# Patient Record
Sex: FEMALE | Race: OTHER | HISPANIC OR LATINO | Employment: UNEMPLOYED | URBAN - METROPOLITAN AREA
[De-identification: names, ages, dates, MRNs, and addresses within clinical notes are randomized per-mention and may not be internally consistent; named-entity substitution may affect disease eponyms.]

---

## 2017-01-15 ENCOUNTER — HOSPITAL ENCOUNTER (EMERGENCY)
Facility: HOSPITAL | Age: 37
Discharge: HOME | End: 2017-01-15
Attending: EMERGENCY MEDICINE | Admitting: EMERGENCY MEDICINE
Payer: COMMERCIAL

## 2017-01-15 VITALS
WEIGHT: 177 LBS | TEMPERATURE: 97.8 F | RESPIRATION RATE: 14 BRPM | OXYGEN SATURATION: 99 % | HEART RATE: 76 BPM | DIASTOLIC BLOOD PRESSURE: 61 MMHG | SYSTOLIC BLOOD PRESSURE: 108 MMHG

## 2017-01-15 DIAGNOSIS — G43.909 MIGRAINE HEADACHE: Primary | ICD-10-CM

## 2017-01-15 PROCEDURE — 96361 HYDRATE IV INFUSION ADD-ON: CPT

## 2017-01-15 PROCEDURE — 96375 TX/PRO/DX INJ NEW DRUG ADDON: CPT

## 2017-01-15 PROCEDURE — 99283 EMERGENCY DEPT VISIT LOW MDM: CPT

## 2017-01-15 PROCEDURE — 96374 THER/PROPH/DIAG INJ IV PUSH: CPT

## 2017-01-15 RX ORDER — KETOROLAC TROMETHAMINE 30 MG/ML
15 INJECTION, SOLUTION INTRAMUSCULAR; INTRAVENOUS ONCE
Status: COMPLETED | OUTPATIENT
Start: 2017-01-15 | End: 2017-01-15

## 2017-01-15 RX ORDER — METOCLOPRAMIDE HYDROCHLORIDE 5 MG/ML
10 INJECTION INTRAMUSCULAR; INTRAVENOUS ONCE
Status: COMPLETED | OUTPATIENT
Start: 2017-01-15 | End: 2017-01-15

## 2017-01-15 RX ADMIN — METOCLOPRAMIDE 10 MG: 5 INJECTION, SOLUTION INTRAMUSCULAR; INTRAVENOUS at 08:45

## 2017-01-15 RX ADMIN — SODIUM CHLORIDE 1000 ML: 0.9 INJECTION, SOLUTION INTRAVENOUS at 08:44

## 2017-01-15 RX ADMIN — KETOROLAC TROMETHAMINE 15 MG: 30 INJECTION, SOLUTION INTRAMUSCULAR at 08:44

## 2017-01-17 ENCOUNTER — ALLSCRIPTS OFFICE VISIT (OUTPATIENT)
Dept: OTHER | Facility: OTHER | Age: 37
End: 2017-01-17

## 2017-01-27 ENCOUNTER — ALLSCRIPTS OFFICE VISIT (OUTPATIENT)
Dept: OTHER | Facility: OTHER | Age: 37
End: 2017-01-27

## 2017-01-27 DIAGNOSIS — R26.89 OTHER ABNORMALITIES OF GAIT AND MOBILITY: ICD-10-CM

## 2017-01-27 DIAGNOSIS — D64.9 ANEMIA: ICD-10-CM

## 2017-01-27 DIAGNOSIS — R26.9 ABNORMALITY OF GAIT AND MOBILITY: ICD-10-CM

## 2017-01-27 DIAGNOSIS — G43.709 CHRONIC MIGRAINE WITHOUT AURA WITHOUT STATUS MIGRAINOSUS, NOT INTRACTABLE: ICD-10-CM

## 2017-01-27 DIAGNOSIS — G31.9 DEGENERATIVE DISEASE OF NERVOUS SYSTEM (HCC): ICD-10-CM

## 2017-01-27 DIAGNOSIS — A69.22 OTHER NEUROLOGIC DISORDERS IN LYME DISEASE: ICD-10-CM

## 2017-01-30 ENCOUNTER — TRANSCRIBE ORDERS (OUTPATIENT)
Dept: ADMINISTRATIVE | Facility: HOSPITAL | Age: 37
End: 2017-01-30

## 2017-01-30 DIAGNOSIS — R26.9 ABNORMALITY OF GAIT: ICD-10-CM

## 2017-01-30 DIAGNOSIS — G31.9: Primary | ICD-10-CM

## 2017-01-30 DIAGNOSIS — G43.719 INTRACTABLE CHRONIC MIGRAINE WITHOUT AURA AND WITHOUT STATUS MIGRAINOSUS: ICD-10-CM

## 2017-01-30 DIAGNOSIS — R26.89 SCISSOR GAIT: ICD-10-CM

## 2017-02-16 ENCOUNTER — HOSPITAL ENCOUNTER (OUTPATIENT)
Dept: RADIOLOGY | Facility: HOSPITAL | Age: 37
Discharge: HOME | End: 2017-02-16
Payer: COMMERCIAL

## 2017-02-16 DIAGNOSIS — G43.719 INTRACTABLE CHRONIC MIGRAINE WITHOUT AURA AND WITHOUT STATUS MIGRAINOSUS: ICD-10-CM

## 2017-02-16 DIAGNOSIS — G31.9: ICD-10-CM

## 2017-02-16 DIAGNOSIS — R26.9 ABNORMALITY OF GAIT: ICD-10-CM

## 2017-02-16 DIAGNOSIS — R26.89 SCISSOR GAIT: ICD-10-CM

## 2017-02-16 PROCEDURE — A9585 GADOBUTROL INJECTION: HCPCS | Performed by: RADIOLOGY

## 2017-02-16 PROCEDURE — 70553 MRI BRAIN STEM W/O & W/DYE: CPT

## 2017-02-16 RX ADMIN — GADOBUTROL 7 ML: 604.72 INJECTION INTRAVENOUS at 15:22

## 2017-02-20 ENCOUNTER — TRANSCRIBE ORDERS (OUTPATIENT)
Dept: ADMINISTRATIVE | Facility: HOSPITAL | Age: 37
End: 2017-02-20

## 2017-02-20 ENCOUNTER — ALLSCRIPTS OFFICE VISIT (OUTPATIENT)
Dept: OTHER | Facility: OTHER | Age: 37
End: 2017-02-20

## 2017-02-20 DIAGNOSIS — R26.9 ABNORMALITY OF GAIT: ICD-10-CM

## 2017-02-20 DIAGNOSIS — G43.701 CHRONIC MIGRAINE WITHOUT AURA WITH STATUS MIGRAINOSUS, NOT INTRACTABLE: ICD-10-CM

## 2017-02-20 DIAGNOSIS — R26.89 SCISSOR GAIT: Primary | ICD-10-CM

## 2017-03-07 ENCOUNTER — GENERIC CONVERSION - ENCOUNTER (OUTPATIENT)
Dept: OTHER | Facility: OTHER | Age: 37
End: 2017-03-07

## 2017-03-07 ENCOUNTER — APPOINTMENT (OUTPATIENT)
Dept: PHYSICAL THERAPY | Facility: REHABILITATION | Age: 37
End: 2017-03-07
Payer: COMMERCIAL

## 2017-03-07 PROCEDURE — 97163 PT EVAL HIGH COMPLEX 45 MIN: CPT

## 2017-03-07 PROCEDURE — 97110 THERAPEUTIC EXERCISES: CPT

## 2017-03-09 ENCOUNTER — APPOINTMENT (OUTPATIENT)
Dept: PHYSICAL THERAPY | Facility: REHABILITATION | Age: 37
End: 2017-03-09
Payer: COMMERCIAL

## 2017-03-09 PROCEDURE — 97110 THERAPEUTIC EXERCISES: CPT

## 2017-03-09 PROCEDURE — 97112 NEUROMUSCULAR REEDUCATION: CPT

## 2017-03-14 ENCOUNTER — APPOINTMENT (OUTPATIENT)
Dept: PHYSICAL THERAPY | Facility: REHABILITATION | Age: 37
End: 2017-03-14
Payer: COMMERCIAL

## 2017-03-16 ENCOUNTER — APPOINTMENT (OUTPATIENT)
Dept: PHYSICAL THERAPY | Facility: REHABILITATION | Age: 37
End: 2017-03-16
Payer: COMMERCIAL

## 2017-03-20 ENCOUNTER — ALLSCRIPTS OFFICE VISIT (OUTPATIENT)
Dept: OTHER | Facility: OTHER | Age: 37
End: 2017-03-20

## 2017-03-20 DIAGNOSIS — N92.0 EXCESSIVE AND FREQUENT MENSTRUATION WITH REGULAR CYCLE: ICD-10-CM

## 2017-03-20 LAB — HCG, QUALITATIVE (HISTORICAL): NEGATIVE

## 2017-03-21 ENCOUNTER — HOSPITAL ENCOUNTER (OUTPATIENT)
Dept: RADIOLOGY | Facility: HOSPITAL | Age: 37
Discharge: HOME | End: 2017-03-21
Attending: PSYCHIATRY & NEUROLOGY
Payer: COMMERCIAL

## 2017-03-21 ENCOUNTER — APPOINTMENT (OUTPATIENT)
Dept: PHYSICAL THERAPY | Facility: REHABILITATION | Age: 37
End: 2017-03-21
Payer: COMMERCIAL

## 2017-03-21 DIAGNOSIS — G43.709 CHRONIC MIGRAINE WITHOUT AURA WITHOUT STATUS MIGRAINOSUS, NOT INTRACTABLE: ICD-10-CM

## 2017-03-21 PROCEDURE — 97112 NEUROMUSCULAR REEDUCATION: CPT

## 2017-03-21 PROCEDURE — 72141 MRI NECK SPINE W/O DYE: CPT

## 2017-03-23 ENCOUNTER — APPOINTMENT (OUTPATIENT)
Dept: PHYSICAL THERAPY | Facility: REHABILITATION | Age: 37
End: 2017-03-23
Payer: COMMERCIAL

## 2017-03-23 PROCEDURE — 97112 NEUROMUSCULAR REEDUCATION: CPT

## 2017-03-28 ENCOUNTER — APPOINTMENT (OUTPATIENT)
Dept: PHYSICAL THERAPY | Facility: REHABILITATION | Age: 37
End: 2017-03-28
Payer: COMMERCIAL

## 2017-03-28 PROCEDURE — 97112 NEUROMUSCULAR REEDUCATION: CPT

## 2017-03-28 PROCEDURE — 97110 THERAPEUTIC EXERCISES: CPT

## 2017-03-29 ENCOUNTER — HOSPITAL ENCOUNTER (OUTPATIENT)
Dept: NEUROLOGY | Facility: AMBULATORY SURGERY CENTER | Age: 37
Discharge: HOME | End: 2017-03-29
Payer: COMMERCIAL

## 2017-03-29 DIAGNOSIS — R26.9 ABNORMALITY OF GAIT: ICD-10-CM

## 2017-03-29 DIAGNOSIS — R26.89 SCISSOR GAIT: ICD-10-CM

## 2017-03-29 PROCEDURE — 95886 MUSC TEST DONE W/N TEST COMP: CPT

## 2017-03-29 PROCEDURE — 95910 NRV CNDJ TEST 7-8 STUDIES: CPT

## 2017-04-18 ENCOUNTER — ALLSCRIPTS OFFICE VISIT (OUTPATIENT)
Dept: OTHER | Facility: OTHER | Age: 37
End: 2017-04-18

## 2017-04-27 ENCOUNTER — ALLSCRIPTS OFFICE VISIT (OUTPATIENT)
Dept: OTHER | Facility: OTHER | Age: 37
End: 2017-04-27

## 2017-05-08 ENCOUNTER — ALLSCRIPTS OFFICE VISIT (OUTPATIENT)
Dept: OTHER | Facility: OTHER | Age: 37
End: 2017-05-08

## 2017-05-16 ENCOUNTER — ALLSCRIPTS OFFICE VISIT (OUTPATIENT)
Dept: OTHER | Facility: OTHER | Age: 37
End: 2017-05-16

## 2017-06-20 ENCOUNTER — ALLSCRIPTS OFFICE VISIT (OUTPATIENT)
Dept: OTHER | Facility: OTHER | Age: 37
End: 2017-06-20

## 2017-07-11 ENCOUNTER — ALLSCRIPTS OFFICE VISIT (OUTPATIENT)
Dept: OTHER | Facility: OTHER | Age: 37
End: 2017-07-11

## 2017-07-31 ENCOUNTER — GENERIC CONVERSION - ENCOUNTER (OUTPATIENT)
Dept: OTHER | Facility: OTHER | Age: 37
End: 2017-07-31

## 2017-08-14 ENCOUNTER — ALLSCRIPTS OFFICE VISIT (OUTPATIENT)
Dept: OTHER | Facility: OTHER | Age: 37
End: 2017-08-14

## 2017-08-14 DIAGNOSIS — E55.9 VITAMIN D DEFICIENCY: ICD-10-CM

## 2017-08-21 ENCOUNTER — GENERIC CONVERSION - ENCOUNTER (OUTPATIENT)
Dept: OTHER | Facility: OTHER | Age: 37
End: 2017-08-21

## 2017-10-02 ENCOUNTER — GENERIC CONVERSION - ENCOUNTER (OUTPATIENT)
Dept: OTHER | Facility: OTHER | Age: 37
End: 2017-10-02

## 2017-11-08 ENCOUNTER — HOSPITAL ENCOUNTER (EMERGENCY)
Facility: HOSPITAL | Age: 37
Discharge: HOME | End: 2017-11-08
Payer: COMMERCIAL

## 2017-11-08 ENCOUNTER — APPOINTMENT (EMERGENCY)
Dept: CT IMAGING | Facility: HOSPITAL | Age: 37
End: 2017-11-08
Payer: COMMERCIAL

## 2017-11-08 VITALS
BODY MASS INDEX: 26.73 KG/M2 | HEART RATE: 75 BPM | OXYGEN SATURATION: 97 % | DIASTOLIC BLOOD PRESSURE: 62 MMHG | TEMPERATURE: 99 F | RESPIRATION RATE: 17 BRPM | SYSTOLIC BLOOD PRESSURE: 116 MMHG | WEIGHT: 181 LBS

## 2017-11-08 DIAGNOSIS — K29.70 VIRAL GASTRITIS: ICD-10-CM

## 2017-11-08 DIAGNOSIS — R10.30 LOWER ABDOMINAL PAIN: Primary | ICD-10-CM

## 2017-11-08 LAB
ALBUMIN SERPL BCP-MCNC: 3.4 G/DL (ref 3.5–5)
ALP SERPL-CCNC: 72 U/L (ref 46–116)
ALT SERPL W P-5'-P-CCNC: 15 U/L (ref 12–78)
ANION GAP SERPL CALCULATED.3IONS-SCNC: 7 MMOL/L (ref 4–13)
AST SERPL W P-5'-P-CCNC: 14 U/L (ref 5–45)
BACTERIA UR QL AUTO: ABNORMAL /HPF
BASOPHILS # BLD AUTO: 0.01 THOUSANDS/ΜL (ref 0–0.1)
BASOPHILS NFR BLD AUTO: 0 % (ref 0–1)
BILIRUB SERPL-MCNC: 0.25 MG/DL (ref 0.2–1)
BILIRUB UR QL STRIP: NEGATIVE
BUN SERPL-MCNC: 10 MG/DL (ref 5–25)
CALCIUM SERPL-MCNC: 8.7 MG/DL (ref 8.3–10.1)
CHLORIDE SERPL-SCNC: 106 MMOL/L (ref 100–108)
CLARITY UR: CLEAR
CO2 SERPL-SCNC: 27 MMOL/L (ref 21–32)
COLOR UR: YELLOW
COLOR, POC: YELLOW
CREAT SERPL-MCNC: 0.72 MG/DL (ref 0.6–1.3)
EOSINOPHIL # BLD AUTO: 0.07 THOUSAND/ΜL (ref 0–0.61)
EOSINOPHIL NFR BLD AUTO: 2 % (ref 0–6)
ERYTHROCYTE [DISTWIDTH] IN BLOOD BY AUTOMATED COUNT: 14.2 % (ref 11.6–15.1)
EXT PREG TEST URINE: NEGATIVE
GFR SERPL CREATININE-BSD FRML MDRD: 109 ML/MIN/1.73SQ M
GLUCOSE SERPL-MCNC: 103 MG/DL (ref 65–140)
GLUCOSE UR STRIP-MCNC: NEGATIVE MG/DL
HCT VFR BLD AUTO: 34.2 % (ref 34.8–46.1)
HGB BLD-MCNC: 11 G/DL (ref 11.5–15.4)
HGB UR QL STRIP.AUTO: ABNORMAL
KETONES UR STRIP-MCNC: NEGATIVE MG/DL
LEUKOCYTE ESTERASE UR QL STRIP: NEGATIVE
LIPASE SERPL-CCNC: 149 U/L (ref 73–393)
LYMPHOCYTES # BLD AUTO: 1.1 THOUSANDS/ΜL (ref 0.6–4.47)
LYMPHOCYTES NFR BLD AUTO: 25 % (ref 14–44)
MCH RBC QN AUTO: 26.3 PG (ref 26.8–34.3)
MCHC RBC AUTO-ENTMCNC: 32.2 G/DL (ref 31.4–37.4)
MCV RBC AUTO: 82 FL (ref 82–98)
MONOCYTES # BLD AUTO: 0.37 THOUSAND/ΜL (ref 0.17–1.22)
MONOCYTES NFR BLD AUTO: 8 % (ref 4–12)
MUCOUS THREADS UR QL AUTO: ABNORMAL
NEUTROPHILS # BLD AUTO: 2.91 THOUSANDS/ΜL (ref 1.85–7.62)
NEUTS SEG NFR BLD AUTO: 65 % (ref 43–75)
NITRITE UR QL STRIP: NEGATIVE
NON-SQ EPI CELLS URNS QL MICRO: ABNORMAL /HPF
NRBC BLD AUTO-RTO: 0 /100 WBCS
PH UR STRIP.AUTO: 6 [PH] (ref 4.5–8)
PLATELET # BLD AUTO: 179 THOUSANDS/UL (ref 149–390)
PMV BLD AUTO: 12 FL (ref 8.9–12.7)
POTASSIUM SERPL-SCNC: 3.9 MMOL/L (ref 3.5–5.3)
PROT SERPL-MCNC: 7.1 G/DL (ref 6.4–8.2)
PROT UR STRIP-MCNC: ABNORMAL MG/DL
RBC # BLD AUTO: 4.19 MILLION/UL (ref 3.81–5.12)
RBC #/AREA URNS AUTO: ABNORMAL /HPF
SODIUM SERPL-SCNC: 140 MMOL/L (ref 136–145)
SP GR UR STRIP.AUTO: 1.02 (ref 1–1.03)
UROBILINOGEN UR QL STRIP.AUTO: 0.2 E.U./DL
WBC # BLD AUTO: 4.46 THOUSAND/UL (ref 4.31–10.16)
WBC #/AREA URNS AUTO: ABNORMAL /HPF

## 2017-11-08 PROCEDURE — 99284 EMERGENCY DEPT VISIT MOD MDM: CPT

## 2017-11-08 PROCEDURE — 80053 COMPREHEN METABOLIC PANEL: CPT | Performed by: PHYSICIAN ASSISTANT

## 2017-11-08 PROCEDURE — 36415 COLL VENOUS BLD VENIPUNCTURE: CPT | Performed by: PHYSICIAN ASSISTANT

## 2017-11-08 PROCEDURE — 74177 CT ABD & PELVIS W/CONTRAST: CPT

## 2017-11-08 PROCEDURE — 96375 TX/PRO/DX INJ NEW DRUG ADDON: CPT

## 2017-11-08 PROCEDURE — 81002 URINALYSIS NONAUTO W/O SCOPE: CPT | Performed by: PHYSICIAN ASSISTANT

## 2017-11-08 PROCEDURE — 81025 URINE PREGNANCY TEST: CPT | Performed by: PHYSICIAN ASSISTANT

## 2017-11-08 PROCEDURE — 96374 THER/PROPH/DIAG INJ IV PUSH: CPT

## 2017-11-08 PROCEDURE — 81001 URINALYSIS AUTO W/SCOPE: CPT

## 2017-11-08 PROCEDURE — 96361 HYDRATE IV INFUSION ADD-ON: CPT

## 2017-11-08 PROCEDURE — 83690 ASSAY OF LIPASE: CPT | Performed by: PHYSICIAN ASSISTANT

## 2017-11-08 PROCEDURE — 85025 COMPLETE CBC W/AUTO DIFF WBC: CPT | Performed by: PHYSICIAN ASSISTANT

## 2017-11-08 RX ORDER — ONDANSETRON 2 MG/ML
4 INJECTION INTRAMUSCULAR; INTRAVENOUS ONCE
Status: COMPLETED | OUTPATIENT
Start: 2017-11-08 | End: 2017-11-08

## 2017-11-08 RX ORDER — KETOROLAC TROMETHAMINE 30 MG/ML
15 INJECTION, SOLUTION INTRAMUSCULAR; INTRAVENOUS ONCE
Status: COMPLETED | OUTPATIENT
Start: 2017-11-08 | End: 2017-11-08

## 2017-11-08 RX ORDER — FAMOTIDINE 10 MG
10 TABLET ORAL DAILY
COMMUNITY
End: 2018-10-29 | Stop reason: SDUPTHER

## 2017-11-08 RX ORDER — DICYCLOMINE HCL 20 MG
20 TABLET ORAL ONCE
Status: COMPLETED | OUTPATIENT
Start: 2017-11-08 | End: 2017-11-08

## 2017-11-08 RX ADMIN — SODIUM CHLORIDE 1000 ML: 0.9 INJECTION, SOLUTION INTRAVENOUS at 07:21

## 2017-11-08 RX ADMIN — ONDANSETRON 4 MG: 2 INJECTION INTRAMUSCULAR; INTRAVENOUS at 07:27

## 2017-11-08 RX ADMIN — KETOROLAC TROMETHAMINE 15 MG: 30 INJECTION, SOLUTION INTRAMUSCULAR at 07:26

## 2017-11-08 RX ADMIN — DICYCLOMINE HYDROCHLORIDE 20 MG: 20 TABLET ORAL at 09:13

## 2017-11-08 RX ADMIN — IOHEXOL 100 ML: 350 INJECTION, SOLUTION INTRAVENOUS at 08:15

## 2017-11-08 NOTE — ED NOTES
Patient reports has had right sided abdominal pain for the past 3 days  Describes it as intermittent sharp pain  Occ  radiation to the back  Reports nausea  No vomiting or diarrhea  No urinary symptoms       Maricarmen Ledesma RN  11/08/17 5013

## 2017-11-08 NOTE — DISCHARGE INSTRUCTIONS
Dolor abdominal   LO QUE NECESITA SABER:   El dolor abdominal puede ser sordo, [place], o elijah  Usted puede sentir dolor localizado en berny liz área del abdomen o en todo el abdomen  El dolor puede ser causado por berny afección arnav estreñimiento, sensibilidad o intoxicación alimentaria, infección o berny obstrucción  Asimismo, el dolor abdominal puede deberse a berny hernia, apendicitis o [place]  Las enfermedades del hígado, la vesícula o el riñón también pueden causar dolor abdominal  La causa del dolor abdominal puede ser desconocida  INSTRUCCIONES SOBRE EL ROB HOSPITALARIA:   Regrese a la spenser de emergencias si:   · Usted comienza a sentir un dolor en el pecho o dificultad para respirar que antes no sentía  · Usted siente un dolor con pulsaciones en la parte superior del abdomen o en la parte inferior de la espalda que de repente se vuelve jessenia  · El dolor se localiza en la parte inferior derecha del abdomen y [place] cuando se [place]  · Usted tiene fiebre por encima de los 100 4 °F (38 °C) o escalofríos  · Usted tiene vómitos y no puede retener líquidos ni alimentos en el estómago  · El dolor no mejora o empeora en las próximas 8 a 12 horas  · Usted nota oscar en shepard vómito o heces, o éstas tienen un aspecto negruzco y alquitranado  · Shepard piel o las partes modesto de flora ojos se vuelven amarillentas  · Si usted es berny paulina y presenta abundante sangrado vaginal que no es shepard menstruación  Pregúntele a shepard [place] vitaminas y minerales son adecuados para usted  · Usted siente dolor en la parte inferior de la espalda  · Usted es [place] y tiene dolor en los testículos  · Siente dolor al [place]  · Usted tiene preguntas o inquietudes acerca de shepard condición o cuidado  Acuda en 24 horas a berny raven de seguimiento con shepard médico o arnav se le indique:  Anote flora preguntas para que se acuerde de hacerlas sonja flora visitas     Medicamentos:   · Samella Brochure ser administrados para calmar shepard estómago y prevenir los vómitos o para disminuir el dolor  Pregunte cómo se debe kinza los analgésicos de forma arnold  · [place] flora medicamentos arnav se le haya indicado  Consulte con shepard médico si usted justin que shepard medicamento no le está ayudando o si presenta efectos secundarios  Infórmele si es alérgico a algún medicamento  Mantenga berny lista actualizada de los [place], las vitaminas y los productos herbales que glenna  Incluya los siguientes datos de los medicamentos: cantidad, frecuencia y motivo de administración  Traiga con usted la lista o los envases de la píldoras a flora citas de seguimiento  Lleve la lista de los medicamentos con usted en ashley de berny emergencia  © 2017 [address]  Information is for End User's use only and may not be sold, redistributed or otherwise used for commercial purposes  All illustrations and images included in CareNotes® are the copyrighted property of A D A M , Inc  or Hernan Brown  Esta información es sólo para uso en educación  Shepard intención no es darle un consejo médico sobre enfermedades o tratamientos  Colsulte con shepard Thelma Gan farmacéutico antes de seguir cualquier régimen médico para saber si es seguro y efectivo para usted  Dolor abdominal elijah   LO QUE NECESITA SABER:   No se puede encontrar la causa del dolor abdominal  Si se encuentra berny causa, el tratamiento dependerá de cuál es marce causa  INSTRUCCIONES SOBRE EL ROB HOSPITALARIA:   Regrese a la spenser de emergencias si:   · Usted no puede dejar de vomitar o vomita oscar  · Usted tiene [employer] evacuaciones intestinales o estas tienen un aspecto alquitranado  · Usted tiene sangrado por shepard recto  · El tamaño del abdomen es más hoda de lo normal y se siente gino y [place] doloroso  · Usted tiene dolor abdominal intenso  · Usted paul de tener flatulencias y evacuaciones intestinales       · Usted se siente mareado, débil o tiene sensación de Tay Sachs  Pregúntele a shepard Nadia Paddy vitaminas y minerales son adecuados para usted  · Usted tiene fiebre  · Tiene nuevos signos y síntomas  · Flora síntomas no mejoran con el tratamiento  · Usted tiene preguntas o inquietudes acerca de shepard condición o cuidado  Medicamentos,  estos pueden administrarse para disminuir el dolor, tratar berny infección y [place] flora síntomas  [place] los [place] arnav se le haya indicado  Llame a shepard médico si usted piensa que el medicamento no está ayudando o si tiene efectos secundarios  Infórmele si es alérgico a cualquier medicamento  Mantenga berny lista actualizada de los [place], las vitaminas y los productos herbales que glenna  Incluya los siguientes datos de los medicamentos: cantidad, frecuencia y motivo de administración  Traiga con usted la lista o los envases de la píldoras a flora citas de seguimiento  Lleve la lista de los medicamentos con usted en ashley de berny emergencia  [place] de shepard síntomas:   · La aplicación de calor  sobre el abdomen de 20 a 30 minutos cada 2 horas por los [employer] indiquen  El calor ayuda a disminuir el dolor y los espasmos musculares  · Controle shepard estrés  El estrés puede causar dolor abdominal  Shepard médico puede recomendarle técnicas de relajación y ejercicios de respiración profunda para ayudar a disminuir el estrés  Shepard médico puede recomendarle que hable con alguien sobre shepard estrés o ansiedad, arnav un consejero o un amigo de [place]  Duerma lo suficiente y realice ejercicio regularmente  · Limite o no consuma bebidas alcohólicas  El alcohol puede empeorar el dolor abdominal  Pregunte a shepard médico si usted puede kinza alcohol  Pregunte cuanto es la cantidad arnold para usted kinza  · No fume  La nicotina y otros químicos en los cigarrillos pueden dañarle el esófago y [place]  Pida información a shepard médico si usted actualmente fuma y necesita ayuda para dejar de fumar   Los cigarrillos electrónicos o tabaco sin humo todavía contienen nicotina  Consulte con shepard médico antes de [employer]  Realice cambios en los alimentos que consume según se le indique:  No coma alimentos que causan dolor abdominal u otros síntomas  Ingiera comidas pequeñas, más a menudo  · Coma más alimentos ricos en fibra si tiene estreñimiento  Los alimentos altos en fibra incluyen frutas, verduras, alimentos de grano integral y legumbres  · No coma alimentos que causan gas si tiene distensión  Por ejemplo, brócoli, col y coliflor  No stephanie gaseosas o bebidas carbonadas, ya que también pueden provocarle gases  · No consuma alimentos o bebidas que contienen sorbitol o fructosa si tiene diarrea y distensión  Louise Loose son jugos de frutas, dulces, mermeladas y gomas de mascar sin azúcar  · No coma alimentos altos en grasas, arnav comidas fritas, hamburguesas con queso, salchichas y postres  · Limite o no tome cafeína  La cafeína [employer], arnav la acidez o las náuseas  · Stephanie suficientes líquidos para evitar la deshidratación causada por la diarrea o los vómitos  Pregunte a shepard médico sobre la cantidad de líquido que necesita kinza todos los días y cuáles le recomienda  Acuda a flora consultas de control con shepard médico según le indicaron  Anote flora preguntas para que se acuerde de hacerlas sonja flora visitas  © 2017 [facility] Information is for End User's use only and may not be sold, redistributed or otherwise used for commercial purposes  All illustrations and images included in CareNotes® are the copyrighted property of A D A [employer] Inc  or Hernan Brown  Esta información es sólo para uso en educación  Shepard intención no es darle un consejo médico sobre enfermedades o tratamientos  Colsulte con shepard Shiraz Travis farmacéutico antes de seguir cualquier régimen médico para saber si es seguro y efectivo para usted

## 2017-11-08 NOTE — ED PROVIDER NOTES
History  Chief Complaint   Patient presents with    Abdominal Pain     Pt reports right lower abdominal pain with nausea since monday     31 y/o female presents to the ED with c/o intermittent right abdominal pain since Monday evening  States pain comes and goes, has associated Nausea  Denies Vomiting, diarrhea, fever, constipation  Last BM was yesterday and was normal  Has been able to eat and drink but decreased appetite  Denies radiation of pain,  aggravating or alleviating factors  Has history of , oopherectomy and tubal ligation in the past  Denies tobacco, ETOH or illicit drug use            Prior to Admission Medications   Prescriptions Last Dose Informant Patient Reported? Taking?   famotidine (PEPCID) 10 mg tablet 2017 at Unknown time  Yes Yes   Sig: Take 10 mg by mouth daily      Facility-Administered Medications: None       History reviewed  No pertinent past medical history  History reviewed  No pertinent surgical history  History reviewed  No pertinent family history  I have reviewed and agree with the history as documented  Social History   Substance Use Topics    Smoking status: Never Smoker    Smokeless tobacco: Never Used    Alcohol use No        Review of Systems   Gastrointestinal: Positive for abdominal pain and nausea  Negative for abdominal distention and vomiting  Genitourinary: Negative for dyspareunia, dysuria, enuresis, flank pain, frequency, hematuria, menstrual problem and pelvic pain  All other systems reviewed and are negative        Physical Exam  ED Triage Vitals [17 0652]   Temperature Pulse Respirations Blood Pressure SpO2   98 3 °F (36 8 °C) 92 16 128/76 98 %      Temp Source Heart Rate Source Patient Position - Orthostatic VS BP Location FiO2 (%)   Oral Monitor Sitting Right arm --      Pain Score       9           Orthostatic Vital Signs  Vitals:    17 0652   BP: 128/76   Pulse: 92   Patient Position - Orthostatic VS: Sitting Physical Exam   Constitutional: She is oriented to person, place, and time  She appears well-developed and well-nourished  HENT:   Head: Normocephalic and atraumatic  Eyes: Conjunctivae are normal    Neck: Normal range of motion  Cardiovascular: Normal rate and regular rhythm  Pulmonary/Chest: Effort normal and breath sounds normal    Abdominal: Soft  Bowel sounds are decreased  There is tenderness in the right upper quadrant, right lower quadrant and epigastric area  There is no rigidity, no rebound, no guarding, no CVA tenderness, no tenderness at McBurney's point and negative Bond's sign  Neurological: She is alert and oriented to person, place, and time  Skin: Skin is warm and dry  Capillary refill takes less than 2 seconds  Psychiatric: She has a normal mood and affect  Nursing note and vitals reviewed        ED Medications  Medications   sodium chloride 0 9 % bolus 1,000 mL (1,000 mL Intravenous New Bag 11/8/17 0721)   ketorolac (TORADOL) 30 mg/mL injection 15 mg (15 mg Intravenous Given 11/8/17 0726)   ondansetron (ZOFRAN) injection 4 mg (4 mg Intravenous Given 11/8/17 0727)       Diagnostic Studies  Results Reviewed     Procedure Component Value Units Date/Time    CBC and differential [[id number]]  (Abnormal) Collected:  11/08/17 0715    Lab Status:  Final result Specimen:  Blood from Arm, Right Updated:  11/08/17 0727     WBC 4 46 Thousand/uL      RBC 4 19 Million/uL      Hemoglobin 11 0 (L) g/dL      Hematocrit 34 2 (L) %      MCV 82 fL      MCH 26 3 (L) pg      MCHC 32 2 g/dL      RDW 14 2 %      MPV 12 0 fL      Platelets 189 Thousands/uL      nRBC 0 /100 WBCs      Neutrophils Relative 65 %      Lymphocytes Relative 25 %      Monocytes Relative 8 %      Eosinophils Relative 2 %      Basophils Relative 0 %      Neutrophils Absolute 2 91 Thousands/µL      Lymphocytes Absolute 1 10 Thousands/µL      Monocytes Absolute 0 37 Thousand/µL      Eosinophils Absolute 0 07 Thousand/µL Basophils Absolute 0 01 Thousands/µL     Comprehensive metabolic panel [[id number]] Collected:  11/08/17 0715    Lab Status: In process Specimen:  Blood from Arm, Right Updated:  11/08/17 0724    Lipase [[id number]] Collected:  11/08/17 0715    Lab Status: In process Specimen:  Blood from Arm, Right Updated:  11/08/17 0724    Urine Microscopic [[id number]] Collected:  11/08/17 0833    Lab Status: In process Specimen:  Urine from Urine, Clean Catch Updated:  11/08/17 0722    POCT urinalysis dipstick [[id number]]  (Abnormal) Resulted:  11/08/17 0720    Lab Status:  Final result Specimen:  Urine Updated:  11/08/17 0720     Color, UA yellow    POCT pregnancy, urine [[id number]]  (Normal) Resulted:  11/08/17 0720    Lab Status:  Final result Updated:  11/08/17 0720     EXT PREG TEST UR (Ref: Negative) negative    ED Urine Macroscopic [[id number]]  (Abnormal) Collected:  11/08/17 0833    Lab Status:  Final result Specimen:  Urine Updated:  11/08/17 0719     Color, UA Yellow     Clarity, UA Clear     pH, UA 6 0     Leukocytes, UA Negative     Nitrite, UA Negative     Protein, UA 30 (1+) (A) mg/dl      Glucose, UA Negative mg/dl      Ketones, UA Negative mg/dl      Urobilinogen, UA 0 2 E U /dl      Bilirubin, UA Negative     Blood, UA Moderate (A)     Specific [place], UA 1 025    Narrative:       CLINITEK RESULT                 CT abdomen pelvis with contrast    (Results Pending)              Procedures  Procedures       Phone Contacts  ED Phone Contact    ED Course  ED Course                                MDM  Number of Diagnoses or Management Options  Lower abdominal pain: new and requires workup  Viral gastritis: new and requires workup  Diagnosis management comments: 29 y/o female presents to the ED with c/o intermittent right abdominal pain since Monday evening  States pain comes and goes, has associated Nausea  Denies Vomiting, diarrhea, fever, constipation   Last BM was yesterday and was normal  Has been able to eat and drink but decreased appetite  Denies radiation of pain,  aggravating or alleviating factors  Has history of , oopherectomy and tubal ligation in the past  Denies tobacco, ETOH or illicit drug use  Pt  Is A&0x3 , NAD, appears uncomfortable, LS CTA Bl, RRR, abd soft, slightly decreased Bs, + Tenderness right side and epigastric area  No rebound tenderness, masses identified  Will order CBC, CMP, Lipase, IVF, Toradol, urine, CT abdomen, pelvis with IV contrast,   Labs WNL,   Pt  Reports mild improvement in abdominal discomfort with toradol will order bentyl  CT scan without acute pathology, normal appendix identified  Symptoms most likely viral in nature  Will DC home follow up PC            Amount and/or Complexity of Data Reviewed  Clinical lab tests: ordered and reviewed  Tests in the radiology section of CPT®: reviewed and ordered      CritCare Time    Disposition  Final diagnoses:   None     ED Disposition     None      Follow-up Information    None       Patient's Medications   Discharge Prescriptions    No medications on file     No discharge procedures on file      ED Provider  Electronically Signed by           Soha Murrieta PA-C  [id number]

## 2017-11-10 ENCOUNTER — ALLSCRIPTS OFFICE VISIT (OUTPATIENT)
Dept: OTHER | Facility: OTHER | Age: 37
End: 2017-11-10

## 2017-11-14 NOTE — PROGRESS NOTES
Assessment    1  Chronic migraine without aura (346 70) (G43 709)   2  Balance problems (781 99) (R26 89)   3  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 (331 9) (G31 9)   4  Gait disturbance (781 2) (R26 9)    Plan   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, Gait disturbance    · 3 - [facility] (NEUROSURGERY ) Co-Management  * for NEUROLOGYNOT NEUROSURGERY  Status: Active  Requested for: 38UBD8195   Ordered;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, Gait disturbance; Ordered By: Sravanthi Patten Performed:  Due: 66CDW2538; Last Updated By: Yesi Simon; 11/10/2017 3:46:51 PM  are Referring to a non- Preferred Provider : 2nd/3rd Opinion  Care Summary provided  : Yes  Chronic migraine without aura    · Dexamethasone 2 MG Oral Tablet; TAKE 1 TABLET DAILY in the morning for fivedays   Rx By: Sravanthi Patten; Dispense: 5 Days ; #:5 Tablet; Refill: 0;Chronic migraine without aura; JERALD = N; Verified Transmission to [facility]; Last Updated By: System, SureScripts; 11/10/2017 3:34:29 PM  Gait disturbance    · Follow-up visit in 3 months Evaluation and Treatment  Follow-up with Amos Streeter or Taryn Lao Status: Complete  Done: 19EDH5145   Ordered;Gait disturbance; Ordered By: Sravanthi Patten Performed:  Due: 67HUB6945; Last Updated By: Yesi Simon; 11/10/2017 3:43:34 PM  Topiramate 25 MG Oral Tablet; one at bedtime for five days then two afterthat; Therapy: 51VBN7615 to (Last Rx:10Nov2017)  Requested for: 12JJG6917; Status: ACTIVE Ordered Rx By: Sravanthi Patten; Dispense: 0 Days ; #:60 Tablet; Refill: 3;  For: Chronic migraine without aura; JERALD = N; Verified Transmission to [facility]; Last Updated By: System, SureScripts; 11/13/2017 9:16:23 AM   History of Present Illness  HPI: We had the pleasure of evaluating Lyric Hartley in neurological follow up today  As you know she is a 31year old right handed female  She is originally from [facility] and moved to the [facility] in [address]  She is here today by herself for evaluation of her headaches and balance problems    does not speak [place]  Used translation line today  problems:She has had balance problem since her last child  Her youngest child is now 15years of age  Does not recall having balance problem when she was younger  She has two sister with similar balance problem  Her one sisters symptoms of balance problem started at the age of 32 now age 30 and other sister symptoms started at the age 22 and she is now 37years of age  She is of balance when she gets up from sitting position or when walking  Has problem with writing and at time talking  No problem with swallowing  She states her memory is also poor and so is her vision  She was seen by an ophthalmologist two years ago but we do not have records of this  Her mother passed away at age 46 from HIV and brain tumor  Her mother also had balance problems  She has no history of cancer  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   Migraine headaches:noneadvilTreatments used in the past for headaches: noneHeadache trigger: Fatigue, Stress/Tensionnone  often do the headaches occur â daily for the last one year  time of the day do the headaches start â there all the time and worse in the morninglong do the headaches last - variesare they located - temporalis the intensity of pain â they can get up to 10/10 and has noted that since she was started on iron pills she is doing better  your usual headache - Throbbing, Poundingsymptoms:Decrease of appetite, nauseaPhotophobialight-headed or dizzyprefer to be alone and in a dark room, unable to work  ROS personally    Feb 17, 2017BRAIN PARENCHYMA: Mild diffuse cerebellar cortical atrophy, unchanged  No abnormal white matter signal identified  Diffusion imaging is unremarkable  There is no discrete mass, mass effect or midline shift  Normal brainstem  There is no intracranial hemorrhage  There is no evidence of acute infarction   Postcontrast imaging of the brain demonstrates no abnormal enhancement  VENTRICLES: Normal  SELLA AND PITUITARY GLAND: Normal  ORBITS: Normal  PARANASAL SINUSES: Normal  VASCULATURE: Evaluation of the major intracranial vasculature demonstrates appropriate flow voids  CALVARIUM AND SKULL BASE: Normal  EXTRACRANIAL SOFT TISSUES: Normal  IMPRESSION: Mild chronic cerebellar cortical volume loss No acute intracranial abnormalities No pathologic enhancement Similar to previous MRI study           Review of Systems  Neurological ROS:  Constitutional: no fever, no chills, no recent weight gain, no recent weight loss, no complaints of feeling tired, no changes in appetite  HEENT:  no sinus problems, not feeling congested, no blurred vision, no dryness of the eyes, no eye pain, no hearing loss, no tinnitus, no mouth sores, no sore throat, no hoarseness, no dysphagia, no masses, no bleeding  Cardiovascular:  no chest pain or pressure, no palpitations present, the heart rate was not rapid or irregular, no swelling in the arms or legs, no poor circulation  Respiratory:  no unusual or persistant cough, no shortness of breath with or without exertion  Gastrointestinal:  no nausea, no vomiting, no diarrhea, no abdominal pain, no changes in bowel habits, no melena, no loss of bowel control  Genitourinary:  no incontinence, no feelings of urinary urgency, no increase in frequency, no urinary hesitancy, no dysuria, no hematuria  Musculoskeletal:  no arthralgias, no myalgias, no immobility or loss of function, no head/neck/back pain, no pain while walking  Integumentary  no masses, no rash, no skin lesions, no livedo reticularis  Psychiatric:  no anxiety, no depression, no mood swings, no psychiatric hospitalizations, no sleep problems  Endocrine   no unusual weight loss or gain, no excessive urination, no excessive thirst, no hair loss or gain, no hot or cold intolerance, no menstrual period change or irregularity, no loss of sexual ability or drive, no erection difficulty, no nipple discharge  Hematologic/Lymphatic:  no unusual bleeding, no tendency for easy bruising, no clotting skin or lumps  Neurological General: headache, but-- no nausea or vomiting,-- no lightheadedness,-- no convulsions,-- no syncope-- and-- no trauma  Neurological Mental Status: mood swings  Neurological Cranial Nerves:  no blurry or double vision, no loss of vision, no face drooping, no facial numbness or weakness, no taste or smell loss/changes, no hearing loss or ringing, no vertigo or dizziness, no dysphagia, no slurred speech  Neurological Motor findings include:  no tremor, no twitching, no cramping(pre/post exercise), no atrophy  ROS Reviewed:   ROS reviewed  Active Problems  1  Acid indigestion (536 8) (K30)   2  Allergic reaction, initial encounter (995 3) (T78 40XA)   3  B12 deficiency (266 2) (E53 8)   4  Balance problems (781 99) (R26 89)   5  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 (331 9) (G31 9)   6  Chronic migraine without aura (346 70) (G43 709)   7  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)   8  Diplopia (368 2) (H53 2)   9  Encounter for cervical Pap smear with pelvic exam (V76 2,V72 31) (Z01 419)   10  Excessive vaginal bleeding (626 2) (N92 0)   11  Gait disturbance (781 2) (R26 9)   12  Influenza vaccine needed (V04 81) (Z23)   13  Iron deficiency anemia (280 9) (D50 9)   14  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,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)   15  Nystagmus (379 50) (H55 00)   16  Urticaria, chronic (708 8) (L50 8)   17  Vitamin D deficiency (268 9) (E55 9)    Past Medical History  1  History of nausea (V12 79) (Z87 898)   2  History of visual disturbance (V12 49) (Z86 69)    Surgical History  1  History of  Section   2  History of Oophorectomy - Unilateral (Removal Of One Ovary)   3  History of Tubal Ligation    Family History  Mother    1  Family history of AIDS   2   Family history of Brain tumor    Social History     · Always uses seat belt   · Does not drink alcohol (V49 89) (Z78 9)   · Does not use illicit drugs (O96 26) (Z78 9)   · Housewife or homemaker   · Lives with children   · Never a smoker   · No caffeine use   · Jehovah's witness   · Single   · Three children    Current Meds   1  Cetirizine HCl - 10 MG Oral Tablet; TAKE 1 TABLET DAILY AT NIGHT; Therapy: 53TWC8620 to (Evaluate:95Dtr2442)  Requested for: 20Jun2017; Last Rx:20Jun2017 Ordered   2  HydrOXYzine HCl - 10 MG Oral Tablet; 1-2 tabs at sleep for inc itchiness; Therapy: 65YXX8601 to (Last Natalia Grumbling)  Requested for: 27Apr2017 Ordered   3  RaNITidine HCl - 300 MG Oral Tablet; TAKE 1 TABLET BY MOUTH EVERY DAY AT BEDTIME; Therapy: 21CEA5384 to (Evaluate:18Oct2017)  Requested for: 20Jun2017; Last Rx:20Jun2017 Ordered   4  Vitamin B12 TABS; Therapy: (Recorded:65Xai9756) to Recorded    Allergies    1  Dye FDC Red 2 (Amaranth) POWD   2  Morphine Derivatives    Vitals  Signs   Recorded: 85AKE3340 02:42PM   Heart Rate: 639  Systolic: 597  Diastolic: 76  Weight: 927 lb   BMI Calculated: 29 38  BSA Calculated: 1 92    Physical Exam   Constitutional  General appearance: No acute distress, well appearing and well nourished  Eyes  Ophthalmoscopic examination: Vision is grossly normal  Gross visual field testing by confrontation shows no abnormalities  EOMI in both eyes  Conjunctivae clear  Eyelids normal palpebral fissures equal  Orbits exhibit normal position  No discharge from the eyes  PERRL  Musculoskeletal  Gait and station: Abnormal  -- (wide base and has a steppage gait, off balance when she turned around  difficulty with tandem but was able to manage with some support  )  Muscle strength: Normal strength throughout  Muscle tone: No atrophy, abnormal movements, flaccidity, cogwheeling or spasticity  Neurologic  Orientation to person, place, and time: Normal    Recent and remote memory: Abnormal  -- [place] 16/30 moca in chart  Attention span and concentration: Normal thought process and attention span     Language: Names objects, able to repeat phrases and speaks spontaneously  Fund of knowledge: Normal vocabulary with appropriate knowledge of current events and past history  2nd cranial nerve: Normal    3rd, 4th, and 6th cranial nerves: Normal    5th cranial nerve: Normal    7th cranial nerve: Normal    8th cranial nerve: Normal    9th cranial nerve: Normal    11th cranial nerve: Normal    12th cranial nerve: Normal    Sensation: Normal    Reflexes: Abnormal   Deep tendon reflexes: 3+ left patella2+ right biceps,-- 2+ left biceps,-- 2+ right triceps,-- 2+ left triceps,-- 2+ right brachioradialis,-- 2+ left brachioradialis,-- 2+ right patella,-- 2+ right ankle jerk-- and-- 2+ left ankle jerk  -- up going two on the left  Coordination: Abnormal  -- over all slow but able to do coordination  Mood and affect: Normal        Future Appointments    Date/Time Provider Specialty Site   12/14/2017 10:30 AM SONDRA Mcneill 476   03/02/2018 12:45 PM Concetta Martinez, [facility] Neurology [address]       Signatures   Electronically signed by : Kendal Díaz MD; Nov 13 2017  3:29PM EST                       (Author)

## 2017-12-14 ENCOUNTER — ALLSCRIPTS OFFICE VISIT (OUTPATIENT)
Dept: OTHER | Facility: OTHER | Age: 37
End: 2017-12-14

## 2017-12-14 DIAGNOSIS — E53.8 DEFICIENCY OF OTHER SPECIFIED B GROUP VITAMINS (CODE): ICD-10-CM

## 2017-12-14 DIAGNOSIS — G31.9 DEGENERATIVE DISEASE OF NERVOUS SYSTEM (HCC): ICD-10-CM

## 2017-12-14 DIAGNOSIS — L50.8 OTHER URTICARIA (CODE): ICD-10-CM

## 2017-12-14 DIAGNOSIS — H53.2 DIPLOPIA: ICD-10-CM

## 2017-12-14 DIAGNOSIS — D50.9 IRON DEFICIENCY ANEMIA: ICD-10-CM

## 2017-12-14 DIAGNOSIS — K30 FUNCTIONAL DYSPEPSIA: ICD-10-CM

## 2018-01-12 VITALS
DIASTOLIC BLOOD PRESSURE: 70 MMHG | WEIGHT: 183.13 LBS | BODY MASS INDEX: 29.43 KG/M2 | SYSTOLIC BLOOD PRESSURE: 106 MMHG | HEART RATE: 101 BPM | HEIGHT: 66 IN | OXYGEN SATURATION: 94 % | RESPIRATION RATE: 18 BRPM | TEMPERATURE: 98.6 F

## 2018-01-12 VITALS
HEART RATE: 106 BPM | WEIGHT: 182 LBS | BODY MASS INDEX: 26.88 KG/M2 | SYSTOLIC BLOOD PRESSURE: 137 MMHG | DIASTOLIC BLOOD PRESSURE: 76 MMHG

## 2018-01-13 VITALS
BODY MASS INDEX: 28.48 KG/M2 | SYSTOLIC BLOOD PRESSURE: 108 MMHG | WEIGHT: 181.44 LBS | HEIGHT: 67 IN | DIASTOLIC BLOOD PRESSURE: 62 MMHG

## 2018-01-13 VITALS
DIASTOLIC BLOOD PRESSURE: 70 MMHG | TEMPERATURE: 98.5 F | HEIGHT: 66 IN | BODY MASS INDEX: 28.85 KG/M2 | HEART RATE: 80 BPM | WEIGHT: 179.5 LBS | OXYGEN SATURATION: 98 % | SYSTOLIC BLOOD PRESSURE: 110 MMHG

## 2018-01-13 VITALS
DIASTOLIC BLOOD PRESSURE: 68 MMHG | BODY MASS INDEX: 27.94 KG/M2 | WEIGHT: 178 LBS | HEART RATE: 91 BPM | OXYGEN SATURATION: 99 % | SYSTOLIC BLOOD PRESSURE: 110 MMHG | HEIGHT: 67 IN | TEMPERATURE: 98.7 F

## 2018-01-13 VITALS
HEART RATE: 107 BPM | SYSTOLIC BLOOD PRESSURE: 106 MMHG | TEMPERATURE: 99.3 F | WEIGHT: 178.38 LBS | OXYGEN SATURATION: 99 % | HEIGHT: 66 IN | RESPIRATION RATE: 18 BRPM | BODY MASS INDEX: 28.67 KG/M2 | DIASTOLIC BLOOD PRESSURE: 70 MMHG

## 2018-01-14 VITALS
RESPIRATION RATE: 18 BRPM | OXYGEN SATURATION: 99 % | HEART RATE: 92 BPM | DIASTOLIC BLOOD PRESSURE: 72 MMHG | SYSTOLIC BLOOD PRESSURE: 110 MMHG | TEMPERATURE: 98.6 F | HEIGHT: 67 IN | WEIGHT: 174 LBS | BODY MASS INDEX: 27.31 KG/M2

## 2018-01-14 VITALS
RESPIRATION RATE: 18 BRPM | WEIGHT: 182 LBS | TEMPERATURE: 98.4 F | OXYGEN SATURATION: 97 % | DIASTOLIC BLOOD PRESSURE: 74 MMHG | HEIGHT: 66 IN | BODY MASS INDEX: 29.25 KG/M2 | HEART RATE: 95 BPM | SYSTOLIC BLOOD PRESSURE: 110 MMHG

## 2018-01-14 VITALS
OXYGEN SATURATION: 99 % | HEART RATE: 88 BPM | RESPIRATION RATE: 18 BRPM | TEMPERATURE: 98.9 F | SYSTOLIC BLOOD PRESSURE: 100 MMHG | DIASTOLIC BLOOD PRESSURE: 76 MMHG

## 2018-01-14 VITALS
SYSTOLIC BLOOD PRESSURE: 102 MMHG | OXYGEN SATURATION: 100 % | WEIGHT: 175.25 LBS | HEIGHT: 67 IN | DIASTOLIC BLOOD PRESSURE: 68 MMHG | BODY MASS INDEX: 27.51 KG/M2 | HEART RATE: 114 BPM | TEMPERATURE: 99.2 F

## 2018-01-14 VITALS
HEART RATE: 95 BPM | WEIGHT: 179.03 LBS | BODY MASS INDEX: 28.04 KG/M2 | SYSTOLIC BLOOD PRESSURE: 109 MMHG | DIASTOLIC BLOOD PRESSURE: 61 MMHG

## 2018-01-15 VITALS
OXYGEN SATURATION: 97 % | DIASTOLIC BLOOD PRESSURE: 64 MMHG | SYSTOLIC BLOOD PRESSURE: 106 MMHG | HEART RATE: 73 BPM | RESPIRATION RATE: 16 BRPM | BODY MASS INDEX: 29.77 KG/M2 | HEIGHT: 66 IN | TEMPERATURE: 98.5 F | WEIGHT: 185.25 LBS

## 2018-01-16 NOTE — MISCELLANEOUS
Signatures   Electronically signed by : SONDRA Bailey ; Jan 17 2017  9:53AM EST                       (Author)

## 2018-01-23 VITALS
RESPIRATION RATE: 20 BRPM | DIASTOLIC BLOOD PRESSURE: 76 MMHG | HEIGHT: 66 IN | SYSTOLIC BLOOD PRESSURE: 110 MMHG | TEMPERATURE: 98.8 F | BODY MASS INDEX: 29.73 KG/M2 | WEIGHT: 185 LBS | HEART RATE: 87 BPM | OXYGEN SATURATION: 99 %

## 2018-01-31 LAB — HBA1C MFR BLD HPLC: 5.5 %

## 2018-03-01 DIAGNOSIS — G43.709 CHRONIC MIGRAINE WITHOUT AURA WITHOUT STATUS MIGRAINOSUS, NOT INTRACTABLE: Primary | ICD-10-CM

## 2018-03-02 ENCOUNTER — OFFICE VISIT (OUTPATIENT)
Dept: NEUROLOGY | Facility: CLINIC | Age: 38
End: 2018-03-02
Payer: COMMERCIAL

## 2018-03-02 VITALS — HEART RATE: 95 BPM | SYSTOLIC BLOOD PRESSURE: 135 MMHG | DIASTOLIC BLOOD PRESSURE: 65 MMHG

## 2018-03-02 DIAGNOSIS — G31.9 CEREBRAL ATROPHY (HCC): ICD-10-CM

## 2018-03-02 DIAGNOSIS — G43.709 CHRONIC MIGRAINE WITHOUT AURA WITHOUT STATUS MIGRAINOSUS, NOT INTRACTABLE: Primary | ICD-10-CM

## 2018-03-02 DIAGNOSIS — R26.89 BALANCE DISORDER: ICD-10-CM

## 2018-03-02 PROCEDURE — 99214 OFFICE O/P EST MOD 30 MIN: CPT | Performed by: PHYSICIAN ASSISTANT

## 2018-03-02 RX ORDER — CETIRIZINE HYDROCHLORIDE 10 MG/1
TABLET ORAL
COMMUNITY
Start: 2016-11-08

## 2018-03-02 RX ORDER — DEXAMETHASONE 2 MG/1
2 TABLET ORAL
Qty: 5 TABLET | Refills: 0 | Status: SHIPPED | OUTPATIENT
Start: 2018-03-02 | End: 2018-04-03 | Stop reason: ALTCHOICE

## 2018-03-02 RX ORDER — TOPIRAMATE 25 MG/1
TABLET ORAL
Qty: 120 TABLET | Refills: 1 | Status: SHIPPED | OUTPATIENT
Start: 2018-03-02 | End: 2018-04-04 | Stop reason: HOSPADM

## 2018-03-02 RX ORDER — TOPIRAMATE 25 MG/1
TABLET ORAL
COMMUNITY
Start: 2017-11-10 | End: 2018-03-02 | Stop reason: SDUPTHER

## 2018-03-02 RX ORDER — RANITIDINE 300 MG/1
1 TABLET ORAL
COMMUNITY
Start: 2017-01-27 | End: 2018-04-11

## 2018-03-02 RX ORDER — DEXAMETHASONE 2 MG/1
TABLET ORAL
COMMUNITY
Start: 2017-11-10 | End: 2018-03-02 | Stop reason: SDUPTHER

## 2018-03-02 RX ORDER — VERAPAMIL HYDROCHLORIDE 40 MG/1
40 TABLET ORAL 2 TIMES DAILY
Qty: 60 TABLET | Refills: 1 | Status: SHIPPED | OUTPATIENT
Start: 2018-03-02 | End: 2018-08-01 | Stop reason: ALTCHOICE

## 2018-03-02 RX ORDER — HYDROXYZINE HYDROCHLORIDE 10 MG/1
TABLET, FILM COATED ORAL
COMMUNITY
Start: 2016-11-08 | End: 2018-08-01 | Stop reason: ALTCHOICE

## 2018-03-02 RX ORDER — OMEPRAZOLE 20 MG/1
CAPSULE, DELAYED RELEASE ORAL
COMMUNITY
Start: 2017-12-14 | End: 2018-04-11

## 2018-03-02 NOTE — PATIENT INSTRUCTIONS
Preventative: Increase Topamax 2 100 mg at bedtime  To do this start taking 3 tabs at bedtime for 5 days and then 4 tabs at bedtime  We will start verapamil 40 mg in the morning  Five days later, increase to twice a day  Abortive: We will try dexamethasone for 5 days to see if this break the headache cycle again    If continues to have daily persistent headaches which range from 5-10 out of 10, we may apply for Botox    Referral to [facility] or [facility] for balance and cerebral atrophy    Please call for an appointment

## 2018-03-02 NOTE — ASSESSMENT & PLAN NOTE
Preventative: Increase Topamax 2 100 mg at bedtime  To do this start taking 3 tabs at bedtime for 5 days and then 4 tabs at bedtime  We will start verapamil 40 mg in the morning  Five days later, increase to twice a day  Abortive:   We will try dexamethasone for 5 days to see if this break the headache cycle again    If continues to have daily persistent headaches which range from 5-10 out of 10, we may apply for Botox

## 2018-03-02 NOTE — PROGRESS NOTES
Patient ID: Dipika Vivar is a 30 y o  female  Assessment/Plan:    Cerebral atrophy  Will refer to [facility] as patient states [facility] did not take her insurance    Chronic migraine without aura without status migrainosus, not intractable  Preventative: Increase Topamax 2 100 mg at bedtime  To do this start taking 3 tabs at bedtime for 5 days and then 4 tabs at bedtime  We will start verapamil 40 mg in the morning  Five days later, increase to twice a day  Abortive: We will try dexamethasone for 5 days to see if this break the headache cycle again    If continues to have daily persistent headaches which range from 5-10 out of 10, we may apply for Botox         Problem List Items Addressed This Visit        Cardiovascular and Mediastinum    Chronic migraine without aura without status migrainosus, not intractable - Primary     Preventative: Increase Topamax 2 100 mg at bedtime  To do this start taking 3 tabs at bedtime for 5 days and then 4 tabs at bedtime  We will start verapamil 40 mg in the morning  Five days later, increase to twice a day  Abortive: We will try dexamethasone for 5 days to see if this break the headache cycle again    If continues to have daily persistent headaches which range from 5-10 out of 10, we may apply for Botox         Relevant Medications    topiramate (TOPAMAX) 25 mg tablet    verapamil (CALAN) 40 mg tablet    dexamethasone (DECADRON) 2 mg tablet       Nervous and Auditory    Cerebral atrophy     Will refer to [facility] or [facility] as patient states [facility] did not take her insurance         Relevant Orders    Ambulatory referral to Neurology       Other    Balance disorder    Relevant Orders    Ambulatory referral to Neurology           Follow up in 3 months  Call if worsens    Subjective:    [facility] #[id number]    Balance problems:    She has had balance problem since her last child  Her youngest child is now 15years of age   Does not recall having balance problem when she was younger  She has two sister with similar balance problem  Her one sisters symptoms of balance problem started at the age of 32 now age 30 and other sister symptoms started at the age 22 and she is now 37years of age  She is of balance when she gets up from sitting position or when walking  Has problem with writing and at time talking  No problem with swallowing  She states her memory is also poor and so is her vision  She was seen by an ophthalmologist two years ago but we do not have records of this  Did not see specialist at [facility]    Her mother passed away at age 46 from "[employer] [address] and brain tumor  Her mother also had balance problem  She has no history of cancer  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     Migraine headaches:  Preventative: Topamax  Abortive: advil, dexamethasone  Any Medical/Alternative Treatments used in the past for headaches: none   Headache trigger: Fatigue, Stress/Tension: none  Current pain 8-9/10  How often do the headaches occur daily for the month or so (patient not the best historian), was for an entire year prior to last visit  What time of the day do the headaches start there all the time and worse in the morning, therefore >15 a month  How long do the headaches last - all day  Where are they located - temporal  What is the intensity of pain they can get up to 10/10 and has noted that since she was started on iron pills she is doing better  Describe your usual headache - Throbbing, Pounding  Associated symptoms:            Decrease of appetite, nausea            Photophobia            light-headed or dizzy            prefer to be alone and in a dark room, unable to work       Feb 17, 2017: BRAIN PARENCHYMA: Mild diffuse cerebellar cortical atrophy, unchanged  No abnormal white matter signal identified  Diffusion imaging is unremarkable  There is no discrete mass, mass effect or midline shift   Normal brainstem  There is no intracranial hemorrhage  There is no evidence of acute infarction  Postcontrast imaging of the brain demonstrates no abnormal enhancement  VENTRICLES: Normal  SELLA AND PITUITARY GLAND: Normal  ORBITS: Normal  PARANASAL SINUSES: Normal  VASCULATURE: Evaluation of the major intracranial vasculature demonstrates appropriate flow voids  CALVARIUM AND SKULL BASE: Normal  EXTRACRANIAL SOFT TISSUES: Normal  IMPRESSION: Mild chronic cerebellar cortical volume loss No acute intracranial abnormalities No pathologic enhancement Similar to previous MRI study      The following portions of the patient's history were reviewed and updated as appropriate: allergies, current medications, past family history, past medical history, past social history, past surgical history and problem list          Objective:    Blood pressure 135/65, pulse 95  Physical Exam   Constitutional: She appears well-developed and well-nourished  HENT:   Head: Normocephalic and atraumatic  Eyes: EOM are normal  Pupils are equal, round, and reactive to light  Neck: Normal range of motion  Cardiovascular: Normal rate  Pulmonary/Chest: Effort normal    Musculoskeletal: Normal range of motion  Neurological: She has normal strength and normal reflexes  Coordination normal    Skin: Skin is warm and dry  Psychiatric: She has a normal mood and affect  Her speech is normal    Nursing note and vitals reviewed  Neurological Exam    Mental Status  The patient is alert and oriented to person, place, time, and situation  She has no visuospatial neglect  Her speech is normal  Her language is fluent with no aphasia  She has normal attention span and concentration  She has a normal fund of knowledge    Difficult telling timing of headaches     Cranial Nerves    CN II: The patient's visual acuity and visual fields are normal   CN III, IV, VI: The patient's pupils are equally round and reactive to light and ocular movements are normal   CN V: The patient has normal facial sensation  CN VII:  The patient has symmetric facial movement  CN VIII:  The patient's hearing is normal   CN IX, X: The patient has symmetric palate movement and normal gag reflex  CN XI: The patient's shoulder shrug strength is normal   CN XII: The patient's tongue is midline without atrophy or fasciculations  Motor  The patient has normal muscle bulk throughout  Her overall muscle tone is normal throughout  Her strength is 5/5 throughout all four extremities  Sensory  The patient's sensation is normal in all four extremities  She has normal cortical sensation    Reflexes  Deep tendon reflexes are 2+ and symmetric in all four extremities with downgoing toes bilaterally  Gait and Coordination   She has a wide stance  She has a hesitant stride  She has abnormal tandem gait  Romberg's sign is sways with eyes closed  She has normal coordination bilaterally  ROS:    Review of Systems   Constitutional: Positive for fatigue  HENT: Negative  Eyes: Positive for pain and visual disturbance  Respiratory: Negative  Cardiovascular: Negative  Gastrointestinal: Negative  Endocrine: Negative  Genitourinary: Negative  Musculoskeletal: Negative  Skin: Negative  Allergic/Immunologic: Negative  Neurological: Positive for headaches  Hematological: Negative  Psychiatric/Behavioral: Positive for behavioral problems  The patient is nervous/anxious

## 2018-03-13 NOTE — PROGRESS NOTES
Assessment/Plan:    No problem-specific Assessment & Plan notes found for this encounter  Diagnoses and all orders for this visit:    Cerebral atrophy    Vitamin D deficiency    Urticaria, chronic    Chronic migraine without aura without status migrainosus, not intractable    Diplopia    Nystagmus    Balance problems        Make appointment with the specialist as referred by neurologist     Iris Vicente neurologist to notify about side effects from new migraine medicine  Follow-up 4 months    Forms were filled, records will be faxed over to her  as requested  Subjective:  Pt here for 3 month follow up visit  Pt has paperwork that needs to be completed  Labs completed  Pt needs pap exam will make an appt   Pt has been to neurologist and does not like the PA that she seen 3/2/2018  She confuses her, she says that there is nothing wrong with her and the her pcp is wrong  Pt continues with headaches, the pills she is taking from neurology keeps her up at night cant get sleep, pt states she doesn't  Even know what the pills are for  Pt continues with having trouble with vision     Patient ID: Terrance Patel is a 30 y o  female  HPI   Patient presents for follow-up with labs  Patient had recent labs at [facility]  CMP was stable  A1c was 5 5  Lipid panel was normal  CC showed a hemoglobin of 10 5 and hematocrit of 32  TSH 1 66    She today has no acute issues or complaints but she continues to have same chronic symptoms of imbalance, double vision, and chronic headaches  She follows with neurologist who is being treating her for her chronic migraines, the refer her to another specialist for her gait issues with mild chronic cerebral atrophy on MRI  She is apply for disability          The following portions of the patient's history were reviewed and updated as appropriate: allergies, current medications, past family history, past medical history, past social history, past surgical history and problem list     Review of Systems   Constitutional: Negative for activity change and appetite change  Eyes: Positive for visual disturbance  Respiratory: Negative  Cardiovascular: Negative  Gastrointestinal: Negative  Genitourinary: Negative  Musculoskeletal: Negative for arthralgias  Skin: Negative for rash  Neurological: Positive for headaches  Psychiatric/Behavioral: Negative  Objective:      /76   Pulse 94   Temp 98 2 °F (36 8 °C)   Ht 5' 9" (1 753 m)   Wt 83 9 kg (185 lb)   SpO2 99%   BMI 27 32 kg/m²          Physical Exam   Constitutional: She is oriented to person, place, and time  She appears well-developed and well-nourished  HENT:   Head: Normocephalic  Eyes: Conjunctivae are normal    + horizontal nystagmus   Cardiovascular: Normal rate, regular rhythm and normal heart sounds  Pulmonary/Chest: Effort normal and breath sounds normal  No respiratory distress  She has no wheezes  She has no rales  Neurological: She is alert and oriented to person, place, and time  No cranial nerve deficit    + dysarthria   Psychiatric: She has a normal mood and affect   Her behavior is normal

## 2018-03-14 ENCOUNTER — OFFICE VISIT (OUTPATIENT)
Dept: FAMILY MEDICINE CLINIC | Facility: CLINIC | Age: 38
End: 2018-03-14
Payer: COMMERCIAL

## 2018-03-14 VITALS
OXYGEN SATURATION: 99 % | SYSTOLIC BLOOD PRESSURE: 110 MMHG | HEIGHT: 69 IN | DIASTOLIC BLOOD PRESSURE: 76 MMHG | BODY MASS INDEX: 27.4 KG/M2 | HEART RATE: 94 BPM | WEIGHT: 185 LBS | TEMPERATURE: 98.2 F

## 2018-03-14 DIAGNOSIS — L50.8 URTICARIA, CHRONIC: ICD-10-CM

## 2018-03-14 DIAGNOSIS — G31.9 CEREBRAL ATROPHY (HCC): Primary | ICD-10-CM

## 2018-03-14 DIAGNOSIS — R26.89 BALANCE PROBLEMS: ICD-10-CM

## 2018-03-14 DIAGNOSIS — G43.709 CHRONIC MIGRAINE WITHOUT AURA WITHOUT STATUS MIGRAINOSUS, NOT INTRACTABLE: ICD-10-CM

## 2018-03-14 DIAGNOSIS — E55.9 VITAMIN D DEFICIENCY: ICD-10-CM

## 2018-03-14 DIAGNOSIS — H53.2 DIPLOPIA: ICD-10-CM

## 2018-03-14 DIAGNOSIS — H55.00 NYSTAGMUS: ICD-10-CM

## 2018-03-14 PROBLEM — A69.22 LYME NEUROPATHY: Status: ACTIVE | Noted: 2017-02-20

## 2018-03-14 PROBLEM — L50.3 DERMATOGRAPHISM: Status: ACTIVE | Noted: 2017-05-16

## 2018-03-14 PROBLEM — K30 ACID INDIGESTION: Status: ACTIVE | Noted: 2017-01-27

## 2018-03-14 PROBLEM — R26.9 GAIT DISTURBANCE: Status: ACTIVE | Noted: 2017-01-27

## 2018-03-14 PROBLEM — D50.9 IRON DEFICIENCY ANEMIA: Status: ACTIVE | Noted: 2017-02-20

## 2018-03-14 PROBLEM — T78.40XA ALLERGIC REACTION: Status: ACTIVE | Noted: 2017-04-27

## 2018-03-14 PROBLEM — N93.9 EXCESSIVE VAGINAL BLEEDING: Status: ACTIVE | Noted: 2017-03-20

## 2018-03-14 PROBLEM — E53.8 B12 DEFICIENCY: Status: ACTIVE | Noted: 2017-02-20

## 2018-03-14 PROCEDURE — 99214 OFFICE O/P EST MOD 30 MIN: CPT | Performed by: FAMILY MEDICINE

## 2018-04-03 ENCOUNTER — HOSPITAL ENCOUNTER (INPATIENT)
Facility: HOSPITAL | Age: 38
LOS: 1 days | Discharge: HOME | DRG: 053 | End: 2018-04-04
Attending: EMERGENCY MEDICINE | Admitting: INTERNAL MEDICINE
Payer: COMMERCIAL

## 2018-04-03 ENCOUNTER — APPOINTMENT (EMERGENCY)
Dept: CT IMAGING | Facility: HOSPITAL | Age: 38
DRG: 053 | End: 2018-04-03
Payer: COMMERCIAL

## 2018-04-03 DIAGNOSIS — R56.9 SEIZURE-LIKE ACTIVITY (HCC): Primary | ICD-10-CM

## 2018-04-03 DIAGNOSIS — G43.909 MIGRAINE HEADACHE: ICD-10-CM

## 2018-04-03 DIAGNOSIS — R40.4 TRANSIENT ALTERATION OF AWARENESS: ICD-10-CM

## 2018-04-03 DIAGNOSIS — G43.709 CHRONIC MIGRAINE WITHOUT AURA WITHOUT STATUS MIGRAINOSUS, NOT INTRACTABLE: ICD-10-CM

## 2018-04-03 DIAGNOSIS — R56.9 SEIZURE (HCC): ICD-10-CM

## 2018-04-03 DIAGNOSIS — G31.9 CEREBELLAR ATROPHY (HCC): ICD-10-CM

## 2018-04-03 PROBLEM — R41.82 ALTERED MENTAL STATUS: Status: ACTIVE | Noted: 2018-04-03

## 2018-04-03 PROBLEM — E87.6 HYPOKALEMIA: Status: ACTIVE | Noted: 2018-04-03

## 2018-04-03 PROBLEM — D50.9 MICROCYTIC ANEMIA: Status: ACTIVE | Noted: 2018-04-03

## 2018-04-03 PROBLEM — I95.9 HYPOTENSION: Status: ACTIVE | Noted: 2018-04-03

## 2018-04-03 LAB
ANION GAP SERPL CALCULATED.3IONS-SCNC: 13 MMOL/L (ref 4–13)
BACTERIA UR QL AUTO: ABNORMAL /HPF
BASOPHILS # BLD AUTO: 0 THOUSANDS/ΜL (ref 0–0.1)
BASOPHILS NFR BLD AUTO: 0 % (ref 0–1)
BILIRUB UR QL STRIP: ABNORMAL
BUN SERPL-MCNC: 11 MG/DL (ref 5–25)
CALCIUM SERPL-MCNC: 8.4 MG/DL (ref 8.3–10.1)
CHLORIDE SERPL-SCNC: 102 MMOL/L (ref 100–108)
CLARITY UR: CLEAR
CO2 SERPL-SCNC: 24 MMOL/L (ref 21–32)
COLOR UR: YELLOW
CREAT SERPL-MCNC: 0.89 MG/DL (ref 0.6–1.3)
EOSINOPHIL # BLD AUTO: 0.03 THOUSAND/ΜL (ref 0–0.61)
EOSINOPHIL NFR BLD AUTO: 1 % (ref 0–6)
ERYTHROCYTE [DISTWIDTH] IN BLOOD BY AUTOMATED COUNT: 15.7 % (ref 11.6–15.1)
EXT PREG TEST URINE: NORMAL
GFR SERPL CREATININE-BSD FRML MDRD: 84 ML/MIN/1.73SQ M
GLUCOSE SERPL-MCNC: 131 MG/DL (ref 65–140)
GLUCOSE UR STRIP-MCNC: NEGATIVE MG/DL
HCT VFR BLD AUTO: 34.2 % (ref 34.8–46.1)
HGB BLD-MCNC: 10.9 G/DL (ref 11.5–15.4)
HGB UR QL STRIP.AUTO: NEGATIVE
KETONES UR STRIP-MCNC: NEGATIVE MG/DL
LEUKOCYTE ESTERASE UR QL STRIP: NEGATIVE
LYMPHOCYTES # BLD AUTO: 0.73 THOUSANDS/ΜL (ref 0.6–4.47)
LYMPHOCYTES NFR BLD AUTO: 16 % (ref 14–44)
MAGNESIUM SERPL-MCNC: 1.7 MG/DL (ref 1.6–2.6)
MCH RBC QN AUTO: 24.5 PG (ref 26.8–34.3)
MCHC RBC AUTO-ENTMCNC: 31.9 G/DL (ref 31.4–37.4)
MCV RBC AUTO: 77 FL (ref 82–98)
MONOCYTES # BLD AUTO: 0.38 THOUSAND/ΜL (ref 0.17–1.22)
MONOCYTES NFR BLD AUTO: 8 % (ref 4–12)
MUCOUS THREADS UR QL AUTO: ABNORMAL
NEUTROPHILS # BLD AUTO: 3.41 THOUSANDS/ΜL (ref 1.85–7.62)
NEUTS SEG NFR BLD AUTO: 75 % (ref 43–75)
NITRITE UR QL STRIP: NEGATIVE
NON-SQ EPI CELLS URNS QL MICRO: ABNORMAL /HPF
NRBC BLD AUTO-RTO: 0 /100 WBCS
PH UR STRIP.AUTO: 5.5 [PH] (ref 4.5–8)
PLATELET # BLD AUTO: 158 THOUSANDS/UL (ref 149–390)
POTASSIUM SERPL-SCNC: 3.3 MMOL/L (ref 3.5–5.3)
PROT UR STRIP-MCNC: ABNORMAL MG/DL
RBC # BLD AUTO: 4.45 MILLION/UL (ref 3.81–5.12)
RBC #/AREA URNS AUTO: ABNORMAL /HPF
SODIUM SERPL-SCNC: 139 MMOL/L (ref 136–145)
SP GR UR STRIP.AUTO: >=1.03 (ref 1–1.03)
UROBILINOGEN UR QL STRIP.AUTO: 0.2 E.U./DL
WBC # BLD AUTO: 4.55 THOUSAND/UL (ref 4.31–10.16)
WBC #/AREA URNS AUTO: ABNORMAL /HPF

## 2018-04-03 PROCEDURE — 96375 TX/PRO/DX INJ NEW DRUG ADDON: CPT

## 2018-04-03 PROCEDURE — 81025 URINE PREGNANCY TEST: CPT | Performed by: PHYSICIAN ASSISTANT

## 2018-04-03 PROCEDURE — 99223 1ST HOSP IP/OBS HIGH 75: CPT | Performed by: INTERNAL MEDICINE

## 2018-04-03 PROCEDURE — 70450 CT HEAD/BRAIN W/O DYE: CPT

## 2018-04-03 PROCEDURE — 82607 VITAMIN B-12: CPT | Performed by: NURSE PRACTITIONER

## 2018-04-03 PROCEDURE — 99285 EMERGENCY DEPT VISIT HI MDM: CPT

## 2018-04-03 PROCEDURE — 80048 BASIC METABOLIC PNL TOTAL CA: CPT | Performed by: PHYSICIAN ASSISTANT

## 2018-04-03 PROCEDURE — 99255 IP/OBS CONSLTJ NEW/EST HI 80: CPT | Performed by: PSYCHIATRY & NEUROLOGY

## 2018-04-03 PROCEDURE — 36415 COLL VENOUS BLD VENIPUNCTURE: CPT | Performed by: PHYSICIAN ASSISTANT

## 2018-04-03 PROCEDURE — 96374 THER/PROPH/DIAG INJ IV PUSH: CPT

## 2018-04-03 PROCEDURE — 85025 COMPLETE CBC W/AUTO DIFF WBC: CPT | Performed by: PHYSICIAN ASSISTANT

## 2018-04-03 PROCEDURE — 83735 ASSAY OF MAGNESIUM: CPT | Performed by: NURSE PRACTITIONER

## 2018-04-03 PROCEDURE — 81001 URINALYSIS AUTO W/SCOPE: CPT

## 2018-04-03 PROCEDURE — 96361 HYDRATE IV INFUSION ADD-ON: CPT

## 2018-04-03 RX ORDER — LORAZEPAM 2 MG/ML
0.5 INJECTION INTRAMUSCULAR ONCE
Status: DISCONTINUED | OUTPATIENT
Start: 2018-04-03 | End: 2018-04-04 | Stop reason: HOSPADM

## 2018-04-03 RX ORDER — HYDROXYZINE HYDROCHLORIDE 10 MG/1
10 TABLET, FILM COATED ORAL
Status: DISCONTINUED | OUTPATIENT
Start: 2018-04-03 | End: 2018-04-04 | Stop reason: HOSPADM

## 2018-04-03 RX ORDER — METOCLOPRAMIDE HYDROCHLORIDE 5 MG/ML
5 INJECTION INTRAMUSCULAR; INTRAVENOUS ONCE
Status: COMPLETED | OUTPATIENT
Start: 2018-04-03 | End: 2018-04-03

## 2018-04-03 RX ORDER — DIPHENHYDRAMINE HYDROCHLORIDE 50 MG/ML
25 INJECTION INTRAMUSCULAR; INTRAVENOUS EVERY 8 HOURS PRN
Status: DISCONTINUED | OUTPATIENT
Start: 2018-04-03 | End: 2018-04-04 | Stop reason: HOSPADM

## 2018-04-03 RX ORDER — PANTOPRAZOLE SODIUM 20 MG/1
20 TABLET, DELAYED RELEASE ORAL
Status: DISCONTINUED | OUTPATIENT
Start: 2018-04-04 | End: 2018-04-04 | Stop reason: HOSPADM

## 2018-04-03 RX ORDER — SODIUM CHLORIDE 9 MG/ML
100 INJECTION, SOLUTION INTRAVENOUS CONTINUOUS
Status: DISCONTINUED | OUTPATIENT
Start: 2018-04-03 | End: 2018-04-04 | Stop reason: HOSPADM

## 2018-04-03 RX ORDER — METOCLOPRAMIDE HYDROCHLORIDE 5 MG/ML
10 INJECTION INTRAMUSCULAR; INTRAVENOUS EVERY 8 HOURS SCHEDULED
Status: DISCONTINUED | OUTPATIENT
Start: 2018-04-03 | End: 2018-04-04

## 2018-04-03 RX ORDER — MAGNESIUM SULFATE HEPTAHYDRATE 40 MG/ML
2 INJECTION, SOLUTION INTRAVENOUS ONCE
Status: COMPLETED | OUTPATIENT
Start: 2018-04-03 | End: 2018-04-03

## 2018-04-03 RX ORDER — POTASSIUM CHLORIDE 20 MEQ/1
40 TABLET, EXTENDED RELEASE ORAL 2 TIMES DAILY
Status: COMPLETED | OUTPATIENT
Start: 2018-04-03 | End: 2018-04-03

## 2018-04-03 RX ORDER — KETOROLAC TROMETHAMINE 30 MG/ML
15 INJECTION, SOLUTION INTRAMUSCULAR; INTRAVENOUS ONCE
Status: COMPLETED | OUTPATIENT
Start: 2018-04-03 | End: 2018-04-03

## 2018-04-03 RX ORDER — MAGNESIUM SULFATE HEPTAHYDRATE 40 MG/ML
2 INJECTION, SOLUTION INTRAVENOUS
Status: DISCONTINUED | OUTPATIENT
Start: 2018-04-04 | End: 2018-04-04

## 2018-04-03 RX ORDER — KETOROLAC TROMETHAMINE 30 MG/ML
30 INJECTION, SOLUTION INTRAMUSCULAR; INTRAVENOUS EVERY 8 HOURS SCHEDULED
Status: DISCONTINUED | OUTPATIENT
Start: 2018-04-03 | End: 2018-04-04

## 2018-04-03 RX ORDER — DIPHENHYDRAMINE HYDROCHLORIDE 50 MG/ML
12.5 INJECTION INTRAMUSCULAR; INTRAVENOUS ONCE
Status: COMPLETED | OUTPATIENT
Start: 2018-04-03 | End: 2018-04-03

## 2018-04-03 RX ORDER — TOPIRAMATE 100 MG/1
100 TABLET, FILM COATED ORAL
Status: DISCONTINUED | OUTPATIENT
Start: 2018-04-03 | End: 2018-04-03

## 2018-04-03 RX ADMIN — POTASSIUM CHLORIDE 40 MEQ: 1500 TABLET, EXTENDED RELEASE ORAL at 12:27

## 2018-04-03 RX ADMIN — METOCLOPRAMIDE 5 MG: 5 INJECTION, SOLUTION INTRAMUSCULAR; INTRAVENOUS at 08:13

## 2018-04-03 RX ADMIN — METOCLOPRAMIDE 10 MG: 5 INJECTION, SOLUTION INTRAMUSCULAR; INTRAVENOUS at 22:08

## 2018-04-03 RX ADMIN — SODIUM CHLORIDE 1000 ML: 0.9 INJECTION, SOLUTION INTRAVENOUS at 08:10

## 2018-04-03 RX ADMIN — METOCLOPRAMIDE 10 MG: 5 INJECTION, SOLUTION INTRAMUSCULAR; INTRAVENOUS at 16:56

## 2018-04-03 RX ADMIN — VALPROATE SODIUM 1000 MG: 100 INJECTION, SOLUTION INTRAVENOUS at 22:09

## 2018-04-03 RX ADMIN — POTASSIUM CHLORIDE 40 MEQ: 1500 TABLET, EXTENDED RELEASE ORAL at 16:56

## 2018-04-03 RX ADMIN — DIPHENHYDRAMINE HYDROCHLORIDE 12.5 MG: 50 INJECTION, SOLUTION INTRAMUSCULAR; INTRAVENOUS at 08:11

## 2018-04-03 RX ADMIN — MAGNESIUM SULFATE HEPTAHYDRATE 2 G: 40 INJECTION, SOLUTION INTRAVENOUS at 14:35

## 2018-04-03 RX ADMIN — KETOROLAC TROMETHAMINE 30 MG: 30 INJECTION, SOLUTION INTRAMUSCULAR at 16:55

## 2018-04-03 RX ADMIN — KETOROLAC TROMETHAMINE 30 MG: 30 INJECTION, SOLUTION INTRAMUSCULAR at 22:08

## 2018-04-03 RX ADMIN — SODIUM CHLORIDE 1000 ML: 0.9 INJECTION, SOLUTION INTRAVENOUS at 10:19

## 2018-04-03 RX ADMIN — SODIUM CHLORIDE 100 ML/HR: 0.9 INJECTION, SOLUTION INTRAVENOUS at 13:29

## 2018-04-03 RX ADMIN — KETOROLAC TROMETHAMINE 15 MG: 30 INJECTION, SOLUTION INTRAMUSCULAR at 09:04

## 2018-04-03 NOTE — ED NOTES
EMS noted that pt had unsteady gait walking to ambulance;  reports that her unsteady gait in a chronic issue        Danilo Pérez RN  04/03/18 0042

## 2018-04-03 NOTE — ED PROVIDER NOTES
History  Chief Complaint   Patient presents with    Altered Mental Status     According to family, pt woke up to 10 minute episode of confusion, aggitation, and screaming  Upon EMS arrival, pt alert and oriented x 4; now reports photosensitivity and neck pain  Hx of "brain mass in back of head"  This is a 53-year-old female patient with a longstanding history of migraines  She is mostly Pashto speaking and her son is a  he has a told  According to what he tells me she was sleeping next to her  and he was a woken by her shaking in bed with her eyes rolled back for approximately 1 minute she then was extremely tired for about 20 minutes  And was nonverbal   She is now alert oriented x4 complaining of her typical migraine headache with some photophobia and posterior head pain  No neck stiffness fever chills cough congestion dizziness  She has mild nausea no vomiting no diarrhea no abdominal pain no urinary symptoms  Nothing makes it better or worse  She is moving all 4 limbs and has no facial droop  The son also stated for the last 3 days she cannot recall which she has been doing describes as almost like an amnesia state  She did not have any loss of bowel or bladder during this seizure-like activity at this point besides her usual frontal migraine she has no other complaints  They did state that she has a brain mass I did review old studies of MRIs and CTs and not 1 study had shown a brain mass but some mild cerebral atrophy  Differential diagnosis includes not limited to seizure, pseudo-seizure, migraine variant, TIA less likely            Prior to Admission Medications   Prescriptions Last Dose Informant Patient Reported? Taking?    Cyanocobalamin (VITAMIN B-12 PO)  Self Yes Yes   Sig: Take by mouth   cetirizine (ZyrTEC) 10 mg tablet  Self Yes Yes   Sig: Take by mouth   famotidine (PEPCID) 10 mg tablet  Self Yes Yes   Sig: Take 10 mg by mouth daily   hydrOXYzine HCL (ATARAX) 10 mg tablet  Self Yes Yes   Sig: Take by mouth   omeprazole (PriLOSEC) 20 mg delayed release capsule  Self Yes Yes   Sig: Take by mouth   ranitidine (ZANTAC) 300 MG tablet  Self Yes Yes   Sig: Take 1 tablet by mouth   topiramate (TOPAMAX) 25 mg tablet  Self No Yes   Sig: Increase to 3 tabs at bediime for 5 days and then 4 tablets at bedimte for 100 mg   verapamil (CALAN) 40 mg tablet  Self No Yes   Sig: Take 1 tablet (40 mg total) by mouth 2 (two) times a day For 5 days take only 1 tab in am then increase to BID      Facility-Administered Medications: None       Past Medical History:   Diagnosis Date    B12 deficiency     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     Chronic migraine without aura     Dermatographism     Gait disturbance     Iron deficiency anemia     Lyme neuropathy     Visual disturbance     Diplopia, nystagmus, decreased visual acuity    Vitamin D deficiency        Past Surgical History:   Procedure Laterality Date     SECTION      OOPHORECTOMY      unilateral --  last assessed 17    TUBAL LIGATION      last assessed 17       Family History   Problem Relation Age of Onset    Other Mother      AIDS, Brain Tumor balance disorder    Seizures Mother     Alcohol abuse Father     Other Sister      Balance disorder    HIV Maternal Grandmother      I have reviewed and agree with the history as documented  Social History   Substance Use Topics    Smoking status: Never Smoker    Smokeless tobacco: Never Used    Alcohol use No        Review of Systems   All other systems reviewed and are negative        Physical Exam  ED Triage Vitals   Temperature Pulse Respirations Blood Pressure SpO2   18 0735 18 0735 18 0735 18 0735 18 0735   98 3 °F (36 8 °C) (!) 121 20 114/74 98 %      Temp Source Heart Rate Source Patient Position - Orthostatic VS BP Location FiO2 (%)   18 0735 18 0726 18 1247 18 1247 --   Oral Monitor Lying Left arm       Pain Score       04/03/18 0735       9           Orthostatic Vital Signs  Vitals:    04/03/18 1118 04/03/18 1247 04/03/18 2300 04/04/18 0726   BP: 103/61 (!) 86/42 95/64 95/65   Pulse: 85 93 82 78   Patient Position - Orthostatic VS:  Lying Lying Lying       Physical Exam   Constitutional: She is oriented to person, place, and time  She appears well-developed and well-nourished  HENT:   Head: Normocephalic and atraumatic  Right Ear: External ear normal    Left Ear: External ear normal    Nose: Nose normal    Mouth/Throat: Oropharynx is clear and moist    Eyes: Conjunctivae are normal  Pupils are equal, round, and reactive to light  Neck: Normal range of motion  Neck supple  Cardiovascular: Normal rate and regular rhythm  Pulmonary/Chest: Effort normal and breath sounds normal    Abdominal: Soft  Bowel sounds are normal  There is no tenderness  Neurological: She is alert and oriented to person, place, and time  She displays normal reflexes  No cranial nerve deficit or sensory deficit  She exhibits normal muscle tone  Coordination normal    Skin: Skin is warm  Psychiatric: She has a normal mood and affect  Her behavior is normal    Nursing note and vitals reviewed        ED Medications  Medications   LORazepam (ATIVAN) 2 mg/mL injection 0 5 mg (0 mg Intravenous Hold 4/3/18 1001)   hydrOXYzine HCL (ATARAX) tablet 10 mg (not administered)   pantoprazole (PROTONIX) EC tablet 20 mg (20 mg Oral Given 4/4/18 0552)   metoclopramide (REGLAN) injection 10 mg (10 mg Intravenous Given 4/4/18 0546)   ketorolac (TORADOL) injection 30 mg (30 mg Intravenous Given 4/4/18 0546)   diphenhydrAMINE (BENADRYL) injection 25 mg (not administered)   sodium chloride 0 9 % infusion (100 mL/hr Intravenous New Bag 4/3/18 1329)   magnesium sulfate 2 g/50 mL IVPB (premix) 2 g (2 g Intravenous New Bag 4/4/18 0916)   valproate (DEPACON) 500 mg in sodium chloride 0 9 % 50 mL IVPB (500 mg Intravenous New Bag 4/4/18 0601)   sodium chloride 0 9 % bolus 1,000 mL (0 mL Intravenous Stopped 4/3/18 0905)   metoclopramide (REGLAN) injection 5 mg (5 mg Intravenous Given 4/3/18 0813)   diphenhydrAMINE (BENADRYL) injection 12 5 mg (12 5 mg Intravenous Given 4/3/18 0811)   ketorolac (TORADOL) injection 15 mg (15 mg Intravenous Given 4/3/18 0904)   sodium chloride 0 9 % bolus 1,000 mL (0 mL Intravenous Stopped 4/3/18 1118)   magnesium sulfate 2 g/50 mL IVPB (premix) 2 g (0 g Intravenous Stopped 4/3/18 1630)   potassium chloride (K-DUR,KLOR-CON) CR tablet 40 mEq ( Oral Canceled Entry 4/3/18 1800)   valproate (DEPACON) 1,000 mg in sodium chloride 0 9 % 50 mL IVPB (0 mg Intravenous Stopped 4/3/18 2309)       Diagnostic Studies  Results Reviewed     Procedure Component Value Units Date/Time    Vitamin B12 [[id number]] Collected:  04/03/18 0807    Lab Status:   In process Specimen:  Blood from Arm, Right Updated:  04/04/18 0412    Magnesium [[id number]]  (Normal) Collected:  04/03/18 0807    Lab Status:  Final result Specimen:  Blood from Arm, Right Updated:  04/03/18 1947     Magnesium 1 7 mg/dL     Urine Microscopic [[id number]]  (Abnormal) Collected:  04/03/18 0809    Lab Status:  Final result Specimen:  Urine from Urine, Clean Catch Updated:  04/03/18 0914     RBC, UA 0-1 (A) /hpf      WBC, UA 2-4 (A) /hpf      Epithelial Cells Occasional /hpf      Bacteria, UA Occasional /hpf      MUCOUS THREADS Moderate    Basic metabolic panel [[id number]]  (Abnormal) Collected:  04/03/18 5343    Lab Status:  Final result Specimen:  Blood from Arm, Right Updated:  04/03/18 0827     Sodium 139 mmol/L      Potassium 3 3 (L) mmol/L      Chloride 102 mmol/L      CO2 24 mmol/L      Anion Gap 13 mmol/L      BUN 11 mg/dL      Creatinine 0 89 mg/dL      Glucose 131 mg/dL      Calcium 8 4 mg/dL      eGFR 84 ml/min/1 73sq m     Narrative:         National Kidney Disease Education Program recommendations are as follows:  GFR calculation is accurate only with a steady state creatinine  Chronic Kidney disease less than 60 ml/min/1 73 sq  meters  Kidney failure less than 15 ml/min/1 73 sq  meters  CBC and differential [[id number]]  (Abnormal) Collected:  04/03/18 0807    Lab Status:  Final result Specimen:  Blood from Arm, Right Updated:  04/03/18 0820     WBC 4 55 Thousand/uL      RBC 4 45 Million/uL      Hemoglobin 10 9 (L) g/dL      Hematocrit 34 2 (L) %      MCV 77 (L) fL      MCH 24 5 (L) pg      MCHC 31 9 g/dL      RDW 15 7 (H) %      Platelets 931 Thousands/uL      nRBC 0 /100 WBCs      Neutrophils Relative 75 %      Lymphocytes Relative 16 %      Monocytes Relative 8 %      Eosinophils Relative 1 %      Basophils Relative 0 %      Neutrophils Absolute 3 41 Thousands/µL      Lymphocytes Absolute 0 73 Thousands/µL      Monocytes Absolute 0 38 Thousand/µL      Eosinophils Absolute 0 03 Thousand/µL      Basophils Absolute 0 00 Thousands/µL     POCT pregnancy, urine [[id number]]  (Normal) Resulted:  04/03/18 0814    Lab Status:  Final result Updated:  04/03/18 0814     EXT PREG TEST UR (Ref: Negative) NEG ( - )    ED Urine Macroscopic [[id number]]  (Abnormal) Collected:  04/03/18 0809    Lab Status:  Final result Specimen:  Urine Updated:  04/03/18 0809     Color, UA Yellow     Clarity, UA Clear     pH, UA 5 5     Leukocytes, UA Negative     Nitrite, UA Negative     Protein, UA 30 (1+) (A) mg/dl      Glucose, UA Negative mg/dl      Ketones, UA Negative mg/dl      Urobilinogen, UA 0 2 E U /dl      Bilirubin, UA Interference- unable to analyze (A)     Blood, UA Negative     Specific Gravity, UA >=1 030    Narrative:       [facility] RESULT                 CT head without contrast   Final Result by Fartun Rose DO (04/03 0913)      No acute intracranial abnormality  Moderately prominent cerebellar atrophy for patient age                 Workstation performed: [id number]                    Procedures  Procedures       Phone Contacts  ED Phone Contact    ED Course  ED Course as of Apr 04 0920   Tue Apr 03, 2018 [phone] with neurologist explained case in detail  He agreed patient should be admitted and have inpatient EEG  Medicine page awaiting call back    21  Patient sleeping soundly easily aroused  Ativan held because sleeping  Wakes up and states she still has pain  When sleeping bp softened, but when awake came up  More fluid given                                MDM  CritCare Time    Disposition  Final diagnoses:   Seizure-like activity ([address] )   Migraine headache     Time reflects when diagnosis was documented in both MDM as applicable and the Disposition within this note     Time User Action Codes Description Comment    4/3/2018  9:49 AM Tyron Moinick Add [R56 9] Seizure-like activity ([address] )     4/3/2018  9:49 AM DinTyron siegelinick Add [G43 909] Migraine headache     4/3/2018 11:19 AM Thedora Freshwater M Add [G31 9] 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     4/3/2018 11:19 AM Thedora Freshwater M Modify [G31 9] 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     4/3/2018 11:19 AM Roxane Parish Add [G43 709] Chronic migraine without aura without status migrainosus, not intractable     4/3/2018 11:19 AM Roxane Parish Modify [G43 709] Chronic migraine without aura without status migrainosus, not intractable       ED Disposition     ED Disposition Condition Comment    Admit  Case was discussed with dr montes and the patient's admission status was agreed to be Admission Status: inpatient status to the service of Dr Jones Yoon           Follow-up Information    None       Current Discharge Medication List      CONTINUE these medications which have NOT CHANGED    Details   cetirizine (ZyrTEC) 10 mg tablet Take by mouth      Cyanocobalamin (VITAMIN B-12 PO) Take by mouth      famotidine (PEPCID) 10 mg tablet Take 10 mg by mouth daily      hydrOXYzine HCL (ATARAX) 10 mg tablet Take by mouth      omeprazole (PriLOSEC) 20 mg delayed release capsule Take by mouth      ranitidine (ZANTAC) 300 MG tablet Take 1 tablet by mouth      topiramate (TOPAMAX) 25 mg tablet Increase to 3 tabs at bediime for 5 days and then 4 tablets at bedimte for 100 mg  Qty: 120 tablet, Refills: 1    Associated Diagnoses: Chronic migraine without aura without status migrainosus, not intractable      verapamil (CALAN) 40 mg tablet Take 1 tablet (40 mg total) by mouth 2 (two) times a day For 5 days take only 1 tab in am then increase to BID  Qty: 60 tablet, Refills: 1    Associated Diagnoses: Chronic migraine without aura without status migrainosus, not intractable           No discharge procedures on file      ED Provider  Electronically Signed by           Shawn Arceo PA-C  04/04/18 9640

## 2018-04-03 NOTE — ASSESSMENT & PLAN NOTE
Unchanged no recent traumas  Pt no longer taking her topamax as it doesn't help her and just takes advil at home  Start migraine protocol including reglan/benadryl/toradol although will have to monitor for chorea given #1  Give magnesium as well  F/u neuro

## 2018-04-03 NOTE — CONSULTS
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  Suspect she has cognitive involvement as well  3   Chronic migraine without aura, chronic daily headaches  Plan:  1  EEG (routine), Epilepsy Center follow-up  2  Acute headache treatment:         A  Toradol 30 mg with Reglan 10 mg IV every 8 hours maximum 9 doses       B  Magnesium 2 g infusion daily maximum 3 days       C  Depacon infusions every 8 hours ( midway between Toradol dosing) 6 total dose  3  Preventative treatment undecided at this point, but will include a daily magnesium oral supplement  4   Likely to require supervision for medication compliance, and repeated rationale for treatment  5  Continue seizure precautions  6  Follow-up in Headache Center, which can coordinate with Movement [address]  7  Check B12, magnesium  8  Further comments and recommendations per the attending neurologist  History of Present Illness     Physician Requesting Consult: Anahi Atkins MD  Reason for Consult / Principal Problem:  Seizure-like activity, chronic migraine without aura, cerebellar atrophy  Hx and PE limited by: Impaired memory, mild somnolence    HPI: Gi Alcantara is a 30y o  year old right-handed female who to the emergency department at [facility] in [facility] on 4/3/2018 at 0728hrs after an episode at home suggestive of seizure    The patient's  reported that she began shaking while in bed with him, and her eyes were rolled back for 1 minute, then was extremely tired and nonverbal for 20 minutes  His son, who was translating in the emergency department, heard her shout, went to his mother's bed room, where he witnessed her having flailing movements  By the time of EMS arrival, she was oriented in 4 spheres, complaining of photophobia, neck pain (which is chronic) and chronic migraine headache  She arrived in the emergency department tachycardic (121) otherwise vital signs were acceptable, blood pressure 114/74  CT head was unremarkable for acute changes, but re-demonstrated mild to moderate cerebellar atrophy  She was unable to provide any meaningful history, only reporting that she had vomited on 04/02  Family reports she has been amnestic during the past several days, during which time they traveled to [place] and return to [place], which she was unaware  While in the emergency department, she became hypotensive at 1000 hours and 1247 hours after which she received fluid challenges  While she was reportedly awake and alert upon arrival, by 1100 hours  nursing staff reported her to be lethargic  She was admitted for workup of seizure  Additionally, she has been started on inpatient acute headache treatment  Patient was interviewed via  line number [id number]  She can give no meaningful history, stating I do not remember anything    Her current complaint is of a serious headache, which is typical of her daily headaches  She reports no relief from medications received to date  The headache is of pounding and pulsating quality in the bifrontal area, she currently has no nausea but has persistent photophobia and phonophobia  She denies lateralizing weakness or focal sensory loss  This patient has a history of chronic migraine without aura, which has evolved into chronic daily headaches    According to her initial Headache Center evaluation with Anoop Nickerson MD in 02/2017, headaches  began in 2016, and are throbbing and pounding in character, are without aura, are located bitemporally or bifrontal, associated with photophobia/lightheadedness/dizziness  Headaches who reported as constant, with worsening pain in the evening  Identifiable triggers include fatigue and stress  A battery of labs were ordered, however no results are found in either all scripts or epic  Additional complaints included balance problems for over 12 years, poor vision, poor memory, impaired hand writing, nystagmus, diplopia; a sister has similar problems develop in her early 35s and her mother had balance issues as well  [address] [place] [address] 16/30  A 2014 MRI brain had identified mild cortical cerebellar atrophy, which was without change on the 02/2017 study  MRI cervical spine was unremarkable  In 04/2017 she was evaluated by Yen Orellana MD in the [address] [place] [address], who was suspicious of spinal cerebellar atrophy  Labs were reordered, again I find no results in the EMR  She return to the headache Center in 11/2017, at which time she was given a 5 day course of Decadron for acute treatment, and started on Topamax 25 mg HS to be titrated to 50 mg  She was referred to Neurosurgery at [facility], unfortunately insurance did not authorize  She return to the headache Center on 3/2/2018, at which time she again received a 5 day course of Decadron for acute headache treatment, then for prophylaxis was further titrated on Topamax to 100 mg HS  She was also placed on verapamil 40 mg daily for 5 days, to be increased to b i d  Referral was placed to Neurosurgery at either [facility] or [facility]   On 3/14/2018, she visited her PCP, reporting that the medications provided by Neurology kept her awake at night  In the emergency department today, it was reported that she had stopped Topamax, verapamil, Zantac (for GERD) and iron supplement  She reports to this examiner she stopped all of her medications except Advil approximately 4 months ago    Her last B12 injection was 6 months ago    Inpatient consult to Neurology  Consult performed by: Himanshu Mondragon ordered by: Griffin Mathew          Review of Systems   HENT: Negative for hearing loss, tinnitus, trouble swallowing and voice change  Eyes: Positive for photophobia and visual disturbance (Chronic poor vision)  Respiratory: Negative for chest tightness and shortness of breath  Cardiovascular: Negative for chest pain and palpitations  Gastrointestinal: Positive for nausea (Resolved)  Negative for vomiting  Genitourinary: Negative  Musculoskeletal: Positive for gait problem  Skin: Negative  Neurological: Positive for dizziness, seizures, light-headedness and headaches  Negative for speech difficulty, weakness and numbness  Chronic memory impairment   Psychiatric/Behavioral:        Reports mood is stable  Mood swings are described in the past   All other systems reviewed and are negative        Historical Information   Past Medical History:   Diagnosis Date    B12 deficiency     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     Chronic migraine without aura     Dermatographism     Gait disturbance     Iron deficiency anemia     Lyme neuropathy     Visual disturbance     Diplopia, nystagmus, decreased visual acuity    Vitamin D deficiency     No history of seizures, closed head injury, CNS infections  Past Surgical History:   Procedure Laterality Date     SECTION      OOPHORECTOMY      unilateral --  last assessed 17    TUBAL LIGATION      last assessed 17     Social History   History   Alcohol Use No     History   Drug Use No     History   Smoking Status    Never Smoker   Smokeless Tobacco    Never Used     Family History:   Family History   Problem Relation Age of Onset    Other Mother      AIDS, Brain Tumor balance disorder    Alcohol abuse Father     Other Sister      Balance disorder    HIV Maternal Grandmother     Mother:  Epilepsy    Review of previous medical records was completed  Meds/Allergies   current meds:   Current Facility-Administered Medications   Medication Dose Route Frequency    diphenhydrAMINE (BENADRYL) injection 25 mg  25 mg Intravenous Q8H PRN    hydrOXYzine HCL (ATARAX) tablet 10 mg  10 mg Oral HS PRN    ketorolac (TORADOL) injection 30 mg  30 mg Intravenous Q8H [address]    LORazepam (ATIVAN) 2 mg/mL injection 0 5 mg  0 5 mg Intravenous Once    metoclopramide (REGLAN) injection 10 mg  10 mg Intravenous Q8H [address]    [START ON 4/4/2018] pantoprazole (PROTONIX) EC tablet 20 mg  20 mg Oral Early Morning    potassium chloride (K-DUR,KLOR-CON) CR tablet 40 mEq  40 mEq Oral BID    sodium chloride 0 9 % infusion  100 mL/hr Intravenous Continuous    topiramate (TOPAMAX) tablet 100 mg  100 mg Oral HS    and PTA meds:   Prior to Admission Medications   Prescriptions Last Dose Informant Patient Reported? Taking?    Cyanocobalamin (VITAMIN B-12 PO)  Self Yes Yes   Sig: Take by mouth   cetirizine (ZyrTEC) 10 mg tablet  Self Yes Yes   Sig: Take by mouth   famotidine (PEPCID) 10 mg tablet  Self Yes Yes   Sig: Take 10 mg by mouth daily   hydrOXYzine HCL (ATARAX) 10 mg tablet  Self Yes Yes   Sig: Take by mouth   omeprazole (PriLOSEC) 20 mg delayed release capsule  Self Yes Yes   Sig: Take by mouth   ranitidine (ZANTAC) 300 MG tablet  Self Yes Yes   Sig: Take 1 tablet by mouth   topiramate (TOPAMAX) 25 mg tablet  Self No Yes   Sig: Increase to 3 tabs at bediime for 5 days and then 4 tablets at bedimte for 100 mg   verapamil (CALAN) 40 mg tablet  Self No Yes   Sig: Take 1 tablet (40 mg total) by mouth 2 (two) times a day For 5 days take only 1 tab in am then increase to BID      Facility-Administered Medications: None    Reportedly had discontinued pre-hospital medication    Allergies   Allergen Reactions    Dye Fdc Red  [Red Dye]     Morphine        Objective   Vitals:Blood pressure (!) 86/42, pulse 93, temperature 99 4 °F (37 4 °C), temperature source Temporal, resp  rate 16, weight 85 6 kg (188 lb 11 4 oz), last menstrual period 03/06/2018, SpO2 97 %  ,Body mass index is 27 87 kg/m²  Intake/Output Summary (Last 24 hours) at 04/03/18 1440  Last data filed at 04/03/18 1118   Gross per 24 hour   Intake             2000 ml   Output                0 ml   Net             2000 ml       Physical Exam   Constitutional: She is oriented to person, place, and time  She appears well-developed and well-nourished  No distress  HENT:   Head: Normocephalic  Mouth/Throat: Oropharynx is clear and moist  No oropharyngeal exudate  Tenderness throughout the scalp to palpation  Tiny aphthous ulcer right anterior tip of tongue, no gross tongue bites or contusions  Eyes: Conjunctivae and EOM are normal  Pupils are equal, round, and reactive to light  No scleral icterus  Neck: Normal range of motion  Neck supple  Muscular tenderness (Right paracervical spasm) present  Carotid bruit is not present  Cardiovascular: Normal rate, regular rhythm and intact distal pulses  No murmur heard  Pulmonary/Chest: Effort normal and breath sounds normal    Abdominal: Soft  Bowel sounds are normal    Musculoskeletal: Normal range of motion  She exhibits no edema or deformity  Neurological: She is oriented to person, place, and time  She has normal strength  She has an abnormal Heel to [employer] (Significant dystaxia bilaterally)  She has a normal Finger-Nose-Finger Test    Reflex Scores:       Tricep reflexes are 1+ on the right side  Patellar reflexes are 1+ on the right side  Achilles reflexes are 0 on the left side  Skin: Skin is warm and dry  She is not diaphoretic  Psychiatric: She has a normal mood and affect  Her speech is normal and behavior is normal    Vitals reviewed  Neurologic Exam     Mental Status   Oriented to person, place, and time  Follows 1 step commands  Attention: decreased     Speech: speech is normal (Repeated cues by  to speak up pain, patient mumbling throughout interview)  Level of consciousness: drowsy ,  arousable by verbal stimuli  Normal comprehension  Cranial Nerves     CN II   Visual fields full to confrontation  CN III, IV, VI   Pupils are equal, round, and reactive to light  Extraocular motions are normal    Right pupil: Size: 3 mm  Shape: regular  Reactivity: brisk  Left pupil: Size: 3 mm  Shape: regular  Reactivity: brisk  Nystagmus: bilateral   Nystagmus type: slow and horizontal (Over shooting to right)  Conjugate gaze: present    CN V   Facial sensation intact  CN VII   Facial expression full, symmetric  CN VIII   CN VIII normal    Hearing: intact    CN IX, X   CN IX normal    CN X normal    Palate: symmetric    CN XI   CN XI normal      CN XII   CN XII normal    Fasciculations: absent  Tongue deviation: none (Intact lateral movements, smooth)  Unable to visualize fundi     Motor Exam   Muscle bulk: normal  Overall muscle tone: normal  Right arm pronator drift: absent  Left arm pronator drift: absent    Strength   Strength 5/5 throughout  Sensory Exam   Light touch normal    Vibration normal    Pinprick normal    Temperature intact     Gait, Coordination, and Reflexes     Gait  Gait: (Deferred)    Coordination   Finger to nose coordination: normal  Heel to shin coordination: abnormal (Significant dystaxia bilaterally)    Tremor   Resting tremor: absent  Intention tremor: absent  Action tremor: absent    Reflexes   Reflexes 2+ except as noted  Right triceps: 1+  Right patellar: 1+  Left achilles: 0  Right plantar reflex: Withdrawal, probably downgoing  Left plantar reflex: Withdrawal, probably downgoing    Right Barrett: absent  Left Barrett: absent  Right ankle clonus: absent  Left ankle clonus: absentFine motor repetitive movements:  Finger taps slightly decreased amplitude on right as compared to left           Lab Results:   CBC:   Results from last 7 days  Lab Units 04/03/18  0807   WBC Thousand/uL 4  55   RBC Million/uL 4 45   HEMOGLOBIN g/dL 10 9*   HEMATOCRIT % 34 2*   MCV fL 77*   PLATELETS Thousands/uL 158   , BMP/CMP:   Results from last 7 days  Lab Units 04/03/18  0807   SODIUM mmol/L 139   POTASSIUM mmol/L 3 3*   CHLORIDE mmol/L 102   CO2 mmol/L 24   ANION GAP mmol/L 13   BUN mg/dL 11   CREATININE mg/dL 0 89   GLUCOSE RANDOM mg/dL 131   CALCIUM mg/dL 8 4   EGFR ml/min/1 73sq m 84   , Urinalysis:   Results from last 7 days  Lab Units 04/03/18  0809   COLOR UA  Yellow   CLARITY UA  Clear   SPEC GRAV UA  >=1 030   PH UA  5 5   LEUKOCYTES UA  Negative   NITRITE UA  Negative   PROTEIN UA mg/dl 30 (1+)*   GLUCOSE UA mg/dl Negative   KETONES UA mg/dl Negative   BILIRUBIN UA  Interference- unable to analyze*   BLOOD UA  Negative    HCG negative , calcium 8 4  01/2018 A1c 5 5, albumin 3 4                Lipid panel: Total cholesterol 162, triglycerides 100, HD 56, LD 86                TSH 1 66    Imaging Studies: I have personally reviewed pertinent reports  and I have personally reviewed pertinent films in PACS   CT head:PARENCHYMA:  No intracranial mass, mass effect or midline shift  No CT signs of acute infarction  No acute intracranial hemorrhage  Moderately prominent cerebellar atrophy for patient age  VENTRICLES AND EXTRA-AXIAL SPACES:  Normal for the patient's age  03/2017 MRI cervical spine without:  ALIGNMENT:  Normal alignment of the cervical spine  No compression fracture  No subluxation  No scoliosis  MARROW SIGNAL:  Normal marrow signal is identified within the visualized bony structures  No discrete marrow lesion  CERVICAL AND VISUALIZED THORACIC CORD:  Normal signal within the visualized cord  PREVERTEBRAL AND PARASPINAL SOFT TISSUES:  Prevertebral and paraspinal soft tissues are unremarkable  VISUALIZED POSTERIOR FOSSA: Known Cerebellar volume loss  CERVICAL DISC SPACES:  Preservation of vertebral body and disc height, signal intensity and alignment    UPPER THORACIC DISC SPACES: Normal     2/2017 MRI brain with and without: (no change from 2014 study)  BRAIN PARENCHYMA:  Mild diffuse cerebellar cortical atrophy, unchanged  No abnormal white matter signal identified  Diffusion imaging is unremarkable  There is no discrete mass, mass effect or midline shift  Normal brainstem  There is no intracranial hemorrhage  There is no evidence of acute infarction  Postcontrast imaging of the brain demonstrates no abnormal enhancement  VENTRICLES:  Normal   SELLA AND PITUITARY GLAND:  Normal   ORBITS:  Normal   PARANASAL SINUSES:  Normal   VASCULATURE:  Evaluation of the major intracranial vasculature demonstrates appropriate flow voids        EKG, Pathology, and Other Studies:    3/29/2017 EMG bilateral lower extremities:  Normal study    No electrophysiological evidence of lumbosacral radiculopathy, plexopathy, or peripheral neuropathy of either the right or left lower extremity  VTE Prophylaxis:  None

## 2018-04-03 NOTE — ASSESSMENT & PLAN NOTE
Pt has hx of recurrent episodes of mild hypotension previously  S/p 2L NS bolus in ED  Will continue to monitor and hydrate w/IVF

## 2018-04-03 NOTE — ED NOTES
Room 446 assigned and ready  Patient and family informed       Elizabeth Waller RN  04/03/18 Lorraine Lyle RN  04/03/18 [id number]

## 2018-04-03 NOTE — ASSESSMENT & PLAN NOTE
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

## 2018-04-03 NOTE — ED NOTES
Reports to have a "brain specialist"; forgets who the physician is though  Kaylah Chairez RN asked family member to find the name of the specialist   However, does reports new onset of dizziness         Petra Vázquez RN  04/03/18 1940 Jesus Waldrop RN  04/03/18 1822

## 2018-04-03 NOTE — ED NOTES
Family member comes out to RN station; wants physician to know that pt reports having no recollection of the past three days  Vane Stafford PAC alerted of additional symptom        Emily Tavera RN  04/03/18 [id number]

## 2018-04-03 NOTE — ASSESSMENT & PLAN NOTE
W/'seizure like movements' that actually sound somewhat more chorea based followed by a period of unresponsiveness and now 'incomplete amnesia'  CT head negative for any hemorrhage midline shift or stroke  Pt not currently taking her verapamil, topamax, or ranitidine or any antihistamines that are sometimes known to be associated w/choreagraphic movements if this was truly the s/sx she was experiencing  No oral lesions or incontinence of stool  No focal neuro deficits  Will continue to monitor and consult neurology  Will start seizure precautions  Will defer routine EEG at this time to neurology

## 2018-04-03 NOTE — ASSESSMENT & PLAN NOTE
Pt previously was on iron but has not been taking of late  Will check ferritin/iron panel to confirm iron deficiency anemia

## 2018-04-03 NOTE — H&P
H&P- Tom Baker 1980, 30 y o  female MRN: [id number]    Unit/Bed#: ED 20 Encounter: [id number]    Primary Care Provider: Dunia De Anda MD   Date and time admitted to hospital: 4/3/2018  7:28 AM      History and Physical - [facility] Internal Medicine    Patient Information: Tom Baker 30 y o  female MRN: [id number]  Unit/Bed#: ED 20 Encounter: [id number]  Admitting Physician: Amina Marin PA-C  PCP: Dunia De Anda MD  Date of Admission:  04/03/18    Assessment/Plan:    Hospital Problem List:     Principal Problem:    Altered mental status  Active Problems:    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    Chronic migraine without aura    Hypotension    Hypokalemia    Microcytic anemia    * Altered mental status   Assessment & Plan    W/'seizure like movements' that actually sound somewhat more chorea based followed by a period of unresponsiveness and now 'incomplete amnesia'  CT head negative for any hemorrhage midline shift or stroke  Pt not currently taking her verapamil, topamax, or ranitidine or any antihistamines that are sometimes known to be associated w/choreagraphic movements if this was truly the s/sx she was experiencing  No oral lesions or incontinence of stool  No focal neuro deficits  Will continue to monitor and consult neurology  Will start seizure precautions  Will defer routine EEG at this time to neurology        Chronic migraine without aura   Assessment & Plan    Unchanged no recent traumas  Pt no longer taking her topamax as it doesn't help her and just takes advil at home  Start migraine protocol including reglan/benadryl/toradol although will have to monitor for chorea given #1  Give magnesium as well  F/u neuro        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   Assessment & Plan    Previously seen by [facility] and sharan   She has associated dysequillibrium, concomittant horizontal/vertical nystagmus and family hx with her sisters and has previously been suggested genetic workup by sharan to r/o SCA  No dysphagia, incontinence or other parkinsonian type movements 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  F/u with OP neurology at [place]        Hypotension   Assessment & Plan    Pt has hx of recurrent episodes of mild hypotension previously  S/p 2L NS bolus in ED  Will continue to monitor and hydrate w/IVF        Microcytic anemia   Assessment & Plan    Pt previously was on iron but has not been taking of late  Will check ferritin/iron panel to confirm iron deficiency anemia          Hypokalemia   Assessment & Plan    Mild will replete          ·     VTE Prophylaxis: low vte risk  / reason for no mechanical VTE prophylaxis low vte risk   Code Status: FC  POLST: There is no POLST form on file for this patient (pre-hospital)    Anticipated Length of Stay:  Patient will be admitted on an Inpatient basis with an anticipated length of stay of  Greater than 2 midnights  Justification for Hospital Stay: change in mental status, chronic headaches    Total Time for Visit, including Counseling / Coordination of Care: 45 minutes  Greater than 50% of this total time spent on direct patient counseling and coordination of care  Chief Complaint:   Headache currently, 'spastic movements' and unresponsiveness today    History of Present Illness:    Amor Apodaca is a 30 y o  female who presents with cerebellar atrophy chronic migraines without aura, and chronic dysequilibrium and gait disturbance coming to ER for change in mental status and jerking like motions starting this AM   Pt is accompanied by family who are bedside  Conversation occurred in [place] with primary translation per pt's son at pt request   Pt currently is a rather poor historian  The last thing she remembers this morning is waking up with EMS all around her    Prior to this she remembers vomiting yesterday evening which was 'white '  Prior to this she apparently cannot recall any details prior to her packing to leave for [place] she her son and family just returned from yesterday after spending the last 4 days there  She knows her address, she knows she's currently in the hospital and why she is here  The son heard around 56 a shout and came to check on his mother whom he found with large and dynamic movements of her arms and legs diffusely, as if she was flailing around  This lasted 5 minutes and the pt was not responsive through this or for nearly 15 minutes afterwards  They called EMS who then brought the patient here for further evaluation where her mentation has improved and she has now developed her typical migraine headache, primarily left sided pulsatile, associated w/photophobia but no n/v   Pt also reports of her chronic right sided neck pain which is nonradiating and also sharp in nature  No numbness, tingling, lateralizing weakness, or blurry vision today or facial droop  These are unchanged in nature and pt has been frustrated by the poor response of her prior medications for which she sees Dr Wilfredo Plummer for, and is no longer taking her verapamil, topamax  She also not taking zantac at this time or any benadryl/atarax or other anticholinergics, only advil for her headaches  No dysphagia, urinary or bowel incontinence  No recent injuries or trauma  She does have a somewhat long standing history of dysequilibrium and cereballar atrophy dating back by imaging to 2014 and by s/sx after her last child was born some 15 years prior  Her sisters have similar s/sx and her mother did as well, although her mother suffered a brain tumor and was HIV+  Pt has been seen by Dr Madelyn Fernández who has recommended the pt be tested to r/o SCA given some element of family hx  She also has been recommended to follow up with stevie or chelsi for her cerebellar atrophy  Review of Systems:    Review of Systems   Constitutional: Positive for fever  Eyes: Positive for photophobia  Gastrointestinal: Positive for nausea and vomiting     Neurological: Positive for weakness and headaches  Psychiatric/Behavioral: Positive for confusion  All other systems reviewed and are negative  Past Medical and Surgical History:     Past Medical History:   Diagnosis Date    Brain mass     Visual disturbance     last assessed 14       Past Surgical History:   Procedure Laterality Date     SECTION      OOPHORECTOMY      unilateral --  last assessed 17    TUBAL LIGATION      last assessed 17       Meds/Allergies:    Prior to Admission medications    Medication Sig Start Date End Date Taking? Authorizing Provider   cetirizine (ZyrTEC) 10 mg tablet Take by mouth 16  Yes Historical Provider, MD   Cyanocobalamin (VITAMIN B-12 PO) Take by mouth   Yes Historical Provider, MD   famotidine (PEPCID) 10 mg tablet Take 10 mg by mouth daily   Yes Historical Provider, MD   hydrOXYzine HCL (ATARAX) 10 mg tablet Take by mouth 16  Yes Historical Provider, MD   omeprazole (PriLOSEC) 20 mg delayed release capsule Take by mouth 17  Yes Historical Provider, MD   ranitidine (ZANTAC) 300 MG tablet Take 1 tablet by mouth 17  Yes Historical Provider, MD   topiramate (TOPAMAX) 25 mg tablet Increase to 3 tabs at bediime for 5 days and then 4 tablets at bedimte for 100 mg 3/2/18  Yes Jose David Delcid PA-C   verapamil (CALAN) 40 mg tablet Take 1 tablet (40 mg total) by mouth 2 (two) times a day For 5 days take only 1 tab in am then increase to BID 3/2/18  Yes Jose David Delcid PA-C   dexamethasone (DECADRON) 2 mg tablet Take 1 tablet (2 mg total) by mouth daily with breakfast 3/2/18 4/3/18 Yes Jose David Delcid PA-C     I have reviewed home medications with patient personally  Allergies:    Allergies   Allergen Reactions    Dye Fdc Red  [Red Dye]     Morphine        Social History:     Marital Status: Single   Occupation:   Patient Pre-hospital Living Situation:   Patient Pre-hospital Level of Mobility:   Patient Pre-hospital Diet Restrictions:   Substance Use History: History   Alcohol Use No     History   Smoking Status    Never Smoker   Smokeless Tobacco    Never Used     History   Drug Use No       Family History:    Family History   Problem Relation Age of Onset    Other Mother      AIDS, Brain Tumor       Physical Exam:     Vitals:   Blood Pressure: 103/61 (04/03/18 1118)  Pulse: 85 (04/03/18 1118)  Temperature: 98 2 °F (36 8 °C) (04/03/18 1000)  Temp Source: Oral (04/03/18 1000)  Respirations: 16 (04/03/18 1118)  SpO2: 98 % (04/03/18 1118)    Physical Exam   Constitutional: She appears well-developed  No distress  HENT:   Head: Normocephalic and atraumatic  Right Ear: External ear normal    Left Ear: External ear normal    Eyes: Conjunctivae and EOM are normal  Pupils are equal, round, and reactive to light  Somewhat difficult to assess EOMs for nystagmus due to pt compliance with concomitant HA, but no observable nystagmus or palsy   Neck:   TTP of right cervical spine, localized  Cardiovascular: Normal rate, regular rhythm, normal heart sounds and intact distal pulses  Exam reveals no gallop and no friction rub  No murmur heard  Pulmonary/Chest: Effort normal and breath sounds normal  No respiratory distress  She has no wheezes  She has no rales  She exhibits no tenderness  Abdominal: Soft  She exhibits no distension  There is no tenderness  There is no rebound and no guarding  Musculoskeletal: She exhibits no edema  Neurological: She is alert  No cranial nerve deficit  She exhibits normal muscle tone  Crude sensation intact in ue/le b/l  No pronator drift  No abnormal DTRs   Skin: Skin is warm and dry  She is not diaphoretic  Vitals reviewed  (  Additional Data:     Lab Results: I have personally reviewed pertinent reports          Results from last 7 days  Lab Units 04/03/18  0807   WBC Thousand/uL 4 55   HEMOGLOBIN g/dL 10 9*   HEMATOCRIT % 34 2*   PLATELETS Thousands/uL 158   NEUTROS PCT % 75   LYMPHS PCT % 16   MONOS PCT % 8   EOS PCT % 1       Results from last 7 days  Lab Units 04/03/18  0807   SODIUM mmol/L 139   POTASSIUM mmol/L 3 3*   CHLORIDE mmol/L 102   CO2 mmol/L 24   BUN mg/dL 11   CREATININE mg/dL 0 89   CALCIUM mg/dL 8 4   GLUCOSE RANDOM mg/dL 131           Imaging: I have personally reviewed pertinent reports  Ct Head Without Contrast    Result Date: 4/3/2018  Narrative: CT BRAIN - WITHOUT CONTRAST INDICATION:   Headache, possible seizure  COMPARISON:  None TECHNIQUE:  CT examination of the brain was performed  In addition to axial images, coronal 2D reformatted images were created and submitted for interpretation  Radiation dose length product (DLP) for this visit:  988 mGy-cm   This examination, like all CT scans performed in the [facility], was performed utilizing techniques to minimize radiation dose exposure, including the use of iterative reconstruction and automated exposure control  IMAGE QUALITY:  Diagnostic  FINDINGS: PARENCHYMA:  No intracranial mass, mass effect or midline shift  No CT signs of acute infarction  No acute intracranial hemorrhage  Moderately prominent cerebellar atrophy for patient age  VENTRICLES AND EXTRA-AXIAL SPACES:  Normal for the patient's age  VISUALIZED ORBITS AND PARANASAL SINUSES:  Unremarkable  CALVARIUM AND EXTRACRANIAL SOFT TISSUES:  Normal      Impression: No acute intracranial abnormality  Moderately prominent cerebellar atrophy for patient age  Workstation performed: [id number]       EKG, Pathology, and Other Studies Reviewed on Admission:   · EKG:     Allscripts / Epic Records Reviewed: Yes     ** Please Note: This note has been constructed using a voice recognition system   **

## 2018-04-04 ENCOUNTER — APPOINTMENT (INPATIENT)
Dept: NEUROLOGY | Facility: HOSPITAL | Age: 38
DRG: 053 | End: 2018-04-04
Payer: COMMERCIAL

## 2018-04-04 LAB
ANION GAP SERPL CALCULATED.3IONS-SCNC: 10 MMOL/L (ref 4–13)
BUN SERPL-MCNC: 6 MG/DL (ref 5–25)
CALCIUM SERPL-MCNC: 7.8 MG/DL (ref 8.3–10.1)
CHLORIDE SERPL-SCNC: 108 MMOL/L (ref 100–108)
CO2 SERPL-SCNC: 23 MMOL/L (ref 21–32)
CREAT SERPL-MCNC: 0.66 MG/DL (ref 0.6–1.3)
ERYTHROCYTE [DISTWIDTH] IN BLOOD BY AUTOMATED COUNT: 15.5 % (ref 11.6–15.1)
FERRITIN SERPL-MCNC: 8 NG/ML (ref 8–388)
FERRITIN SERPL-MCNC: 8 NG/ML (ref 8–388)
GFR SERPL CREATININE-BSD FRML MDRD: 115 ML/MIN/1.73SQ M
GLUCOSE SERPL-MCNC: 92 MG/DL (ref 65–140)
HCT VFR BLD AUTO: 30.8 % (ref 34.8–46.1)
HGB BLD-MCNC: 9.5 G/DL (ref 11.5–15.4)
IRON SATN MFR SERPL: 10 %
IRON SERPL-MCNC: 31 UG/DL (ref 50–170)
MCH RBC QN AUTO: 23.9 PG (ref 26.8–34.3)
MCHC RBC AUTO-ENTMCNC: 30.8 G/DL (ref 31.4–37.4)
MCV RBC AUTO: 77 FL (ref 82–98)
PLATELET # BLD AUTO: 160 THOUSANDS/UL (ref 149–390)
PMV BLD AUTO: 12.2 FL (ref 8.9–12.7)
POTASSIUM SERPL-SCNC: 3.9 MMOL/L (ref 3.5–5.3)
RBC # BLD AUTO: 3.98 MILLION/UL (ref 3.81–5.12)
SODIUM SERPL-SCNC: 141 MMOL/L (ref 136–145)
TIBC SERPL-MCNC: 319 UG/DL (ref 250–450)
VIT B12 SERPL-MCNC: 291 PG/ML (ref 100–900)
WBC # BLD AUTO: 2.59 THOUSAND/UL (ref 4.31–10.16)

## 2018-04-04 PROCEDURE — 99239 HOSP IP/OBS DSCHRG MGMT >30: CPT | Performed by: INTERNAL MEDICINE

## 2018-04-04 PROCEDURE — 83550 IRON BINDING TEST: CPT | Performed by: PHYSICIAN ASSISTANT

## 2018-04-04 PROCEDURE — 95816 EEG AWAKE AND DROWSY: CPT | Performed by: PSYCHIATRY & NEUROLOGY

## 2018-04-04 PROCEDURE — 82728 ASSAY OF FERRITIN: CPT | Performed by: PHYSICIAN ASSISTANT

## 2018-04-04 PROCEDURE — 80048 BASIC METABOLIC PNL TOTAL CA: CPT | Performed by: INTERNAL MEDICINE

## 2018-04-04 PROCEDURE — 83540 ASSAY OF IRON: CPT | Performed by: PHYSICIAN ASSISTANT

## 2018-04-04 PROCEDURE — 95816 EEG AWAKE AND DROWSY: CPT

## 2018-04-04 PROCEDURE — 85027 COMPLETE CBC AUTOMATED: CPT | Performed by: INTERNAL MEDICINE

## 2018-04-04 RX ORDER — DIVALPROEX SODIUM 500 MG/1
1000 TABLET, EXTENDED RELEASE ORAL
Qty: 60 TABLET | Refills: 3 | Status: SHIPPED | OUTPATIENT
Start: 2018-04-04 | End: 2018-05-15 | Stop reason: SDUPTHER

## 2018-04-04 RX ORDER — CYANOCOBALAMIN 1000 UG/ML
1000 INJECTION INTRAMUSCULAR; SUBCUTANEOUS ONCE
Status: COMPLETED | OUTPATIENT
Start: 2018-04-04 | End: 2018-04-04

## 2018-04-04 RX ADMIN — KETOROLAC TROMETHAMINE 30 MG: 30 INJECTION, SOLUTION INTRAMUSCULAR at 13:28

## 2018-04-04 RX ADMIN — KETOROLAC TROMETHAMINE 30 MG: 30 INJECTION, SOLUTION INTRAMUSCULAR at 05:46

## 2018-04-04 RX ADMIN — PANTOPRAZOLE SODIUM 20 MG: 20 TABLET, DELAYED RELEASE ORAL at 05:52

## 2018-04-04 RX ADMIN — CYANOCOBALAMIN 1000 MCG: 1000 INJECTION, SOLUTION INTRAMUSCULAR at 17:07

## 2018-04-04 RX ADMIN — SODIUM CHLORIDE 100 ML/HR: 0.9 INJECTION, SOLUTION INTRAVENOUS at 11:53

## 2018-04-04 RX ADMIN — METOCLOPRAMIDE 10 MG: 5 INJECTION, SOLUTION INTRAMUSCULAR; INTRAVENOUS at 13:28

## 2018-04-04 RX ADMIN — MAGNESIUM SULFATE HEPTAHYDRATE 2 G: 40 INJECTION, SOLUTION INTRAVENOUS at 09:16

## 2018-04-04 RX ADMIN — VALPROATE SODIUM 500 MG: 100 INJECTION, SOLUTION INTRAVENOUS at 06:01

## 2018-04-04 RX ADMIN — METOCLOPRAMIDE 10 MG: 5 INJECTION, SOLUTION INTRAMUSCULAR; INTRAVENOUS at 05:46

## 2018-04-04 RX ADMIN — VALPROATE SODIUM 500 MG: 100 INJECTION, SOLUTION INTRAVENOUS at 14:49

## 2018-04-04 NOTE — PROGRESS NOTES
Progress Note - Edythe Klinefelter 1980, 30 y o  female MRN: [id number]    Unit/Bed#: E4 -01 Encounter: [id number]    Primary Care Provider: Meagan Lema MD   Date and time admitted to hospital: 4/3/2018  7:28 AM        * Altered mental status   Assessment & Plan    Patient back to baseline  Mostly likely secondary to Seizure disorder  Continue depakote infusion, as per neurology, seizure precautions  Await EEG  Chronic migraine without aura   Assessment & Plan    Unchanged no recent traumas  Pt no longer taking her topamax as it doesn't help her and just takes advil at home  Continue migraine protocol including reglan/benadryl/toradol although will have to monitor for chorea given #1          Hypotension   Assessment & Plan    BP better  Continue IVF  Hypokalemia   Assessment & Plan    Mild will replete        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   Assessment & Plan    Previously seen by [facility] and sharan   She has associated dysequillibrium, concomittant horizontal/vertical nystagmus and family hx with her sisters and has previously been suggested genetic workup by sharan to r/o Spinocerebellar ataxia  F/u with OP neurology at [place]        Microcytic anemia   Assessment & Plan    Pt previously was on iron but has not been taking of late  Will check ferritin/iron panel to confirm iron deficiency anemia                  ______________________________________________________________________    SUBJECTIVE:   Patient seen and examined  She is only Danish-speaking  Encounter with the [employer] phone  Patient states she feels a lot better  Headache has resolved  Requesting if she can shower      OBJECTIVE:     Vitals:   HR:  [78-95] 78  Resp:  [16-18] 18  BP: ()/(42-65) 95/65  SpO2:  [97 %-100 %] 99 %  Temp (24hrs), Av 9 °F (37 2 °C), Min:98 2 °F (36 8 °C), Max:99 4 °F (37 4 °C)  Current: Temperature: 98 8 °F (37 1 °C)    Intake/Output Summary (Last 24 hours) at 18 0851  Last data filed at 04/03/18 1118   Gross per 24 hour   Intake             2000 ml   Output                0 ml   Net             2000 ml       Physical Exam:   GENERAL: AAO x 3, overweight  HEENT: atraumatic, normocephalic  Oral mucosa moist, no icterus, pallor  PERRLA +  Neck supple, no JVD, no lymphadenopathy, no thryomegaly  CHEST: B/L breath sounds heard, occasional wheezing  CVS: S1, S2  No cyanosis/clubbing or edema  ABDOMEN: Soft/obese/NT/BS heard  NEUROLOGICAL: CN II -XI grossly intact  No focal motor or sensory deficits  No signs of meningeal irritation or cerebellar dysfunction  EXTREMITIES: No cyanosis/clubbing or edema  LABS:  Awaited for today  Scheduled Meds:    Current Facility-Administered Medications:  diphenhydrAMINE 25 mg Intravenous Q8H PRN Lennie Parish PA-C    hydrOXYzine HCL 10 mg Oral HS PRN Lennie Parish PA-C    [facility]ac [facility]ous [facility]    LORazepam 0 5 mg Intravenous Once Travon Mo PA-C    magnesium sulfate 2 g Intravenous After Breakfast [facility] DELICIA Michael    metoclopramide 10 mg Intravenous [facility]    pantoprazole 20 mg Oral Early Morning Lennie Parish PA-C    sodium chloride 100 mL/hr Intravenous Continuous Lennie Parish PA-C Last Rate: 100 mL/hr (04/03/18 1329)   valproate sodium 500 mg Intravenous Q8H JenDELICIA Waller Last Rate: 500 mg (04/04/18 0601)       Continuous Infusions:    sodium chloride 100 mL/hr Last Rate: 100 mL/hr (04/03/18 1329)       PRN Meds:    diphenhydrAMINE    hydrOXYzine HCL      VTE Prophylaxis:SCDs  Patient Centered Rounds: I have discussed today's plans with nursing staff today  DISPOSITION:  Awaiting EEG, PTOT evaluation  Likely discharge in a m  Time Spent for Care: 20 minutes   More than 50% of total time spent on counseling and coordination of care as described above  Family will be updated      [address], MD  HOSPITALIST SERVICES  4/4/2018    PLEASE NOTE:  This encounter was completed utilizing the [employer] Direct Speech Voice Recognition Software  Grammatical errors, random word insertions, pronoun errors and incomplete sentences are occasional consequences of the system due to software limitations, ambient noise and hardware issues  These may be missed by proof reading prior to affixing electronic signature  Any questions or concerns about the content, text or information contained within the body of this dictation should be directly addressed to the physician for clarification  Please do not hesitate to call me directly if you have any any questions or concerns

## 2018-04-04 NOTE — ASSESSMENT & PLAN NOTE
Patient back to baseline  Mostly likely secondary to Seizure disorder  Continue depakote infusion, as per neurology, seizure precautions  Await EEG

## 2018-04-04 NOTE — PROGRESS NOTES
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  4   B12 deficiency  5  Iron deficiency  Plan:  1  Depakote ER 1000 mg HS for seizure prophylaxis as well as headache prevention  2   Over-the-counter magnesium supplement daily for headache prevention  3  CBC, LFTs, Depakote level, ammonia level on 04/09  4  [place] reporting completed  5  Activity precautions for seizure are in discharge instructions  Will need to be translated into Telugu for patient, reviewed with  at bedside  6  Will arrange for Epilepsy Center follow-up, in addition to the Headache Center/Movement   [address]  7   B12 1000 mcg IM prior to discharge, resume oral supplementation    Subjective:   Reports complete resolution of headache  No vomiting, taking diet well  No recurrent seizures reported  Has received Toradol/Reglan for doses, Depacon 1000 mg on 04/03, 500 mg x2 on 04/04  Current Facility-Administered Medications:  diphenhydrAMINE 25 mg Intravenous Q8H PRN Lennie Parish PA-C    hydrOXYzine HCL 10 mg Oral HS PRN Lennie Parish PA-C    ketorolac 30 mg Intravenous [facility] DELICIA Yeager    LORazepam 0 5 mg Intravenous Once Travon Mo PA-C    magnesium sulfate 2 g Intravenous After Breakfast DELICIA Yeager    metoclopramide 10 mg Intravenous [facility] DELICIA Yeager    pantoprazole 20 mg Oral Early Morning Lennie Parish PA-C    sodium chloride 100 mL/hr Intravenous Continuous Lennie Parish PA-C Last Rate: 100 mL/hr (04/04/18 1153)   valproate sodium 500 mg Intravenous Q8H DELICIA Yeager Last Rate: 500 mg (04/04/18 1449)       Vitals: Blood pressure 91/54, pulse 87, temperature 97 8 °F (36 6 °C), temperature source Temporal, resp   rate 18, weight 85 6 kg (188 lb 11 4 oz), last menstrual period 03/06/2018, SpO2 99 %  ,Body mass index is 27 87 kg/m²  Physical Exam:   Gen:  Sitting upright in bed in bright lit room, no distress  Neuro    MSE awake, alert, interactive + social smiles    CN extraocular movements full with saccades/nystagmus to right greater than left  Symmetric facies  Motor power full throughout  No tremor  No asterixis        Lab, Imaging and other studies:   CBC:   Results from last 7 days  Lab Units 04/04/18  0913 04/03/18  0807   WBC Thousand/uL 2 59* 4 55   RBC Million/uL 3 98 4 45   HEMOGLOBIN g/dL 9 5* 10 9*   HEMATOCRIT % 30 8* 34 2*   MCV fL 77* 77*   PLATELETS Thousands/uL 160 158   , BMP/CMP:   Results from last 7 days  Lab Units 04/04/18  0913 04/03/18  0807   SODIUM mmol/L 141 139   POTASSIUM mmol/L 3 9 3 3*   CHLORIDE mmol/L 108 102   CO2 mmol/L 23 24   ANION GAP mmol/L 10 13   BUN mg/dL 6 11   CREATININE mg/dL 0 66 0 89   GLUCOSE RANDOM mg/dL 92 131   CALCIUM mg/dL 7 8* 8 4   EGFR ml/min/1 73sq m 115 84   Fe 31  B12 291, Mag 1 7    EEG:This 31 minute Routine EEG recorded during wakefulness and drowsiness is abnormal due to background irregularity and intermixed theta slowing, consistent with mild to moderate diffuse cerebral dysfunction  No focal abnormalities nor interictal epileptiform discharges were noted  No electrographic seizures occurred during the recording  Counseling / Coordination of Care  Total time spent today 65 minutes  Greater than 50% of total time was spent with the patient and / or family counseling and / or coordination of care   A description of the counseling / coordination of care: Epilepsy teaching, treatment plan, activity restrictions, medications and follow-up

## 2018-04-04 NOTE — PLAN OF CARE
DISCHARGE PLANNING     Discharge to home or other facility with appropriate resources Progressing        DISCHARGE PLANNING - CARE MANAGEMENT     Discharge to post-acute care or home with appropriate resources Progressing        Knowledge Deficit     Patient/family/caregiver demonstrates understanding of disease process, treatment plan, medications, and discharge instructions Progressing        Potential for Falls     Patient will remain free of falls Progressing        Prexisting or High Potential for Compromised Skin Integrity     Skin integrity is maintained or improved Progressing        SAFETY ADULT     Patient will remain free of falls Progressing

## 2018-04-04 NOTE — DISCHARGE SUMMARY
1 [facility]    NAME: Mali Small  AGE: 30 y o  SEX: female   MRN: [id number]   Encounter: [id number]   Unit/Bed: E4 -01     Admitting Provider:  Brie Rain MD  Discharge Provider:  Ho Coyle MD  Admission Date: 4/3/2018       Discharge Date: 04/04/18   LOS: 1  Primary Care Physician at Discharge: Quentin Adam [phone]    DISCHARGE DIAGNOSES  · Seizure  · Altered mental status secondary to above  · Chronic migraine headache without aura -- this has resolved  · Hypokalemia -- resolved  · Transient hypotension - resolved    SECONDARY DIAGNOSES  · Chronic migraine headache  · B12 Deficiency  · Iron Deficiency  · 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  · Microcytic anemia      HOSPITAL COURSE:  Mali Small is a 30 y o  female who presents with cerebellar atrophy chronic migraines without aura, and chronic dysequilibrium and gait disturbance coming to ER for change in mental status and jerking like motions starting this AM   Pt is accompanied by family who are bedside  Conversation occurred in [place] with primary translation per pt's son at pt request   Pt currently is a rather poor historian  The last thing she remembers this morning is waking up with EMS all around her  Prior to this she remembers vomiting yesterday evening which was 'white '  Prior to this she apparently cannot recall any details prior to her packing to leave for [place] which she her son and family just returned from yesterday after spending the last 4 days there  She knows her address, she knows she's currently in the hospital and why she is here  The son heard around 56 a shout and came to check on his mother whom he found with large and dynamic movements of her arms and legs diffusely, as if she was flailing around    This lasted 5 minutes and the pt was not responsive through this or for nearly 15 minutes afterwards  They called EMS who then brought the patient here for further evaluation where her mentation has improved and she has now developed her typical migraine headache, primarily left sided pulsatile, associated w/photophobia but no n/v   Pt also reports of her chronic right sided neck pain which is nonradiating and also sharp in nature  No numbness, tingling, lateralizing weakness, or blurry vision today or facial droop  These are unchanged in nature and pt has been frustrated by the poor response of her prior medications for which she sees Dr Jean-Paul Gilbert for, and is no longer taking her verapamil, topamax  She also not taking zantac at this time or any benadryl/atarax or other anticholinergics, only advil for her headaches  No dysphagia, urinary or bowel incontinence  No recent injuries or trauma  She does have a somewhat long standing history of dysequilibrium and cereballar atrophy dating back by imaging to 2014 and by s/sx after her last child was born some 15 years prior  Her sisters have similar s/sx and her mother did as well, although her mother suffered a brain tumor and was HIV+  Pt has been seen by Dr Kevin Odonnell who has recommended the pt be tested to r/o SCA given some element of family hx  She also has been recommended to follow up with stevie or chelsi for her cerebellar atrophy  Below is the short summary of hospital stay: Altered mental status secondary to seizure     · Patient back to baseline  · Mostly likely secondary to Seizure disorder  · Discussed with Neurology -- patient will continue with Depakote ER 1gm at bedtime along with magnesium supplements  · Patient will continue with vitamin B12 supplementation  · EEG -- nonspecific findings  · Scripts for vitamin B12 Depakote ER and magnesium have been called into [facility], [place], [place]    · Neurology and myself explained to the patient and patient's  regarding discharge instructions and the fully aware, and agreeable  · Patient is to get a CBC, LFT, ammonia, depakote levels on 04/09/2018 -- this will be followed up by Neurology  · Absolutely no driving -- [employer] has been notified  · Patient is to follow up with epilepsy Center to be coordinated by Neurology  Chronic migraine without aura     · Unchanged no recent traumas  · Pt no longer taking her topamax as it doesn't help her and just takes advil at home  · Continue migraine protocol including reglan/benadryl/toradol although will have to monitor for chorea given #1             Hypotension     · BP much better  · Responded well to IV fluids  Hypokalemia     · Resolved  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     · Previously seen by Yuki Duarte   · She has associated dysequillibrium, concomittant horizontal/vertical nystagmus and family hx with her sisters and has previously been suggested genetic workup by sharan to r/o Spinocerebellar ataxia  · F/u with OP neurology at [facility]   Microcytic anemia     · Patient refusing to take iron supplements as it makes her constipated  · Follow-up CBC as outpatient  CONSULTING PROVIDERS   · Neurology    PROCEDURES PERFORMED  Ct Head Without Contrast    Result Date: 4/3/2018  Narrative: CT BRAIN - WITHOUT CONTRAST INDICATION:   Headache, possible seizure  COMPARISON:  None TECHNIQUE:  CT examination of the brain was performed  In addition to axial images, coronal 2D reformatted images were created and submitted for interpretation  Radiation dose length product (DLP) for this visit:  988 mGy-cm   This examination, like all CT scans performed in the [facility], was performed utilizing techniques to minimize radiation dose exposure, including the use of iterative reconstruction and automated exposure control  IMAGE QUALITY:  Diagnostic  FINDINGS: PARENCHYMA:  No intracranial mass, mass effect or midline shift   No CT signs of acute infarction  No acute intracranial hemorrhage  Moderately prominent cerebellar atrophy for patient age  VENTRICLES AND EXTRA-AXIAL SPACES:  Normal for the patient's age  VISUALIZED ORBITS AND PARANASAL SINUSES:  Unremarkable  CALVARIUM AND EXTRACRANIAL SOFT TISSUES:  Normal      Impression: No acute intracranial abnormality  Moderately prominent cerebellar atrophy for patient age  Workstation performed: [id number]       PERTINENT LAB DATAS  Results for Kristal Mon (MRN [id number]) as of 4/4/2018 16:50   4/4/2018 09:13   eGFR 115   Sodium 141   Potassium 3 9   Chloride 108   CO2 23   Anion Gap 10   BUN 6   Glucose 92   Calcium 7 8 (L)   WBC 2 59 (L)   RBC 3 98   Hemoglobin 9 5 (L)   Hematocrit 30 8 (L)   MCV 77 (L)   MCH 23 9 (L)   MCHC 30 8 (L)   RDW 15 5 (H)   Platelets 797   MPV 59 6     PHYSICAL EXAM:  Vitals:   Blood Pressure: 91/54 (04/04/18 1516)  Pulse: 87 (04/04/18 1516)  Temperature: 97 8 °F (36 6 °C) (04/04/18 1516)  Temp Source: Temporal (04/04/18 1516)  Respirations: 18 (04/04/18 1516)  Weight - Scale: 85 6 kg (188 lb 11 4 oz) (04/03/18 1310)  SpO2: 99 % (04/04/18 1516)    GENERAL: AAO x 3, overweight  HEENT: atraumatic, normocephalic  Oral mucosa moist, no icterus, pallor  PERRLA +  Neck supple, no JVD, no lymphadenopathy, no thryomegaly  CHEST: B/L breath sounds heard, occasional wheezing  CVS: S1, S2  No cyanosis/clubbing or edema  ABDOMEN: Soft/obese/NT/BS heard  NEUROLOGICAL: CN II -XI grossly intact  No focal motor or sensory deficits  No signs of meningeal irritation or cerebellar dysfunction  EXTREMITIES: No cyanosis/clubbing or edema  Discharge Disposition: Home with family  Facility: Home with family  Test Results Pending at Discharge: NA    Medications   Please see After Visit Summary for reconciled discharge medications provided to patient and family           Diet Orders            Start     Ordered    04/03/18 1310  Diet Regular; Regular House  Diet effective now     Question Answer Comment   Diet Type Regular    Regular Regular House    RD to adjust diet per protocol? Yes        04/03/18 1309    04/03/18 1251  Room Service  Once     Question:  Type of Service  Answer:  Room Service-Appropriate    04/03/18 1259        Activity restrictions: No strenuous activity  Discharge Condition: good    Outpatient Follow-Up  1  Ignacio Nissen, MD [address] [address] [address] [address] [facility] [phone] - follow-up within one wee  2  Follow up with Reina Saenz MD Specialty: Neurology   [facility] Neu[facility] office will call to schedule appointment in the [place] [phone] in several weeks  3  CBC, LFTs, ammonia level, Depakote level on 04/09/2018 -- results to be forwarded to Neurology  Code Status: Level 1 - Full Code    Discharge Statement   I spent 33 minutes discharging the patient  This time was spent on the day of discharge  Greater than 50% of total time was spent with the patient and / or family counseling and / or coordination of care  Inocente Onofre MD  HOSPITALIST SERVICES  4/4/2018    PLEASE NOTE:  This encounter was completed utilizing the [employer] Speech Voice Recognition Software  Grammatical errors, random word insertions, pronoun errors and incomplete sentences are occasional consequences of the system due to software limitations, ambient noise and hardware issues  These may be missed by proof reading prior to affixing electronic signature  Any questions or concerns about the content, text or information contained within the body of this dictation should be directly addressed to the physician for clarification  Please do not hesitate to call me directly if you have any any questions or concerns

## 2018-04-04 NOTE — NURSING NOTE
Assumed care of patient, patient awake and alert and able to make needs known  No s/s of distress  Agree with previous nurse assessment

## 2018-04-04 NOTE — DISCHARGE INSTRUCTIONS
You have been started on a medication Depakote ER 1000mg  Take 1 every night at bedtime both to prevent seizures and to reduce the severity and frequency of headaches  Additionally, please restart vitamin B12 supplement, and a magnesium supplement (over-the-counter) 250 or 500 mg daily, which will help prevent headaches as well  On 04/09, please obtain blood testing  It will be sent to the Neurology office, and you will be contacted if it is abnormal    No swimming, diving, operating heavy machinery, climbing ladders  No tub baths; may shower  Take all anti-seizure medications on schedule as directed, do not miss doses  Contact the neurology office if seizures recur, or problems with medications  Should a seizure last more than 5 minutes or patient fails to recover after 30 minutes or there are multiple seizures in a day or if there is a suspected head injury then EMS should be called or they go to the nearest emergency room  If he only experiences altered awareness, confusion, or focal seizures, then they may call the office for guidance  Seizure first aid consists of preventing the patient from wandering or removal of dangerous objects or prevention of injury, for convulsive seizures, position the patient on the ground, may place a pillow under the head, turn the patient to his side, no objects or reaching for the mouth, no restraints but prevent injuries by removing glasses or sharp/heavy objects, and time the duration of the seizure       NO DRIVING

## 2018-04-04 NOTE — PLAN OF CARE
Problem: Potential for Falls  Goal: Patient will remain free of falls  INTERVENTIONS:  - Assess patient frequently for physical needs  -  Identify cognitive and physical deficits and behaviors that affect risk of falls  -  [place] fall precautions as indicated by assessment   - Educate patient/family on patient safety including physical limitations  - Instruct patient to call for assistance with activity based on assessment  - Modify environment to reduce risk of injury  - Consider OT/PT consult to assist with strengthening/mobility    Outcome: Progressing      Problem: DISCHARGE PLANNING - CARE MANAGEMENT  Goal: Discharge to post-acute care or home with appropriate resources  INTERVENTIONS:  - Conduct assessment to determine patient/family and health care team treatment goals, and need for post-acute services based on payer coverage, community resources, and patient preferences, and barriers to discharge  - Address psychosocial, clinical, and financial barriers to discharge as identified in assessment in conjunction with the patient/family and health care team  - Arrange appropriate level of post-acute services according to patients   needs and preference and payer coverage in collaboration with the physician and health care team  - Communicate with and update the patient/family, physician, and health care team regarding progress on the discharge plan  - Arrange appropriate transportation to post-acute venues   Outcome: Progressing      Problem: SAFETY ADULT  Goal: Patient will remain free of falls  INTERVENTIONS:  - Assess patient frequently for physical needs  -  Identify cognitive and physical deficits and behaviors that affect risk of falls    -  [place] fall precautions as indicated by assessment   - Educate patient/family on patient safety including physical limitations  - Instruct patient to call for assistance with activity based on assessment  - Modify environment to reduce risk of injury  - Consider OT/PT consult to assist with strengthening/mobility    Outcome: Progressing      Problem: DISCHARGE PLANNING  Goal: Discharge to home or other facility with appropriate resources  INTERVENTIONS:  - Identify barriers to discharge w/patient and caregiver  - Arrange for needed discharge resources and transportation as appropriate  - Identify discharge learning needs (meds, wound care, etc )  - Arrange for interpretive services to assist at discharge as needed  - Refer to Case Management Department for coordinating discharge planning if the patient needs post-hospital services based on physician/advanced practitioner order or complex needs related to functional status, cognitive ability, or social support system   Outcome: Progressing      Problem: Knowledge Deficit  Goal: Patient/family/caregiver demonstrates understanding of disease process, treatment plan, medications, and discharge instructions  Complete learning assessment and assess knowledge base    Interventions:  - Provide teaching at level of understanding  - Provide teaching via preferred learning methods   Outcome: Progressing      Problem: Prexisting or High Potential for Compromised Skin Integrity  Goal: Skin integrity is maintained or improved  INTERVENTIONS:  - Identify patients at risk for skin breakdown  - Assess and monitor skin integrity  - Assess and monitor nutrition and hydration status  - Monitor labs (i e  albumin)  - Assess for incontinence   - Turn and reposition patient  - Assist with mobility/ambulation  - Relieve pressure over bony prominences  - Avoid friction and shearing  - Provide appropriate hygiene as needed including keeping skin clean and dry  - Evaluate need for skin moisturizer/barrier cream  - Collaborate with interdisciplinary team (i e  Nutrition, Rehabilitation, etc )   - Patient/family teaching   Outcome: Progressing

## 2018-04-04 NOTE — ASSESSMENT & PLAN NOTE
Unchanged no recent traumas  Pt no longer taking her topamax as it doesn't help her and just takes advil at home  Continue migraine protocol including reglan/benadryl/toradol although will have to monitor for chorea given #1

## 2018-04-04 NOTE — CASE MANAGEMENT
Initial Clinical Review    Admission: Date/Time/Statement: 4/3/18 @ 0951     Orders Placed This Encounter   Procedures    Inpatient Admission (expected length of stay for this patient is greater than two midnights)     Standing Status:   Standing     Number of Occurrences:   1     Order Specific Question:   Admitting Physician     Answer:   NILTON SANTANA [[id number]]     Order Specific Question:   Level of Care     Answer:   Med Surg [[id number]]     Order Specific Question:   Estimated length of stay     Answer:   More than 2 Midnights     Order Specific Question:   Certification     Answer:   I certify that inpatient services are medically necessary for this patient for a duration of greater than two midnights  See H&P and MD Progress Notes for additional information about the patient's course of treatment  ED: Date/Time/Mode of Arrival:   ED Arrival Information     Expected Arrival Acuity Means of Arrival Escorted By Service Admission Type    - 4/3/2018 07:28 Urgent Ambulance [address] Urgent    Arrival Complaint    Altered Mental Status        Chief Complaint:   Chief Complaint   Patient presents with    Altered Mental Status     According to family, pt woke up to 10 minute episode of confusion, aggitation, and screaming  Upon EMS arrival, pt alert and oriented x 4; now reports photosensitivity and neck pain  Hx of "brain mass in back of head"  History of Illness:  57-year-old female patient with a longstanding history of migraines  She is mostly Hungarian speaking and her son is a  he has a told  According to what he tells me she was sleeping next to her  and he was a woken by her shaking in bed with her eyes rolled back for approximately 1 minute she then was extremely tired for about 20 minutes  And was nonverbal   She is now alert oriented x4 complaining of her typical migraine headache with some photophobia and posterior head pain   No neck stiffness fever chills cough congestion dizziness  She has mild nausea no vomiting no diarrhea no abdominal pain no urinary symptoms  Nothing makes it better or worse  She is moving all 4 limbs and has no facial droop  The son also stated for the last 3 days she cannot recall which she has been doing describes as almost like an amnesia state  She did not have any loss of bowel or bladder during this seizure-like activity at this point besides her usual frontal migraine she has no other complaints  They did state that she has a brain mass I did review old studies of MRIs and CTs and not 1 study had shown a brain mass but some mild cerebral atrophy  Differential diagnosis includes not limited to seizure, pseudo-seizure, migraine variant, TIA less likely       ED Vital Signs:   ED Triage Vitals   Temperature Pulse Respirations Blood Pressure SpO2   04/03/18 0735 04/03/18 0735 04/03/18 0735 04/03/18 0735 04/03/18 0735   98 3 °F (36 8 °C) (!) 121 20 114/74 98 %      Temp Source Heart Rate Source Patient Position - Orthostatic VS BP Location FiO2 (%)   04/03/18 0735 04/04/18 0726 04/03/18 1247 04/03/18 1247 --   Oral Monitor Lying Left arm       Pain Score       04/03/18 0735       9        Wt Readings from Last 1 Encounters:   04/03/18 85 6 kg (188 lb 11 4 oz)       Vital Signs (abnormal):   04/03/18 1000 98 2 °F (36 8 °C) 95 16  85/50 100 %     Abnormal Labs/Diagnostic Test Results:  k 3 3,   H&H 10 9 / 34 2  Urine   1+ protein,  2-4 wbc's,  occ bacteria  CT Head: No acute intracranial abnormality  Moderately prominent cerebellar atrophy for patient age      ED Treatment:   Medication Administration from 04/03/2018 0728 to 04/03/2018 1241       Date/Time Order Dose Route Action Action by Comments     04/03/2018 0905 sodium chloride 0 9 % bolus 1,000 mL 0 mL Intravenous Stopped Charles Peguero RN      04/03/2018 0810 sodium chloride 0 9 % bolus 1,000 mL 1,000 mL Intravenous [facility]      04/03/2018 7987 metoclopramide (REGLAN) injection 5 mg 5 mg Intravenous Given Ramiro Molina RN      04/03/2018 9683 diphenhydrAMINE (BENADRYL) injection 12 5 mg 12 5 mg Intravenous Given Ramiro Molina RN      04/03/2018 4480 ketorolac (TORADOL) injection 15 mg 15 mg Intravenous Given Ramiro Molina RN      04/03/2018 1001 LORazepam (ATIVAN) 2 mg/mL injection 0 5 mg 0 mg Intravenous Hold Ramiro Molina RN Hypotensive at 85/50     04/03/2018 1118 sodium chloride 0 9 % bolus 1,000 mL 0 mL Intravenous Stopped Christobal Bath OLAYINKA Lyle      04/03/2018 1019 sodium chloride 0 9 % bolus 1,000 mL 1,000 mL Intravenous New Bag Ramiro Molina RN      04/03/2018 1227 potassium chloride (K-DUR,KLOR-CON) CR tablet 40 mEq 40 mEq Oral Given Hafsa Ba RN           Past Medical/Surgical History:    Active Ambulatory Problems     Diagnosis Date Noted    Chronic migraine without aura without status migrainosus, not intractable 03/02/2018    Balance problems 03/02/2018    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 03/02/2018    Acid indigestion 01/27/2017    Allergic reaction 04/27/2017    B12 deficiency 02/20/2017    Chronic migraine without aura 02/20/2017    Dermatographism 05/16/2017    Diplopia 04/18/2017    Excessive vaginal bleeding 03/20/2017    Gait disturbance 01/27/2017    Iron deficiency anemia 02/20/2017    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    Nystagmus 04/18/2017    Urticaria, chronic 04/27/2017    Vitamin D deficiency 02/20/2017     Resolved Ambulatory Problems     Diagnosis Date Noted    No Resolved Ambulatory Problems     Past Medical History:   Diagnosis Date    B12 deficiency     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     Chronic migraine without aura     Dermatographism     Gait disturbance     Iron deficiency anemia     Lyme neuropathy     Visual disturbance     Vitamin D deficiency        Admitting Diagnosis: Migraine headache [G43 909]  Altered mental status [R41 82]  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 [G31 9]  Seizure-like activity ([facility] [address] ) [R56 9]  Chronic migraine without aura without status migrainosus, not intractable [G43 709]    Age/Sex: 30 y o  female    Assessment/Plan:   Principal Problem:    Altered mental status  Active Problems:    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    Chronic migraine without aura    Hypotension    Hypokalemia    Microcytic anemia         * Altered mental status   Assessment & Plan     W/'seizure like movements' that actually sound somewhat more chorea based followed by a period of unresponsiveness and now 'incomplete amnesia'  CT head negative for any hemorrhage midline shift or stroke  Pt not currently taking her verapamil, topamax, or ranitidine or any antihistamines that are sometimes known to be associated w/choreagraphic movements if this was truly the s/sx she was experiencing  No oral lesions or incontinence of stool  No focal neuro deficits  Will continue to monitor and consult neurology  Will start seizure precautions  Will defer routine EEG at this time to neurology          Chronic migraine without aura   Assessment & Plan     Unchanged no recent traumas  Pt no longer taking her topamax as it doesn't help her and just takes advil at home  Start migraine protocol including reglan/benadryl/toradol although will have to monitor for chorea given #1  Give magnesium as well  F/u neuro          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   Assessment & Plan     Previously seen by [facility] and sharan   She has associated dysequillibrium, concomittant horizontal/vertical nystagmus and family hx with her sisters and has previously been suggested genetic workup by sharan to r/o SCA  No dysphagia, incontinence or other parkinsonian type movements 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    F/u with OP neurology at [place]          Hypotension   Assessment & Plan     Pt has hx of recurrent episodes of mild hypotension previously  S/p 2L NS bolus in ED  Will continue to monitor and hydrate w/IVF          Microcytic anemia   Assessment & Plan     Pt previously was on iron but has not been taking of late  Will check ferritin/iron panel to confirm iron deficiency anemia             Hypokalemia   Assessment & Plan     Mild will replete             VTE Prophylaxis: low vte risk  / reason for no mechanical VTE prophylaxis low vte risk   Anticipated Length of Stay:  Patient will be admitted on an Inpatient basis with an anticipated length of stay of  Greater than 2 midnights  Justification for Hospital Stay: change in mental status, chronic headaches    Admission Orders: IP  Consult Neuro  Seizure Precautions  Ambulate pt q shift  Reg Diet  EEG  Vit B12,   Mag,  Iron sat,  Ferritin,   CBC,  BMP  PT / OT Eval    Scheduled Meds:   Current Facility-Administered Medications:  diphenhydrAMINE 25 mg Intravenous Q8H PRN Lennie Parish PA-C    hydrOXYzine HCL 10 mg Oral HS PRN Lennie Parish PA-C    ketorolac 30 mg Intravenous Q8H [address] DELICIA Xavier    LORazepam 0 5 mg Intravenous Once Travon Mo PA-C    magnesium sulfate x 2 doses 2 g Intravenous After Breakfast Michael Flattery, CRNP    metoclopramide 10 mg Intravenous [facility] Michael Flattery, CRNP    pantoprazole 20 mg Oral Early Morning Lennie Parish PA-C    sodium chloride 100 mL/hr Intravenous Continuous Lennie Parish PA-C Last Rate: 100 mL/hr (04/03/18 1329)   valproate sodium 500 mg Intravenous Q8H Michael Flattery, CRNP Last Rate: 500 mg (04/04/18 0601)     Continuous Infusions:   sodium chloride 100 mL/hr Last Rate: 100 mL/hr (04/03/18 1329)     PRN Meds:   diphenhydrAMINE    hydrOXYzine HCL  Per Neuro:  Assessment:  1  Nocturnal event suggestive of partial seizure with secondary generalization:  Shout, followed by involuntary tonic-clonic movements, followed by postictal phase  Nothing by history  to suggest this was a triggered event; has no personal history of seizures, but has family history (mother) of epilepsy  2  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, 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    Suspect she has cognitive involvement as well  3   Chronic migraine without aura, chronic daily headaches  Plan:  1  EEG (routine), [facility] follow-up  2  Acute headache treatment:         A  Toradol 30 mg with Reglan 10 mg IV every 8 hours maximum 9 doses       B  Magnesium 2 g infusion daily maximum 3 days       C  Depacon infusions every 8 hours ( midway between Toradol dosing) 6 total dose  3  Preventative treatment undecided at this point, but will include a daily magnesium oral supplement  4   Likely to require supervision for medication compliance, and repeated rationale for treatment  5  Continue seizure precautions  6  Follow-up in Headache Center, which can coordinate with Movement [address]  7  Check B12, magnesium    4/4: 98 8 - 78 - 18   95/65     RA  Iron  31,   H&H 9 5 / 30 8,   WBC's 2 59,   Ca 7 8    Thank you,  [address] [facility] in the [facility] by Hernan Brown for 2017  Network Utilization Review Department  Phone: [phone]; Fax [phone]  ATTENTION: The Network Utilization Review Department is now centralized for our 7 Facilities  Make a note that we have a new phone and fax numbers for our Department  Please call with any questions or concerns to [phone] and carefully follow the prompts so that you are directed to the right person  All voicemails are confidential  Fax any determinations, approvals, denials, and requests for initial or continue stay review clinical to [phone]  Due to HIGH CALL volume, it would be easier if you could please send faxed requests to expedite your requests and in part, help us provide discharge notifications faster

## 2018-04-04 NOTE — ASSESSMENT & PLAN NOTE
Previously seen by [facility] and sharan   She has associated dysequillibrium, concomittant horizontal/vertical nystagmus and family hx with her sisters and has previously been suggested genetic workup by sharan to r/o Spinocerebellar ataxia    F/u with OP neurology at [facility]

## 2018-04-05 NOTE — CASE MANAGEMENT
Notification of Discharge  This is a Notification of Discharge from our facility [address]  Please be advised that this patient has been discharge from our facility  Below you will find the admission and discharge date and time including the patients disposition  PRESENTATION DATE: 4/3/2018  7:28 AM  IP ADMISSION DATE: 4/3/18 0951  DISCHARGE DATE: 4/4/2018  6:17 PM  DISPOSITION: [address] [facility] in the [employer] by Hernan Brown for 2017  Network Utilization Review Department  Phone: [phone]; Fax [phone]  ATTENTION: The Network Utilization Review Department is now centralized for our 7 Facilities  Make a note that we have a new phone and fax numbers for our Department  Please call with any questions or concerns to [phone] and carefully follow the prompts so that you are directed to the right person  All voicemails are confidential  Fax any determinations, approvals, denials, and requests for initial or continue stay review clinical to [phone]  Due to HIGH CALL volume, it would be easier if you could please send faxed requests to expedite your requests and in part, help us provide discharge notifications faster

## 2018-04-06 VITALS
HEART RATE: 87 BPM | TEMPERATURE: 97.8 F | WEIGHT: 188.71 LBS | OXYGEN SATURATION: 99 % | RESPIRATION RATE: 18 BRPM | SYSTOLIC BLOOD PRESSURE: 91 MMHG | BODY MASS INDEX: 27.87 KG/M2 | DIASTOLIC BLOOD PRESSURE: 54 MMHG

## 2018-04-11 ENCOUNTER — OFFICE VISIT (OUTPATIENT)
Dept: FAMILY MEDICINE CLINIC | Facility: CLINIC | Age: 38
End: 2018-04-11
Payer: COMMERCIAL

## 2018-04-11 VITALS
TEMPERATURE: 98.2 F | SYSTOLIC BLOOD PRESSURE: 110 MMHG | OXYGEN SATURATION: 98 % | HEIGHT: 69 IN | DIASTOLIC BLOOD PRESSURE: 62 MMHG | HEART RATE: 106 BPM | WEIGHT: 188 LBS | BODY MASS INDEX: 27.85 KG/M2

## 2018-04-11 DIAGNOSIS — R56.9 SEIZURE-LIKE ACTIVITY (HCC): Primary | ICD-10-CM

## 2018-04-11 DIAGNOSIS — K30 ACID INDIGESTION: ICD-10-CM

## 2018-04-11 PROCEDURE — 99214 OFFICE O/P EST MOD 30 MIN: CPT | Performed by: FAMILY MEDICINE

## 2018-04-11 PROCEDURE — 1111F DSCHRG MED/CURRENT MED MERGE: CPT | Performed by: FAMILY MEDICINE

## 2018-04-11 RX ORDER — OMEPRAZOLE 20 MG/1
20 CAPSULE, DELAYED RELEASE ORAL DAILY
Qty: 90 CAPSULE | Refills: 0 | Status: SHIPPED | OUTPATIENT
Start: 2018-04-11 | End: 2018-08-01 | Stop reason: ALTCHOICE

## 2018-04-11 RX ORDER — DIAPER,BRIEF,INFANT-TODD,DISP
EACH MISCELLANEOUS
COMMUNITY
Start: 2018-04-10 | End: 2020-10-14 | Stop reason: SDUPTHER

## 2018-04-11 NOTE — PROGRESS NOTES
Assessment/Plan:    No problem-specific Assessment & Plan notes found for this encounter  Diagnoses and all orders for this visit:    Seizure-like activity ([address] )   -On depakote 500mg qd, magnesium    -Keep neuro apt  Acid indigestion  -     omeprazole (PriLOSEC) 20 mg delayed release capsule; Take 1 capsule (20 mg total) by mouth daily  - pepcid 10mg qd          Fu 3 mo    Subjective:      Patient ID: Elissa Stroud is a 30 y o  female  HPI  Pt was hospitalized at [facility] from April 3rd to April 4th after suffering a seizure  This is a first-time episode for her a new diagnosis for her  She does have history migraines, dysequilibrium and cereballar atrophy  She follow withDr  Duarte Reinoso neurologists, she has also been evaluated by Dr Albina Stoner who refer her out to her she but she has not made of appointment  She was started on Depakote 500mg qd  Today she feels well, she has had no other episodes since hospitalization  She has a follow-up appointment with neurologist for further evaluation of her seizures as outpatient  All the hospital records and discharge summary reviewed and discussed with pt  The following portions of the patient's history were reviewed and updated as appropriate: allergies, current medications, past family history, past medical history, past social history, past surgical history and problem list     Review of Systems   Constitutional: Negative for activity change and appetite change  Respiratory: Negative  Cardiovascular: Negative  Gastrointestinal: Negative  Genitourinary: Negative  Musculoskeletal: Negative for arthralgias  Skin: Negative for rash  Psychiatric/Behavioral: Negative  Objective:      /62   Pulse (!) 106   Temp 98 2 °F (36 8 °C)   Ht 5' 9" (1 753 m)   Wt 85 3 kg (188 lb)   SpO2 98%   BMI 27 76 kg/m²          Physical Exam   Constitutional: She is oriented to person, place, and time   She appears well-developed and well-nourished  HENT:   Head: Normocephalic and atraumatic  Eyes: Conjunctivae are normal    Cardiovascular: Normal rate, regular rhythm and normal heart sounds  Pulmonary/Chest: Effort normal and breath sounds normal  No respiratory distress  She has no wheezes  She has no rales  Neurological: She is alert and oriented to person, place, and time  Psychiatric: She has a normal mood and affect   Her behavior is normal

## 2018-05-08 DIAGNOSIS — G43.709 CHRONIC MIGRAINE WITHOUT AURA WITHOUT STATUS MIGRAINOSUS, NOT INTRACTABLE: Primary | ICD-10-CM

## 2018-05-15 ENCOUNTER — OFFICE VISIT (OUTPATIENT)
Dept: NEUROLOGY | Facility: CLINIC | Age: 38
End: 2018-05-15
Payer: COMMERCIAL

## 2018-05-15 VITALS
DIASTOLIC BLOOD PRESSURE: 68 MMHG | BODY MASS INDEX: 28.47 KG/M2 | RESPIRATION RATE: 18 BRPM | HEIGHT: 69 IN | WEIGHT: 192.2 LBS | SYSTOLIC BLOOD PRESSURE: 128 MMHG | HEART RATE: 99 BPM

## 2018-05-15 DIAGNOSIS — R40.4 TRANSIENT ALTERATION OF AWARENESS: ICD-10-CM

## 2018-05-15 DIAGNOSIS — G43.711 INTRACTABLE CHRONIC MIGRAINE WITHOUT AURA AND WITH STATUS MIGRAINOSUS: Primary | ICD-10-CM

## 2018-05-15 DIAGNOSIS — R56.9 SEIZURE (HCC): ICD-10-CM

## 2018-05-15 PROCEDURE — 99215 OFFICE O/P EST HI 40 MIN: CPT | Performed by: PSYCHIATRY & NEUROLOGY

## 2018-05-15 RX ORDER — DIVALPROEX SODIUM 500 MG/1
1000 TABLET, EXTENDED RELEASE ORAL
Qty: 60 TABLET | Refills: 11 | Status: SHIPPED | OUTPATIENT
Start: 2018-05-15 | End: 2019-02-25 | Stop reason: SDUPTHER

## 2018-05-15 NOTE — PATIENT INSTRUCTIONS
-- Continue to take Divalproex ER 1000 mg nightly  -- Continue to follow with Dr Tara Means for your headaches and schedule and appointment at [facility] for evaluation of your balance

## 2018-05-15 NOTE — PROGRESS NOTES
Patient ID: Constantino Miles is a 30 y o  female with cerebellar atrophy, gait instability, chronic migraine headaches and recent onset seizure, who is returning to Neurology office for follow up of her seizures  Assessment/Plan:    Seizure [facility])  The event that she experienced was most suggestive of a generalized tonic-clonic seizure  She has only ever had the 1 seizure, but does have a strong family history of other neurologic disease, with her mother and multiple siblings having symptoms of gait ataxia similar to her, and her mother also having seizures  There was no focality to her seizure, and her EEG did not show any focal abnormalities or epileptiform discharges  She does not appear to have a clear cause of her seizures, but I am curious if this is related to her other neurologic issues  Seizures are not common with spinocerebellar ataxias, but with her mother also having seizures, it is possible  --she has not had any further seizures since the addition of Depakote, and she appears to be tolerating this well without any side effects  Depakote has also been helpful for her headaches, so I will continue this unchanged  She will continue to take 1000 milligrams nightly  --I discussed seizure first aid, seizure safety precautions, driving restrictions, and pregnancy issues related to Depakote  She previously had a tubal ligation, so she cannot get pregnant    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  She continues to have difficulty with gait instability, which has worsened gradually over time  Her symptoms and strong family history are very suggestive of spinocerebellar ataxia, but she has not yet arranged for an appointment at [facility]  They report that the referral , so I will re-order one today  Chronic migraine without aura  She continues to have headaches, but they are improved with the addition of Depakote   She will continue to follow with the Headache team     I spent a total of 50 min with the patient with greater than 50% of that time spent counseling and coordinating her care, specifically discussing her diagnosis, medications, plans for further evaluation, as detailed above         She will return to the office as scheduled with Dr Mini Fermin  I will not schedule her with an appointment with me, only because she is already following with Dr Mini Fermin and is planned to get another opinion for her ataxia at [facility]  I am happy to discuss her case further or see her back if any further questions arise  Subjective:    HPI    Current seizure medications:  1  Divalproex ER 1000 mg nightly  Other medications as per Epic    Briefly reviewing her history, she has been having difficulty with her balance around 4 years ago  This affects her walking, but also affects her ability to write and talking at times  This has lead to her no longer being able to work  She has been following with Dr Mini Fermin for chronic migraines, and had seen Dr Matilde Stringer for her gait disturbance  The possibility of spinocerebellar ataxia was raised, as her mother and multiple siblings have similar problems  She was recently referred to [facility], but she has not yet been able to schedule that appointment  She comes to the office today for follow up of a recent hospitalization for a new onset seizure  She does not remember exactly what happened, but she had gone to bed and around 5:15 in the morning, she awoke from sleep and was screaming loudly  She then became stiff all over and started shaking with foaming out of her mouth  Her  called 46, and this had stopped by the time EMS arrived  She was admitted to the hospital, and was started on Depakote she had an EEG while in the hospital, which showed some diffuse slowing, but no focal abnormalities or epileptiform discharges  Since being discharged from the hospital, she has not had any further episodes concerning for seizures    She has tolerated the addition of Depakote well without any side effects  She reports that her headaches are overall better, now only having about 5 mild headaches since being in the hospital   When her headaches occur, she will take a p r n  medication and they will resolve  She continues to have difficulty with gait instability  She became tearful when talking about her gait instability this appears to be her largest concern today  Special Features  Status epilepticus:  No  Self Injury Seizures:  No  Precipitating Factors:  None    Epilepsy Risk Factors:  Uncomplicated pregnancy with normal development  No learning disabilities or cognitive delay  No h/o febrile seizures, CNS infections, strokes, or CNS neoplasms  Her mother also had seizures  Prior AEDs:  None    Prior Evaluation:  - MRI brain: 2017: Mild chronic cerebellar cortical volume loss  No acute intracranial abnormalities  No pathologic enhancement  Similar to previous MRI study  - Routine EE2018: abnormal due to background irregularity and intermixed theta slowing, consistent with mild to moderate diffuse cerebral dysfunction  No focal abnormalities nor interictal epileptiform discharges were noted  No electrographic seizures occurred during the recording    - Video EEG: none    Her history was also obtained from her , who was present at today's visit  I personally reviewed her MRI from 2017, as detailed above  I reviewed prior notes including prior neurology notes, as documented in Epic/Teros, and summarized above  The following portions of the patient's history were reviewed and updated as appropriate: allergies, current medications, past family history, past medical history, past social history, past surgical history and problem list          Objective:    Blood pressure 128/68, pulse 99, resp  rate 18, height 5' 9" (1 753 m), weight 87 2 kg (192 lb 3 2 oz)      Physical Exam    Neurological Exam  GENERAL EXAMINATION: In general patient is well appearing and in no distress  There is no peripheral edema  NEUROLOGIC EXAMINATION:     Alert and oriented to person, date, location  Fund of knowledge is full with good understanding of medical situation  Recent and remote memory were intact    Mood and affect are appropriate  Attention is intact  Language function including fluency, naming, and comprehension intact  Cranial nerves: Pupils are equal round reactive to light and accommodation  Visual Fields are full to confrontation bilaterally  Optic discs are sharp with no evidence of papilledema  Extraocular movements are intact, but difficulty with smooth pursuits and end-gaze nystagmus in all directions  Facial sensation is intact to light touch  No facial droop, face activates symmetrically  There is no notable dysarthria  Hearing was intact to finger rub  Tongue and uvula are midline and palate elevates symmetrically  Shoulder shrug  5/5  Motor Exam:  No pronator drift  Bulk and tone are normal  Strength is 5/5 throughout  Deep tendon reflexes: Biceps 2+, brachioradialis 2+, patellar 2+, Achilles 2+ bilaterally  Negative Barretts     Sensation: Intact light touch    Coordination: Finger nose finger and heel to shin testing are with mild ataxia   Gait: Negative romberg  Positive truncal ataxia and wide based, ataxic gait  ROS:    Review of Systems   Constitutional: Negative  Negative for appetite change and fever  HENT: Negative  Negative for hearing loss, tinnitus, trouble swallowing and voice change  Eyes: Negative  Negative for photophobia and pain  Respiratory: Negative  Negative for shortness of breath  Cardiovascular: Negative  Negative for palpitations  Gastrointestinal: Negative  Negative for nausea and vomiting  Endocrine: Negative  Negative for cold intolerance and heat intolerance  Genitourinary: Negative  Negative for dysuria, frequency and urgency     Musculoskeletal: Positive for back pain  Negative for myalgias and neck pain  Skin: Negative  Negative for rash  Neurological: Positive for headaches  Negative for dizziness, tremors, seizures, syncope, facial asymmetry, speech difficulty, weakness, light-headedness and numbness  Balance problems, difficulty walking  Hematological: Negative  Does not bruise/bleed easily  Psychiatric/Behavioral: Negative  Negative for confusion, hallucinations and sleep disturbance          Memory problems, Snoring

## 2018-05-15 NOTE — ASSESSMENT & PLAN NOTE
The event that she experienced was most suggestive of a generalized tonic-clonic seizure  She has only ever had the 1 seizure, but does have a strong family history of other neurologic disease, with her mother and multiple siblings having symptoms of gait ataxia similar to her, and her mother also having seizures  There was no focality to her seizure, and her EEG did not show any focal abnormalities or epileptiform discharges  She does not appear to have a clear cause of her seizures, but I am curious if this is related to her other neurologic issues  Seizures are not common with spinocerebellar ataxias, but with her mother also having seizures, it is possible  --she has not had any further seizures since the addition of Depakote, and she appears to be tolerating this well without any side effects  Depakote has also been helpful for her headaches, so I will continue this unchanged  She will continue to take 1000 milligrams nightly  --I discussed seizure first aid, seizure safety precautions, driving restrictions, and pregnancy issues related to Depakote    She previously had a tubal ligation, so she cannot get pregnant

## 2018-05-15 NOTE — ASSESSMENT & PLAN NOTE
She continues to have difficulty with gait instability, which has worsened gradually over time  Her symptoms and strong family history are very suggestive of spinocerebellar ataxia, but she has not yet arranged for an appointment at [facility]  They report that the referral , so I will re-order one today

## 2018-05-15 NOTE — ASSESSMENT & PLAN NOTE
She continues to have headaches, but they are improved with the addition of Depakote   She will continue to follow with the Headache team

## 2018-07-31 NOTE — PROGRESS NOTES
Assessment/Plan:    No problem-specific Assessment & Plan notes found for this encounter  Diagnoses and all orders for this visit:    Iron deficiency anemia, unspecified iron deficiency anemia type  -     CBC and differential; Future  -     Basic metabolic panel; Future  -     Ferritin; Future  -     Lipid Panel with Direct LDL reflex; Future  -     HEMOGLOBIN A1C W/ EAG ESTIMATION; Future    Acid indigestion  -     CBC and differential; Future  -     Basic metabolic panel; Future  -     Ferritin; Future  -     Lipid Panel with Direct LDL reflex; Future  -     HEMOGLOBIN A1C W/ EAG ESTIMATION; Future    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  -     CBC and differential; Future  -     Basic metabolic panel; Future  -     Ferritin; Future  -     Lipid Panel with Direct LDL reflex; Future  -     HEMOGLOBIN A1C W/ EAG ESTIMATION; Future    Chronic migraine without aura without status migrainosus, not intractable  -     CBC and differential; Future  -     Basic metabolic panel; Future  -     Ferritin; Future  -     Lipid Panel with Direct LDL reflex; Future  -     HEMOGLOBIN A1C W/ EAG ESTIMATION; Future    Diplopia  -     CBC and differential; Future  -     Basic metabolic panel; Future  -     Ferritin; Future  -     Lipid Panel with Direct LDL reflex; Future  -     HEMOGLOBIN A1C W/ EAG ESTIMATION; Future        Fu 6mo + labs    Subjective:   Pt here for 3 month follow up visit  No labs ordered  Pt complaining of neck pain started today   Patient ID: Avila Elizabeth is a 30 y o  female  HPI     Pt presents for follow up  She is doing really well  She cont with diplopia and balance problems but unchanged, she is waiting on appt from [facility] neurology for apt and second opinion         The following portions of the patient's history were reviewed and updated as appropriate: allergies, current medications, past family history, past medical history, past social history, past surgical history and problem list     Review of Systems Constitutional: Negative for activity change and appetite change  Respiratory: Negative  Cardiovascular: Negative  Gastrointestinal: Negative  Genitourinary: Negative  Musculoskeletal: Negative for arthralgias  Skin: Negative for rash  Psychiatric/Behavioral: Negative  Objective:      /70   Pulse 100   Temp 99 4 °F (37 4 °C)   Ht 5' 9" (1 753 m)   Wt 88 5 kg (195 lb)   SpO2 98%   BMI 28 80 kg/m²          Physical Exam   Constitutional: She is oriented to person, place, and time  She appears well-developed and well-nourished  HENT:   Head: Normocephalic  Eyes: Conjunctivae are normal    Cardiovascular: Normal rate, regular rhythm and normal heart sounds  Pulmonary/Chest: Effort normal and breath sounds normal  No respiratory distress  She has no wheezes  She has no rales  Abdominal: Soft  Bowel sounds are normal  She exhibits no distension  There is no tenderness  Neurological: She is alert and oriented to person, place, and time  Psychiatric: She has a normal mood and affect   Her behavior is normal

## 2018-08-01 ENCOUNTER — OFFICE VISIT (OUTPATIENT)
Dept: FAMILY MEDICINE CLINIC | Facility: CLINIC | Age: 38
End: 2018-08-01
Payer: COMMERCIAL

## 2018-08-01 VITALS
HEART RATE: 100 BPM | WEIGHT: 195 LBS | BODY MASS INDEX: 28.88 KG/M2 | SYSTOLIC BLOOD PRESSURE: 100 MMHG | OXYGEN SATURATION: 98 % | HEIGHT: 69 IN | TEMPERATURE: 99.4 F | DIASTOLIC BLOOD PRESSURE: 70 MMHG

## 2018-08-01 DIAGNOSIS — K30 ACID INDIGESTION: ICD-10-CM

## 2018-08-01 DIAGNOSIS — D50.9 IRON DEFICIENCY ANEMIA, UNSPECIFIED IRON DEFICIENCY ANEMIA TYPE: Primary | ICD-10-CM

## 2018-08-01 DIAGNOSIS — G43.709 CHRONIC MIGRAINE WITHOUT AURA WITHOUT STATUS MIGRAINOSUS, NOT INTRACTABLE: ICD-10-CM

## 2018-08-01 DIAGNOSIS — H53.2 DIPLOPIA: ICD-10-CM

## 2018-08-01 DIAGNOSIS — G31.9 CEREBELLAR ATROPHY (HCC): ICD-10-CM

## 2018-08-01 PROBLEM — I95.9 HYPOTENSION: Status: RESOLVED | Noted: 2018-04-03 | Resolved: 2018-08-01

## 2018-08-01 PROCEDURE — 1036F TOBACCO NON-USER: CPT | Performed by: FAMILY MEDICINE

## 2018-08-01 PROCEDURE — 3008F BODY MASS INDEX DOCD: CPT | Performed by: FAMILY MEDICINE

## 2018-08-01 PROCEDURE — 3725F SCREEN DEPRESSION PERFORMED: CPT | Performed by: FAMILY MEDICINE

## 2018-08-01 PROCEDURE — 99213 OFFICE O/P EST LOW 20 MIN: CPT | Performed by: FAMILY MEDICINE

## 2018-08-28 ENCOUNTER — TELEPHONE (OUTPATIENT)
Dept: NEUROLOGY | Facility: CLINIC | Age: 38
End: 2018-08-28

## 2018-10-10 NOTE — PROGRESS NOTES
Patient ID: Chadwick Torres is a 41 y o  female  Assessment/Plan:    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  Needs to see [facility]  Referral placed again    Chronic migraine without aura without status migrainosus, not intractable  Preventative:  Continue Depakote 1000 mg at bedtime  We will start verapamil 40 mg in the morning  Five days later, increase to twice a day  Abortive: At onset of migraine, may take Tylenol  But this need to be less than 3 doses a week     Seizure ([address] )  Continue to follow up with Epileptologist      Balance problems  Needs to see chelsi         Problem List Items Addressed This Visit        Cardiovascular and Mediastinum    Chronic migraine without aura without status migrainosus, not intractable - Primary     Preventative:  Continue Depakote 1000 mg at bedtime  We will start verapamil 40 mg in the morning  Five days later, increase to twice a day  Abortive: At onset of migraine, may take Tylenol  But this need to be less than 3 doses a week          Relevant Medications    verapamil (CALAN) 40 mg tablet       Nervous and Auditory    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     Needs to see [facility]  Referral placed again         Relevant Orders    Ambulatory referral to Neurology       Other    Balance problems     Needs to see chelsi         Seizure [facility])     Continue to follow up with Epileptologist             Other Visit Diagnoses     Balance problem        Relevant Orders    Ambulatory referral to Neurology                Subjective:    HPI  [facility] #[id number]     Balance problems:     She has had balance problem since her last child  Her youngest child is now 15years of age  Does not recall having balance problem when she was younger  She has two sister with similar balance problem  Her one sisters symptoms of balance problem started at the age of 32 now age 30 and other sister symptoms started at the age 22 and she is now 41years of age   She is of balance when she gets up from sitting position or when walking  Has problem with writing and at time talking  No problem with swallowing  She states her memory is also poor and so is her vision  She was seen by an ophthalmologist two years ago but we do not have records of this  Did not see specialist at [address] [place] [address]  Her mother passed away at age 46 from [facility] [address] and brain tumor  Her mother also had balance problem  She has no history of cancer  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      Patient is a difficult historian despite using     Migraine headaches:  Preventative: Topamax, Depakote  Abortive: advil, dexamethasone, tylenol (3 times a day every day)  Any Medical/Alternative Treatments used in the past for headaches: none   Headache trigger: Fatigue, Stress/Tension, lack of food  Current pain  0/10  Headaches began in 2014  How often do the headaches occur: 4-7/week, >15 migraine days a month  What time of the day do the headaches start: start before going to bed  How long do the headaches last - 8 hours  Where are they located - frontal and temporal  What is the intensity of pain: always 10/10   Describe your usual headache - Throbbing, Pounding  Associated symptoms:            Decrease of appetite, nausea            Photophobia            light-headed or dizzy            prefer to be alone and in a dark room, unable to work        Feb 17, 2017: BRAIN PARENCHYMA: Mild diffuse cerebellar cortical atrophy, unchanged  No abnormal white matter signal identified  Diffusion imaging is unremarkable  There is no discrete mass, mass effect or midline shift  Normal brainstem  There is no intracranial hemorrhage  There is no evidence of acute infarction  Postcontrast imaging of the brain demonstrates no abnormal enhancement   VENTRICLES: Normal  SELLA AND PITUITARY GLAND: Normal  ORBITS: Normal  PARANASAL SINUSES: Normal  VASCULATURE: Evaluation of the major intracranial vasculature demonstrates appropriate flow voids  CALVARIUM AND SKULL BASE: Normal  EXTRACRANIAL SOFT TISSUES: Normal  IMPRESSION: Mild chronic cerebellar cortical volume loss No acute intracranial abnormalities No pathologic enhancement Similar to previous MRI study      The following portions of the patient's history were reviewed and updated as appropriate: allergies, current medications, past family history, past medical history, past social history, past surgical history and problem list          Objective:    Blood pressure 130/70, pulse 89, height 5' 9" (1 753 m), weight 88 7 kg (195 lb 9 6 oz)  Physical Exam   Constitutional: She appears well-developed and well-nourished  HENT:   Head: Normocephalic and atraumatic  Eyes: Pupils are equal, round, and reactive to light  Lids are normal    Neck: Normal range of motion  Cardiovascular: Normal rate  Pulmonary/Chest: Effort normal    Musculoskeletal: Normal range of motion  Neurological: She has normal strength and normal reflexes  Coordination normal    Skin: Skin is warm and dry  Psychiatric: She has a normal mood and affect  Her speech is normal    Nursing note and vitals reviewed  Neurological Exam  Mental Status   Oriented to person, place, time and situation  Memory: Poor historian and difficulty with memory  Speech is normal  Language is fluent with no aphasia  Attention and concentration are normal     Cranial Nerves  CN II: Visual acuity is normal  Visual fields full to confrontation  CN III, IV, VI: Extraocular movements intact bilaterally  Normal lids and orbits bilaterally  Pupils equal round and reactive to light bilaterally  CN V: Facial sensation is normal   CN VII: Full and symmetric facial movement  CN VIII: Hearing is normal   CN IX, X: Palate elevates symmetrically  Normal gag reflex  CN XI: Shoulder shrug strength is normal   CN XII: Tongue midline without atrophy or fasciculations  Motor  Normal muscle bulk throughout   No fasciculations present  Normal muscle tone  Strength is 5/5 throughout all four extremities  Sensory  Sensation is intact to light touch, pinprick, vibration and proprioception in all four extremities  Reflexes  Deep tendon reflexes are 2+ and symmetric in all four extremities with downgoing toes bilaterally  Coordination  Finger-to-nose, rapid alternating movements and heel-to-shin normal bilaterally without dysmetria  Gait  Wide base and ataxic  ROS:    Review of Systems   HENT: Negative  Eyes: Positive for visual disturbance  Respiratory: Negative  Cardiovascular: Negative  Gastrointestinal: Negative  Endocrine: Negative  Genitourinary: Negative  Musculoskeletal: Positive for back pain, gait problem and neck pain  Skin: Negative  Allergic/Immunologic: Negative  Neurological: Positive for weakness, numbness and headaches  Hematological: Negative  Psychiatric/Behavioral: Negative

## 2018-10-12 ENCOUNTER — OFFICE VISIT (OUTPATIENT)
Dept: NEUROLOGY | Facility: CLINIC | Age: 38
End: 2018-10-12
Payer: COMMERCIAL

## 2018-10-12 VITALS
SYSTOLIC BLOOD PRESSURE: 130 MMHG | DIASTOLIC BLOOD PRESSURE: 70 MMHG | WEIGHT: 195.6 LBS | HEIGHT: 69 IN | BODY MASS INDEX: 28.97 KG/M2 | HEART RATE: 89 BPM

## 2018-10-12 DIAGNOSIS — R26.89 BALANCE PROBLEMS: ICD-10-CM

## 2018-10-12 DIAGNOSIS — G43.709 CHRONIC MIGRAINE WITHOUT AURA WITHOUT STATUS MIGRAINOSUS, NOT INTRACTABLE: Primary | ICD-10-CM

## 2018-10-12 DIAGNOSIS — G31.9 CEREBELLAR ATROPHY (HCC): ICD-10-CM

## 2018-10-12 DIAGNOSIS — R56.9 SEIZURE (HCC): ICD-10-CM

## 2018-10-12 DIAGNOSIS — R26.89 BALANCE PROBLEM: ICD-10-CM

## 2018-10-12 PROCEDURE — 99214 OFFICE O/P EST MOD 30 MIN: CPT | Performed by: PHYSICIAN ASSISTANT

## 2018-10-12 RX ORDER — VERAPAMIL HYDROCHLORIDE 40 MG/1
40 TABLET ORAL 2 TIMES DAILY
Qty: 60 TABLET | Refills: 3 | Status: SHIPPED | OUTPATIENT
Start: 2018-10-12 | End: 2019-06-07 | Stop reason: ALTCHOICE

## 2018-10-12 NOTE — PATIENT INSTRUCTIONS
Chronic migraine without aura without status migrainosus, not intractable  Preventative:  Continue Depakote 1000 mg at bedtime  We will start verapamil 40 mg in the morning  Five days later, increase to twice a day  Abortive: At onset of migraine, may take Tylenol  But this need to be less than 3 doses a week     - Young patient who are still in a reproductive age, should take folic acid daily when taking anti-seiuzre drugs especially Depakote  May take these over-the-counter supplements to decrease intensity and frequency of migraines  - Magnesium Oxide 400-500 mg a day  If any diarrhea or upset stomach, decrease dose  as tolerated  -  B2 200 mg a day  May take once a day in am  This supplement will change the color of the urine to fluorescent yellow no matter how hydrated, which is normal       Headache management instructions  - When patient has a moderate to severe headache, they should seek rest, initiate relaxation and apply cold compresses to the head  - Maintain regular sleep schedule  Adults need at least 7-8 hours of uninterrupted a night  - Limit over the counter medications such as Tylenol, Ibuprofen, Aleve, Excedrin  (No more than 3 times a week)  - Maintain headache diary  We discussed an ETELVINA for a smart phone is "Migraine e Diary"  - Limit caffeine to 1-2 cups a day or less  - Avoid dietary trigger  (aged cheese, peanuts, MSG, aspartame and nitrates)  - Patient is to have regular frequent meals to prevent headache onset  - Please drink at least 64 ounces of water a day to help remain hydrated  Please call with any questions or concerns

## 2018-10-12 NOTE — PROGRESS NOTES
Review of Systems   Constitutional: Negative for appetite change and fever  HENT: Negative  Negative for hearing loss, tinnitus, trouble swallowing and voice change  Eyes: Negative  Negative for photophobia and pain  Blurred vision    Respiratory: Negative  Negative for shortness of breath  Cardiovascular: Negative  Negative for palpitations  Gastrointestinal: Negative  Negative for nausea and vomiting  Endocrine: Negative  Negative for cold intolerance and heat intolerance  Genitourinary: Negative  Negative for dysuria, frequency and urgency  Musculoskeletal: Positive for back pain and neck pain  Negative for myalgias  Skin: Negative  Negative for rash  Allergic/Immunologic: Negative  Neurological: Positive for weakness, numbness and headaches  Negative for dizziness, tremors, seizures, syncope, facial asymmetry, speech difficulty and light-headedness  Balance problems, difficulty walking, tingling, cramping   Hematological: Negative  Does not bruise/bleed easily  Psychiatric/Behavioral: Negative  Negative for confusion, hallucinations and sleep disturbance

## 2018-10-12 NOTE — ASSESSMENT & PLAN NOTE
Preventative:  Continue Depakote 1000 mg at bedtime  We will start verapamil 40 mg in the morning  Five days later, increase to twice a day  Abortive: At onset of migraine, may take Tylenol    But this need to be less than 3 doses a week

## 2018-10-24 LAB — HBA1C MFR BLD HPLC: 5.7 %

## 2018-10-29 DIAGNOSIS — K21.9 GASTROESOPHAGEAL REFLUX DISEASE, ESOPHAGITIS PRESENCE NOT SPECIFIED: Primary | ICD-10-CM

## 2018-10-29 RX ORDER — FAMOTIDINE 10 MG
10 TABLET ORAL DAILY
Qty: 30 TABLET | Refills: 1 | Status: SHIPPED | OUTPATIENT
Start: 2018-10-29 | End: 2019-02-25

## 2018-11-05 DIAGNOSIS — D50.9 MICROCYTIC ANEMIA: Primary | ICD-10-CM

## 2018-11-05 RX ORDER — FERROUS SULFATE TAB EC 324 MG (65 MG FE EQUIVALENT) 324 (65 FE) MG
324 TABLET DELAYED RESPONSE ORAL
Qty: 60 TABLET | Refills: 1 | Status: SHIPPED | OUTPATIENT
Start: 2018-11-05 | End: 2020-01-29 | Stop reason: ALTCHOICE

## 2018-11-07 ENCOUNTER — TELEPHONE (OUTPATIENT)
Dept: FAMILY MEDICINE CLINIC | Facility: CLINIC | Age: 38
End: 2018-11-07

## 2018-11-07 NOTE — TELEPHONE ENCOUNTER
----- Message from Chapincito Borja sent at 11/7/2018 11:15 AM EST -----  Hey I called this patient and she does not speak any english can you call her?  ----- Message -----  From: Juvenal Santos MD  Sent: 11/5/2018   8:18 AM  To: [phone] [facility] Clinical    Pt had labs too soon her apt is on feb  Had them in [facility]  Please notify pt labs show low iron reserve  Will send to pharmacy iron supplement to take daily, she can add a stool softner if she gets constipated as needed

## 2018-11-07 NOTE — TELEPHONE ENCOUNTER
----- Message from Chapincito Vinson sent at 11/7/2018 11:15 AM EST -----  Hey I called this patient and she does not speak any english can you call her?  ----- Message -----  From: Master Jolley MD  Sent: 11/5/2018   8:18 AM  To: [phone] [facility] Clinical    Pt had labs too soon her apt is on feb  Had them in [facility]  Please notify pt labs show low iron reserve  Will send to pharmacy iron supplement to take daily, she can add a stool softner if she gets constipated as needed

## 2019-02-21 NOTE — PROGRESS NOTES
Assessment/Plan:  Return the office in 6 weeks after labs  Restart Depakote slowly as below/restart magnesium/restart PPI  - referral to GI  Seizure ([facility] [address] )  Generalized tonic-clonic seizure  She has only ever had the 1 seizure, but does have a strong family history of other neurologic disease, with her mother and multiple siblings having symptoms of gait ataxia similar to her, and her mother also having seizures  There was no focality to her seizure, and her EEG did not show any focal abnormalities or epileptiform discharges  She has not had any further seizures since the addition of Depakote  Depakote has also been helpful for her headaches  Today she is off Depakote for months - daily frontal headache    Chronic migraine without aura without status migrainosus, not intractable  Patient had been under the care of Neurology and chronic headache was stable on Depakote and verapamil - however patient recently has been non compliant with getting refills  Daily headache for bilateral frontal sharp since been this regimen  She is taking over-the-counter ibuprofen  Acid indigestion  Was taking as needed Pepcid however symptoms have exacerbated due to excessive use of NSAIDs - restart PPI - referral made to GI  Diagnoses and all orders for this visit:    Chronic migraine without aura without status migrainosus, not intractable  -     magnesium oxide (MAG-OX) 400 mg; Take 1 tablet (400 mg total) by mouth 2 (two) times a day  -     Ambulatory referral to Neurology; Future  -     TSH, 3rd generation with Free T4 reflex; Future  -     Vitamin D 25 hydroxy; Future  -     Vitamin B12; Future  -     CBC and differential; Future  -     Basic metabolic panel; Future    Seizure (HCC)  -     divalproex sodium (DEPAKOTE ER) 500 mg 24 hr tablet; Take 1 tablet (500 mg total) by mouth daily at bedtime In 2 weeks increase to twice a day  -     Ambulatory referral to Neurology;  Future  -     TSH, 3rd generation with Free T4 reflex; Future  -     Vitamin D 25 hydroxy; Future  -     Vitamin B12; Future  -     CBC and differential; Future  -     Basic metabolic panel; Future    Transient alteration of awareness  -     divalproex sodium (DEPAKOTE ER) 500 mg 24 hr tablet; Take 1 tablet (500 mg total) by mouth daily at bedtime In 2 weeks increase to twice a day  -     Ambulatory referral to Neurology; Future  -     TSH, 3rd generation with Free T4 reflex; Future  -     Vitamin D 25 hydroxy; Future  -     Vitamin B12; Future  -     CBC and differential; Future  -     Basic metabolic panel; Future    Acid indigestion  -     omeprazole (PriLOSEC) 40 MG capsule; Take 1 capsule (40 mg total) by mouth daily  -     Ambulatory referral to Gastroenterology; Future  -     TSH, 3rd generation with Free T4 reflex; Future  -     Vitamin D 25 hydroxy; Future  -     Vitamin B12; Future  -     CBC and differential; Future  -     Basic metabolic panel; Future    Gastroesophageal reflux disease without esophagitis  -     omeprazole (PriLOSEC) 40 MG capsule; Take 1 capsule (40 mg total) by mouth daily  -     Ambulatory referral to Gastroenterology; Future  -     TSH, 3rd generation with Free T4 reflex; Future  -     Vitamin D 25 hydroxy; Future  -     Vitamin B12; Future  -     CBC and differential; Future  -     Basic metabolic panel; Future    Gait disturbance    Hypokalemia    Other orders  -     Discontinue: magnesium oxide (MAG-OX) 400 mg; Take 400 mg by mouth  -     hydrOXYzine HCL (ATARAX) 10 mg tablet  -     ondansetron (ZOFRAN-ODT) 4 mg disintegrating tablet; Take 4 mg by mouth every 8 (eight) hours as needed  -     ammonium lactate (LAC-HYDRIN) 12 % cream          Subjective: 6 month follow up , no recent labs  Patient stated she is not take any medication     Only takes otc medication for acid reflux    - doesn't want flu shot or tdap         Health Maintenance Due   Topic Date Due    BMI: Followup Plan  08/03/2000    DTaP,Tdap,and Td Vaccines (1 - Tdap) 08/03/2003    PAP SMEAR  08/03/2003    INFLUENZA VACCINE  07/01/2018        Patient ID: Lulu Underwood is a 41 y o  female  HPI  Chronic disease follow-up without labs  Patient is off all her chronic migrainosus headache/seizure medications - states she has had no refills so she did not make any phone calls to Neurology  Complaints today is daily headache - sharp frontal - taking NSAIDs - daily  Patient also complaining indigestion and regurg  Taking Pepcid not working  She has not followed through with work up at [facility] for cerebellar atrophy/ ataxia  because it is not covered under her insurance  Lab review done in 10/18  ferritin low - cbc stable  The following portions of the patient's history were reviewed and updated as appropriate: allergies, past social history, past surgical history and problem list     Review of Systems   Constitutional: Negative for activity change and appetite change  HENT: Negative  Eyes: Negative for visual disturbance  Respiratory: Negative  Cardiovascular: Negative  Gastrointestinal: Negative  Genitourinary: Negative  Musculoskeletal: Positive for gait problem  Negative for arthralgias  Skin: Negative for rash  Neurological: Positive for headaches  Psychiatric/Behavioral: Negative  Objective:      /78 (BP Location: Left arm, Patient Position: Sitting, Cuff Size: Standard)   Pulse 105   Temp 99 1 °F (37 3 °C) (Tympanic)   Resp 19   Ht 5' 9" (1 753 m)   Wt 91 8 kg (202 lb 4 8 oz)   SpO2 98%   BMI 29 87 kg/m²          Physical Exam   Constitutional: She is oriented to person, place, and time  She appears well-developed and well-nourished  No distress  HENT:   Head: Normocephalic     Right Ear: External ear normal    Left Ear: External ear normal    Nose: Nose normal    Mouth/Throat: Oropharynx is clear and moist    Eyes: Conjunctivae are normal    Cardiovascular: Normal rate, regular rhythm and normal heart sounds  Pulmonary/Chest: Effort normal and breath sounds normal    Abdominal: Soft  Bowel sounds are normal    Lymphadenopathy:     She has no cervical adenopathy  Neurological: She is alert and oriented to person, place, and time  Coordination abnormal    Abnormal gait   Skin: Skin is warm and dry  Psychiatric: She has a normal mood and affect   Her behavior is normal

## 2019-02-25 ENCOUNTER — OFFICE VISIT (OUTPATIENT)
Dept: FAMILY MEDICINE CLINIC | Facility: CLINIC | Age: 39
End: 2019-02-25
Payer: COMMERCIAL

## 2019-02-25 VITALS
TEMPERATURE: 99.1 F | HEIGHT: 69 IN | DIASTOLIC BLOOD PRESSURE: 78 MMHG | HEART RATE: 105 BPM | SYSTOLIC BLOOD PRESSURE: 138 MMHG | WEIGHT: 202.3 LBS | BODY MASS INDEX: 29.96 KG/M2 | RESPIRATION RATE: 19 BRPM | OXYGEN SATURATION: 98 %

## 2019-02-25 DIAGNOSIS — K30 ACID INDIGESTION: ICD-10-CM

## 2019-02-25 DIAGNOSIS — R56.9 SEIZURE (HCC): ICD-10-CM

## 2019-02-25 DIAGNOSIS — K21.9 GASTROESOPHAGEAL REFLUX DISEASE WITHOUT ESOPHAGITIS: ICD-10-CM

## 2019-02-25 DIAGNOSIS — R26.9 GAIT DISTURBANCE: ICD-10-CM

## 2019-02-25 DIAGNOSIS — R40.4 TRANSIENT ALTERATION OF AWARENESS: ICD-10-CM

## 2019-02-25 DIAGNOSIS — G43.709 CHRONIC MIGRAINE WITHOUT AURA WITHOUT STATUS MIGRAINOSUS, NOT INTRACTABLE: Primary | ICD-10-CM

## 2019-02-25 DIAGNOSIS — E87.6 HYPOKALEMIA: ICD-10-CM

## 2019-02-25 PROCEDURE — 99214 OFFICE O/P EST MOD 30 MIN: CPT | Performed by: NURSE PRACTITIONER

## 2019-02-25 PROCEDURE — 3008F BODY MASS INDEX DOCD: CPT | Performed by: NURSE PRACTITIONER

## 2019-02-25 RX ORDER — OMEPRAZOLE 40 MG/1
40 CAPSULE, DELAYED RELEASE ORAL DAILY
Qty: 90 CAPSULE | Refills: 1 | Status: SHIPPED | OUTPATIENT
Start: 2019-02-25 | End: 2019-05-13 | Stop reason: SDUPTHER

## 2019-02-25 RX ORDER — AMMONIUM LACTATE 12 G/100G
CREAM TOPICAL
COMMUNITY
Start: 2019-01-02

## 2019-02-25 RX ORDER — DIVALPROEX SODIUM 500 MG/1
500 TABLET, EXTENDED RELEASE ORAL
Qty: 60 TABLET | Refills: 1 | Status: SHIPPED | OUTPATIENT
Start: 2019-02-25 | End: 2019-10-16 | Stop reason: SDUPTHER

## 2019-02-25 RX ORDER — ONDANSETRON 4 MG/1
4 TABLET, ORALLY DISINTEGRATING ORAL EVERY 8 HOURS PRN
COMMUNITY
Start: 2018-10-19 | End: 2020-01-29 | Stop reason: ALTCHOICE

## 2019-02-25 RX ORDER — HYDROXYZINE HYDROCHLORIDE 10 MG/1
TABLET, FILM COATED ORAL
COMMUNITY
Start: 2018-11-20

## 2019-02-25 NOTE — ASSESSMENT & PLAN NOTE
Generalized tonic-clonic seizure  She has only ever had the 1 seizure, but does have a strong family history of other neurologic disease, with her mother and multiple siblings having symptoms of gait ataxia similar to her, and her mother also having seizures  There was no focality to her seizure, and her EEG did not show any focal abnormalities or epileptiform discharges  She has not had any further seizures since the addition of Depakote  Depakote has also been helpful for her headaches    Today she is off Depakote for months - daily frontal headache

## 2019-02-25 NOTE — ASSESSMENT & PLAN NOTE
Was taking as needed Pepcid however symptoms have exacerbated due to excessive use of NSAIDs - restart PPI - referral made to GI

## 2019-02-25 NOTE — ASSESSMENT & PLAN NOTE
Patient had been under the care of Neurology and chronic headache was stable on Depakote and verapamil - however patient recently has been non compliant with getting refills  Daily headache for bilateral frontal sharp since been this regimen  She is taking over-the-counter ibuprofen

## 2019-03-01 ENCOUNTER — ANESTHESIA EVENT (OUTPATIENT)
Dept: GASTROENTEROLOGY | Facility: MEDICAL CENTER | Age: 39
End: 2019-03-01
Payer: COMMERCIAL

## 2019-03-01 ENCOUNTER — CONSULT (OUTPATIENT)
Dept: GASTROENTEROLOGY | Facility: MEDICAL CENTER | Age: 39
End: 2019-03-01
Payer: COMMERCIAL

## 2019-03-01 VITALS
HEIGHT: 69 IN | SYSTOLIC BLOOD PRESSURE: 120 MMHG | DIASTOLIC BLOOD PRESSURE: 60 MMHG | BODY MASS INDEX: 29.77 KG/M2 | TEMPERATURE: 97.6 F | HEART RATE: 98 BPM | WEIGHT: 201 LBS

## 2019-03-01 DIAGNOSIS — K30 ACID INDIGESTION: ICD-10-CM

## 2019-03-01 DIAGNOSIS — K21.9 GASTROESOPHAGEAL REFLUX DISEASE WITHOUT ESOPHAGITIS: ICD-10-CM

## 2019-03-01 PROCEDURE — 99243 OFF/OP CNSLTJ NEW/EST LOW 30: CPT | Performed by: INTERNAL MEDICINE

## 2019-03-01 NOTE — PATIENT INSTRUCTIONS
EGD scheduled on 3/8/2019 with Dr Ling Underwood at [facility]  Instructions given to patient

## 2019-03-04 NOTE — PROGRESS NOTES
[facility] - Outpatient Consultation  Mihai Marino 41 y o  female MRN: [id number]  Encounter: [id number]          ASSESSMENT AND PLAN:      1  Acid indigestion  2  Gastroesophageal reflux disease without esophagitis  - Case request operating room: ESOPHAGOGASTRODUODENOSCOPY (EGD)  Due to ongoing symptoms of reflux which are bothersome interfering with her daily activity will plan for EGD evaluation as symptoms have not responded to PPI therapy  Will discuss lifestyle and dietary modifications  She is on multiple medications including iron intermittently and Depakote which may be attributing to her symptoms of dyspepsia  Will plan for EGD evaluation to rule out H pylori and celiac disease as well  If EGD is negative symptoms are likely secondary to medication and interval bowel syndrome  ______________________________________________________________________    HPI:      She is a 51-year-old female with past medical history of seizure disorder currently well controlled without any seizures over the last 4 years, history of chronic migraines presents here for evaluation of reflux disease which has not responded well to PPI therapy with ongoing daily symptoms  She is on omeprazole 40 mg daily without any response  Symptoms are typical heartburn symptoms worse postprandial     She also has symptoms of dyspepsia without any alarming symptoms  She has previous history iron deficient anemia but currently hemoglobin is within normal limits  REVIEW OF SYSTEMS:    CONSTITUTIONAL: Denies any fever, chills, rigors, and weight loss  HEENT: No earache or tinnitus  Denies hearing loss or visual disturbances  CARDIOVASCULAR: No chest pain or palpitations  RESPIRATORY: Denies any cough, hemoptysis, shortness of breath or dyspnea on exertion  GASTROINTESTINAL: As noted in the History of Present Illness  GENITOURINARY: No problems with urination   Denies any hematuria or dysuria  NEUROLOGIC: No dizziness or vertigo, denies headaches  MUSCULOSKELETAL: Denies any muscle or joint pain  SKIN: Denies skin rashes or itching  ENDOCRINE: Denies excessive thirst  Denies intolerance to heat or cold  PSYCHOSOCIAL: Denies depression or anxiety  Denies any recent memory loss         Historical Information   Past Medical History:   Diagnosis Date    B12 deficiency     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     Chronic migraine without aura     Dermatographism     Gait disturbance     Iron deficiency anemia     Lyme neuropathy     Visual disturbance     Diplopia, nystagmus, decreased visual acuity    Vitamin D deficiency      Past Surgical History:   Procedure Laterality Date     SECTION      OOPHORECTOMY      unilateral --  last assessed 17    TUBAL LIGATION      last assessed 17     Social History   Social History     Substance and Sexual Activity   Alcohol Use No     Social History     Substance and Sexual Activity   Drug Use No     Social History     Tobacco Use   Smoking Status Never Smoker   Smokeless Tobacco Never Used     Family History   Problem Relation Age of Onset    Other Mother         AIDS, Brain Tumor balance disorder    Seizures Mother     Alcohol abuse Father     Other Sister         Balance disorder    HIV Maternal Grandmother        Meds/Allergies       Current Outpatient Medications:     ammonium lactate (LAC-HYDRIN) 12 % cream    cetirizine (ZyrTEC) 10 mg tablet    cyanocobalamin 1000 MCG tablet    divalproex sodium (DEPAKOTE ER) 500 mg 24 hr tablet    ferrous sulfate 324 (65 Fe) mg    hydrocortisone 1 % cream    hydrOXYzine HCL (ATARAX) 10 mg tablet    magnesium oxide (MAG-OX) 400 mg    magnesium oxide (MAG-OX) 400 mg    omeprazole (PriLOSEC) 40 MG capsule    ondansetron (ZOFRAN-ODT) 4 mg disintegrating tablet    verapamil (CALAN) 40 mg tablet    Allergies   Allergen Reactions    Dye Fdc Red  [Red Dye]     Morphine            Objective Blood pressure 120/60, pulse 98, temperature 97 6 °F (36 4 °C), temperature source Tympanic, height 5' 9" (1 753 m), weight 91 2 kg (201 lb)  Body mass index is 29 68 kg/m²  PHYSICAL EXAM:      General Appearance:   Alert, cooperative, no distress   HEENT:   Normocephalic, atraumatic, anicteric      Neck:  Supple, symmetrical, trachea midline   Lungs:   Clear to auscultation bilaterally; no rales, rhonchi or wheezing; respirations unlabored    Heart[de-identified]   Regular rate and rhythm; no murmur, rub, or gallop  Abdomen:   Soft, non-tender, non-distended; normal bowel sounds; no masses, no organomegaly    Genitalia:   Deferred    Rectal:   Deferred    Extremities:  No cyanosis, clubbing or edema    Pulses:  2+ and symmetric    Skin:  No jaundice, rashes, or lesions    Lymph nodes:  No palpable cervical lymphadenopathy        Lab Results:   No visits with results within 1 Day(s) from this visit  Latest known visit with results is:   Orders Only on 10/24/2018   Component Date Value    Hemoglobin A1C 10/24/2018 5 7          Radiology Results:   No results found

## 2019-03-08 ENCOUNTER — HOSPITAL ENCOUNTER (OUTPATIENT)
Facility: MEDICAL CENTER | Age: 39
Setting detail: OUTPATIENT SURGERY
Discharge: HOME | End: 2019-03-08
Attending: INTERNAL MEDICINE | Admitting: INTERNAL MEDICINE
Payer: COMMERCIAL

## 2019-03-08 ENCOUNTER — ANESTHESIA (OUTPATIENT)
Dept: GASTROENTEROLOGY | Facility: MEDICAL CENTER | Age: 39
End: 2019-03-08
Payer: COMMERCIAL

## 2019-03-08 VITALS
OXYGEN SATURATION: 100 % | HEIGHT: 69 IN | TEMPERATURE: 98 F | RESPIRATION RATE: 16 BRPM | DIASTOLIC BLOOD PRESSURE: 75 MMHG | WEIGHT: 201 LBS | HEART RATE: 81 BPM | SYSTOLIC BLOOD PRESSURE: 114 MMHG | BODY MASS INDEX: 29.77 KG/M2

## 2019-03-08 DIAGNOSIS — K21.9 GASTROESOPHAGEAL REFLUX DISEASE WITHOUT ESOPHAGITIS: ICD-10-CM

## 2019-03-08 PROCEDURE — 88305 TISSUE EXAM BY PATHOLOGIST: CPT | Performed by: PATHOLOGY

## 2019-03-08 PROCEDURE — 43239 EGD BIOPSY SINGLE/MULTIPLE: CPT | Performed by: INTERNAL MEDICINE

## 2019-03-08 PROCEDURE — 88342 IMHCHEM/IMCYTCHM 1ST ANTB: CPT | Performed by: PATHOLOGY

## 2019-03-08 RX ORDER — PROPOFOL 10 MG/ML
INJECTION, EMULSION INTRAVENOUS AS NEEDED
Status: DISCONTINUED | OUTPATIENT
Start: 2019-03-08 | End: 2019-03-08 | Stop reason: SURG

## 2019-03-08 RX ORDER — SODIUM CHLORIDE 9 MG/ML
125 INJECTION, SOLUTION INTRAVENOUS CONTINUOUS
Status: DISCONTINUED | OUTPATIENT
Start: 2019-03-08 | End: 2019-03-08 | Stop reason: HOSPADM

## 2019-03-08 RX ADMIN — SODIUM CHLORIDE: 0.9 INJECTION, SOLUTION INTRAVENOUS at 09:45

## 2019-03-08 RX ADMIN — PROPOFOL 50 MG: 10 INJECTION, EMULSION INTRAVENOUS at 09:50

## 2019-03-08 RX ADMIN — PROPOFOL 50 MG: 10 INJECTION, EMULSION INTRAVENOUS at 09:52

## 2019-03-08 RX ADMIN — PROPOFOL 120 MG: 10 INJECTION, EMULSION INTRAVENOUS at 09:49

## 2019-03-08 NOTE — H&P
History and Physical -  [facility]  Kemi Jackson 41 y o  female MRN: [id number]                  HPI: Kemi Jackson is a 41y o  year old female who presents for EGD evaluation for longstanding reflux which is uncontrolled by PPI use      REVIEW OF SYSTEMS: Per the HPI, and otherwise unremarkable      Historical Information   Past Medical History:   Diagnosis Date    B12 deficiency     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     Chronic migraine without aura     Dermatographism     Gait disturbance     Iron deficiency anemia     Lyme neuropathy     Visual disturbance     Diplopia, nystagmus, decreased visual acuity    Vitamin D deficiency      Past Surgical History:   Procedure Laterality Date     SECTION      OOPHORECTOMY      unilateral --  last assessed 17    TUBAL LIGATION      last assessed 17     Social History   Social History     Substance and Sexual Activity   Alcohol Use No     Social History     Substance and Sexual Activity   Drug Use No     Social History     Tobacco Use   Smoking Status Never Smoker   Smokeless Tobacco Never Used     Family History   Problem Relation Age of Onset    Other Mother         AIDS, Brain Tumor balance disorder    Seizures Mother     Alcohol abuse Father     Other Sister         Balance disorder    HIV Maternal Grandmother        Meds/Allergies     Medications Prior to Admission   Medication    ammonium lactate (LAC-HYDRIN) 12 % cream    cetirizine (ZyrTEC) 10 mg tablet    cyanocobalamin 1000 MCG tablet    divalproex sodium (DEPAKOTE ER) 500 mg 24 hr tablet    ferrous sulfate 324 (65 Fe) mg    hydrocortisone 1 % cream    hydrOXYzine HCL (ATARAX) 10 mg tablet    magnesium oxide (MAG-OX) 400 mg    magnesium oxide (MAG-OX) 400 mg    omeprazole (PriLOSEC) 40 MG capsule    ondansetron (ZOFRAN-ODT) 4 mg disintegrating tablet    verapamil (CALAN) 40 mg tablet       Allergies   Allergen Reactions    Dye Fdc Red  [Red Dye]     Morphine        Objective     There were no vitals taken for this visit  PHYSICAL EXAM    Gen: NAD  CV: RRR  CHEST: Clear  ABD: soft, NT/ND  EXT: no edema      ASSESSMENT/PLAN:  This is a 41y o  year old female here for EGD, and she is stable and optimized for her procedure

## 2019-03-08 NOTE — ANESTHESIA POSTPROCEDURE EVALUATION
Post-Op Assessment Note    CV Status:  Stable    Pain management: adequate     Mental Status:  Alert and awake   Hydration Status:  Euvolemic   PONV Controlled:  Controlled   Airway Patency:  Patent   Post Op Vitals Reviewed:  Yes              BP      Temp      Pulse     Resp      SpO2

## 2019-03-08 NOTE — DISCHARGE INSTRUCTIONS
Endoscopia superior   LO QUE NECESITA SABER:   Karla endoscopia superior también se conoce arnav endoscopia gastrointestinal (GI) superior o esofagogastroduodenoscopia (EGD)  Usted podría sentirse inflamado, con gases o sentir molestia abdominal después de slaughter procedimiento  Slaughter garganta podría estar adolorida por 24 a 36 horas  Usted podría eructar o expulsar gas debido al aire que todavía está en slaughter cuerpo  INSTRUCCIONES SOBRE EL ROB HOSPITALARIA:   Llame al 911 en ashley de presentar lo siguiente:   · Tiene dolor en el pecho o dificultad para respirar de forma repentina  Busque atención médica de inmediato si:   · Está mareado o siente que se va a desmayar  · Usted tiene dificultad para tragar  · Soumya evacuaciones intestinales son Norma Maribell o negras  · Slaughter abdomen está gino y firme y usted siente dolor intenso  · Usted vomita oscar  Pregúntele a slaughter Eve Guise vitaminas y minerales son adecuados para usted  · Usted se siente lleno o hinchado y no puede eructar o expulsar gas  · Usted no ha tenido karla evacuación intestinal después de 3 días de slaughter procedimiento  · Usted tiene dolor de lit  · Usted tiene fiebre o escalofríos  · Usted tiene náuseas o está vomitando  · Usted tiene salpullido o urticaria  · Usted tiene preguntas o inquietudes acerca de slaughter endoscopia  Alivie el dolor de garganta:  Chupe pastillas para la garganta o hielo triturado  Leandra gárgaras con karla pequeña cantidad de agua tibia con sal  Mezcle 1 cucharadita de sal y 1 taza de agua tibia para hacer agua salada  Alivie el gas y la molestia de la inflamación:  Acuéstese sobre slaughter costado derecho con karla almohada térmica sobre slaughter abdomen  Camine un poco para ayudar a expulsar el gas  Coma comidas pequeñas hasta que se alivie de la inflamación  Descanse después de slaughter procedimiento:  A usted le mccullough administrado medicamento para relajarse   No  maneje o tome decisiones importantes hasta el día siguiente de slaughter procedimiento  Regrese a soumya actividades normales según le indiquen  Usted generalmente puede regresar al Rufina Ramirez al día siguiente de shepard procedimiento  Acuda a soumya consultas de control con shepard médico según le indicaron  Anote soumya preguntas para que se acuerde de hacerlas sonja soumya visitas  © 2017 seymour Hay  Information is for End User's use only and may not be sold, redistributed or otherwise used for commercial purposes  All illustrations and images included in CareNotes® are the copyrighted property of A D A M , Inc  or Hernan Brown  Esta información es sólo para uso en educación  Shepard intención no es darle un consejo médico sobre enfermedades o tratamientos  Colsulte con shepard Genet Lozano farmacéutico antes de seguir cualquier régimen médico para saber si es seguro y efectivo para usted  Hernia hiatal   LO QUE NECESITA SABER:   Berny hernia hiatal es berny condición que provoca que parte de shepard estómago se abulte a través del hiato (apertura pequeña) en shepard diafragma  La parte del estómago podría moverse hacia arriba y [place], o podría quedarse atrapado en el diafragma  INSTRUCCIONES SOBRE EL ROB HOSPITALARIA:   Busque atención médica de inmediato si:   · Usted tiene dolor abdominal intenso  · Usted trata de vomitar linus no sale nada  · Usted siente dolor mendoza en el pecho y dificultad repentina para respirar  · Soumya heces son negras o tienen oscar  · Shepard vómito parece arnav café molido o contiene oscar  Pregúntele a shepard [place] vitaminas y minerales son adecuados para usted  · Soumya síntomas están empeorando  · Usted tiene náusea y está vomitando  · Usted pierde peso sin proponérselo  · Usted tiene preguntas o inquietudes acerca de shepard condición o cuidado  Medicamentos:   · Medicamentos,  podrían administrase para [employer] síntomas de la Gaila Leslie   Estos medicamentos ayudan a disminuir u obstruir el ácido estomacal  Es posible que [employer] [place] ayudan a hacer más estrecho el esfínter esofágico     · evidanza flora medicamentos arnav se le haya indicado  Consulte con shepard médico si usted justin que shepard medicamento no le está ayudando o si presenta efectos secundarios  Infórmele si es alérgico a cualquier medicamento  Mantenga berny lista actualizada de los [place], las vitaminas y los productos herbales que glenna  Incluya los siguientes datos de los medicamentos: cantidad, frecuencia y motivo de administración  Traiga con usted la lista o los envases de la píldoras a flora citas de seguimiento  Lleve la lista de los medicamentos con usted en ashley de berny emergencia  Acuda a flora consultas de control con shepard médico según le indicaron  Anote flora preguntas para que se acuerde de hacerlas sonja flora visitas  Cuidado personal:   · Evite los alimentos que empeoran flora síntomas  Estos podrían [employer], jugos de fruta, alcohol, cafeína, chocolate y [place]  · Coma porciones pequeñas sonja el día  Las porciones [employer] dan a shepard estómago menos alimentos que digerir  · Evite acostarse e inclinarse después de comer  No consuma alimentos entre 2 y 3 horas antes de [facility] [employer]  [place] disminuye shepard riesgo de reflujo  · Mantenga un peso saludable  Si usted tiene sobrepeso, la pérdida de peso podría ayudarle a [employer]  · Duerma con shepard valencia elevada  al menos 6 pulgadas  · No fume  El fumar puede aumentar flora síntomas de [place]  © 2017 [address] Information is for End User's use only and may not be sold, redistributed or otherwise used for commercial purposes  All illustrations and images included in CareNotes® are the copyrighted property of A D A M , Inc  or Hernan Stephanie  Esta información es sólo para uso en educación  Shepard intención no es darle un consejo médico sobre enfermedades o tratamientos   Colsulte con shepard médico, enfermera o farmacéutico antes de seguir cualquier régimen médico para saber si es seguro y efectivo para usted

## 2019-03-08 NOTE — OP NOTE
OPERATIVE REPORT  PATIENT NAME: Kevin Alexnadre    :  1980  MRN: [id number]  Pt Location: [facility] GI ROOM 01    SURGERY DATE: 3/8/2019    Surgeon(s) and Role:     * Paulino Sutton MD - Primary    Preop Diagnosis:  Gastroesophageal reflux disease without esophagitis [K21 9]    Post-Op Diagnosis Codes:     * Gastroesophageal reflux disease without esophagitis [K21 9]    Procedure(s) (LRB):  ESOPHAGOGASTRODUODENOSCOPY (EGD) (N/A)    Specimen(s):  ID Type Source Tests Collected by Time Destination   1 : r/o celiac Tissue Duodenum TISSUE EXAM Paulino Sutton MD 3/8/2019 5072    2 : r/o h pylori Tissue Stomach TISSUE EXAM Paulino Sutton MD 3/8/2019 5361        Estimated Blood Loss:   Minimal    Drains:  * No LDAs found *    Anesthesia Type:   IV Sedation with Anesthesia    Operative Indications:  Gastroesophageal reflux disease without esophagitis [K21 9]  ESOPHAGOGASTRODUODENOSCOPY    PROCEDURE: EGD    SEDATION: Monitored anesthesia care, check anesthesia records    ASA Class: 2    INDICATIONS:  Dyspepsia; GERD    CONSENT:  Informed consent was obtained for the procedure, including sedation after explaining the risks and benefits of the procedure  Risks including but not limited to bleeding, perforation, infection, and missed lesion  PREPARATION:   Telemetry, pulse oximetry, blood pressure were monitored throughout the procedure  Patient was identified by myself both verbally and by visual inspection of ID band  DESCRIPTION:   Patient was placed in the left lateral decubitus position and was sedated with the above medication  The gastroscope was introduced in to the oropharynx and the esophagus was intubated under direct visualization  Scope was passed down the esophagus up to 2nd part of the duodenum  A careful inspection was made as the gastroscope was withdrawn, including a retroflexed view of the stomach; findings and interventions are described below       FINDINGS:    #1  Esophagus- sliding hernia seen in the distal esophagus measuring 2-3 cm in size  Otherwise normal esophagus    #2  Stomach-normal status post biopsy for H pylori    #3  Duodenum- normal D1 and D2 status post biopsy for celiac disease         IMPRESSIONS:      Sliding hiatal hernia otherwise normal EGD    RECOMMENDATIONS:     Follow-up biopsy results    COMPLICATIONS:  None; patient tolerated the procedure well      SPECIMENS:    ID Type Source Tests Collected by Time Destination   1 : r/o celiac Tissue Duodenum TISSUE EXAM Allison Vale MD 3/8/2019 7386    2 : r/o h pylori Tissue Stomach TISSUE EXAM Allison Vale MD 3/8/2019 1978        ESTIMATED BLOOD LOSS:  Minimal      SIGNATURE: Allison Vale MD  DATE: March 8, 2019  TIME: 9:58 AM

## 2019-03-08 NOTE — DISCHARGE INSTR - AVS FIRST PAGE
OPERATIVE REPORT  PATIENT NAME: Zara Kunz    :  1980  MRN: [id number]  Pt Location: [facility] GI ROOM 01    SURGERY DATE: 3/8/2019    Surgeon(s) and Role:     * Allison Vale MD - Primary    Preop Diagnosis:  Gastroesophageal reflux disease without esophagitis [K21 9]    Post-Op Diagnosis Codes:     * Gastroesophageal reflux disease without esophagitis [K21 9]    Procedure(s) (LRB):  ESOPHAGOGASTRODUODENOSCOPY (EGD) (N/A)    Specimen(s):  ID Type Source Tests Collected by Time Destination   1 : r/o celiac Tissue Duodenum TISSUE EXAM Allison Vale MD 3/8/2019 2774    2 : r/o h pylori Tissue Stomach TISSUE EXAM Allison Vale MD 3/8/2019 4160        Estimated Blood Loss:   Minimal    Drains:  * No LDAs found *    Anesthesia Type:   IV Sedation with Anesthesia    Operative Indications:  Gastroesophageal reflux disease without esophagitis [K21 9]  ESOPHAGOGASTRODUODENOSCOPY    PROCEDURE: EGD    SEDATION: Monitored anesthesia care, check anesthesia records    ASA Class: 2    INDICATIONS:  Dyspepsia; GERD    CONSENT:  Informed consent was obtained for the procedure, including sedation after explaining the risks and benefits of the procedure  Risks including but not limited to bleeding, perforation, infection, and missed lesion  PREPARATION:   Telemetry, pulse oximetry, blood pressure were monitored throughout the procedure  Patient was identified by myself both verbally and by visual inspection of ID band  DESCRIPTION:   Patient was placed in the left lateral decubitus position and was sedated with the above medication  The gastroscope was introduced in to the oropharynx and the esophagus was intubated under direct visualization  Scope was passed down the esophagus up to 2nd part of the duodenum  A careful inspection was made as the gastroscope was withdrawn, including a retroflexed view of the stomach; findings and interventions are described below       FINDINGS:    #1  Esophagus- sliding hernia seen in the distal esophagus measuring 2-3 cm in size  Otherwise normal esophagus    #2  Stomach-normal status post biopsy for H pylori    #3  Duodenum- normal D1 and D2 status post biopsy for celiac disease         IMPRESSIONS:      Sliding hiatal hernia otherwise normal EGD    RECOMMENDATIONS:     Follow-up biopsy results    COMPLICATIONS:  None; patient tolerated the procedure well      SPECIMENS:    ID Type Source Tests Collected by Time Destination   1 : r/o celiac Tissue Duodenum TISSUE EXAM Mariangel Hunt MD 3/8/2019 8735    2 : r/o h pylori Tissue Stomach TISSUE EXAM Mariangel Hunt MD 3/8/2019 5609        ESTIMATED BLOOD LOSS:  Minimal      SIGNATURE: Mariangel Hunt MD  DATE: March 8, 2019  TIME: 9:58 AM

## 2019-03-13 ENCOUNTER — TELEPHONE (OUTPATIENT)
Dept: GASTROENTEROLOGY | Facility: MEDICAL CENTER | Age: 39
End: 2019-03-13

## 2019-03-13 DIAGNOSIS — A04.8 H. PYLORI INFECTION: Primary | ICD-10-CM

## 2019-03-13 RX ORDER — BISMUTH SUBSALICYLATE 262 MG/1
524 TABLET, CHEWABLE ORAL
Qty: 112 TABLET | Refills: 0 | Status: SHIPPED | OUTPATIENT
Start: 2019-03-13 | End: 2019-03-27

## 2019-03-13 RX ORDER — OMEPRAZOLE 40 MG/1
40 CAPSULE, DELAYED RELEASE ORAL 2 TIMES DAILY
Qty: 28 CAPSULE | Refills: 0 | Status: SHIPPED | OUTPATIENT
Start: 2019-03-13 | End: 2020-01-29 | Stop reason: SDUPTHER

## 2019-03-13 RX ORDER — METRONIDAZOLE 250 MG/1
250 TABLET ORAL 4 TIMES DAILY
Qty: 56 TABLET | Refills: 0 | Status: SHIPPED | OUTPATIENT
Start: 2019-03-13 | End: 2019-03-27

## 2019-03-13 RX ORDER — TETRACYCLINE HYDROCHLORIDE 250 MG/1
250 CAPSULE ORAL 4 TIMES DAILY
Qty: 56 CAPSULE | Refills: 0 | Status: SHIPPED | OUTPATIENT
Start: 2019-03-13 | End: 2019-03-27

## 2019-03-13 NOTE — TELEPHONE ENCOUNTER
Patient called back and got results from [facility] and she sent a message to the PA pool for her medications

## 2019-03-13 NOTE — TELEPHONE ENCOUNTER
I sent the medication to her pharmacy, she does have a red food dye allergy listed but she currently takes medications with red food dye

## 2019-03-13 NOTE — TELEPHONE ENCOUNTER
Patient returned Kirsten's call  I explained to her that she was positive for H  Pylori infection  I explain she has to complete a treatment for 14 days, once she is done she most wait a month to repeat the stool study  I sent a message over to Providers to send over treatment to pharmacy

## 2019-03-13 NOTE — TELEPHONE ENCOUNTER
----- Message from Kapil Farooq MD sent at 3/12/2019  6:22 PM EDT -----  H pylori is positive please let the pt know  Please have our PA or nursing team help with treatment  Also remind  Them to make sure eradication have taken place

## 2019-03-28 ENCOUNTER — OFFICE VISIT (OUTPATIENT)
Dept: URGENT CARE | Age: 39
End: 2019-03-28
Payer: COMMERCIAL

## 2019-03-28 VITALS
TEMPERATURE: 97.9 F | WEIGHT: 197 LBS | HEIGHT: 67 IN | HEART RATE: 100 BPM | RESPIRATION RATE: 20 BRPM | OXYGEN SATURATION: 99 % | SYSTOLIC BLOOD PRESSURE: 132 MMHG | DIASTOLIC BLOOD PRESSURE: 68 MMHG | BODY MASS INDEX: 30.92 KG/M2

## 2019-03-28 DIAGNOSIS — N39.0 ACUTE UTI: Primary | ICD-10-CM

## 2019-03-28 DIAGNOSIS — R35.0 URINARY FREQUENCY: ICD-10-CM

## 2019-03-28 LAB
SL AMB  POCT GLUCOSE, UA: NEGATIVE
SL AMB LEUKOCYTE ESTERASE,UA: ABNORMAL
SL AMB POCT BILIRUBIN,UA: NEGATIVE
SL AMB POCT BLOOD,UA: ABNORMAL
SL AMB POCT CLARITY,UA: ABNORMAL
SL AMB POCT COLOR,UA: YELLOW
SL AMB POCT KETONES,UA: NEGATIVE
SL AMB POCT NITRITE,UA: NEGATIVE
SL AMB POCT PH,UA: 6.5
SL AMB POCT SPECIFIC GRAVITY,UA: 1.01
SL AMB POCT URINE PROTEIN: ABNORMAL
SL AMB POCT UROBILINOGEN: 0.2

## 2019-03-28 PROCEDURE — 99283 EMERGENCY DEPT VISIT LOW MDM: CPT | Performed by: PHYSICIAN ASSISTANT

## 2019-03-28 PROCEDURE — 87086 URINE CULTURE/COLONY COUNT: CPT | Performed by: PHYSICIAN ASSISTANT

## 2019-03-28 PROCEDURE — 99203 OFFICE O/P NEW LOW 30 MIN: CPT | Performed by: PHYSICIAN ASSISTANT

## 2019-03-28 PROCEDURE — 87186 SC STD MICRODIL/AGAR DIL: CPT | Performed by: PHYSICIAN ASSISTANT

## 2019-03-28 PROCEDURE — G0382 LEV 3 HOSP TYPE B ED VISIT: HCPCS | Performed by: PHYSICIAN ASSISTANT

## 2019-03-28 PROCEDURE — 87077 CULTURE AEROBIC IDENTIFY: CPT | Performed by: PHYSICIAN ASSISTANT

## 2019-03-28 RX ORDER — SULFAMETHOXAZOLE AND TRIMETHOPRIM 800; 160 MG/1; MG/1
1 TABLET ORAL EVERY 12 HOURS SCHEDULED
Qty: 10 TABLET | Refills: 0 | Status: SHIPPED | OUTPATIENT
Start: 2019-03-28 | End: 2019-04-02

## 2019-03-28 NOTE — PROGRESS NOTES
[facility]        NAME: Danica Varela is a 41 y o  female  : 1980    MRN: [id number]  DATE: 2019  TIME: 6:01 PM    Assessment and Plan   Acute UTI [N39 0]  1  Acute UTI  sulfamethoxazole-trimethoprim (BACTRIM DS) 800-160 mg per tablet   2  Urinary frequency  POCT urine dip    Urine culture     Urine dip- blood, moderate leuks, negative nitrites, consistent with UTI  Culture pending    Patient Instructions     Take all antibiotics as prescribed  Please take probiotics  Drink lots of clear fluids  Call us in 2 days for urine culture results  Follow-up with PCP, OB/Gyn or Urology in the next few days for reexamination and to ensure resolution of symptoms  Call info link - [phone]  Go to ER if worsening symptoms, fevers, back pain, nausea, vomiting or other concerning symptoms     Chief Complaint     Chief Complaint   Patient presents with    Possible UTI     Pt presents with one week hx of burning with urination, frequency          History of Present Illness       51-year-old female presents with urinary tract infection" x1 week  She states she has been having burning with urination, frequency where she feels like she has to go but only little urine comes out  States it feels like prior urinary tract infections  She denies any history of kidney stones  She denies any fevers, back pain, flank pain, abdominal pain, nausea, vomiting, vaginal bleeding or discharge, STD risk or other complaints  She denies any pregnancy risk  States she has tried increased fluids with little relief      Review of Systems   Review of Systems   Constitutional: Negative for fever  Respiratory: Negative for shortness of breath  Cardiovascular: Negative for chest pain  Gastrointestinal: Negative for abdominal pain, diarrhea, nausea and vomiting  Genitourinary: Positive for dysuria, frequency and urgency   Negative for decreased urine volume, flank pain, genital sores, pelvic pain, vaginal bleeding, vaginal discharge and vaginal pain  Musculoskeletal: Negative for back pain and myalgias  Neurological: Negative for dizziness, weakness and light-headedness           Current Medications       Current Outpatient Medications:     ammonium lactate (LAC-HYDRIN) 12 % cream, , Disp: , Rfl:     cetirizine (ZyrTEC) 10 mg tablet, Take by mouth, Disp: , Rfl:     cyanocobalamin 1000 MCG tablet, Take 0 5 tablets (500 mcg total) by mouth daily, Disp: 30 tablet, Rfl: 3    divalproex sodium (DEPAKOTE ER) 500 mg 24 hr tablet, Take 1 tablet (500 mg total) by mouth daily at bedtime In 2 weeks increase to twice a day, Disp: 60 tablet, Rfl: 1    ferrous sulfate 324 (65 Fe) mg, Take 1 tablet (324 mg total) by mouth 2 (two) times a day before meals, Disp: 60 tablet, Rfl: 1    hydrocortisone 1 % cream, , Disp: , Rfl:     hydrOXYzine HCL (ATARAX) 10 mg tablet, , Disp: , Rfl:     magnesium oxide (MAG-OX) 400 mg, Take 1 tablet (400 mg total) by mouth daily, Disp: 30 tablet, Rfl: 3    magnesium oxide (MAG-OX) 400 mg, Take 1 tablet (400 mg total) by mouth 2 (two) times a day, Disp: 60 tablet, Rfl: 3    omeprazole (PriLOSEC) 40 MG capsule, Take 1 capsule (40 mg total) by mouth daily, Disp: 90 capsule, Rfl: 1    ondansetron (ZOFRAN-ODT) 4 mg disintegrating tablet, Take 4 mg by mouth every 8 (eight) hours as needed, Disp: , Rfl:     verapamil (CALAN) 40 mg tablet, Take 1 tablet (40 mg total) by mouth 2 (two) times a day Start with bedtime only for 5 days then increase to BID, Disp: 60 tablet, Rfl: 3    omeprazole (PriLOSEC) 40 MG capsule, Take 1 capsule (40 mg total) by mouth 2 (two) times a day for 14 days, Disp: 28 capsule, Rfl: 0    sulfamethoxazole-trimethoprim (BACTRIM DS) 800-160 mg per tablet, Take 1 tablet by mouth every 12 (twelve) hours for 5 days, Disp: 10 tablet, Rfl: 0    Current Allergies     Allergies as of 03/28/2019 - Reviewed 03/28/2019   Allergen Reaction Noted    Dye fdc red  [red dye]  04/27/2017  Morphine  01/15/2017            The following portions of the patient's history were reviewed and updated as appropriate: allergies, current medications, past family history, past medical history, past social history, past surgical history and problem list      Past Medical History:   Diagnosis Date    B12 deficiency     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     Chronic migraine without aura     Dermatographism     Gait disturbance     Iron deficiency anemia     Lyme neuropathy     Visual disturbance     Diplopia, nystagmus, decreased visual acuity    Vitamin D deficiency        Past Surgical History:   Procedure Laterality Date     SECTION      ESOPHAGOGASTRODUODENOSCOPY N/A 3/8/2019    Procedure: ESOPHAGOGASTRODUODENOSCOPY (EGD); Surgeon: Margarita Delgado MD;  Location: [facility]; Service: Gastroenterology    OOPHORECTOMY      unilateral --  last assessed 17    TUBAL LIGATION      last assessed 17       Family History   Problem Relation Age of Onset    Other Mother         AIDS, Brain Tumor balance disorder    Seizures Mother     Alcohol abuse Father     Other Sister         Balance disorder    HIV Maternal Grandmother          Medications have been verified  Objective   /68   Pulse 100   Temp 97 9 °F (36 6 °C)   Resp 20   Ht 5' 7" (1 702 m)   Wt 89 4 kg (197 lb)   LMP 2019   SpO2 99%   BMI 30 85 kg/m²        Physical Exam     Physical Exam   Constitutional: She is oriented to person, place, and time  She appears well-developed and well-nourished  No distress  Smiling, nontoxic-appearing   HENT:   Head: Normocephalic and atraumatic  Mouth/Throat: Oropharynx is clear and moist    Cardiovascular: Normal rate, regular rhythm and normal heart sounds  Pulmonary/Chest: Effort normal and breath sounds normal  She has no wheezes  Abdominal: Soft  Bowel sounds are normal  There is no tenderness  There is no rebound     No CVA tenderness   Neurological: She is alert and oriented to person, place, and time  Skin: Skin is warm and dry  Psychiatric: She has a normal mood and affect  Her behavior is normal    Nursing note and vitals reviewed

## 2019-03-31 LAB — BACTERIA UR CULT: ABNORMAL

## 2019-04-08 ENCOUNTER — TELEPHONE (OUTPATIENT)
Dept: FAMILY MEDICINE CLINIC | Facility: CLINIC | Age: 39
End: 2019-04-08

## 2019-04-10 ENCOUNTER — OFFICE VISIT (OUTPATIENT)
Dept: FAMILY MEDICINE CLINIC | Facility: CLINIC | Age: 39
End: 2019-04-10
Payer: COMMERCIAL

## 2019-04-10 VITALS
WEIGHT: 201.38 LBS | OXYGEN SATURATION: 99 % | SYSTOLIC BLOOD PRESSURE: 90 MMHG | HEART RATE: 86 BPM | RESPIRATION RATE: 16 BRPM | TEMPERATURE: 91 F | HEIGHT: 67 IN | BODY MASS INDEX: 31.61 KG/M2 | DIASTOLIC BLOOD PRESSURE: 60 MMHG

## 2019-04-10 DIAGNOSIS — D50.9 IRON DEFICIENCY ANEMIA, UNSPECIFIED IRON DEFICIENCY ANEMIA TYPE: ICD-10-CM

## 2019-04-10 DIAGNOSIS — E55.9 VITAMIN D DEFICIENCY: Primary | ICD-10-CM

## 2019-04-10 DIAGNOSIS — G31.9 CEREBELLAR ATROPHY (HCC): ICD-10-CM

## 2019-04-10 DIAGNOSIS — R26.9 GAIT DISTURBANCE: ICD-10-CM

## 2019-04-10 DIAGNOSIS — R29.6 FALLS FREQUENTLY: ICD-10-CM

## 2019-04-10 DIAGNOSIS — R26.89 BALANCE PROBLEMS: ICD-10-CM

## 2019-04-10 PROCEDURE — 99214 OFFICE O/P EST MOD 30 MIN: CPT | Performed by: NURSE PRACTITIONER

## 2019-04-10 PROCEDURE — 3008F BODY MASS INDEX DOCD: CPT | Performed by: NURSE PRACTITIONER

## 2019-04-10 PROCEDURE — 1036F TOBACCO NON-USER: CPT | Performed by: NURSE PRACTITIONER

## 2019-04-10 RX ORDER — ERGOCALCIFEROL 1.25 MG/1
50000 CAPSULE ORAL 3 TIMES WEEKLY
Qty: 12 CAPSULE | Refills: 2 | Status: SHIPPED | OUTPATIENT
Start: 2019-04-10 | End: 2020-01-29 | Stop reason: ALTCHOICE

## 2019-04-11 ENCOUNTER — TELEPHONE (OUTPATIENT)
Dept: NEUROLOGY | Facility: CLINIC | Age: 39
End: 2019-04-11

## 2019-04-11 ENCOUNTER — PATIENT OUTREACH (OUTPATIENT)
Dept: FAMILY MEDICINE CLINIC | Facility: CLINIC | Age: 39
End: 2019-04-11

## 2019-04-12 ENCOUNTER — PATIENT OUTREACH (OUTPATIENT)
Dept: FAMILY MEDICINE CLINIC | Facility: CLINIC | Age: 39
End: 2019-04-12

## 2019-04-16 ENCOUNTER — TELEPHONE (OUTPATIENT)
Dept: NEUROLOGY | Facility: CLINIC | Age: 39
End: 2019-04-16

## 2019-04-16 ENCOUNTER — PATIENT OUTREACH (OUTPATIENT)
Dept: FAMILY MEDICINE CLINIC | Facility: CLINIC | Age: 39
End: 2019-04-16

## 2019-04-17 ENCOUNTER — OFFICE VISIT (OUTPATIENT)
Dept: NEUROLOGY | Facility: CLINIC | Age: 39
End: 2019-04-17
Payer: COMMERCIAL

## 2019-04-17 VITALS
SYSTOLIC BLOOD PRESSURE: 124 MMHG | BODY MASS INDEX: 31.32 KG/M2 | DIASTOLIC BLOOD PRESSURE: 78 MMHG | WEIGHT: 200 LBS | HEART RATE: 101 BPM

## 2019-04-17 DIAGNOSIS — R26.81 GAIT INSTABILITY: Primary | ICD-10-CM

## 2019-04-17 DIAGNOSIS — R56.9 SEIZURE (HCC): ICD-10-CM

## 2019-04-17 DIAGNOSIS — R40.4 TRANSIENT ALTERATION OF AWARENESS: ICD-10-CM

## 2019-04-17 DIAGNOSIS — G31.9 CEREBELLAR ATROPHY (HCC): ICD-10-CM

## 2019-04-17 DIAGNOSIS — G43.709 CHRONIC MIGRAINE WITHOUT AURA WITHOUT STATUS MIGRAINOSUS, NOT INTRACTABLE: ICD-10-CM

## 2019-04-17 PROCEDURE — 99214 OFFICE O/P EST MOD 30 MIN: CPT | Performed by: PHYSICIAN ASSISTANT

## 2019-04-18 ENCOUNTER — TELEPHONE (OUTPATIENT)
Dept: NEUROLOGY | Facility: CLINIC | Age: 39
End: 2019-04-18

## 2019-04-18 ENCOUNTER — OFFICE VISIT (OUTPATIENT)
Dept: FAMILY MEDICINE CLINIC | Facility: CLINIC | Age: 39
End: 2019-04-18
Payer: COMMERCIAL

## 2019-04-18 VITALS
SYSTOLIC BLOOD PRESSURE: 100 MMHG | WEIGHT: 200 LBS | BODY MASS INDEX: 31.39 KG/M2 | RESPIRATION RATE: 16 BRPM | TEMPERATURE: 98.6 F | OXYGEN SATURATION: 98 % | HEART RATE: 90 BPM | DIASTOLIC BLOOD PRESSURE: 64 MMHG | HEIGHT: 67 IN

## 2019-04-18 DIAGNOSIS — R29.6 FALLS FREQUENTLY: ICD-10-CM

## 2019-04-18 DIAGNOSIS — R27.0 ATAXIA: ICD-10-CM

## 2019-04-18 DIAGNOSIS — G31.9 CEREBELLAR ATROPHY (HCC): ICD-10-CM

## 2019-04-18 DIAGNOSIS — R26.9 GAIT DISTURBANCE: ICD-10-CM

## 2019-04-18 DIAGNOSIS — H53.2 DIPLOPIA: ICD-10-CM

## 2019-04-18 DIAGNOSIS — R26.89 BALANCE PROBLEMS: ICD-10-CM

## 2019-04-18 DIAGNOSIS — Z04.89 EXAMINATION FOR MEDICOLEGAL REASON: Primary | ICD-10-CM

## 2019-04-18 PROCEDURE — 3008F BODY MASS INDEX DOCD: CPT | Performed by: NURSE PRACTITIONER

## 2019-04-18 PROCEDURE — 99213 OFFICE O/P EST LOW 20 MIN: CPT | Performed by: NURSE PRACTITIONER

## 2019-04-30 ENCOUNTER — PATIENT OUTREACH (OUTPATIENT)
Dept: FAMILY MEDICINE CLINIC | Facility: CLINIC | Age: 39
End: 2019-04-30

## 2019-05-13 DIAGNOSIS — K21.9 GASTROESOPHAGEAL REFLUX DISEASE WITHOUT ESOPHAGITIS: ICD-10-CM

## 2019-05-13 DIAGNOSIS — K30 ACID INDIGESTION: ICD-10-CM

## 2019-05-13 PROBLEM — R26.81 GAIT INSTABILITY: Status: ACTIVE | Noted: 2019-05-13

## 2019-05-13 RX ORDER — OMEPRAZOLE 40 MG/1
40 CAPSULE, DELAYED RELEASE ORAL DAILY
Qty: 90 CAPSULE | Refills: 1 | Status: SHIPPED | OUTPATIENT
Start: 2019-05-13 | End: 2019-07-15 | Stop reason: SDUPTHER

## 2019-05-21 ENCOUNTER — PATIENT OUTREACH (OUTPATIENT)
Dept: FAMILY MEDICINE CLINIC | Facility: CLINIC | Age: 39
End: 2019-05-21

## 2019-05-28 ENCOUNTER — PATIENT OUTREACH (OUTPATIENT)
Dept: FAMILY MEDICINE CLINIC | Facility: CLINIC | Age: 39
End: 2019-05-28

## 2019-06-05 ENCOUNTER — TELEPHONE (OUTPATIENT)
Dept: NEUROLOGY | Facility: CLINIC | Age: 39
End: 2019-06-05

## 2019-06-05 DIAGNOSIS — G43.709 CHRONIC MIGRAINE WITHOUT AURA WITHOUT STATUS MIGRAINOSUS, NOT INTRACTABLE: Primary | ICD-10-CM

## 2019-06-07 RX ORDER — PROPRANOLOL HYDROCHLORIDE 10 MG/1
TABLET ORAL
Qty: 60 TABLET | Refills: 2 | Status: SHIPPED | OUTPATIENT
Start: 2019-06-07 | End: 2020-01-29 | Stop reason: ALTCHOICE

## 2019-06-12 ENCOUNTER — PATIENT OUTREACH (OUTPATIENT)
Dept: FAMILY MEDICINE CLINIC | Facility: CLINIC | Age: 39
End: 2019-06-12

## 2019-06-20 DIAGNOSIS — G43.709 CHRONIC MIGRAINE WITHOUT AURA WITHOUT STATUS MIGRAINOSUS, NOT INTRACTABLE: ICD-10-CM

## 2019-06-20 RX ORDER — GABAPENTIN 100 MG/1
CAPSULE ORAL
Qty: 90 CAPSULE | Refills: 1 | Status: SHIPPED | OUTPATIENT
Start: 2019-06-20 | End: 2020-01-29 | Stop reason: ALTCHOICE

## 2019-06-25 ENCOUNTER — PATIENT OUTREACH (OUTPATIENT)
Dept: FAMILY MEDICINE CLINIC | Facility: CLINIC | Age: 39
End: 2019-06-25

## 2019-07-15 DIAGNOSIS — A04.8 H. PYLORI INFECTION: ICD-10-CM

## 2019-07-15 DIAGNOSIS — K30 ACID INDIGESTION: ICD-10-CM

## 2019-07-15 DIAGNOSIS — K21.9 GASTROESOPHAGEAL REFLUX DISEASE WITHOUT ESOPHAGITIS: ICD-10-CM

## 2019-07-17 RX ORDER — OMEPRAZOLE 40 MG/1
40 CAPSULE, DELAYED RELEASE ORAL DAILY
Qty: 90 CAPSULE | Refills: 1 | Status: SHIPPED | OUTPATIENT
Start: 2019-07-17 | End: 2020-01-29 | Stop reason: SDUPTHER

## 2019-07-17 RX ORDER — OMEPRAZOLE 40 MG/1
CAPSULE, DELAYED RELEASE ORAL
Qty: 28 CAPSULE | Refills: 0 | OUTPATIENT
Start: 2019-07-17

## 2019-09-03 DIAGNOSIS — G43.709 CHRONIC MIGRAINE WITHOUT AURA WITHOUT STATUS MIGRAINOSUS, NOT INTRACTABLE: ICD-10-CM

## 2019-09-03 NOTE — TELEPHONE ENCOUNTER
If she already did the loading dose which was 2 injections at once, she should do only ONE injection subq every 30 days thereafter  Thanks

## 2019-09-03 NOTE — TELEPHONE ENCOUNTER
Medication refill check list    Correct patient? yes   Correct medication name, dose, and pill size? yes   Correct provider? yes   Last and Next appt  scheduled? Yes, last date 04/17/19 & next date 09/11/19   Right pharmacy listed? yes   Correct quantity for 30 or 90 days? yes   Is the patient out of refills? When was it last prescribed? Yes, last date 06/20/19   Directions match what the patient says they are taking? yes   Enough refills? (none for controlled substances, 1 year for routine medications) yes     Galcanezumab-gnlm (EMGALITY) 120 MG/ML SOAJ   Patient called to the office requesting a refill

## 2019-09-04 NOTE — TELEPHONE ENCOUNTER
Spoke with patient and she expressed understanding that she is to get one a month with 5 refills available from [facility]

## 2019-09-11 ENCOUNTER — OFFICE VISIT (OUTPATIENT)
Dept: NEUROLOGY | Facility: CLINIC | Age: 39
End: 2019-09-11
Payer: COMMERCIAL

## 2019-09-11 VITALS
SYSTOLIC BLOOD PRESSURE: 119 MMHG | BODY MASS INDEX: 31.11 KG/M2 | WEIGHT: 198.6 LBS | DIASTOLIC BLOOD PRESSURE: 66 MMHG | HEART RATE: 89 BPM

## 2019-09-11 DIAGNOSIS — G43.709 CHRONIC MIGRAINE WITHOUT AURA WITHOUT STATUS MIGRAINOSUS, NOT INTRACTABLE: Primary | ICD-10-CM

## 2019-09-11 DIAGNOSIS — G31.9 CEREBELLAR ATROPHY (HCC): ICD-10-CM

## 2019-09-11 DIAGNOSIS — R56.9 SEIZURE (HCC): ICD-10-CM

## 2019-09-11 DIAGNOSIS — R26.81 GAIT INSTABILITY: ICD-10-CM

## 2019-09-11 DIAGNOSIS — E53.8 B12 DEFICIENCY: ICD-10-CM

## 2019-09-11 PROCEDURE — 99215 OFFICE O/P EST HI 40 MIN: CPT | Performed by: PHYSICIAN ASSISTANT

## 2019-09-11 NOTE — PROGRESS NOTES
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  See note below  Will proceed with genetic testing if not already done at [facility]  Fall precautions discussed in great detail  She declines the use of a cane or walker  She states she just does not want to use it  Other safety concerns discussed such as well lit house, keeping lose rugs out of the house, etc    Chronic migraine without aura without status migrainosus, not intractable  Continue emgality injection q30 days, no s/e and decreased migraines by >50%  Problem List Items Addressed This Visit        Cardiovascular and Mediastinum    Chronic migraine without aura without status migrainosus, not intractable - Primary     Continue emgality injection q30 days, no s/e and decreased migraines by >50%  Nervous and Auditory    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 (HCC)       Other    B12 deficiency    Seizure (HCC)    Gait instability     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 multiple direct family members have this  See note below  Will proceed with genetic testing if not already done at [facility]  Fall precautions discussed in great detail  She declines the use of a cane or walker  She states she just does not want to use it  Other safety concerns discussed such as well lit house, keeping lose rugs out of the house, etc                The patient recently saw [facility] for second opinion on her loss of balance, and as noted below her and her  are both poor historians and cannot tell me if they had blood work repeat brain MRI or other orders done, although these were definitely recommended per the last [place] note  I will have to call to [facility] and ask to clarify these orders and whether not the patient had them at [facility] or needs to get it done here      The patient should not hesitate to call me prior to her follow up with any questions or concerns  The patient was instructed to urgently call 911 or present to the nearest emergency room with any new or worsening neurological deficits  Subjective:    HPI     Ms  Giancarlo Wheatley is a pleasant 42-year-old here for neurological follow-up for gait instability and chronic migraine headaches  Her  is here today and provides most of the translation  The patient is primarily [place]-speaking and speaks some [place]      She also has a history of seizures  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  She was hospitalized in April 2018 for this  It was noted she has only ever had one seizure  She has not had any seizure-like events since last seen      She has a strong family history of gait ataxia, in her mother and multiple siblings- 2 sisters (?Spinocerebellar ataxia)  She has a family history of seizures in her mother  Her mother passed at 45 yo from HIV and brain tumor      She has cerebellar atrophy on brain MRI associated with the gait instability which has worsened gradually over time  She has had many falls  No recent severe injury  Since last seen the pt saw [facility] on 5/23/19 for 2nd opinion for loss of balance , and they noted concern for genetic ataxia syndrome  New labs were ordered including ataxia panel to the note, paraneoplastic profile, vitamin-D, CBC, CMP and peripheral smear  The patient states they do not remember getting this done  PT was ordered but they did not do this either  In addition a brain MRI was repeated they do not know the results  The patient and her  seemed to be poor historians  In addition ophthalmology evaluation was ordered for double vision, and I am not sure that she had this done either      She states her balance problem started with the birth of her last child who is now 15years old    One of her sister's symptoms of balance problem started at the age of 32 now age 28 and other sister symptoms started at the age 22 and she is now 42 years of age  She is of balance when she gets up from sitting position or when walking  Has problem with writing and at time talking  No problem with swallowing  She states her memory is also poor and so is her vision  She was seen by an ophthalmologist two years ago but we do not have records of this      The patient denies a personal history of cancer  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      Migraine headaches:  Improved frequency with Emgality, denies s/e  Preventative: Topamax, Depakote, verapamil, magnesium  Abortive: advil, dexamethasone, tylenol (3 times a day every day)  Any Medical/Alternative Treatments used in the past for headaches: none   Headache trigger: Fatigue, Stress/Tension, lack of food  Current pain- 0/10  Headaches began in 2014  How often do the headaches occur: 1-2 per week  What time of the day do the headaches start: start before going to bed  How long do the headaches last - 8 hours  Where are they located - frontal and temporal  What is the intensity of pain: always 10/10   Describe your usual headache - Throbbing, Pounding  Associated symptoms: Decrease of appetite, nausea, Photophobia, light-headed or dizzy, prefer to be alone and in a dark room, unable to work    The following portions of the patient's history were reviewed and updated as appropriate:   She  has a past medical history of B12 deficiency, 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 ([address] ), Chronic migraine without aura, Dermatographism, Gait disturbance, Iron deficiency anemia, Lyme neuropathy, Seizures ([address] ), Visual disturbance, and Vitamin D deficiency    She   Patient Active Problem List    Diagnosis Date Noted    Gait instability 05/13/2019    Gastroesophageal reflux disease without esophagitis 03/01/2019    Seizure (HCC)     Altered mental status 04/03/2018    Microcytic anemia 2018    Chronic migraine without aura without status migrainosus, not intractable 2018    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 ([facility] [address] ) 2018    Dermatographism 2017    Allergic reaction 2017    Urticaria, chronic 2017    Diplopia 2017    Nystagmus 2017    Excessive vaginal bleeding 2017    B12 deficiency 2017    Iron deficiency anemia 2017    Lyme neuropathy 2017    Vitamin D deficiency 2017    Acid indigestion 2017     She  has a past surgical history that includes  section; Oophorectomy; Tubal ligation; and Esophagogastroduodenoscopy (N/A, 3/8/2019)  Her family history includes Alcohol abuse in her father; HIV in her maternal grandmother; Other in her mother and sister; Seizures in her mother  She  reports that she has never smoked  She has never used smokeless tobacco  She reports that she does not drink alcohol or use drugs  Current Outpatient Medications   Medication Sig Dispense Refill    ammonium lactate (LAC-HYDRIN) 12 % cream       calcium carbonate-vitamin D (OSCAL-D) 500 mg-200 units per tablet Take 1 tablet by mouth daily 90 tablet 3    cetirizine (ZyrTEC) 10 mg tablet Take by mouth      gabapentin (NEURONTIN) 100 mg capsule 1 cap qhs x 5 days, then 2 caps qhs x 5 days, then 3 caps qhs  Take 1 additional capsule q8h prn migraine  90 capsule 1    hydrocortisone 1 % cream       hydrOXYzine HCL (ATARAX) 10 mg tablet       magnesium oxide (MAG-OX) 400 mg Take 1 tablet (400 mg total) by mouth 2 (two) times a day 60 tablet 3    Misc   Devices (CANE) MISC Use daily for balance 1 each 0    omeprazole (PriLOSEC) 40 MG capsule Take 1 capsule (40 mg total) by mouth 2 (two) times a day for 14 days 28 capsule 0    omeprazole (PriLOSEC) 40 MG capsule Take 1 capsule (40 mg total) by mouth daily 90 capsule 1    ondansetron (ZOFRAN-ODT) 4 mg disintegrating tablet Take 4 mg by mouth every 8 (eight) hours as needed      propranolol (INDERAL) 10 mg tablet 1 tab qhs x 5 days, then BID  Please call for dose increase if needed  60 tablet 2    cyanocobalamin 1000 MCG tablet Take 0 5 tablets (500 mcg total) by mouth daily (Patient not taking: Reported on 4/18/2019) 30 tablet 3    divalproex sodium (DEPAKOTE ER) 250 mg 24 hr tablet Take 2 tablets (500 mg total) by mouth daily at bedtime 60 tablet 5    ergocalciferol (VITAMIN D2) 50,000 units Take 1 capsule (50,000 Units total) by mouth 3 (three) times a week for 12 doses 12 capsule 2    ferrous sulfate 324 (65 Fe) mg Take 1 tablet (324 mg total) by mouth 2 (two) times a day before meals (Patient not taking: Reported on 4/10/2019) 60 tablet 1    Galcanezumab-gnlm (EMGALITY) 120 MG/ML SOAJ One ml subcutaneously q30 days  1 pen 5    lidocaine (LMX) 4 % cream Apply topically as needed for moderate pain 30 g 0    magnesium oxide (MAG-OX) 400 mg Take 1 tablet (400 mg total) by mouth daily (Patient not taking: Reported on 4/18/2019) 30 tablet 3    methocarbamol (ROBAXIN) 750 mg tablet Take 1 tablet (750 mg total) by mouth 3 (three) times a day for 30 doses (Patient not taking: Reported on 10/16/2019) 30 tablet 0    methocarbamol (ROBAXIN) 750 mg tablet Take 1 tablet (750 mg total) by mouth every 6 (six) hours as needed for muscle spasms 20 tablet 0    naproxen (NAPROSYN) 250 mg tablet Take 1 tablet (250 mg total) by mouth 3 (three) times a day with meals for 7 days 21 tablet 0    naproxen (NAPROSYN) 500 mg tablet Take 1 tablet (500 mg total) by mouth 2 (two) times a day with meals 30 tablet 0     No current facility-administered medications for this visit  She is allergic to dye fdc red  [red dye] and morphine            Objective:    Blood pressure 119/66, pulse 89, weight 90 1 kg (198 lb 9 6 oz), last menstrual period 08/20/2019  Physical Exam    Neurological Exam  Vital signs reviewed  Well developed, well nourished    Head: Normocephalic, atraumatic  Neck: Neck flexors 5/5  CN 2-12: intact and symmetric, including EOMs which are normal b/l and PERRL  MSK: 5/5 t/o  ROM normal x all 4 extr  Dysmetria in both upper extremities  Sensation: Inact to LT and temp x4 extr  Romberg positive  Reflexes: 2+ and symmetric in all 4 extr  , except trace in the ankles b/l  Coordination: dystaxic with HTS b/l   Gait: Wide-based and ataxic gait  Holds onto her  to walk  States she does not want to use a cane  ROS:    Review of Systems   Constitutional: Negative  Negative for appetite change and fever  HENT: Negative  Negative for hearing loss, tinnitus, trouble swallowing and voice change  Eyes: Positive for visual disturbance (blurred vision)  Negative for photophobia and pain  Respiratory: Negative  Negative for shortness of breath  Cardiovascular: Negative  Negative for palpitations  Gastrointestinal: Negative  Negative for nausea and vomiting  Endocrine: Negative  Negative for cold intolerance and heat intolerance  Genitourinary: Negative  Negative for dysuria, frequency and urgency  Musculoskeletal: Positive for gait problem (imbalance)  Negative for myalgias and neck pain  Skin: Negative  Negative for rash  Neurological: Positive for headaches  Negative for dizziness, tremors, seizures, syncope, facial asymmetry, speech difficulty, weakness, light-headedness and numbness  Hematological: Negative  Does not bruise/bleed easily  Psychiatric/Behavioral: Negative  Negative for confusion, hallucinations and sleep disturbance  The following portions of the patient's history were reviewed and updated as appropriate: allergies, current medications/ medication history, past family history, past medical history, past social history, past surgical history and problem list     Review of systems was reviewed and otherwise unremarkable from a neurological perspective

## 2019-09-11 NOTE — PATIENT INSTRUCTIONS
Will try to get cane-  Will get brain MRI from [facility]  Will order genetic test blood work and mail to you  Will obtain ophthalmology results

## 2019-09-30 ENCOUNTER — HOSPITAL ENCOUNTER (EMERGENCY)
Facility: HOSPITAL | Age: 39
Discharge: HOME | End: 2019-09-30
Attending: EMERGENCY MEDICINE
Payer: COMMERCIAL

## 2019-09-30 VITALS
RESPIRATION RATE: 16 BRPM | WEIGHT: 199.08 LBS | OXYGEN SATURATION: 99 % | BODY MASS INDEX: 31.18 KG/M2 | SYSTOLIC BLOOD PRESSURE: 97 MMHG | HEART RATE: 86 BPM | DIASTOLIC BLOOD PRESSURE: 60 MMHG | TEMPERATURE: 98.5 F

## 2019-09-30 DIAGNOSIS — M54.50 ACUTE LOW BACK PAIN: Primary | ICD-10-CM

## 2019-09-30 LAB
BACTERIA UR QL AUTO: ABNORMAL /HPF
BILIRUB UR QL STRIP: NEGATIVE
CLARITY UR: CLEAR
COLOR UR: YELLOW
EXT PREG TEST URINE: NORMAL
EXT. CONTROL ED NAV: NORMAL
GLUCOSE UR STRIP-MCNC: NEGATIVE MG/DL
HGB UR QL STRIP.AUTO: ABNORMAL
KETONES UR STRIP-MCNC: NEGATIVE MG/DL
LEUKOCYTE ESTERASE UR QL STRIP: NEGATIVE
NITRITE UR QL STRIP: NEGATIVE
NON-SQ EPI CELLS URNS QL MICRO: ABNORMAL /HPF
PH UR STRIP.AUTO: 7 [PH] (ref 4.5–8)
PROT UR STRIP-MCNC: ABNORMAL MG/DL
RBC #/AREA URNS AUTO: ABNORMAL /HPF
SP GR UR STRIP.AUTO: 1.02 (ref 1–1.03)
UROBILINOGEN UR QL STRIP.AUTO: 1 E.U./DL
WBC #/AREA URNS AUTO: ABNORMAL /HPF

## 2019-09-30 PROCEDURE — 81025 URINE PREGNANCY TEST: CPT | Performed by: EMERGENCY MEDICINE

## 2019-09-30 PROCEDURE — 99284 EMERGENCY DEPT VISIT MOD MDM: CPT

## 2019-09-30 PROCEDURE — 99284 EMERGENCY DEPT VISIT MOD MDM: CPT | Performed by: EMERGENCY MEDICINE

## 2019-09-30 PROCEDURE — 81001 URINALYSIS AUTO W/SCOPE: CPT

## 2019-09-30 RX ORDER — NAPROXEN 250 MG/1
250 TABLET ORAL
Qty: 21 TABLET | Refills: 0 | Status: SHIPPED | OUTPATIENT
Start: 2019-09-30 | End: 2019-09-30 | Stop reason: SDUPTHER

## 2019-09-30 RX ORDER — LIDOCAINE 50 MG/G
1 PATCH TOPICAL ONCE
Status: DISCONTINUED | OUTPATIENT
Start: 2019-09-30 | End: 2019-09-30 | Stop reason: HOSPADM

## 2019-09-30 RX ORDER — LIDOCAINE 40 MG/G
CREAM TOPICAL AS NEEDED
Qty: 30 G | Refills: 0 | Status: SHIPPED | OUTPATIENT
Start: 2019-09-30 | End: 2020-01-29 | Stop reason: ALTCHOICE

## 2019-09-30 RX ORDER — METHOCARBAMOL 750 MG/1
750 TABLET, FILM COATED ORAL 3 TIMES DAILY
Qty: 30 TABLET | Refills: 0 | Status: SHIPPED | OUTPATIENT
Start: 2019-09-30 | End: 2020-01-29 | Stop reason: ALTCHOICE

## 2019-09-30 RX ORDER — NAPROXEN 250 MG/1
500 TABLET ORAL ONCE
Status: COMPLETED | OUTPATIENT
Start: 2019-09-30 | End: 2019-09-30

## 2019-09-30 RX ORDER — NAPROXEN 250 MG/1
250 TABLET ORAL
Qty: 21 TABLET | Refills: 0 | Status: SHIPPED | OUTPATIENT
Start: 2019-09-30 | End: 2020-04-10

## 2019-09-30 RX ORDER — METHOCARBAMOL 750 MG/1
750 TABLET, FILM COATED ORAL ONCE
Status: COMPLETED | OUTPATIENT
Start: 2019-09-30 | End: 2019-09-30

## 2019-09-30 RX ORDER — LIDOCAINE 40 MG/G
CREAM TOPICAL AS NEEDED
Qty: 30 G | Refills: 0 | Status: SHIPPED | OUTPATIENT
Start: 2019-09-30 | End: 2019-09-30 | Stop reason: SDUPTHER

## 2019-09-30 RX ORDER — METHOCARBAMOL 750 MG/1
750 TABLET, FILM COATED ORAL 3 TIMES DAILY
Qty: 30 TABLET | Refills: 0 | Status: SHIPPED | OUTPATIENT
Start: 2019-09-30 | End: 2019-09-30 | Stop reason: SDUPTHER

## 2019-09-30 RX ADMIN — LIDOCAINE 1 PATCH: 50 PATCH TOPICAL at 13:46

## 2019-09-30 RX ADMIN — NAPROXEN 500 MG: 250 TABLET ORAL at 13:46

## 2019-09-30 RX ADMIN — METHOCARBAMOL 750 MG: 750 TABLET, FILM COATED ORAL at 13:46

## 2019-09-30 NOTE — ED PROVIDER NOTES
History  Chief Complaint   Patient presents with    Back Pain     Pt arrives via ems pt states she was at home made a sudden turn and developed sever lower back pain  trouble walking  pt states no hx of back problems  History provided by:  Patient  Back Pain   Location:  Lumbar spine  Quality:  Shooting  Radiates to:  L thigh  Pain severity:  Moderate  Onset quality:  Sudden  Duration: minutes pta  Timing:  Constant  Progression:  Worsening  Chronicity:  New  Context: twisting    Relieved by:  Nothing  Worsened by: Movement, twisting and touching  Ineffective treatments:  None tried  Associated symptoms: no abdominal pain, no abdominal swelling, no bladder incontinence, no bowel incontinence, no chest pain, no dysuria, no fever, no headaches, no leg pain, no numbness, no paresthesias, no pelvic pain, no perianal numbness, no tingling, no weakness and no weight loss        Prior to Admission Medications   Prescriptions Last Dose Informant Patient Reported? Taking? Galcanezumab-gnlm (EMGALITY) 120 MG/ML SOAJ   No No   Sig: One ml subcutaneously q30 days  Misc   Devices (CANE) MISC   No No   Sig: Use daily for balance   ammonium lactate (LAC-HYDRIN) 12 % cream   Yes No   calcium carbonate-vitamin D (OSCAL-D) 500 mg-200 units per tablet   No No   Sig: Take 1 tablet by mouth daily   cetirizine (ZyrTEC) 10 mg tablet  Self Yes No   Sig: Take by mouth   cyanocobalamin 1000 MCG tablet  Self No No   Sig: Take 0 5 tablets (500 mcg total) by mouth daily   Patient not taking: Reported on 4/18/2019   divalproex sodium (DEPAKOTE ER) 500 mg 24 hr tablet   No No   Sig: Take 1 tablet (500 mg total) by mouth daily at bedtime In 2 weeks increase to twice a day   ergocalciferol (VITAMIN D2) 50,000 units   No No   Sig: Take 1 capsule (50,000 Units total) by mouth 3 (three) times a week for 12 doses   ferrous sulfate 324 (65 Fe) mg   No No   Sig: Take 1 tablet (324 mg total) by mouth 2 (two) times a day before meals   Patient not taking: Reported on 4/10/2019   gabapentin (NEURONTIN) 100 mg capsule   No No   Si cap qhs x 5 days, then 2 caps qhs x 5 days, then 3 caps qhs  Take 1 additional capsule q8h prn migraine  hydrOXYzine HCL (ATARAX) 10 mg tablet   Yes No   hydrocortisone 1 % cream  Self Yes No   magnesium oxide (MAG-OX) 400 mg  Self No No   Sig: Take 1 tablet (400 mg total) by mouth daily   Patient not taking: Reported on 2019   magnesium oxide (MAG-OX) 400 mg   No No   Sig: Take 1 tablet (400 mg total) by mouth 2 (two) times a day   omeprazole (PriLOSEC) 40 MG capsule   No No   Sig: Take 1 capsule (40 mg total) by mouth 2 (two) times a day for 14 days   omeprazole (PriLOSEC) 40 MG capsule   No No   Sig: Take 1 capsule (40 mg total) by mouth daily   ondansetron (ZOFRAN-ODT) 4 mg disintegrating tablet   Yes No   Sig: Take 4 mg by mouth every 8 (eight) hours as needed   propranolol (INDERAL) 10 mg tablet   No No   Si tab qhs x 5 days, then BID  Please call for dose increase if needed  Facility-Administered Medications: None       Past Medical History:   Diagnosis Date    B12 deficiency     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     Chronic migraine without aura     Dermatographism     Gait disturbance     Iron deficiency anemia     Lyme neuropathy     Visual disturbance     Diplopia, nystagmus, decreased visual acuity    Vitamin D deficiency        Past Surgical History:   Procedure Laterality Date     SECTION      ESOPHAGOGASTRODUODENOSCOPY N/A 3/8/2019    Procedure: ESOPHAGOGASTRODUODENOSCOPY (EGD); Surgeon: Anita Anton MD;  Location: [facility];   Service: Gastroenterology    OOPHORECTOMY      unilateral --  last assessed 17    TUBAL LIGATION      last assessed 17       Family History   Problem Relation Age of Onset    Other Mother         AIDS, Brain Tumor balance disorder    Seizures Mother     Alcohol abuse Father     Other Sister         Balance disorder    HIV Maternal Grandmother I have reviewed and agree with the history as documented  Social History     Tobacco Use    Smoking status: Never Smoker    Smokeless tobacco: Never Used   Substance Use Topics    Alcohol use: No    Drug use: No        Review of Systems   Constitutional: Negative for activity change, appetite change, fatigue, fever and weight loss  HENT: Negative for congestion, dental problem, ear pain, rhinorrhea and sore throat  Eyes: Negative for pain and redness  Respiratory: Negative for chest tightness, shortness of breath and wheezing  Cardiovascular: Negative for chest pain and palpitations  Gastrointestinal: Negative for abdominal pain, blood in stool, bowel incontinence, constipation, diarrhea, nausea and vomiting  Endocrine: Negative for cold intolerance and heat intolerance  Genitourinary: Negative for bladder incontinence, dysuria, frequency, hematuria, pelvic pain, vaginal bleeding and vaginal discharge  Musculoskeletal: Positive for back pain  Negative for arthralgias and myalgias  Skin: Negative for color change, pallor and rash  Neurological: Negative for tingling, weakness, numbness, headaches and paresthesias  Hematological: Does not bruise/bleed easily  Psychiatric/Behavioral: Negative for agitation, hallucinations and suicidal ideas  Physical Exam  Physical Exam   Constitutional: She is oriented to person, place, and time  She appears well-developed and well-nourished  HENT:   Mouth/Throat: No oropharyngeal exudate  TMs normal bilaterally no pharyngeal erythema no rhinorrhea nontender palpation of sinuses, normal looking turbinates   Eyes: Conjunctivae and EOM are normal    Neck: Normal range of motion  Neck supple  No meningeal signs   Cardiovascular: Normal rate, regular rhythm, normal heart sounds and intact distal pulses  Pulmonary/Chest: Effort normal and breath sounds normal  No respiratory distress  She has no wheezes  She has no rales   She exhibits no tenderness  Abdominal: Soft  Bowel sounds are normal  She exhibits no distension and no mass  There is no tenderness  No hernia  No cvat  nttp over T/L spines  +ttp over bilateral lower lumbar region  nbi b/l le   Musculoskeletal: Normal range of motion  She exhibits no edema  Lymphadenopathy:     She has no cervical adenopathy  Neurological: She is alert and oriented to person, place, and time  No cranial nerve deficit  Skin: No rash noted  No erythema  No edema   Psychiatric: She has a normal mood and affect  Her behavior is normal    Nursing note and vitals reviewed        Vital Signs  ED Triage Vitals [09/30/19 1311]   Temperature Pulse Respirations Blood Pressure SpO2   98 5 °F (36 9 °C) 94 16 117/70 100 %      Temp Source Heart Rate Source Patient Position - Orthostatic VS BP Location FiO2 (%)   Oral Monitor Sitting Right arm --      Pain Score       Worst Possible Pain           Vitals:    09/30/19 1311 09/30/19 1505   BP: 117/70    Pulse: 94 86   Patient Position - Orthostatic VS: Sitting Lying         Visual Acuity      ED Medications  Medications   lidocaine (LIDODERM) 5 % patch 1 patch (1 patch Topical Medication Applied 9/30/19 1346)   naproxen (NAPROSYN) tablet 500 mg (500 mg Oral Given 9/30/19 1346)   methocarbamol (ROBAXIN) tablet 750 mg (750 mg Oral Given 9/30/19 1346)       Diagnostic Studies  Results Reviewed     Procedure Component Value Units Date/Time    Urine Microscopic [[id number]]  (Abnormal) Collected:  09/30/19 1450    Lab Status:  Final result Specimen:  Urine, Clean Catch Updated:  09/30/19 1507     RBC, UA 1-2 /hpf      WBC, UA None Seen /hpf      Epithelial Cells Occasional /hpf      Bacteria, UA Occasional /hpf     POCT pregnancy, urine [[id number]]  (Normal) Resulted:  09/30/19 1452    Lab Status:  Final result Updated:  09/30/19 1453     EXT PREG TEST UR (Ref: Negative) NEG ( - )     Control Vaild    POCT urinalysis dipstick [[id number]]  (Abnormal) Resulted:  09/30/19 1452 Lab Status:  Final result Specimen:  Urine Updated:  09/30/19 1452    ED Urine Macroscopic [[id number]]  (Abnormal) Collected:  09/30/19 1450    Lab Status:  Final result Specimen:  Urine Updated:  09/30/19 1451     Color, UA Yellow     Clarity, UA Clear     pH, UA 7 0     Leukocytes, UA Negative     Nitrite, UA Negative     Protein, UA Trace mg/dl      Glucose, UA Negative mg/dl      Ketones, UA Negative mg/dl      Urobilinogen, UA 1 0 E U /dl      Bilirubin, UA Negative     Blood, UA Trace     Specific [place], UA 1 020    Narrative:       CLINITEK RESULT                 No orders to display              Procedures  Procedures       ED Course  ED Course as of Sep 30 1509   Mon Sep 30, 2019   1453 Pain improved  Will reassure, , dc to home w/ outpatient f/u and symptomatic tx  [facility]  Number of Diagnoses or Management Options  Diagnosis management comments: Acute low back pain c/w msk pain w/o red flag signs/symptoms with exam c/w msk pain-will do urine dip, upreg, tx for msk pain      Disposition  Final diagnoses:   Acute low back pain     Time reflects when diagnosis was documented in both MDM as applicable and the Disposition within this note     Time User Action Codes Description Comment    9/30/2019  3:04 PM Patsy Posada Add [M54 5] Acute low back pain       ED Disposition     ED Disposition Condition Date/Time Comment    Discharge Stable Mon Sep 30, 2019  3:04 PM Bruno Dias discharge to home/self care  Follow-up Information     Follow up With Specialties Details Why Contact Info    Hilda Stratton, [address], Nurse Practitioner Schedule an appointment as soon as possible for a visit in 2 days  [address] [phone]            Patient's Medications   Discharge Prescriptions    LIDOCAINE (LMX) 4 % CREAM    Apply topically as needed for moderate pain       Start Date: 9/30/2019 End Date: --       Order Dose: --       Quantity: 30 g    Refills: 0    METHOCARBAMOL (ROBAXIN) 750 MG TABLET    Take 1 tablet (750 mg total) by mouth 3 (three) times a day for 30 doses       Start Date: 9/30/2019 End Date: 10/10/2019       Order Dose: 750 mg       Quantity: 30 tablet    Refills: 0    NAPROXEN (NAPROSYN) 250 MG TABLET    Take 1 tablet (250 mg total) by mouth 3 (three) times a day with meals for 7 days       Start Date: 9/30/2019 End Date: 10/7/2019       Order Dose: 250 mg       Quantity: 21 tablet    Refills: 0     No discharge procedures on file      ED Provider  Electronically Signed by           Reyna Suarez MD  09/30/19 9556

## 2019-10-11 ENCOUNTER — APPOINTMENT (EMERGENCY)
Dept: RADIOLOGY | Facility: HOSPITAL | Age: 39
End: 2019-10-11
Payer: COMMERCIAL

## 2019-10-11 ENCOUNTER — HOSPITAL ENCOUNTER (EMERGENCY)
Facility: HOSPITAL | Age: 39
Discharge: HOME | End: 2019-10-11
Attending: EMERGENCY MEDICINE | Admitting: EMERGENCY MEDICINE
Payer: COMMERCIAL

## 2019-10-11 ENCOUNTER — APPOINTMENT (EMERGENCY)
Dept: CT IMAGING | Facility: HOSPITAL | Age: 39
End: 2019-10-11
Payer: COMMERCIAL

## 2019-10-11 VITALS
DIASTOLIC BLOOD PRESSURE: 54 MMHG | HEART RATE: 83 BPM | TEMPERATURE: 98.4 F | RESPIRATION RATE: 16 BRPM | OXYGEN SATURATION: 98 % | WEIGHT: 194.22 LBS | SYSTOLIC BLOOD PRESSURE: 100 MMHG | BODY MASS INDEX: 30.42 KG/M2

## 2019-10-11 DIAGNOSIS — R56.9 SEIZURE (HCC): Primary | ICD-10-CM

## 2019-10-11 DIAGNOSIS — S16.1XXA STRAIN OF NECK MUSCLE, INITIAL ENCOUNTER: ICD-10-CM

## 2019-10-11 DIAGNOSIS — R89.9 ABNORMAL LABORATORY TEST: ICD-10-CM

## 2019-10-11 DIAGNOSIS — S40.011A CONTUSION OF RIGHT SHOULDER, INITIAL ENCOUNTER: ICD-10-CM

## 2019-10-11 DIAGNOSIS — S09.90XA ACUTE HEAD INJURY, INITIAL ENCOUNTER: ICD-10-CM

## 2019-10-11 LAB
ALBUMIN SERPL BCP-MCNC: 3.7 G/DL (ref 3.5–5)
ALP SERPL-CCNC: 69 U/L (ref 46–116)
ALT SERPL W P-5'-P-CCNC: 18 U/L (ref 12–78)
ANION GAP SERPL CALCULATED.3IONS-SCNC: 9 MMOL/L (ref 4–13)
AST SERPL W P-5'-P-CCNC: 16 U/L (ref 5–45)
BACTERIA UR QL AUTO: ABNORMAL /HPF
BASOPHILS # BLD AUTO: 0.03 THOUSANDS/ΜL (ref 0–0.1)
BASOPHILS NFR BLD AUTO: 1 % (ref 0–1)
BILIRUB SERPL-MCNC: 0.35 MG/DL (ref 0.2–1)
BILIRUB UR QL STRIP: NEGATIVE
BUN SERPL-MCNC: 8 MG/DL (ref 5–25)
CALCIUM SERPL-MCNC: 9.1 MG/DL (ref 8.3–10.1)
CHLORIDE SERPL-SCNC: 105 MMOL/L (ref 100–108)
CLARITY UR: CLEAR
CO2 SERPL-SCNC: 25 MMOL/L (ref 21–32)
COLOR UR: YELLOW
COLOR, POC: YELLOW
CREAT SERPL-MCNC: 0.7 MG/DL (ref 0.6–1.3)
EOSINOPHIL # BLD AUTO: 0.05 THOUSAND/ΜL (ref 0–0.61)
EOSINOPHIL NFR BLD AUTO: 1 % (ref 0–6)
ERYTHROCYTE [DISTWIDTH] IN BLOOD BY AUTOMATED COUNT: 13 % (ref 11.6–15.1)
EXT PREG TEST URINE: NEGATIVE
EXT. CONTROL ED NAV: NORMAL
GFR SERPL CREATININE-BSD FRML MDRD: 111 ML/MIN/1.73SQ M
GLUCOSE SERPL-MCNC: 113 MG/DL (ref 65–140)
GLUCOSE UR STRIP-MCNC: NEGATIVE MG/DL
HCT VFR BLD AUTO: 41.9 % (ref 34.8–46.1)
HGB BLD-MCNC: 13.2 G/DL (ref 11.5–15.4)
HGB UR QL STRIP.AUTO: ABNORMAL
IMM GRANULOCYTES # BLD AUTO: 0.02 THOUSAND/UL (ref 0–0.2)
IMM GRANULOCYTES NFR BLD AUTO: 0 % (ref 0–2)
KETONES UR STRIP-MCNC: NEGATIVE MG/DL
LEUKOCYTE ESTERASE UR QL STRIP: NEGATIVE
LYMPHOCYTES # BLD AUTO: 1.01 THOUSANDS/ΜL (ref 0.6–4.47)
LYMPHOCYTES NFR BLD AUTO: 16 % (ref 14–44)
MCH RBC QN AUTO: 27.3 PG (ref 26.8–34.3)
MCHC RBC AUTO-ENTMCNC: 31.5 G/DL (ref 31.4–37.4)
MCV RBC AUTO: 87 FL (ref 82–98)
MONOCYTES # BLD AUTO: 0.46 THOUSAND/ΜL (ref 0.17–1.22)
MONOCYTES NFR BLD AUTO: 7 % (ref 4–12)
NEUTROPHILS # BLD AUTO: 4.61 THOUSANDS/ΜL (ref 1.85–7.62)
NEUTS SEG NFR BLD AUTO: 75 % (ref 43–75)
NITRITE UR QL STRIP: NEGATIVE
NON-SQ EPI CELLS URNS QL MICRO: ABNORMAL /HPF
NRBC BLD AUTO-RTO: 0 /100 WBCS
PH UR STRIP.AUTO: 5.5 [PH] (ref 4.5–8)
PLATELET # BLD AUTO: 207 THOUSANDS/UL (ref 149–390)
PMV BLD AUTO: 12 FL (ref 8.9–12.7)
POTASSIUM SERPL-SCNC: 4.4 MMOL/L (ref 3.5–5.3)
PROT SERPL-MCNC: 7.6 G/DL (ref 6.4–8.2)
PROT UR STRIP-MCNC: NEGATIVE MG/DL
RBC # BLD AUTO: 4.83 MILLION/UL (ref 3.81–5.12)
RBC #/AREA URNS AUTO: ABNORMAL /HPF
SODIUM SERPL-SCNC: 139 MMOL/L (ref 136–145)
SP GR UR STRIP.AUTO: 1.02 (ref 1–1.03)
UROBILINOGEN UR QL STRIP.AUTO: 0.2 E.U./DL
VALPROATE SERPL-MCNC: <3 UG/ML (ref 50–100)
WBC # BLD AUTO: 6.18 THOUSAND/UL (ref 4.31–10.16)
WBC #/AREA URNS AUTO: ABNORMAL /HPF

## 2019-10-11 PROCEDURE — 36415 COLL VENOUS BLD VENIPUNCTURE: CPT | Performed by: PHYSICIAN ASSISTANT

## 2019-10-11 PROCEDURE — 81025 URINE PREGNANCY TEST: CPT | Performed by: PHYSICIAN ASSISTANT

## 2019-10-11 PROCEDURE — 99285 EMERGENCY DEPT VISIT HI MDM: CPT

## 2019-10-11 PROCEDURE — 80053 COMPREHEN METABOLIC PANEL: CPT | Performed by: PHYSICIAN ASSISTANT

## 2019-10-11 PROCEDURE — 81001 URINALYSIS AUTO W/SCOPE: CPT

## 2019-10-11 PROCEDURE — 73030 X-RAY EXAM OF SHOULDER: CPT

## 2019-10-11 PROCEDURE — 85025 COMPLETE CBC W/AUTO DIFF WBC: CPT | Performed by: PHYSICIAN ASSISTANT

## 2019-10-11 PROCEDURE — 99285 EMERGENCY DEPT VISIT HI MDM: CPT | Performed by: PHYSICIAN ASSISTANT

## 2019-10-11 PROCEDURE — 80164 ASSAY DIPROPYLACETIC ACD TOT: CPT | Performed by: PHYSICIAN ASSISTANT

## 2019-10-11 PROCEDURE — 72125 CT NECK SPINE W/O DYE: CPT

## 2019-10-11 PROCEDURE — 70450 CT HEAD/BRAIN W/O DYE: CPT

## 2019-10-11 PROCEDURE — 96374 THER/PROPH/DIAG INJ IV PUSH: CPT

## 2019-10-11 PROCEDURE — 96361 HYDRATE IV INFUSION ADD-ON: CPT

## 2019-10-11 RX ORDER — METHOCARBAMOL 750 MG/1
750 TABLET, FILM COATED ORAL EVERY 6 HOURS PRN
Qty: 20 TABLET | Refills: 0 | Status: SHIPPED | OUTPATIENT
Start: 2019-10-11 | End: 2019-10-11 | Stop reason: SDUPTHER

## 2019-10-11 RX ORDER — NAPROXEN 500 MG/1
500 TABLET ORAL 2 TIMES DAILY WITH MEALS
Qty: 30 TABLET | Refills: 0 | Status: SHIPPED | OUTPATIENT
Start: 2019-10-11

## 2019-10-11 RX ORDER — KETOROLAC TROMETHAMINE 30 MG/ML
15 INJECTION, SOLUTION INTRAMUSCULAR; INTRAVENOUS ONCE
Status: COMPLETED | OUTPATIENT
Start: 2019-10-11 | End: 2019-10-11

## 2019-10-11 RX ORDER — NAPROXEN 500 MG/1
500 TABLET ORAL 2 TIMES DAILY WITH MEALS
Qty: 30 TABLET | Refills: 0 | Status: SHIPPED | OUTPATIENT
Start: 2019-10-11 | End: 2019-10-11 | Stop reason: SDUPTHER

## 2019-10-11 RX ORDER — METHOCARBAMOL 750 MG/1
750 TABLET, FILM COATED ORAL EVERY 6 HOURS PRN
Qty: 20 TABLET | Refills: 0 | Status: SHIPPED | OUTPATIENT
Start: 2019-10-11 | End: 2020-01-29 | Stop reason: ALTCHOICE

## 2019-10-11 RX ADMIN — SODIUM CHLORIDE 1000 ML: 0.9 INJECTION, SOLUTION INTRAVENOUS at 09:30

## 2019-10-11 RX ADMIN — KETOROLAC TROMETHAMINE 15 MG: 30 INJECTION, SOLUTION INTRAMUSCULAR at 09:49

## 2019-10-11 NOTE — ED PROVIDER NOTES
History  Chief Complaint   Patient presents with    Seizure - Prior Hx Of      reports pt had "an epileptic attack " Reports prior hx of seizure  Pt reporting right shoulder pain  Pt fell from bed  Pt reports striking her head  Pt's  reports non-compliance to antiseizure medication because "The pills are too big "      39y  o female with PMH of B12 deficiency, chronic migraine, gait disturbance, iron deficiency anemia, lyme neuropathy, seizures, visual disturbances and vitamin d deficiency presents to the ER for evaluation after a seizure occurring around 07:14 today  Patient's  is present and does most of the translating   states he heard a boom and went in the bedroom to find the patient on the bedroom floor seizure   states she was seizing for about 20 minutes  Patient was not incontinent   states she may have bitten her tongue  Patient complains of right shoulder, neck pain and headache since the incident  She denies taking any medication for pain  She describes her pain as sharp and non-radiating  She rates her pain 10/10 and states it is constant  Patient states she does not take her Depakote because the pills are too big  Patient states she follows with Neurology and last saw them 3 weeks ago  She states they are aware she is not taking her medications and they tell her to start taking them  Patient states she has not taken the medication in 8 months at least  Patient states she only ever had one seizure prior to today  She denies fever, chills, chest pain, dyspnea, N/V/D, abdominal pain, weakness or paresthesias  History provided by:  Patient   used: No        Prior to Admission Medications   Prescriptions Last Dose Informant Patient Reported? Taking? Galcanezumab-gnlm (EMGALITY) 120 MG/ML SOAJ   No No   Sig: One ml subcutaneously q30 days  Misc   Devices (CANE) MISC   No No   Sig: Use daily for balance   ammonium lactate (LAC-HYDRIN) 12 % cream   Yes No   calcium carbonate-vitamin D (OSCAL-D) 500 mg-200 units per tablet   No No   Sig: Take 1 tablet by mouth daily   cetirizine (ZyrTEC) 10 mg tablet  Self Yes No   Sig: Take by mouth   cyanocobalamin 1000 MCG tablet  Self No No   Sig: Take 0 5 tablets (500 mcg total) by mouth daily   Patient not taking: Reported on 2019   divalproex sodium (DEPAKOTE ER) 500 mg 24 hr tablet   No No   Sig: Take 1 tablet (500 mg total) by mouth daily at bedtime In 2 weeks increase to twice a day   ergocalciferol (VITAMIN D2) 50,000 units   No No   Sig: Take 1 capsule (50,000 Units total) by mouth 3 (three) times a week for 12 doses   ferrous sulfate 324 (65 Fe) mg   No No   Sig: Take 1 tablet (324 mg total) by mouth 2 (two) times a day before meals   Patient not taking: Reported on 4/10/2019   gabapentin (NEURONTIN) 100 mg capsule   No No   Si cap qhs x 5 days, then 2 caps qhs x 5 days, then 3 caps qhs  Take 1 additional capsule q8h prn migraine     hydrOXYzine HCL (ATARAX) 10 mg tablet   Yes No   hydrocortisone 1 % cream  Self Yes No   lidocaine (LMX) 4 % cream   No No   Sig: Apply topically as needed for moderate pain   magnesium oxide (MAG-OX) 400 mg  Self No No   Sig: Take 1 tablet (400 mg total) by mouth daily   Patient not taking: Reported on 2019   magnesium oxide (MAG-OX) 400 mg   No No   Sig: Take 1 tablet (400 mg total) by mouth 2 (two) times a day   methocarbamol (ROBAXIN) 750 mg tablet   No No   Sig: Take 1 tablet (750 mg total) by mouth 3 (three) times a day for 30 doses   naproxen (NAPROSYN) 250 mg tablet   No No   Sig: Take 1 tablet (250 mg total) by mouth 3 (three) times a day with meals for 7 days   omeprazole (PriLOSEC) 40 MG capsule   No No   Sig: Take 1 capsule (40 mg total) by mouth 2 (two) times a day for 14 days   omeprazole (PriLOSEC) 40 MG capsule   No No   Sig: Take 1 capsule (40 mg total) by mouth daily   ondansetron (ZOFRAN-ODT) 4 mg disintegrating tablet   Yes No   Sig: Take 4 mg by mouth every 8 (eight) hours as needed   propranolol (INDERAL) 10 mg tablet   No No   Si tab qhs x 5 days, then BID  Please call for dose increase if needed  Facility-Administered Medications: None       Past Medical History:   Diagnosis Date    B12 deficiency     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 ([address] )     Chronic migraine without aura     Dermatographism     Gait disturbance     Iron deficiency anemia     Lyme neuropathy     Seizures (HCC)     Visual disturbance     Diplopia, nystagmus, decreased visual acuity    Vitamin D deficiency        Past Surgical History:   Procedure Laterality Date     SECTION      ESOPHAGOGASTRODUODENOSCOPY N/A 3/8/2019    Procedure: ESOPHAGOGASTRODUODENOSCOPY (EGD); Surgeon: Ofelia Soto MD;  Location: [facility] GI LAB; Service: Gastroenterology    OOPHORECTOMY      unilateral --  last assessed 17    TUBAL LIGATION      last assessed 17       Family History   Problem Relation Age of Onset    Other Mother         AIDS, Brain Tumor balance disorder    Seizures Mother     Alcohol abuse Father     Other Sister         Balance disorder    HIV Maternal Grandmother      I have reviewed and agree with the history as documented  Social History     Tobacco Use    Smoking status: Never Smoker    Smokeless tobacco: Never Used   Substance Use Topics    Alcohol use: No    Drug use: No        Review of Systems   Constitutional: Negative for chills and fever  Eyes: Negative for photophobia and visual disturbance  Respiratory: Negative for shortness of breath  Cardiovascular: Negative for chest pain  Gastrointestinal: Negative for abdominal pain, diarrhea, nausea and vomiting  Genitourinary: Negative for dysuria, frequency, hematuria and urgency  Musculoskeletal: Positive for neck pain  Negative for back pain and neck stiffness  Skin: Negative for rash  Allergic/Immunologic: Negative for food allergies  Neurological: Positive for headaches  Negative for weakness and numbness  Physical Exam  Physical Exam   Constitutional:  Non-toxic appearance  No distress  HENT:   Head: Normocephalic and atraumatic  Right Ear: Tympanic membrane, external ear and ear canal normal  No drainage, swelling or tenderness  No foreign bodies  Tympanic membrane is not erythematous  No hemotympanum  Left Ear: Tympanic membrane, external ear and ear canal normal  No drainage, swelling or tenderness  No foreign bodies  Tympanic membrane is not erythematous  No hemotympanum  Nose: Nose normal    Mouth/Throat: Uvula is midline, oropharynx is clear and moist and mucous membranes are normal  No uvula swelling  No posterior oropharyngeal edema, posterior oropharyngeal erythema or tonsillar abscesses  No tonsillar exudate  Eyes: Pupils are equal, round, and reactive to light  Conjunctivae and EOM are normal    Neck: Normal range of motion and phonation normal  Neck supple  Muscular tenderness (to the right laterally) present  No spinous process tenderness present  No neck rigidity  No tracheal deviation, no edema, no erythema and normal range of motion present  Cardiovascular: Normal rate, regular rhythm, S1 normal, S2 normal and normal heart sounds  Exam reveals no gallop and no friction rub  No murmur heard  Pulmonary/Chest: Effort normal and breath sounds normal  No respiratory distress  She has no decreased breath sounds  She has no wheezes  She has no rhonchi  She has no rales  She exhibits no tenderness  Abdominal: Soft  Bowel sounds are normal  She exhibits no distension  There is no tenderness  There is no rebound and no guarding  Musculoskeletal: Normal range of motion  She exhibits no edema or deformity  Right shoulder: She exhibits tenderness, bony tenderness and pain  She exhibits normal range of motion, no swelling, no effusion, no crepitus, no deformity, normal pulse and normal strength          Cervical back: She exhibits tenderness and pain  She exhibits normal range of motion, no bony tenderness, no swelling, no edema and no deformity  Thoracic back: Normal         Lumbar back: Normal    Neurological: She is alert  She has normal strength  No cranial nerve deficit or sensory deficit  She exhibits normal muscle tone  Gait normal  GCS eye subscore is 4  GCS verbal subscore is 5  GCS motor subscore is 6  Skin: Skin is warm and dry  No rash noted  Psychiatric: She has a normal mood and affect  Nursing note and vitals reviewed  Vital Signs  ED Triage Vitals [10/11/19 0843]   Temperature Pulse Respirations Blood Pressure SpO2   98 4 °F (36 9 °C) (!) 119 16 113/64 98 %      Temp Source Heart Rate Source Patient Position - Orthostatic VS BP Location FiO2 (%)   Temporal Monitor Sitting Right arm --      Pain Score       Worst Possible Pain           Vitals:    10/11/19 0843 10/11/19 0907 10/11/19 1112   BP: 113/64  100/54   Pulse: (!) 119  83   Patient Position - Orthostatic VS: Sitting Sitting Lying         Visual Acuity  Visual Acuity      Most Recent Value   L Pupil Size (mm)  3   R Pupil Size (mm)  3          ED Medications  Medications   sodium chloride 0 9 % bolus 1,000 mL (1,000 mL Intravenous New Bag 10/11/19 0930)   ketorolac (TORADOL) injection 15 mg (15 mg Intravenous Given 10/11/19 0949)       Diagnostic Studies  Results Reviewed     Procedure Component Value Units Date/Time    Urine Microscopic [[id number]]  (Abnormal) Collected:  10/11/19 1031    Lab Status:  Final result Specimen:  Urine, Clean Catch Updated:  10/11/19 1151     RBC, UA None Seen /hpf      WBC, UA 2-4 /hpf      Epithelial Cells Occasional /hpf      Bacteria, UA Occasional /hpf     Valproic acid level, total [[id number]] Collected:  10/11/19 0930    Lab Status:   In process Specimen:  Blood from Arm, Left Updated:  10/11/19 1128    POCT pregnancy, urine [[id number]]  (Normal) Resulted:  10/11/19 1040    Lab Status:  Final result Updated:  10/11/19 1040     EXT PREG TEST UR (Ref: Negative) negative     Control valid    POCT urinalysis dipstick [[id number]]  (Abnormal) Resulted:  10/11/19 1039    Lab Status:  Final result Specimen:  Urine Updated:  10/11/19 1040     Color, UA yellow    ED Urine Macroscopic [[id number]]  (Abnormal) Collected:  10/11/19 1031    Lab Status:  Final result Specimen:  Urine Updated:  10/11/19 1032     Color, UA Yellow     Clarity, UA Clear     pH, UA 5 5     Leukocytes, UA Negative     Nitrite, UA Negative     Protein, UA Negative mg/dl      Glucose, UA Negative mg/dl      Ketones, UA Negative mg/dl      Urobilinogen, UA 0 2 E U /dl      Bilirubin, UA Negative     Blood, UA Trace     Specific [place], UA 1 020    Narrative:       CLINITEK RESULT    Comprehensive metabolic panel [[id number]] Collected:  10/11/19 0930    Lab Status:  Final result Specimen:  Blood from Arm, Left Updated:  10/11/19 0956     Sodium 139 mmol/L      Potassium 4 4 mmol/L      Chloride 105 mmol/L      CO2 25 mmol/L      ANION GAP 9 mmol/L      BUN 8 mg/dL      Creatinine 0 70 mg/dL      Glucose 113 mg/dL      Calcium 9 1 mg/dL      AST 16 U/L      ALT 18 U/L      Alkaline Phosphatase 69 U/L      Total Protein 7 6 g/dL      Albumin 3 7 g/dL      Total Bilirubin 0 35 mg/dL      eGFR 111 ml/min/1 73sq m     Narrative:       [facility] guidelines for Chronic Kidney Disease (CKD):     Stage 1 with normal or high GFR (GFR > 90 mL/min/1 73 square meters)    Stage 2 Mild CKD (GFR = 60-89 mL/min/1 73 square meters)    Stage 3A Moderate CKD (GFR = 45-59 mL/min/1 73 square meters)    Stage 3B Moderate CKD (GFR = 30-44 mL/min/1 73 square meters)    Stage 4 Severe CKD (GFR = 15-29 mL/min/1 73 square meters)    Stage 5 End Stage CKD (GFR <15 mL/min/1 73 square meters)  Note: GFR calculation is accurate only with a steady state creatinine    CBC and differential [[id number]] Collected:  10/11/19 0930    Lab Status:  Final result Specimen:  Blood from Arm, Left Updated:  10/11/19 0937     WBC 6 18 Thousand/uL      RBC 4 83 Million/uL      Hemoglobin 13 2 g/dL      Hematocrit 41 9 %      MCV 87 fL      MCH 27 3 pg      MCHC 31 5 g/dL      RDW 13 0 %      MPV 12 0 fL      Platelets 426 Thousands/uL      nRBC 0 /100 WBCs      Neutrophils Relative 75 %      Immat GRANS % 0 %      Lymphocytes Relative 16 %      Monocytes Relative 7 %      Eosinophils Relative 1 %      Basophils Relative 1 %      Neutrophils Absolute 4 61 Thousands/µL      Immature Grans Absolute 0 02 Thousand/uL      Lymphocytes Absolute 1 01 Thousands/µL      Monocytes Absolute 0 46 Thousand/µL      Eosinophils Absolute 0 05 Thousand/µL      Basophils Absolute 0 03 Thousands/µL                  CT head without contrast   Final Result by Claudio Hawley MD (10/11 1053)      No acute intracranial abnormality  Workstation performed: [id number]         CT cervical spine without contrast   Final Result by Claudio Hawley MD (10/11 1054)      No cervical spine fracture or traumatic malalignment  Workstation performed: [id number]         XR shoulder 2+ views RIGHT   ED Interpretation by Cuco Montemayor PA-C (10/11 1057)   No acute abnormalities seen by me at this time  Procedures  Procedures       ED Course                               MDM  Number of Diagnoses or Management Options  Abnormal laboratory test: new and requires workup  Acute head injury, initial encounter: new and requires workup  Contusion of right shoulder, initial encounter: new and requires workup  Seizure [facility]): new and requires workup  Strain of neck muscle, initial encounter: new and requires workup  Diagnosis management comments: DDX consists of but not limited to: seizure, fracture, contusion, intracranial abnormality    Will CT head and cervical spine  Will xray right shoulder  Will check CBC, BMP and urine  Will check Depakote level  Informed patient of lab and imaging findings   Patient has been asymptomatic in the ER  Will discharge  At discharge, I instructed the patient to:  -follow up with pcp  -take Naproxen as prescribed for pain and inflammation  -take Robaxin as prescribed for muscle strain  -rest   -follow up with Neurology  -take Depakote  -return to the ER if symptoms worsened or new symptoms arose  Patient agreed to this plan and was stable at time of discharge  Amount and/or Complexity of Data Reviewed  Clinical lab tests: ordered and reviewed  Tests in the radiology section of CPT®: ordered and reviewed  Review and summarize past medical records: yes  Independent visualization of images, tracings, or specimens: yes    Patient Progress  Patient progress: stable      Disposition  Final diagnoses:   Seizure ([facility] [address] )   Acute head injury, initial encounter   Strain of neck muscle, initial encounter   Contusion of right shoulder, initial encounter   Abnormal laboratory test - hematuria     Time reflects when diagnosis was documented in both MDM as applicable and the Disposition within this note     Time User Action Codes Description Comment    10/11/2019 11:10 AM Norva Dapper A Add [R56 9] Seizure ([facility] [address] )     10/11/2019 11:10 AM Norva Dapper A Add [S09 90XA] Acute head injury, initial encounter     10/11/2019 11:10 AM Norva Dapper A Add Minna Deep  1XXA] Strain of neck muscle, initial encounter     10/11/2019 11:10 AM Norva Dapper A Add [S40 011A] Contusion of right shoulder, initial encounter     10/11/2019 11:11 AM Norva Dapper A Add [R89 9] Abnormal laboratory test     10/11/2019 11:11 AM Norva Dapper A Modify [R89 9] Abnormal laboratory test hematuria      ED Disposition     ED Disposition Condition Date/Time Comment    Discharge Stable Fri Oct 11, 2019 11:10 AM Ruby Mckeon discharge to home/self care              Follow-up Information     Follow up With Specialties Details Why Contact Info Additional [address], [address] Nurse Marielle Practitioner Schedule an appointment as soon as possible for a visit   [phone]  [address] [facility]  [address] [facility] [address]  [phone]       [facility] Schedule an appointment as soon as possible for a visit   [address] [facility] [id number]  [address] [facility] Neurology [facility], [address] [facility], [address] [place] [address], [address]          Patient's Medications   Discharge Prescriptions    METHOCARBAMOL (ROBAXIN) 750 MG TABLET    Take 1 tablet (750 mg total) by mouth every 6 (six) hours as needed for muscle spasms       Start Date: 10/11/2019End Date: --       Order Dose: 750 mg       Quantity: 20 tablet    Refills: 0    NAPROXEN (NAPROSYN) 500 MG TABLET    Take 1 tablet (500 mg total) by mouth 2 (two) times a day with meals       Start Date: 10/11/2019End Date: --       Order Dose: 500 mg       Quantity: 30 tablet    Refills: 0     No discharge procedures on file      ED Provider  Electronically Signed by           Jacquie Lerner PA-C  10/11/19 1031

## 2019-10-11 NOTE — RESULT ENCOUNTER NOTE
Patient states she has not been taking her medication and was instructed to follow up with Neurology

## 2019-10-11 NOTE — ED NOTES
Pt reports on right sided neck pain, no midline c-spine tenderness        Maria De Jesus Benitez RN  10/11/19 9215

## 2019-10-11 NOTE — DISCHARGE INSTRUCTIONS
DISCHARGE INSTRUCTIONS:    FOLLOW UP WITH YOUR PRIMARY CARE PROVIDER OR THE [address] [place] [address]  MAKE AN APPOINTMENT TO BE SEEN  TAKE NAPROXEN AS PRESCRIBED FOR PAIN AND INFLAMMATION  IF RASH, SHORTNESS OF BREATH OR TROUBLE SWALLOWING, STOP TAKING THE MEDICATION AND BE SEEN  TAKE ROBAXIN AS PRESCRIBED FOR MUSCLE STRAIN  IF RASH, SHORTNESS OF BREATH OR TROUBLE SWALLOWING, STOP TAKING THE MEDICATION AND BE SEEN  REST AND APPLY HEAT TO THE AREA  TAKE YOUR DEPAKOTE  FOLLOW UP WITH NEUROLOGY  IF SYMPTOMS WORSEN OR NEW SYMPTOMS ARISE, RETURN TO THE ER TO BE SEEN

## 2019-10-14 PROBLEM — R26.9 GAIT DISTURBANCE: Status: RESOLVED | Noted: 2017-01-27 | Resolved: 2019-10-14

## 2019-10-14 PROBLEM — R26.89 BALANCE PROBLEMS: Status: RESOLVED | Noted: 2018-03-02 | Resolved: 2019-10-14

## 2019-10-15 ENCOUNTER — TELEPHONE (OUTPATIENT)
Dept: NEUROLOGY | Facility: CLINIC | Age: 39
End: 2019-10-15

## 2019-10-15 NOTE — TELEPHONE ENCOUNTER
Pt's  Gemini Son called and states that pt had 1 episode of seizure Friday around [phone]  Prior to the seizure, pt was sleeping then suddenly she fell off the bed, "her whole body started to shake", lasted about 20 minutes  Pt was taken to [facility] ed  Pls see ed notes  Multiple Seizures? no     Usual type of seizure? Yes    Sleep deprivation? No    Missed Medications? Not currently taking seizure meds  Recently/Currently ill (URI, UTI, infections etc ) No    Any new medicatons (including otc)-no    ETOH or Drug use? No    New Stressors? No    Current Medications confirmed as:  None per   States that pt was taking depakote but she stopped taking it bec the pill was too big  States that pt was advised to f/u w/ her neurologist   Pt saw [facility] on 9/11/19 for migraine  Last saw Dr Boni Eddy 5/15/18 for seizure and migraine  Sri, can you accommodate this pt to Dr Ari Quinonez schedule? If no open slot for Dr Boni Eddy, pt is agreeable to schedule f/u appt w/ [facility]    Jo, pls see above  If no open slot for Dr Boni Eddy, can you accommodate this pt to 's schedule?      thanks          [phone] ok to leave detailed message

## 2019-10-15 NOTE — TELEPHONE ENCOUNTER
Patient scheduled with Lalo Erwin tomorrow morning 10/16/19 in [facility]

## 2019-10-15 NOTE — TELEPHONE ENCOUNTER
Marybel Cummings please advise if you would like patient to be added to your schedule on the week of 11/11/2019 either at 8am or noon

## 2019-10-16 ENCOUNTER — OFFICE VISIT (OUTPATIENT)
Dept: NEUROLOGY | Facility: CLINIC | Age: 39
End: 2019-10-16
Payer: COMMERCIAL

## 2019-10-16 VITALS
BODY MASS INDEX: 30.54 KG/M2 | WEIGHT: 195 LBS | SYSTOLIC BLOOD PRESSURE: 136 MMHG | HEART RATE: 87 BPM | DIASTOLIC BLOOD PRESSURE: 68 MMHG

## 2019-10-16 DIAGNOSIS — G43.709 CHRONIC MIGRAINE WITHOUT AURA WITHOUT STATUS MIGRAINOSUS, NOT INTRACTABLE: ICD-10-CM

## 2019-10-16 DIAGNOSIS — R56.9 SEIZURE (HCC): ICD-10-CM

## 2019-10-16 DIAGNOSIS — R40.4 TRANSIENT ALTERATION OF AWARENESS: ICD-10-CM

## 2019-10-16 DIAGNOSIS — R26.81 GAIT INSTABILITY: Primary | ICD-10-CM

## 2019-10-16 DIAGNOSIS — M54.50 BACK PAIN, LUMBOSACRAL: ICD-10-CM

## 2019-10-16 DIAGNOSIS — G31.9 CEREBELLAR ATROPHY (HCC): ICD-10-CM

## 2019-10-16 PROCEDURE — 99214 OFFICE O/P EST MOD 30 MIN: CPT | Performed by: PHYSICIAN ASSISTANT

## 2019-10-16 RX ORDER — DIVALPROEX SODIUM 250 MG/1
500 TABLET, EXTENDED RELEASE ORAL
Qty: 60 TABLET | Refills: 5 | Status: SHIPPED | OUTPATIENT
Start: 2019-10-16 | End: 2020-01-29 | Stop reason: ALTCHOICE

## 2019-10-16 NOTE — ASSESSMENT & PLAN NOTE
She was not taking Depakote because the tablets were too large  She cannot tell me how long she was not taking it  This seems to be the reason why she had the seizure  She also has low back pain but declines treatment for it except physical therapy  She denies fevers, infection, changes in sleep or eating habits or any worsening stress  Seizure precautions were reviewed in detail  Her  states she only eats 1 meal a day but this is our norm  I did remind her of that missing meals can lead to low glucose which can lead to a seizure  She was agreeable to restart Depakote if the tab was smaller  I sent the 250 mg tabs and asked the pharmacist to provide smallest tab of possible  She will call me if she still cannot tolerate the size  I ordered Depakote trough level to be done in 2 weeks

## 2019-10-16 NOTE — ASSESSMENT & PLAN NOTE
Continue Emgality  She has greater than 50% reduction in migraines since she started this  I am not sure why it was not mailed to her recently  She is over a month late for her next injection  I gave her the number for perform specialty pharmacy and I asked her to call

## 2019-10-16 NOTE — PROGRESS NOTES
Patient ID: Jami Guzman is a 42 y o  female  Assessment/Plan:    Seizure ([facility] )  She was not taking Depakote because the tablets were too large  She cannot tell me how long she was not taking it  This seems to be the reason why she had the seizure  She also has low back pain but declines treatment for it except physical therapy  She denies fevers, infection, changes in sleep or eating habits or any worsening stress  Seizure precautions were reviewed in detail  Her  states she only eats 1 meal a day but this is our norm  I did remind her of that missing meals can lead to low glucose which can lead to a seizure  She was agreeable to restart Depakote if the tab was smaller  I sent the 250 mg tabs and asked the pharmacist to provide smallest tab of possible  She will call me if she still cannot tolerate the size  I ordered Depakote trough level to be done in 2 weeks  Chronic migraine without aura without status migrainosus, not intractable   Continue Emgality  She has greater than 50% reduction in migraines since she started this  I am not sure why it was not mailed to her recently  She is over a month late for her next injection  I [facility] for [facility] and I asked her to call  Diagnoses and all orders for this visit:    Gait instability  -     Ambulatory referral to Physical Therapy; Future    Chronic migraine without aura without status migrainosus, not intractable  -     Galcanezumab-gnlm (EMGALITY) 120 MG/ML SOAJ; One ml subcutaneously q30 days  Seizure ([facility] )  -     divalproex sodium (DEPAKOTE ER) 250 mg 24 hr tablet; Take 2 tablets (500 mg total) by mouth daily at bedtime  -     Valproic acid level, total; Future  -     Valproic acid level, free; Future    Transient alteration of awareness  -     divalproex sodium (DEPAKOTE ER) 250 mg 24 hr tablet;  Take 2 tablets (500 mg total) by mouth daily at bedtime    Back pain, lumbosacral  - Ambulatory referral to Physical Therapy; Future    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 ([facility] )       The patient should not hesitate to call me prior to her follow up with any questions or concerns  The patient was instructed to urgently call 911 or present to the nearest emergency room with any new or worsening neurological deficits  Subjective:    HPI    Ms Reny Espinoza is a pleasant 82-year-old here for neurological follow-up for gait instability and chronic migraine headaches  [place]  is here today and provides most of the translation   The patient is primarily Panamanian-speaking and speaks some English      She also has a history of seizures   She saw Dr Destinee Tuttle 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  Seth Belcher was hospitalized in April 2018 for this   Since last seen she apparently had a breakthrough seizure  Her  was in the next room and he heard a lot of noise  He went in the other room and she was actively having a seizure  She had loss of consciousness with arm convulsions  He presented to the ED 10/11/2019 which is the day she had the seizure  The patient apparently fell on the floor on the right shoulder and had some pain  It was noted that she was not compliant with the Depakote because the pills are too large  See ED note for more information   states she bit her tongue possibly but there was no incontinence  She is having more back pain recently  She presented to the ED 9/30/2019 for low back pain and given naproxen  She was very fatigued after the seizure and just now is starting to feel a little better  On questioning the seizure was anywhere from 7-30 minutes per 's history      She has a strong family history of gait ataxia, in her mother and multiple siblings- 2 sisters (?Spinocerebellar ataxia)      She has a family history of seizures in her mother  Her mother passed at 45 yo from HIV and brain tumor      She has cerebellar atrophy on brain MRI associated with the gait instability which has worsened gradually over time  Nicole Olmos has had many falls   No recent severe injury  Recently saw [facility] for balance/ ataxia  I am in the process of obtaining records  She states her balance problem started with the birth of her last child who is now 15years old   One of her sister's symptoms of balance problem started at the age of 32 now age 28 and other sister symptoms started at the age 22 and she is now 44 years of age  She is of balance when she gets up from sitting position or when walking  Has problem with writing and at time talking  No problem with swallowing  She states her memory is also poor and so is her vision  She was seen by an ophthalmologist two years ago but we do not have records of this      The patient denies a personal history of cancer  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      Migraine headaches:  She continues to have migraine headaches very frequently    Preventative: Topamax, Depakote, verapamil, magnesium, Emgality- works well but has not had a refill for some reason  Abortive: advil, dexamethasone, tylenol (3 times a day every day), naproxen  Any Medical/Alternative Treatments used in the past for headaches: none   Headache trigger: Fatigue, Stress/Tension, lack of food  Current pain: 1/10  Headaches began in 2014  How often do the headaches occur: 4-7/week, >15 migraine days a month  What time of the day do the headaches start: start before going to bed  How long do the headaches last - 8 hours  Where are they located - frontal and temporal  What is the intensity of pain: always 10/10   Describe your usual headache - Throbbing, Pounding  Associated symptoms: Decrease of appetite, nausea, Photophobia, light-headed or dizzy, prefer to be alone and in a dark room, unable to work    The following portions of the patient's history were reviewed and updated as appropriate: She  has a past medical history of B12 deficiency, 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 ([address] ), Chronic migraine without aura, Dermatographism, Gait disturbance, Iron deficiency anemia, Lyme neuropathy, Seizures ([address] ), Visual disturbance, and Vitamin D deficiency  She   Patient Active Problem List    Diagnosis Date Noted    Gait instability 2019    Gastroesophageal reflux disease without esophagitis 2019    Se[facility] )     Altered mental status 2018    Microcytic anemia 2018    Chronic migraine without aura without status migrainosus, not intractable 2018    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 ([facility] ) 2018    Dermatographism 2017    Allergic reaction 2017    Urticaria, chronic 2017    Diplopia 2017    Nystagmus 2017    Excessive vaginal bleeding 2017    B12 deficiency 2017    Iron deficiency anemia 2017    Lyme neuropathy 2017    Vitamin D deficiency 2017    Acid indigestion 2017     She  has a past surgical history that includes  section; Oophorectomy; Tubal ligation; and Esophagogastroduodenoscopy (N/A, 3/8/2019)  Her family history includes Alcohol abuse in her father; HIV in her maternal grandmother; Other in her mother and sister; Seizures in her mother  She  reports that she has never smoked  She has never used smokeless tobacco  She reports that she does not drink alcohol or use drugs  Current Outpatient Medications   Medication Sig Dispense Refill    ammonium lactate (LAC-HYDRIN) 12 % cream       calcium carbonate-vitamin D (OSCAL-D) 500 mg-200 units per tablet Take 1 tablet by mouth daily 90 tablet 3    cetirizine (ZyrTEC) 10 mg tablet Take by mouth      Galcanezumab-gnlm (EMGALITY) 120 MG/ML SOAJ One ml subcutaneously q30 days   1 pen 5    hydrocortisone 1 % cream       hydrOXYzine HCL (ATARAX) 10 mg tablet       lidocaine (LMX) 4 % cream Apply topically as needed for moderate pain 30 g 0    magnesium oxide (MAG-OX) 400 mg Take 1 tablet (400 mg total) by mouth 2 (two) times a day 60 tablet 3    methocarbamol (ROBAXIN) 750 mg tablet Take 1 tablet (750 mg total) by mouth every 6 (six) hours as needed for muscle spasms 20 tablet 0    Misc  Devices (CANE) MISC Use daily for balance 1 each 0    naproxen (NAPROSYN) 500 mg tablet Take 1 tablet (500 mg total) by mouth 2 (two) times a day with meals 30 tablet 0    omeprazole (PriLOSEC) 40 MG capsule Take 1 capsule (40 mg total) by mouth daily 90 capsule 1    ondansetron (ZOFRAN-ODT) 4 mg disintegrating tablet Take 4 mg by mouth every 8 (eight) hours as needed      propranolol (INDERAL) 10 mg tablet 1 tab qhs x 5 days, then BID  Please call for dose increase if needed  60 tablet 2    cyanocobalamin 1000 MCG tablet Take 0 5 tablets (500 mcg total) by mouth daily (Patient not taking: Reported on 4/18/2019) 30 tablet 3    divalproex sodium (DEPAKOTE ER) 250 mg 24 hr tablet Take 2 tablets (500 mg total) by mouth daily at bedtime 60 tablet 5    ergocalciferol (VITAMIN D2) 50,000 units Take 1 capsule (50,000 Units total) by mouth 3 (three) times a week for 12 doses 12 capsule 2    ferrous sulfate 324 (65 Fe) mg Take 1 tablet (324 mg total) by mouth 2 (two) times a day before meals (Patient not taking: Reported on 4/10/2019) 60 tablet 1    gabapentin (NEURONTIN) 100 mg capsule 1 cap qhs x 5 days, then 2 caps qhs x 5 days, then 3 caps qhs  Take 1 additional capsule q8h prn migraine   90 capsule 1    magnesium oxide (MAG-OX) 400 mg Take 1 tablet (400 mg total) by mouth daily (Patient not taking: Reported on 4/18/2019) 30 tablet 3    methocarbamol (ROBAXIN) 750 mg tablet Take 1 tablet (750 mg total) by mouth 3 (three) times a day for 30 doses (Patient not taking: Reported on 10/16/2019) 30 tablet 0    naproxen (NAPROSYN) 250 mg tablet Take 1 tablet (250 mg total) by mouth 3 (three) times a day with meals for 7 days 21 tablet 0    omeprazole (PriLOSEC) 40 MG capsule Take 1 capsule (40 mg total) by mouth 2 (two) times a day for 14 days 28 capsule 0     No current facility-administered medications for this visit  She is allergic to dye fdc red  [red dye] and morphine            Objective:    Blood pressure 136/68, pulse 87, weight 88 5 kg (195 lb), last menstrual period 09/22/2019  Physical Exam    Neurological Exam   Vital signs reviewed  Well developed, well nourished  No dysarthria is heard  Head: Normocephalic, atraumatic  Neck: Neck flexors 5/5  CN 2-12: intact and symmetric, including EOMs which are normal b/l and PERRL  Fundi b/l are normal to crude ophthalmological examination  MSK: 5/5 t/o  ROM normal x all 4 extr   Dysmetria in both upper extremities  Sensation: Inact to LT and temp x4 extr  Romberg positive  Reflexes: 2+ and symmetric in all 4 extr  , except trace in the ankles b/l  Coordination: dystaxic with HTS b/l   Gait: Wide-based and ataxic gait  Holds onto her  to walk  Difficulty arising from the exam table due to low back pain/ stiffness  ROS:    Review of Systems   Constitutional: Negative  Negative for appetite change and fever  HENT: Negative  Negative for hearing loss, tinnitus, trouble swallowing and voice change  Eyes: Negative  Negative for photophobia and pain  Respiratory: Negative  Negative for shortness of breath  Cardiovascular: Negative  Negative for palpitations  Gastrointestinal: Negative  Negative for nausea and vomiting  Endocrine: Negative  Negative for cold intolerance and heat intolerance  Genitourinary: Negative  Negative for dysuria, frequency and urgency  Musculoskeletal: Negative  Negative for myalgias and neck pain  Skin: Negative  Negative for rash  Neurological: Positive for seizures  Negative for dizziness, tremors, syncope, facial asymmetry, speech difficulty, weakness, light-headedness, numbness and headaches  Hematological: Negative  Does not bruise/bleed easily     Psychiatric/Behavioral: Negative  Negative for confusion, hallucinations and sleep disturbance  The following portions of the patient's history were reviewed and updated as appropriate: allergies, current medications/ medication history, past family history, past medical history, past social history, past surgical history and problem list     Review of systems was reviewed and otherwise unremarkable from a neurological perspective

## 2019-10-21 ENCOUNTER — TELEPHONE (OUTPATIENT)
Dept: NEUROLOGY | Facility: CLINIC | Age: 39
End: 2019-10-21

## 2019-10-21 DIAGNOSIS — G31.9 CEREBELLAR ATROPHY (HCC): Primary | ICD-10-CM

## 2019-10-21 DIAGNOSIS — R29.6 FALLS FREQUENTLY: ICD-10-CM

## 2019-10-21 DIAGNOSIS — R27.0 ATAXIA: ICD-10-CM

## 2019-10-21 DIAGNOSIS — R26.89 BALANCE DISORDER: ICD-10-CM

## 2019-10-21 NOTE — TELEPHONE ENCOUNTER
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     Thank you,  Courtney Hylton

## 2019-10-24 NOTE — TELEPHONE ENCOUNTER
Not driving does not qualify a patient for home health, however, if patient is homebound (ie  if It is a taxing effort for patient to leave her home unassisted), she should be able to qualify for home health  This means that she can go out for medical appts, but not for recreational purposes  If patient meets this criteria, please enter "Ambulatory Referral for Home Health" FYI though  Simba Hernandez Patient's insurance typically covers only very limited in-home sessions

## 2019-10-25 ENCOUNTER — TELEPHONE (OUTPATIENT)
Dept: NEUROLOGY | Facility: CLINIC | Age: 39
End: 2019-10-25

## 2019-10-25 NOTE — TELEPHONE ENCOUNTER
Received call from [facility]  Patient requires PA for [facility]  PA intiated through [facility]      Key [id number]    Awaiting determination

## 2019-10-31 NOTE — PROGRESS NOTES
Assessment  1  Acid indigestion (536 8) (K30)   2  Urticaria, chronic (708 8) (L50 8)   · stable    Plan  Acid indigestion    · Omeprazole 20 MG Oral Capsule Delayed Release; TOME 1 CAPSULE BYMOUTH twice DAILY  Acid indigestion, B12 deficiency, 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, Diplopia, Iron deficiency anemia,Urticaria, chronic    · (1) CBC/PLT/DIFF; Status:Active; Requested for:[id number];    · (1) COMPREHENSIVE METABOLIC PANEL; Status:Active; Requested for:54Vcv6280;    · (1) HEMOGLOBIN A1C; Status:Active; Requested for:37Xut4031;    · (1) LIPID PANEL, FASTING; Status:Active; Requested for:80Rte8062;    · (1) TSH WITH FT4 REFLEX; Status:Active; Requested for:93Izt6842; Health Maintenance    · *VB-Depression Screening; Status:Complete;   Done: [id number] 10:29AM    Discussion/Summary    Switch to PPI, if sxs persist will need EGD  FU 3 mo + pap and labs  The patient, patient's family was counseled regarding instructions for management,-- risk factor reductions,-- prognosis,-- impressions,-- risks and benefits of treatment options  total time of encounter was 30 minutes  Chief Complaint  pt here for follow up visitpt continues to complaint of acid reflux, very bad at night, pt says medications prescribed not workingpt not taking any medicationsdepression scale negativept needs pap exam      History of Present Illness  pt presents for follow upshe complains of heartburn persistent despite use of ranitidine  Mostly nighttime  No N/V or associated sxs  her urticaria/rash is stable  currently on no meds      Review of Systems   Constitutional: No fever, no chills, feels well, no tiredness, no recent weight gain or weight loss  Cardiovascular: No complaints of slow heart rate, no fast heart rate, no chest pain, no palpitations, no leg claudication, no lower extremity edema  Respiratory: No complaints of shortness of breath, no wheezing, no cough, no SOB on exertion, no orthopnea, no PND    Gastrointestinal: No complaints of OV note faxed to Dr Radha Bhatt. abdominal pain, no constipation, no nausea or vomiting, no diarrhea, no bloody stools  Genitourinary: No complaints of dysuria, no incontinence, no pelvic pain, no dysmenorrhea, no vaginal discharge or bleeding  Active Problems  1  Acid indigestion (536 8) (K30)   2  Allergic reaction, initial encounter (995 3) (T78 40XA)   3  B12 deficiency (266 2) (E53 8)   4  Balance problems (781 99) (R26 89)   5  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 (331 9) (G31 9)   6  Chronic migraine without aura (346 70) (G43 709)   7  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)   8  Diplopia (368 2) (H53 2)   9  Encounter for cervical Pap smear with pelvic exam (V76 2,V72 31) (Z01 419)   10  Excessive vaginal bleeding (626 2) (N92 0)   11  Gait disturbance (781 2) (R26 9)   12  Influenza vaccine needed (V04 81) (Z23)   13  Iron deficiency anemia (280 9) (D50 9)   14  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,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)   15  Nystagmus (379 50) (H55 00)   16  Urticaria, chronic (708 8) (L50 8)   17  Vitamin D deficiency (268 9) (E55 9)    Past Medical History  1  History of nausea (V12 79) (Z87 898)   2  History of visual disturbance (V12 49) (Z86 69)    The active problems and past medical history were reviewed and updated today  Surgical History  1  History of  Section   2  History of Oophorectomy - Unilateral (Removal Of One Ovary)   3  History of Tubal Ligation    Family History  Mother    1  Family history of AIDS   2  Family history of Brain tumor    Social History   · Always uses seat belt   · Does not drink alcohol (V49 89) (Z78 9)   · Does not use illicit drugs (X79 48) (Z78 9)   · Housewife or homemaker   · Lives with children   · Never a smoker   · No caffeine use   · [employer]   · Single   · Three children  The social history was reviewed and updated today  Current Meds   1  Cetirizine HCl - 10 MG Oral Tablet; TAKE 1 TABLET DAILY AT NIGHT; Therapy: [id number] to (Evaluate:69Dhy8664)  Requested for: 2017; Last Rx:2017 Ordered   2  following problems? Little interest or pleasure in doing things: Not at all - 0 Feeling down, depressed, or hopeless: Not at all - 0   PHQ-2 Adult Depression Screening Negative         Future Appointments    Date/Time Provider Specialty Site   03/14/2018 11:30 AM SONDRA Maya [facility]   03/02/2018 12:45 PM Gabi Lewis, [facility] Neurology [address]  [address]     Signatures   Electronically signed by : SONDRA Hong ; Dec 14 2017 12:59PM EST                       (Author)

## 2019-11-06 NOTE — TELEPHONE ENCOUNTER
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

## 2019-11-07 NOTE — TELEPHONE ENCOUNTER
MSW phoned [address] at  and spoke with Fernando Hanson confirmed that they did receive the referral, and that there will be a 5-7 day delay in services beginning  MSW phoned patient via the Octavio Patten  # [phone]  Patient is agreeable to [address] for home PT and was made aware that there will be a 5-7 delay for services to begin  Patient verbalized understanding

## 2019-11-08 ENCOUNTER — TELEPHONE (OUTPATIENT)
Dept: NEUROLOGY | Facility: CLINIC | Age: 39
End: 2019-11-08

## 2019-11-08 DIAGNOSIS — R26.89 BALANCE DISORDER: Primary | ICD-10-CM

## 2019-11-08 DIAGNOSIS — R29.6 FALLS FREQUENTLY: ICD-10-CM

## 2019-11-08 DIAGNOSIS — R27.0 ATAXIA: ICD-10-CM

## 2019-11-11 NOTE — TELEPHONE ENCOUNTER
Bebe from PT called reports they are going to see her for twice a week for 4 weeks, asking for a script for a rolling walker to be faxed to [facility]  If agreeable, please order and sent to [place] to fax

## 2019-12-03 ENCOUNTER — DOCUMENTATION (OUTPATIENT)
Dept: NEUROLOGY | Facility: CLINIC | Age: 39
End: 2019-12-03

## 2019-12-10 ENCOUNTER — TELEPHONE (OUTPATIENT)
Dept: NEUROLOGY | Facility: CLINIC | Age: 39
End: 2019-12-10

## 2019-12-10 NOTE — TELEPHONE ENCOUNTER
Indy  #[id number]    Returned call to Ivan Steel, patient is calling to see if she was able to get the [facility] medication  She states that she is starting to get headaches more frequently again since stopping medication  Checked PA status and see that it is still pending  Placed patient on hold and called regarding the pending PA  PA sent to wrong provider, so PA was never completed for status  Will re-submit for patient to obtain outcome  New PA submitted     KEY [id number]    Awaiting determination

## 2019-12-12 NOTE — TELEPHONE ENCOUNTER
pt called requesting a   called pt back using  #[id number]  pt called reagarding emgality  i made her aware that this was approved and that i would call pharm to make them aware of approval   she will call pharm to set up shipment  I called [facility] and they have approval on file  She states that they need script  I made her aware that script was sent on 10/16  She was able to find script  She states that it was canceled previously because of no response from our office    She will have pharm restart this script       emgality approved from 12/10/19-6/10/20

## 2020-01-02 ENCOUNTER — HOSPITAL ENCOUNTER (EMERGENCY)
Facility: HOSPITAL | Age: 40
Discharge: HOME | End: 2020-01-03
Attending: EMERGENCY MEDICINE | Admitting: EMERGENCY MEDICINE
Payer: COMMERCIAL

## 2020-01-02 DIAGNOSIS — R56.9 SEIZURE (HCC): Primary | ICD-10-CM

## 2020-01-02 LAB
ALBUMIN SERPL BCP-MCNC: 3.5 G/DL (ref 3.5–5)
ALP SERPL-CCNC: 69 U/L (ref 46–116)
ALT SERPL W P-5'-P-CCNC: 18 U/L (ref 12–78)
ANION GAP SERPL CALCULATED.3IONS-SCNC: 16 MMOL/L (ref 4–13)
AST SERPL W P-5'-P-CCNC: 13 U/L (ref 5–45)
BASOPHILS # BLD AUTO: 0.02 THOUSANDS/ΜL (ref 0–0.1)
BASOPHILS NFR BLD AUTO: 0 % (ref 0–1)
BILIRUB SERPL-MCNC: 0.33 MG/DL (ref 0.2–1)
BILIRUB UR QL STRIP: NEGATIVE
BUN SERPL-MCNC: 12 MG/DL (ref 5–25)
CALCIUM SERPL-MCNC: 9.1 MG/DL (ref 8.3–10.1)
CHLORIDE SERPL-SCNC: 103 MMOL/L (ref 100–108)
CLARITY UR: CLEAR
CO2 SERPL-SCNC: 20 MMOL/L (ref 21–32)
COLOR UR: YELLOW
COLOR, POC: YELLOW
CREAT SERPL-MCNC: 0.94 MG/DL (ref 0.6–1.3)
EOSINOPHIL # BLD AUTO: 0.01 THOUSAND/ΜL (ref 0–0.61)
EOSINOPHIL NFR BLD AUTO: 0 % (ref 0–6)
ERYTHROCYTE [DISTWIDTH] IN BLOOD BY AUTOMATED COUNT: 13.2 % (ref 11.6–15.1)
EXT PREG TEST URINE: NORMAL
EXT. CONTROL ED NAV: NORMAL
GFR SERPL CREATININE-BSD FRML MDRD: 78 ML/MIN/1.73SQ M
GLUCOSE SERPL-MCNC: 93 MG/DL (ref 65–140)
GLUCOSE UR STRIP-MCNC: NEGATIVE MG/DL
HCT VFR BLD AUTO: 38.4 % (ref 34.8–46.1)
HGB BLD-MCNC: 11.8 G/DL (ref 11.5–15.4)
HGB UR QL STRIP.AUTO: ABNORMAL
IMM GRANULOCYTES # BLD AUTO: 0.02 THOUSAND/UL (ref 0–0.2)
IMM GRANULOCYTES NFR BLD AUTO: 0 % (ref 0–2)
KETONES UR STRIP-MCNC: ABNORMAL MG/DL
LEUKOCYTE ESTERASE UR QL STRIP: NEGATIVE
LYMPHOCYTES # BLD AUTO: 1.33 THOUSANDS/ΜL (ref 0.6–4.47)
LYMPHOCYTES NFR BLD AUTO: 21 % (ref 14–44)
MCH RBC QN AUTO: 26.7 PG (ref 26.8–34.3)
MCHC RBC AUTO-ENTMCNC: 30.7 G/DL (ref 31.4–37.4)
MCV RBC AUTO: 87 FL (ref 82–98)
MONOCYTES # BLD AUTO: 0.52 THOUSAND/ΜL (ref 0.17–1.22)
MONOCYTES NFR BLD AUTO: 8 % (ref 4–12)
NEUTROPHILS # BLD AUTO: 4.44 THOUSANDS/ΜL (ref 1.85–7.62)
NEUTS SEG NFR BLD AUTO: 71 % (ref 43–75)
NITRITE UR QL STRIP: NEGATIVE
NRBC BLD AUTO-RTO: 0 /100 WBCS
PH UR STRIP.AUTO: 5.5 [PH] (ref 4.5–8)
PLATELET # BLD AUTO: 210 THOUSANDS/UL (ref 149–390)
PMV BLD AUTO: 13.1 FL (ref 8.9–12.7)
POTASSIUM SERPL-SCNC: 4 MMOL/L (ref 3.5–5.3)
PROT SERPL-MCNC: 7.6 G/DL (ref 6.4–8.2)
PROT UR STRIP-MCNC: ABNORMAL MG/DL
RBC # BLD AUTO: 4.42 MILLION/UL (ref 3.81–5.12)
SODIUM SERPL-SCNC: 139 MMOL/L (ref 136–145)
SP GR UR STRIP.AUTO: >=1.03 (ref 1–1.03)
UROBILINOGEN UR QL STRIP.AUTO: 0.2 E.U./DL
VALPROATE SERPL-MCNC: 22 UG/ML (ref 50–100)
WBC # BLD AUTO: 6.34 THOUSAND/UL (ref 4.31–10.16)

## 2020-01-02 PROCEDURE — 80164 ASSAY DIPROPYLACETIC ACD TOT: CPT | Performed by: EMERGENCY MEDICINE

## 2020-01-02 PROCEDURE — 81001 URINALYSIS AUTO W/SCOPE: CPT

## 2020-01-02 PROCEDURE — 96374 THER/PROPH/DIAG INJ IV PUSH: CPT

## 2020-01-02 PROCEDURE — 36415 COLL VENOUS BLD VENIPUNCTURE: CPT | Performed by: EMERGENCY MEDICINE

## 2020-01-02 PROCEDURE — 99284 EMERGENCY DEPT VISIT MOD MDM: CPT | Performed by: EMERGENCY MEDICINE

## 2020-01-02 PROCEDURE — 80053 COMPREHEN METABOLIC PANEL: CPT | Performed by: EMERGENCY MEDICINE

## 2020-01-02 PROCEDURE — 96361 HYDRATE IV INFUSION ADD-ON: CPT

## 2020-01-02 PROCEDURE — 93005 ELECTROCARDIOGRAM TRACING: CPT

## 2020-01-02 PROCEDURE — 85025 COMPLETE CBC W/AUTO DIFF WBC: CPT | Performed by: EMERGENCY MEDICINE

## 2020-01-02 PROCEDURE — 81025 URINE PREGNANCY TEST: CPT | Performed by: EMERGENCY MEDICINE

## 2020-01-02 PROCEDURE — 99284 EMERGENCY DEPT VISIT MOD MDM: CPT

## 2020-01-02 RX ORDER — KETOROLAC TROMETHAMINE 30 MG/ML
15 INJECTION, SOLUTION INTRAMUSCULAR; INTRAVENOUS ONCE
Status: COMPLETED | OUTPATIENT
Start: 2020-01-02 | End: 2020-01-02

## 2020-01-02 RX ADMIN — SODIUM CHLORIDE 1000 ML: 0.9 INJECTION, SOLUTION INTRAVENOUS at 22:22

## 2020-01-02 RX ADMIN — KETOROLAC TROMETHAMINE 15 MG: 30 INJECTION, SOLUTION INTRAMUSCULAR at 23:23

## 2020-01-03 VITALS
TEMPERATURE: 99.2 F | BODY MASS INDEX: 33.87 KG/M2 | DIASTOLIC BLOOD PRESSURE: 57 MMHG | RESPIRATION RATE: 14 BRPM | SYSTOLIC BLOOD PRESSURE: 100 MMHG | WEIGHT: 216.27 LBS | OXYGEN SATURATION: 96 % | HEART RATE: 82 BPM

## 2020-01-03 LAB
ATRIAL RATE: 112 BPM
BACTERIA UR QL AUTO: ABNORMAL /HPF
MUCOUS THREADS UR QL AUTO: ABNORMAL
NON-SQ EPI CELLS URNS QL MICRO: ABNORMAL /HPF
P AXIS: 56 DEGREES
PR INTERVAL: 160 MS
QRS AXIS: 53 DEGREES
QRSD INTERVAL: 72 MS
QT INTERVAL: 298 MS
QTC INTERVAL: 406 MS
RBC #/AREA URNS AUTO: ABNORMAL /HPF
T WAVE AXIS: 35 DEGREES
VENTRICULAR RATE: 112 BPM
WBC #/AREA URNS AUTO: ABNORMAL /HPF

## 2020-01-03 PROCEDURE — 93010 ELECTROCARDIOGRAM REPORT: CPT | Performed by: INTERNAL MEDICINE

## 2020-01-03 NOTE — ED ATTENDING ATTESTATION
1/2/2020  IHung DO, saw and evaluated the patient  I have discussed the patient with the resident/non-physician practitioner and agree with the resident's/non-physician practitioner's findings, Plan of Care, and MDM as documented in the resident's/non-physician practitioner's note, except where noted  All available labs and Radiology studies were reviewed  I was present for key portions of any procedure(s) performed by the resident/non-physician practitioner and I was immediately available to provide assistance  At this point I agree with the current assessment done in the Emergency Department  I have conducted an independent evaluation of this patient a history and physical is as follows:    41 yo F presenting for evaluation of breakthrough seizures  Unwitnessed one possibly this morning and then witnessed one tonight  Lasted 15 minutes, eye rolling, tongue biting, generalized tonic clonic  No incontinence  Stopped on it's own  Has had decreased po intake recently, no food/drink today because she states the smell of any food bothers her  No recent illness  Now has HA, bifrontal but typical to what she gets after seizure  Patient with small abrasion to tongue from seizure  No other areas of injury or pain  Patient follows with neurology, recently changed her Depakote in October from 1 500 mg pill to 2 250 mg pills  States that she has been compliant with all of her doses, last dose being tonight, although  questions whether this is accurate  Patient does not have a 's license      GEN: Vitals stable, no acute distress, appears comfortable  HEENT: Head is normocephalic/atraumatic, EOMI, PERRL, abrasion to tip of tongue, no open/gaping wound, no midline C/T/L spine tenderness  CV: heart regular rate and rhythm, no murmurs/rubs/gallops  PULM: Lungs CTA b/l, no wheezes/rales/rhonchi  ABD: soft, nontender, nondistended  BACK: no ttp, no rash/skin changes  NEURO:  CN 2-12 intact, GCS 15, no focal neuro deficits, strength 5/5 all extremities, sensation grossly intact  EXT: skin warm/dry, no peripheral edema  PSYCH: normal affect/personal interaction    A/P: 43 yo F with breakthrough sz- will get assess for electrolyte abnormality, give IV fluids, urine pregnancy, UA to rule urine infection, sent off Depakote level for Neurology to follow up    ED Course         Critical Care Time  Procedures

## 2020-01-03 NOTE — ED NOTES
Pt aware of necessary urine specimen  Pt states she is unable to void at this time        Dutch Gomez  01/02/20 [id number]

## 2020-01-03 NOTE — ED PROVIDER NOTES
History  Chief Complaint   Patient presents with    Seizure - Prior Hx Of     Per EMS pt was in bed had seizure lasting approx  5 to 7 mins around 15 mins ago  Pt reports seizure this morning at 0900, was not seen by provider  Hx of seizure on depakote, pt with 10/10 pain HA, difficulty communicating in triage     Patient is a 81-year-old female with history of seizure presenting for seizure, patient is to be on Depakote 500 mg daily at home  Patient was seen in this emergency department in October 2019 for seizure, at that time patient admitted that she was not taking her medication due to pill size  Patient was seen shortly thereafter and her neurologist changed her prescription to smaller pills, patient states that she has been taking them  Patient has not had a seizure since then, today she reportedly had two  Her  helps with translation and states she had a "small" seizure this morning, he did not witness it however became concerned when she was not answering the phone and went home  Patient was in bed when he found her, he states that seizure lasted 5-6 minutes and stopped on its own  Patient did not want to come to the hospital and continued her daily activities  Prior to arrival patient had an additional seizure that lasted approximately 15 minutes per , again topping on its own   states that patient's limbs were jerking and she was biting her tongue, he states that her eyes were rolling  No reports of incontinence, she was initially confused after the seizure, and complaining of a headache  Patient has a history of migraines and also experiences headaches after her seizures, headache is located in her bifrontal area and is stabbing in nature   states that the patient has been in her normal state of health recently, she has had no recent illnesses, she has had decreased intake over the past 2 days    Per chart review patient has had a unilateral oophorectomy and tubal ligation, she does not remember when her last perid was  Prior to Admission Medications   Prescriptions Last Dose Informant Patient Reported? Taking? Galcanezumab-gnlm (EMGALITY) 120 MG/ML SOAJ   No Yes   Sig: One ml subcutaneously q30 days  Misc  Devices (CANE) MISC   No No   Sig: Use daily for balance   ammonium lactate (LAC-HYDRIN) 12 % cream Not Taking at Unknown time  Yes No   calcium carbonate-vitamin D (OSCAL-D) 500 mg-200 units per tablet   No Yes   Sig: Take 1 tablet by mouth daily   cetirizine (ZyrTEC) 10 mg tablet  Self Yes Yes   Sig: Take by mouth   cyanocobalamin 1000 MCG tablet Not Taking at Unknown time Self No No   Sig: Take 0 5 tablets (500 mcg total) by mouth daily   Patient not taking: Reported on 2019   divalproex sodium (DEPAKOTE ER) 250 mg 24 hr tablet   No Yes   Sig: Take 2 tablets (500 mg total) by mouth daily at bedtime   ergocalciferol (VITAMIN D2) 50,000 units   No No   Sig: Take 1 capsule (50,000 Units total) by mouth 3 (three) times a week for 12 doses   ferrous sulfate 324 (65 Fe) mg Not Taking at Unknown time  No No   Sig: Take 1 tablet (324 mg total) by mouth 2 (two) times a day before meals   Patient not taking: Reported on 4/10/2019   gabapentin (NEURONTIN) 100 mg capsule Not Taking at Unknown time  No No   Si cap qhs x 5 days, then 2 caps qhs x 5 days, then 3 caps qhs  Take 1 additional capsule q8h prn migraine     Patient not taking: Reported on 2020   hydrOXYzine HCL (ATARAX) 10 mg tablet   Yes No   hydrocortisone 1 % cream  Self Yes No   lidocaine (LMX) 4 % cream   No No   Sig: Apply topically as needed for moderate pain   magnesium oxide (MAG-OX) 400 mg  Self No No   Sig: Take 1 tablet (400 mg total) by mouth daily   Patient not taking: Reported on 2019   magnesium oxide (MAG-OX) 400 mg   No No   Sig: Take 1 tablet (400 mg total) by mouth 2 (two) times a day   methocarbamol (ROBAXIN) 750 mg tablet   No No   Sig: Take 1 tablet (750 mg total) by mouth 3 (three) times a day for 30 doses   Patient not taking: Reported on 10/16/2019   methocarbamol (ROBAXIN) 750 mg tablet   No No   Sig: Take 1 tablet (750 mg total) by mouth every 6 (six) hours as needed for muscle spasms   naproxen (NAPROSYN) 250 mg tablet   No No   Sig: Take 1 tablet (250 mg total) by mouth 3 (three) times a day with meals for 7 days   naproxen (NAPROSYN) 500 mg tablet   No No   Sig: Take 1 tablet (500 mg total) by mouth 2 (two) times a day with meals   omeprazole (PriLOSEC) 40 MG capsule   No No   Sig: Take 1 capsule (40 mg total) by mouth 2 (two) times a day for 14 days   omeprazole (PriLOSEC) 40 MG capsule   No No   Sig: Take 1 capsule (40 mg total) by mouth daily   ondansetron (ZOFRAN-ODT) 4 mg disintegrating tablet   Yes No   Sig: Take 4 mg by mouth every 8 (eight) hours as needed   propranolol (INDERAL) 10 mg tablet   No No   Si tab qhs x 5 days, then BID  Please call for dose increase if needed  Facility-Administered Medications: None       Past Medical History:   Diagnosis Date    B12 deficiency     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 ([facility] [address] )     Chronic migraine without aura     Dermatographism     Gait disturbance     Iron deficiency anemia     Lyme neuropathy     Seizures (HCC)     Visual disturbance     Diplopia, nystagmus, decreased visual acuity    Vitamin D deficiency        Past Surgical History:   Procedure Laterality Date     SECTION      ESOPHAGOGASTRODUODENOSCOPY N/A 3/8/2019    Procedure: ESOPHAGOGASTRODUODENOSCOPY (EGD); Surgeon: Silvia Mclean MD;  Location: [facility] GI [facility];   Service: Gastroenterology    OOPHORECTOMY      unilateral --  last assessed 17    TUBAL LIGATION      last assessed 17       Family History   Problem Relation Age of Onset    Other Mother         AIDS, Brain Tumor balance disorder    Seizures Mother     Alcohol abuse Father     Other Sister         Balance disorder    HIV Maternal Grandmother      I have reviewed and agree with the history as documented  Social History     Tobacco Use    Smoking status: Never Smoker    Smokeless tobacco: Never Used   Substance Use Topics    Alcohol use: No    Drug use: No        Review of Systems   Constitutional: Positive for appetite change  Negative for chills and fever  Respiratory: Negative for shortness of breath  Cardiovascular: Negative for chest pain  Gastrointestinal: Negative for abdominal pain, nausea and vomiting  Genitourinary: Negative for dysuria  Neurological: Positive for seizures and headaches  Negative for weakness, light-headedness and numbness  All other systems reviewed and are negative  Physical Exam  ED Triage Vitals [01/02/20 2120]   Temperature Pulse Respirations Blood Pressure SpO2   99 2 °F (37 3 °C) (!) 111 16 118/66 98 %      Temp Source Heart Rate Source Patient Position - Orthostatic VS BP Location FiO2 (%)   Oral Monitor Lying Left arm --      Pain Score       Worst Possible Pain             Orthostatic Vital Signs  Vitals:    01/02/20 2120 01/02/20 2219 01/02/20 2321 01/03/20 0015   BP: 118/66 110/70 100/57    Pulse: (!) 111 102 98 82   Patient Position - Orthostatic VS: Lying Lying Lying        Physical Exam   Constitutional: She is oriented to person, place, and time  She appears well-developed and well-nourished  No distress  HENT:   Head: Normocephalic and atraumatic  Punctate erythematous marks to tip of tongue, no lateral marks   Eyes: Pupils are equal, round, and reactive to light  Conjunctivae and EOM are normal  Right eye exhibits no discharge  Left eye exhibits no discharge  Cardiovascular: Normal rate and regular rhythm  Pulmonary/Chest: Effort normal and breath sounds normal  No respiratory distress  Abdominal: Soft  She exhibits no distension  There is no tenderness  There is no rebound and no guarding  Musculoskeletal: She exhibits no edema or deformity     Neurological: She is alert and oriented to person, place, and time  No cranial nerve deficit or sensory deficit  She exhibits normal muscle tone  Coordination normal    Skin: Skin is warm  No rash noted  She is not diaphoretic  No pallor  Vitals reviewed        ED Medications  Medications   sodium chloride 0 9 % bolus 1,000 mL (0 mL Intravenous Stopped 1/2/20 2324)   ketorolac (TORADOL) injection 15 mg (15 mg Intravenous Given 1/2/20 2323)       Diagnostic Studies  Results Reviewed     Procedure Component Value Units Date/Time    Urine Microscopic [[id number]]  (Abnormal) Collected:  01/02/20 2317    Lab Status:  Final result Specimen:  Urine, Clean Catch Updated:  01/03/20 0003     RBC, UA 10-20 /hpf      WBC, UA 0-1 /hpf      Epithelial Cells Occasional /hpf      Bacteria, UA Occasional /hpf      MUCUS THREADS Occasional    Valproic acid level, total [[id number]]  (Abnormal) Collected:  01/02/20 2222    Lab Status:  Final result Specimen:  Blood from Arm, Left Updated:  01/02/20 2359     Valproic Acid, Total 22 ug/mL     Comprehensive metabolic panel [[id number]]  (Abnormal) Collected:  01/02/20 2222    Lab Status:  Final result Specimen:  Blood from Arm, Left Updated:  01/02/20 2358     Sodium 139 mmol/L      Potassium 4 0 mmol/L      Chloride 103 mmol/L      CO2 20 mmol/L      ANION GAP 16 mmol/L      BUN 12 mg/dL      Creatinine 0 94 mg/dL      Glucose 93 mg/dL      Calcium 9 1 mg/dL      AST 13 U/L      ALT 18 U/L      Alkaline Phosphatase 69 U/L      Total Protein 7 6 g/dL      Albumin 3 5 g/dL      Total Bilirubin 0 33 mg/dL      eGFR 78 ml/min/1 73sq m     Narrative:       [place] guidelines for Chronic Kidney Disease (CKD):     Stage 1 with normal or high GFR (GFR > 90 mL/min/1 73 square meters)    Stage 2 Mild CKD (GFR = 60-89 mL/min/1 73 square meters)    Stage 3A Moderate CKD (GFR = 45-59 mL/min/1 73 square meters)    Stage 3B Moderate CKD (GFR = 30-44 mL/min/1 73 square meters)    Stage 4 Severe CKD (GFR = 15-29 mL/min/1 73 square meters)    Stage 5 End Stage CKD (GFR <15 mL/min/1 73 square meters)  Note: GFR calculation is accurate only with a steady state creatinine    POCT urinalysis dipstick [[id number]]  (Normal) Resulted:  01/02/20 2320    Lab Status:  Final result Specimen:  Urine Updated:  01/02/20 2320     Color, UA Yellow    POCT pregnancy, urine [[id number]]  (Normal) Resulted:  01/02/20 2319    Lab Status:  Final result Updated:  01/02/20 2320     EXT PREG TEST UR (Ref: Negative) NEG (-)     Control Valid    Urine Macroscopic, POC [[id number]]  (Abnormal) Collected:  01/02/20 2317    Lab Status:  Final result Specimen:  Urine Updated:  01/02/20 2318     Color, UA Yellow     Clarity, UA Clear     pH, UA 5 5     Leukocytes, UA Negative     Nitrite, UA Negative     Protein,  (2+) mg/dl      Glucose, UA Negative mg/dl      Ketones, UA 15 (1+) mg/dl      Urobilinogen, UA 0 2 E U /dl      Bilirubin, UA Negative     Blood, UA Large     Specific Gravity, UA >=1 030    Narrative:       CLINITEK RESULT    CBC and differential [[id number]]  (Abnormal) Collected:  01/02/20 2222    Lab Status:  Final result Specimen:  Blood from Arm, Left Updated:  01/02/20 2234     WBC 6 34 Thousand/uL      RBC 4 42 Million/uL      Hemoglobin 11 8 g/dL      Hematocrit 38 4 %      MCV 87 fL      MCH 26 7 pg      MCHC 30 7 g/dL      RDW 13 2 %      MPV 13 1 fL      Platelets 234 Thousands/uL      nRBC 0 /100 WBCs      Neutrophils Relative 71 %      Immat GRANS % 0 %      Lymphocytes Relative 21 %      Monocytes Relative 8 %      Eosinophils Relative 0 %      Basophils Relative 0 %      Neutrophils Absolute 4 44 Thousands/µL      Immature Grans Absolute 0 02 Thousand/uL      Lymphocytes Absolute 1 33 Thousands/µL      Monocytes Absolute 0 52 Thousand/µL      Eosinophils Absolute 0 01 Thousand/µL      Basophils Absolute 0 02 Thousands/µL                  No orders to display         Procedures  Procedures      ED Course MDM  Number of Diagnoses or Management Options  [facility]):   Diagnosis management comments: 44-year-old female presenting for seizure, patient has a history of seizures on Depakote at home  Patient states that she has been taking her medication, level shows that she is subtherapeutic  Patient has no history of alcohol abuse, no electrolyte derangement, urine pregnancy negative; seizure likely due to subtherapeutic medication level  Patient has not fall to the ground during the seizure events, patient recently had head CT performed in October 2019 that showed no acute intracranial pathology  CT head is not pursued at this time  Patient returned to her baseline mental status, patient does not wish to be hospitalized  Patient was discharged and strongly advised to follow up with Neurology office in the morning regarding her medication level and daily dose  Disposition  Final diagnoses:   Seizure [facility])     Time reflects when diagnosis was documented in both MDM as applicable and the Disposition within this note     Time User Action Codes Description Comment    1/3/2020 12:08 AM Laney Becker [[id number]] [facility])       ED Disposition     ED Disposition Condition Date/Time Comment    Discharge Stable Fri Danis 3, 2020 12:08 AM Angélica Simons discharge to home/self care  Follow-up Information     Follow up With Specialties Details Why Contact Info Additional [facility] Neurology [facility] Neurology Call  Follow-up with your neurologist tomorrow   [address] [facility] [place] [address] [phone] [facility] Neurology [facility], [address], [address]          Discharge Medication List as of 1/3/2020 12:10 AM      CONTINUE these medications which have NOT CHANGED    Details   calcium carbonate-vitamin D (OSCAL-D) 500 mg-200 units per tablet Take 1 tablet by mouth daily, Starting Wed 4/10/2019, Normal      cetirizine (ZyrTEC) 10 mg tablet Take by mouth, Starting Tue 11/8/2016, Historical Med      divalproex sodium (DEPAKOTE ER) 250 mg 24 hr tablet Take 2 tablets (500 mg total) by mouth daily at bedtime, Starting Wed 10/16/2019, Normal      Galcanezumab-gnlm (EMGALITY) 120 MG/ML SOAJ One ml subcutaneously q30 days  , Normal      ammonium lactate (LAC-HYDRIN) 12 % cream Starting Wed 1/2/2019, Historical Med      cyanocobalamin 1000 MCG tablet Take 0 5 tablets (500 mcg total) by mouth daily, Starting Wed 4/4/2018, Print      ergocalciferol (VITAMIN D2) 50,000 units Take 1 capsule (50,000 Units total) by mouth 3 (three) times a week for 12 doses, Starting Wed 4/10/2019, Until Tue 5/7/2019, Normal      ferrous sulfate 324 (65 Fe) mg Take 1 tablet (324 mg total) by mouth 2 (two) times a day before meals, Starting Mon 11/5/2018, Normal      gabapentin (NEURONTIN) 100 mg capsule 1 cap qhs x 5 days, then 2 caps qhs x 5 days, then 3 caps qhs  Take 1 additional capsule q8h prn migraine , Normal      hydrocortisone 1 % cream Starting Tue 4/10/2018, Historical Med      hydrOXYzine HCL (ATARAX) 10 mg tablet Starting Tue 11/20/2018, Historical Med      lidocaine (LMX) 4 % cream Apply topically as needed for moderate pain, Starting Mon 9/30/2019, Print      !! magnesium oxide (MAG-OX) 400 mg Take 1 tablet (400 mg total) by mouth daily, Starting Wed 4/4/2018, Print      !! magnesium oxide (MAG-OX) 400 mg Take 1 tablet (400 mg total) by mouth 2 (two) times a day, Starting Mon 2/25/2019, Normal      methocarbamol (ROBAXIN) 750 mg tablet Take 1 tablet (750 mg total) by mouth every 6 (six) hours as needed for muscle spasms, Starting Fri 10/11/2019, Print      Misc   Devices (CANE) MISC Use daily for balance, Normal      naproxen (NAPROSYN) 500 mg tablet Take 1 tablet (500 mg total) by mouth 2 (two) times a day with meals, Starting Fri 10/11/2019, Print      omeprazole (PriLOSEC) 40 MG capsule Take 1 capsule (40 mg total) by mouth daily, Starting Wed 7/17/2019, Normal      ondansetron (ZOFRAN-ODT) 4 mg disintegrating tablet Take 4 mg by mouth every 8 (eight) hours as needed, Starting Fri 10/19/2018, Historical Med      propranolol (INDERAL) 10 mg tablet 1 tab qhs x 5 days, then BID  Please call for dose increase if needed , Normal       !! - Potential duplicate medications found  Please discuss with provider  No discharge procedures on file  ED Provider  Attending physically available and evaluated Karin Randle  ANGÉLICA managed the patient along with the ED Attending      Electronically Signed by         Freddie Jenkins DO  01/05/20 5574

## 2020-01-03 NOTE — DISCHARGE INSTRUCTIONS
Your depakote level was 22, this is below therapeutic range  It is important to follow-up with your neurologist tomorrow regarding this and your dose  They may increase your daily dose

## 2020-01-08 ENCOUNTER — OFFICE VISIT (OUTPATIENT)
Dept: NEUROLOGY | Facility: CLINIC | Age: 40
End: 2020-01-08
Payer: COMMERCIAL

## 2020-01-08 VITALS
DIASTOLIC BLOOD PRESSURE: 73 MMHG | BODY MASS INDEX: 31.01 KG/M2 | WEIGHT: 198 LBS | SYSTOLIC BLOOD PRESSURE: 117 MMHG | HEART RATE: 87 BPM

## 2020-01-08 DIAGNOSIS — R27.0 ATAXIA: ICD-10-CM

## 2020-01-08 DIAGNOSIS — Z91.14 NONCOMPLIANCE WITH MEDICATIONS: ICD-10-CM

## 2020-01-08 DIAGNOSIS — R56.9 SEIZURE (HCC): ICD-10-CM

## 2020-01-08 DIAGNOSIS — G43.709 CHRONIC MIGRAINE WITHOUT AURA WITHOUT STATUS MIGRAINOSUS, NOT INTRACTABLE: Primary | ICD-10-CM

## 2020-01-08 DIAGNOSIS — R40.4 TRANSIENT ALTERATION OF AWARENESS: ICD-10-CM

## 2020-01-08 PROCEDURE — 1036F TOBACCO NON-USER: CPT | Performed by: PHYSICIAN ASSISTANT

## 2020-01-08 PROCEDURE — 99214 OFFICE O/P EST MOD 30 MIN: CPT | Performed by: PHYSICIAN ASSISTANT

## 2020-01-08 RX ORDER — TOPIRAMATE 25 MG/1
TABLET ORAL
Qty: 120 TABLET | Refills: 2 | Status: SHIPPED | OUTPATIENT
Start: 2020-01-08 | End: 2020-05-22 | Stop reason: SINTOL

## 2020-01-23 DIAGNOSIS — K21.9 GASTROESOPHAGEAL REFLUX DISEASE WITHOUT ESOPHAGITIS: ICD-10-CM

## 2020-01-23 DIAGNOSIS — K30 ACID INDIGESTION: ICD-10-CM

## 2020-01-23 RX ORDER — OMEPRAZOLE 40 MG/1
40 CAPSULE, DELAYED RELEASE ORAL DAILY
Qty: 90 CAPSULE | Refills: 1 | OUTPATIENT
Start: 2020-01-23

## 2020-01-23 NOTE — TELEPHONE ENCOUNTER
Patient is overdue for follow-up  She may obtain OTC stomach medication, or she may follow-up to discuss further refills of this medication

## 2020-01-29 ENCOUNTER — OFFICE VISIT (OUTPATIENT)
Dept: FAMILY MEDICINE CLINIC | Facility: CLINIC | Age: 40
End: 2020-01-29
Payer: COMMERCIAL

## 2020-01-29 VITALS
WEIGHT: 189.8 LBS | DIASTOLIC BLOOD PRESSURE: 62 MMHG | BODY MASS INDEX: 28.11 KG/M2 | HEART RATE: 67 BPM | TEMPERATURE: 97.2 F | HEIGHT: 69 IN | SYSTOLIC BLOOD PRESSURE: 100 MMHG | OXYGEN SATURATION: 95 % | RESPIRATION RATE: 16 BRPM

## 2020-01-29 DIAGNOSIS — R56.9 SEIZURE (HCC): ICD-10-CM

## 2020-01-29 DIAGNOSIS — K21.9 GASTROESOPHAGEAL REFLUX DISEASE WITHOUT ESOPHAGITIS: ICD-10-CM

## 2020-01-29 DIAGNOSIS — Z00.00 WELL ADULT EXAM: Primary | ICD-10-CM

## 2020-01-29 DIAGNOSIS — A04.8 H. PYLORI INFECTION: ICD-10-CM

## 2020-01-29 DIAGNOSIS — G31.9 CEREBELLAR ATROPHY (HCC): ICD-10-CM

## 2020-01-29 DIAGNOSIS — E55.9 VITAMIN D DEFICIENCY: ICD-10-CM

## 2020-01-29 DIAGNOSIS — R73.01 IMPAIRED FASTING BLOOD SUGAR: ICD-10-CM

## 2020-01-29 DIAGNOSIS — K30 ACID INDIGESTION: ICD-10-CM

## 2020-01-29 PROCEDURE — 99395 PREV VISIT EST AGE 18-39: CPT | Performed by: FAMILY MEDICINE

## 2020-01-29 RX ORDER — MELATONIN
1000 DAILY
Qty: 30 TABLET | Refills: 5 | Status: SHIPPED | OUTPATIENT
Start: 2020-01-29 | End: 2020-02-11 | Stop reason: SDUPTHER

## 2020-01-29 RX ORDER — OMEPRAZOLE 40 MG/1
40 CAPSULE, DELAYED RELEASE ORAL 2 TIMES DAILY
Qty: 28 CAPSULE | Refills: 5 | Status: SHIPPED | OUTPATIENT
Start: 2020-01-29 | End: 2020-02-11 | Stop reason: SDUPTHER

## 2020-01-29 NOTE — PROGRESS NOTES
[facility] PRACTICE    NAME: Anabelle Holden  AGE: 42 y o  SEX: female  : 1980     DATE: 2020     Assessment and Plan:     Problem List Items Addressed This Visit        Digestive    Gastroesophageal reflux disease without esophagitis    Relevant Medications    omeprazole (PriLOSEC) 40 MG capsule       Nervous and Auditory    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 (HCC)     Given refills vit b12  Goes to neurology  Continue mgmt per neurology  Relevant Medications    cyanocobalamin (VITAMIN B-12) 1000 MCG tablet       Other    Acid indigestion     Advised not sit up for at least 1 hour after eating  Omeprazole refilled  Vitamin D deficiency    Relevant Medications    cholecalciferol (VITAMIN D3) 1,000 units tablet    Other Relevant Orders    Vitamin D 25 hydroxy    Seizure ([address] )     Switched from depakote to topamax  Other Visit Diagnoses     Well adult exam    -  Primary    H  pylori infection        Relevant Medications    omeprazole (PriLOSEC) 40 MG capsule    Impaired fasting blood sugar        Relevant Orders    Hemoglobin A1C    BMI 28 0-28 9,adult        Relevant Orders    Lipid panel      needs pap smear  Will inform next visit  Immunizations and preventive care screenings were discussed with patient today  Appropriate education was printed on patient's after visit summary  Counseling:  Alcohol/drug use: discussed moderation in alcohol intake, the recommendations for healthy alcohol use, and avoidance of illicit drug use  Dental Health: discussed importance of regular tooth brushing, flossing, and dental visits  Sexual health: discussed sexually transmitted diseases, partner selection, use of condoms, avoidance of unintended pregnancy, and contraceptive alternatives  · Exercise: the importance of regular exercise/physical activity was discussed  Recommend exercise 3-5 times per week for at least 30 minutes  Return in about 6 months (around 7/29/2020) for Recheck chronic conditions with labs  Chief Complaint:     Chief Complaint   Patient presents with    Follow-up      History of Present Illness:     Adult Annual Physical   Patient here for a comprehensive physical exam  The patient reports no problems  Concerned about loss of appetite  Started topamax last week  Diet and Physical Activity  · Diet/Nutrition: limited fruits/vegetables  · Exercise: no formal exercise  Depression Screening  PHQ-9 Depression Screening    PHQ-9:    Frequency of the following problems over the past two weeks:            General Health  · Sleep: sleeps well  · Vision: vision problems: has an opthalmologist    · Dental: regular dental visits  /GYN Health  · Last menstrual period: 1/14/20  · Contraceptive method: tubal ligation  · History of STDs?: no      Review of Systems:     Review of Systems   Constitutional: Negative for fever and unexpected weight change  HENT: Negative for ear pain, sore throat and trouble swallowing  Eyes: Positive for visual disturbance  Negative for pain  Respiratory: Negative for cough, chest tightness, shortness of breath and wheezing  Cardiovascular: Negative for chest pain  Gastrointestinal: Negative for abdominal distention, abdominal pain, blood in stool, constipation, diarrhea, nausea and vomiting  Endocrine: Negative for polydipsia and polyuria  Genitourinary: Negative for dysuria and hematuria  Musculoskeletal: Negative for back pain and myalgias  Skin: Negative for rash  Neurological: Positive for dizziness, seizures and headaches  Negative for syncope  Psychiatric/Behavioral: Negative for suicidal ideas        Past Medical History:     Past Medical History:   Diagnosis Date    B12 deficiency     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 ([facility] )     Chronic migraine without aura     Dermatographism     Gait disturbance     Iron deficiency anemia     Lyme neuropathy  Seizures (HCC)     Visual disturbance     Diplopia, nystagmus, decreased visual acuity    Vitamin D deficiency       Past Surgical History:     Past Surgical History:   Procedure Laterality Date     SECTION      ESOPHAGOGASTRODUODENOSCOPY N/A 3/8/2019    Procedure: ESOPHAGOGASTRODUODENOSCOPY (EGD); Surgeon: Ishmael Crawley MD;  Location: [facility];   Service: Gastroenterology    OOPHORECTOMY      unilateral --  last assessed 17    TUBAL LIGATION      last assessed 17      Social History:     Social History     Socioeconomic History    Marital status: Single     Spouse name: None    Number of children: 3    Years of education: None    Highest education level: None   Occupational History    Occupation: Housewife / Homemaker   Social Needs    Financial resource strain: Not hard at all   Elier-Memo insecurity:     Worry: Never true     Inability: Never true    Transportation needs:     Medical: No     Non-medical: No   Tobacco Use    Smoking status: Never Smoker    Smokeless tobacco: Never Used   Substance and Sexual Activity    Alcohol use: No    Drug use: No    Sexual activity: None   Lifestyle    Physical activity:     Days per week: 0 days     Minutes per session: 0 min    Stress: Not at all   Relationships    Social connections:     Talks on phone: Patient refused     Gets together: Patient refused     Attends [employer] service: Patient refused     Active member of club or organization: Patient refused     Attends meetings of clubs or organizations: Patient refused     Relationship status: Patient refused    Intimate partner violence:     Fear of current or ex partner: No     Emotionally abused: No     Physically abused: No     Forced sexual activity: No   Other Topics Concern    None   Social History Narrative    Always uses seatbelt    Lives with children      Family History:     Family History   Problem Relation Age of Onset    Other Mother         AIDS, Brain Tumor balance disorder    Seizures Mother     Alcohol abuse Father     Other Sister         Balance disorder    HIV Maternal Grandmother       Current Medications:     Current Outpatient Medications   Medication Sig Dispense Refill    Galcanezumab-gnlm (EMGALITY) 120 MG/ML SOAJ One ml subcutaneously q30 days  1 pen 5    Misc  Devices (CANE) MISC Use daily for balance 1 each 0    topiramate (TOPAMAX) 25 mg tablet 1 tab BID x 1 week, then 2 tabs BID x 1 week, then please call office for refill  120 tablet 2    ammonium lactate (LAC-HYDRIN) 12 % cream       cetirizine (ZyrTEC) 10 mg tablet Take by mouth      cholecalciferol (VITAMIN D3) 1,000 units tablet Take 1 tablet (1,000 Units total) by mouth daily 30 tablet 5    cyanocobalamin (VITAMIN B-12) 1000 MCG tablet Take 0 5 tablets (500 mcg total) by mouth daily 30 tablet 3    hydrocortisone 1 % cream       hydrOXYzine HCL (ATARAX) 10 mg tablet       magnesium oxide (MAG-OX) 400 mg Take 1 tablet (400 mg total) by mouth daily (Patient not taking: Reported on 4/18/2019) 30 tablet 3    naproxen (NAPROSYN) 250 mg tablet Take 1 tablet (250 mg total) by mouth 3 (three) times a day with meals for 7 days 21 tablet 0    naproxen (NAPROSYN) 500 mg tablet Take 1 tablet (500 mg total) by mouth 2 (two) times a day with meals (Patient not taking: Reported on 1/29/2020) 30 tablet 0    omeprazole (PriLOSEC) 40 MG capsule Take 1 capsule (40 mg total) by mouth 2 (two) times a day for 14 days 28 capsule 5     No current facility-administered medications for this visit  Allergies:      Allergies   Allergen Reactions    Dye Fdc Red  [Red Dye]     Morphine       Physical Exam:     /62 (BP Location: Left arm, Patient Position: Sitting, Cuff Size: Adult)   Pulse 67   Temp (!) 97 2 °F (36 2 °C) (Tympanic)   Resp 16   Ht 5' 9" (1 753 m)   Wt 86 1 kg (189 lb 12 8 oz)   SpO2 95%   BMI 28 03 kg/m²     Physical Exam   Constitutional: She is oriented to person, place, and time  She appears well-developed and well-nourished  HENT:   Head: Normocephalic and atraumatic  Right Ear: External ear normal    Left Ear: External ear normal    Mouth/Throat: No oropharyngeal exudate  Eyes: Pupils are equal, round, and reactive to light  Conjunctivae and EOM are normal  No scleral icterus  Neck: Normal range of motion  Neck supple  Cardiovascular: Normal rate, regular rhythm and intact distal pulses  Exam reveals no gallop and no friction rub  No murmur heard  Pulmonary/Chest: Effort normal and breath sounds normal  No respiratory distress  She has no wheezes  She has no rales  Abdominal: Soft  Bowel sounds are normal  She exhibits no distension and no mass  There is no tenderness  There is no rebound  Musculoskeletal: Normal range of motion  She exhibits no edema  Neurological: She is alert and oriented to person, place, and time  Nystagmus present   Skin: Skin is warm and dry  Psychiatric: She has a normal mood and affect         Jordan Grimaldo MD   [address] [place] [address]

## 2020-02-04 ENCOUNTER — TELEPHONE (OUTPATIENT)
Dept: FAMILY MEDICINE CLINIC | Facility: CLINIC | Age: 40
End: 2020-02-04

## 2020-02-04 DIAGNOSIS — A04.8 H. PYLORI INFECTION: ICD-10-CM

## 2020-02-04 NOTE — TELEPHONE ENCOUNTER
----- Message from Ghada Steward MD sent at 2/1/2020  9:31 AM EST -----  Please let pt know that her mail in pharmacy does not prescribe omeprazole or vitamin d  She can get these over the counter or I can sent them to a local pharmacy

## 2020-02-05 RX ORDER — OMEPRAZOLE 40 MG/1
CAPSULE, DELAYED RELEASE ORAL
Qty: 90 CAPSULE | Refills: 0 | OUTPATIENT
Start: 2020-02-05

## 2020-02-11 DIAGNOSIS — E55.9 VITAMIN D DEFICIENCY: ICD-10-CM

## 2020-02-11 DIAGNOSIS — G31.9 CEREBELLAR ATROPHY (HCC): ICD-10-CM

## 2020-02-11 DIAGNOSIS — A04.8 H. PYLORI INFECTION: ICD-10-CM

## 2020-02-11 RX ORDER — OMEPRAZOLE 40 MG/1
40 CAPSULE, DELAYED RELEASE ORAL 2 TIMES DAILY
Qty: 28 CAPSULE | Refills: 5 | Status: SHIPPED | OUTPATIENT
Start: 2020-02-11 | End: 2020-08-18 | Stop reason: SDUPTHER

## 2020-02-11 RX ORDER — MELATONIN
1000 DAILY
Qty: 30 TABLET | Refills: 5 | Status: SHIPPED | OUTPATIENT
Start: 2020-02-11

## 2020-04-03 ENCOUNTER — TELEPHONE (OUTPATIENT)
Dept: FAMILY MEDICINE CLINIC | Facility: CLINIC | Age: 40
End: 2020-04-03

## 2020-04-03 DIAGNOSIS — R40.4 TRANSIENT ALTERATION OF AWARENESS: ICD-10-CM

## 2020-04-03 DIAGNOSIS — G31.9 CEREBRAL ATROPHY (HCC): ICD-10-CM

## 2020-04-03 DIAGNOSIS — R56.9 SEIZURE (HCC): ICD-10-CM

## 2020-04-03 DIAGNOSIS — R26.81 UNSTEADY GAIT: Primary | ICD-10-CM

## 2020-04-03 DIAGNOSIS — R26.81 GAIT INSTABILITY: Primary | ICD-10-CM

## 2020-04-03 DIAGNOSIS — G40.109 SPECIAL SENSORY ATTACKS (HCC): ICD-10-CM

## 2020-04-03 DIAGNOSIS — G31.9 CEREBELLAR ATROPHY (HCC): ICD-10-CM

## 2020-04-03 DIAGNOSIS — G43.709 CHRONIC MIGRAINE WITHOUT AURA WITHOUT STATUS MIGRAINOSUS, NOT INTRACTABLE: ICD-10-CM

## 2020-04-10 ENCOUNTER — TELEMEDICINE (OUTPATIENT)
Dept: NEUROLOGY | Facility: CLINIC | Age: 40
End: 2020-04-10
Payer: COMMERCIAL

## 2020-04-10 ENCOUNTER — TELEPHONE (OUTPATIENT)
Dept: NEUROLOGY | Facility: CLINIC | Age: 40
End: 2020-04-10

## 2020-04-10 DIAGNOSIS — G11.8 SPINOCEREBELLAR ATAXIA (HCC): Primary | ICD-10-CM

## 2020-04-10 DIAGNOSIS — R26.81 UNSTEADY GAIT: ICD-10-CM

## 2020-04-10 DIAGNOSIS — G43.709 CHRONIC MIGRAINE WITHOUT AURA WITHOUT STATUS MIGRAINOSUS, NOT INTRACTABLE: ICD-10-CM

## 2020-04-10 DIAGNOSIS — R40.4 TRANSIENT ALTERATION OF AWARENESS: Primary | ICD-10-CM

## 2020-04-10 DIAGNOSIS — G31.9 CEREBELLAR ATROPHY (HCC): ICD-10-CM

## 2020-04-10 DIAGNOSIS — R56.9 SEIZURE (HCC): ICD-10-CM

## 2020-04-10 DIAGNOSIS — R56.9 SEIZURE (HCC): Primary | ICD-10-CM

## 2020-04-10 DIAGNOSIS — R40.4 TRANSIENT ALTERATION OF AWARENESS: ICD-10-CM

## 2020-04-10 PROCEDURE — G2012 BRIEF CHECK IN BY MD/QHP: HCPCS | Performed by: PHYSICIAN ASSISTANT

## 2020-04-13 PROBLEM — Z91.14 NONCOMPLIANCE WITH MEDICATIONS: Status: ACTIVE | Noted: 2020-04-13

## 2020-04-13 PROBLEM — R27.0 ATAXIA: Status: ACTIVE | Noted: 2020-04-13

## 2020-04-13 PROBLEM — Z91.148 NONCOMPLIANCE WITH MEDICATIONS: Status: ACTIVE | Noted: 2020-04-13

## 2020-04-16 PROBLEM — G11.8 SPINOCEREBELLAR ATAXIA (HCC): Status: ACTIVE | Noted: 2020-04-16

## 2020-04-20 ENCOUNTER — HOSPITAL ENCOUNTER (EMERGENCY)
Facility: HOSPITAL | Age: 40
Discharge: HOME | End: 2020-04-20
Attending: EMERGENCY MEDICINE | Admitting: EMERGENCY MEDICINE
Payer: COMMERCIAL

## 2020-04-20 ENCOUNTER — APPOINTMENT (EMERGENCY)
Dept: CT IMAGING | Facility: HOSPITAL | Age: 40
End: 2020-04-20
Payer: COMMERCIAL

## 2020-04-20 VITALS
SYSTOLIC BLOOD PRESSURE: 114 MMHG | BODY MASS INDEX: 28.32 KG/M2 | WEIGHT: 191.8 LBS | DIASTOLIC BLOOD PRESSURE: 70 MMHG | OXYGEN SATURATION: 99 % | TEMPERATURE: 98.9 F | HEART RATE: 103 BPM | RESPIRATION RATE: 16 BRPM

## 2020-04-20 DIAGNOSIS — R56.9 SEIZURE (HCC): Primary | ICD-10-CM

## 2020-04-20 LAB
ALBUMIN SERPL BCP-MCNC: 3.4 G/DL (ref 3.5–5)
ALP SERPL-CCNC: 63 U/L (ref 46–116)
ALT SERPL W P-5'-P-CCNC: 17 U/L (ref 12–78)
ANION GAP SERPL CALCULATED.3IONS-SCNC: 10 MMOL/L (ref 4–13)
AST SERPL W P-5'-P-CCNC: 18 U/L (ref 5–45)
BASOPHILS # BLD AUTO: 0.03 THOUSANDS/ΜL (ref 0–0.1)
BASOPHILS NFR BLD AUTO: 0 % (ref 0–1)
BILIRUB SERPL-MCNC: 0.23 MG/DL (ref 0.2–1)
BUN SERPL-MCNC: 7 MG/DL (ref 5–25)
CALCIUM SERPL-MCNC: 8.5 MG/DL (ref 8.3–10.1)
CHLORIDE SERPL-SCNC: 104 MMOL/L (ref 100–108)
CO2 SERPL-SCNC: 22 MMOL/L (ref 21–32)
CREAT SERPL-MCNC: 0.8 MG/DL (ref 0.6–1.3)
EOSINOPHIL # BLD AUTO: 0.01 THOUSAND/ΜL (ref 0–0.61)
EOSINOPHIL NFR BLD AUTO: 0 % (ref 0–6)
ERYTHROCYTE [DISTWIDTH] IN BLOOD BY AUTOMATED COUNT: 13.5 % (ref 11.6–15.1)
GFR SERPL CREATININE-BSD FRML MDRD: 94 ML/MIN/1.73SQ M
GLUCOSE SERPL-MCNC: 103 MG/DL (ref 65–140)
HCG SERPL QL: NEGATIVE
HCT VFR BLD AUTO: 35.7 % (ref 34.8–46.1)
HGB BLD-MCNC: 10.8 G/DL (ref 11.5–15.4)
IMM GRANULOCYTES # BLD AUTO: 0.03 THOUSAND/UL (ref 0–0.2)
IMM GRANULOCYTES NFR BLD AUTO: 0 % (ref 0–2)
LYMPHOCYTES # BLD AUTO: 0.87 THOUSANDS/ΜL (ref 0.6–4.47)
LYMPHOCYTES NFR BLD AUTO: 12 % (ref 14–44)
MCH RBC QN AUTO: 25.4 PG (ref 26.8–34.3)
MCHC RBC AUTO-ENTMCNC: 30.3 G/DL (ref 31.4–37.4)
MCV RBC AUTO: 84 FL (ref 82–98)
MONOCYTES # BLD AUTO: 0.65 THOUSAND/ΜL (ref 0.17–1.22)
MONOCYTES NFR BLD AUTO: 9 % (ref 4–12)
NEUTROPHILS # BLD AUTO: 5.45 THOUSANDS/ΜL (ref 1.85–7.62)
NEUTS SEG NFR BLD AUTO: 79 % (ref 43–75)
NRBC BLD AUTO-RTO: 0 /100 WBCS
PLATELET # BLD AUTO: 183 THOUSANDS/UL (ref 149–390)
PMV BLD AUTO: 13.7 FL (ref 8.9–12.7)
POTASSIUM SERPL-SCNC: 4.7 MMOL/L (ref 3.5–5.3)
PROT SERPL-MCNC: 7.3 G/DL (ref 6.4–8.2)
RBC # BLD AUTO: 4.25 MILLION/UL (ref 3.81–5.12)
SODIUM SERPL-SCNC: 136 MMOL/L (ref 136–145)
WBC # BLD AUTO: 7.04 THOUSAND/UL (ref 4.31–10.16)

## 2020-04-20 PROCEDURE — 84703 CHORIONIC GONADOTROPIN ASSAY: CPT | Performed by: EMERGENCY MEDICINE

## 2020-04-20 PROCEDURE — 36415 COLL VENOUS BLD VENIPUNCTURE: CPT | Performed by: EMERGENCY MEDICINE

## 2020-04-20 PROCEDURE — 85025 COMPLETE CBC W/AUTO DIFF WBC: CPT | Performed by: EMERGENCY MEDICINE

## 2020-04-20 PROCEDURE — 99284 EMERGENCY DEPT VISIT MOD MDM: CPT

## 2020-04-20 PROCEDURE — 99285 EMERGENCY DEPT VISIT HI MDM: CPT | Performed by: EMERGENCY MEDICINE

## 2020-04-20 PROCEDURE — 96374 THER/PROPH/DIAG INJ IV PUSH: CPT

## 2020-04-20 PROCEDURE — 80053 COMPREHEN METABOLIC PANEL: CPT | Performed by: EMERGENCY MEDICINE

## 2020-04-20 PROCEDURE — 72125 CT NECK SPINE W/O DYE: CPT

## 2020-04-20 PROCEDURE — 70450 CT HEAD/BRAIN W/O DYE: CPT

## 2020-04-20 RX ORDER — LEVETIRACETAM 250 MG/1
500 TABLET ORAL ONCE
Status: COMPLETED | OUTPATIENT
Start: 2020-04-20 | End: 2020-04-20

## 2020-04-20 RX ORDER — ACETAMINOPHEN 325 MG/1
975 TABLET ORAL ONCE
Status: COMPLETED | OUTPATIENT
Start: 2020-04-20 | End: 2020-04-20

## 2020-04-20 RX ORDER — KETOROLAC TROMETHAMINE 30 MG/ML
15 INJECTION, SOLUTION INTRAMUSCULAR; INTRAVENOUS ONCE
Status: COMPLETED | OUTPATIENT
Start: 2020-04-20 | End: 2020-04-20

## 2020-04-20 RX ORDER — LEVETIRACETAM 500 MG/1
500 TABLET ORAL EVERY 12 HOURS SCHEDULED
Qty: 40 TABLET | Refills: 0 | Status: SHIPPED | OUTPATIENT
Start: 2020-04-20 | End: 2020-05-21 | Stop reason: SDUPTHER

## 2020-04-20 RX ADMIN — LEVETIRACETAM 500 MG: 250 TABLET, FILM COATED ORAL at 14:05

## 2020-04-20 RX ADMIN — KETOROLAC TROMETHAMINE 15 MG: 30 INJECTION, SOLUTION INTRAMUSCULAR at 14:04

## 2020-04-20 RX ADMIN — ACETAMINOPHEN 975 MG: 325 TABLET ORAL at 13:03

## 2020-05-21 DIAGNOSIS — R56.9 SEIZURE (HCC): ICD-10-CM

## 2020-05-21 RX ORDER — LEVETIRACETAM 500 MG/1
TABLET ORAL
Qty: 180 TABLET | Refills: 0 | Status: SHIPPED | OUTPATIENT
Start: 2020-05-21 | End: 2020-10-12

## 2020-05-22 ENCOUNTER — TELEMEDICINE (OUTPATIENT)
Dept: NEUROLOGY | Facility: CLINIC | Age: 40
End: 2020-05-22
Payer: COMMERCIAL

## 2020-05-22 VITALS — BODY MASS INDEX: 25.48 KG/M2 | WEIGHT: 172 LBS | HEIGHT: 69 IN

## 2020-05-22 DIAGNOSIS — G43.709 CHRONIC MIGRAINE WITHOUT AURA WITHOUT STATUS MIGRAINOSUS, NOT INTRACTABLE: ICD-10-CM

## 2020-05-22 DIAGNOSIS — R63.0 ANOREXIA SYMPTOM: ICD-10-CM

## 2020-05-22 DIAGNOSIS — G11.8 SPINOCEREBELLAR ATAXIA (HCC): ICD-10-CM

## 2020-05-22 DIAGNOSIS — G31.9 CEREBELLAR ATROPHY (HCC): ICD-10-CM

## 2020-05-22 DIAGNOSIS — R56.9 SEIZURE (HCC): Primary | ICD-10-CM

## 2020-05-22 DIAGNOSIS — R27.0 ATAXIA: ICD-10-CM

## 2020-05-22 PROCEDURE — G2012 BRIEF CHECK IN BY MD/QHP: HCPCS | Performed by: PHYSICIAN ASSISTANT

## 2020-05-26 ENCOUNTER — TELEPHONE (OUTPATIENT)
Dept: NEUROLOGY | Facility: CLINIC | Age: 40
End: 2020-05-26

## 2020-06-01 ENCOUNTER — TELEPHONE (OUTPATIENT)
Dept: NEUROLOGY | Facility: CLINIC | Age: 40
End: 2020-06-01

## 2020-07-07 DIAGNOSIS — G43.709 CHRONIC MIGRAINE WITHOUT AURA WITHOUT STATUS MIGRAINOSUS, NOT INTRACTABLE: ICD-10-CM

## 2020-07-08 RX ORDER — GALCANEZUMAB 120 MG/ML
INJECTION, SOLUTION SUBCUTANEOUS
Qty: 1 PEN | Refills: 10 | Status: SHIPPED | OUTPATIENT
Start: 2020-07-08 | End: 2020-10-14 | Stop reason: SINTOL

## 2020-07-09 ENCOUNTER — TELEPHONE (OUTPATIENT)
Dept: NEUROLOGY | Facility: CLINIC | Age: 40
End: 2020-07-09

## 2020-07-09 NOTE — TELEPHONE ENCOUNTER
Spoke with Jeannine Bamberger, son who helped translate in [address]  Confirmed 7/10/2020 7:30AM [address] at [facility]  Covid screening completed for patient and her spouse who will accompany her the visit  Aware of policies

## 2020-07-10 ENCOUNTER — OFFICE VISIT (OUTPATIENT)
Dept: NEUROLOGY | Facility: CLINIC | Age: 40
End: 2020-07-10
Payer: COMMERCIAL

## 2020-07-10 VITALS
HEART RATE: 88 BPM | DIASTOLIC BLOOD PRESSURE: 57 MMHG | SYSTOLIC BLOOD PRESSURE: 102 MMHG | TEMPERATURE: 97.9 F | WEIGHT: 176 LBS | BODY MASS INDEX: 25.99 KG/M2

## 2020-07-10 DIAGNOSIS — R56.9 SEIZURE (HCC): ICD-10-CM

## 2020-07-10 DIAGNOSIS — G43.709 CHRONIC MIGRAINE WITHOUT AURA WITHOUT STATUS MIGRAINOSUS, NOT INTRACTABLE: Primary | ICD-10-CM

## 2020-07-10 DIAGNOSIS — G11.8 SPINOCEREBELLAR ATAXIA (HCC): ICD-10-CM

## 2020-07-10 DIAGNOSIS — Z91.14 NONCOMPLIANCE WITH MEDICATIONS: ICD-10-CM

## 2020-07-10 PROCEDURE — 99214 OFFICE O/P EST MOD 30 MIN: CPT | Performed by: PHYSICIAN ASSISTANT

## 2020-07-10 NOTE — PATIENT INSTRUCTIONS
For emgality side effect of swelling: Ice pack and take 25 mg benadryl 30 minutes prior to injection- take injection at night since you are taking benadryl

## 2020-07-10 NOTE — PROGRESS NOTES
Patient ID: Suzy Brewer is a 47 y o  female  Assessment/Plan:    Chronic migraine without aura without status migrainosus, not intractable  Her migraine headaches are well controlled with Emgality injection Q 30 days and she denies side effects  P r n  Headache she will use Tylenol  She describes site injection reaction of swelling and sometimes redness after the Emgality injection  I instructed her to place ice on the injection for a few minutes prior, and take Benadryl as well if needed  If this does not help she should call me right away  She denies any symptoms consistent with anaphylaxis, denies hives  Seizure (HCC)  Continue Keppra 500 mg q 12 hours  She is noncompliant with this medication, as she has been in the past with other seizure medications  Thankfully she has had no seizure-like activity since last seen  I reminded her that she has to take this twice a day or her seizure threshold will decrease  Seizure precautions again discussed in great detail with her and her   I reminded her that she needs to get the EEG that was ordered back in April  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[facility] )  Awaiting genetic testing results  Diagnoses and all orders for this visit:    Chronic migraine without aura without status migrainosus, not intractable    Seizure ([facility] [address] )    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[facility] )    Noncompliance with medications         The patient should not hesitate to call me prior to her follow up with any questions or concerns  The patient was instructed to urgently call 911 or present to the nearest emergency room with any new or worsening neurological deficits  This note was sent to Dr Pee Sr to co-sign in Dr Isai haynes  Subjective:    HPI    Ms Suzy Brewer is here for neurological follow-up for migraine headaches and seizure-like episodes, with her  Bella Boudreaux who provides Estonian translation      Since last seen she continues Keppra 500 mg q 12 hours and denies side effects  Sometimes she is not compliant with it  For example she typically only remembers to take it once a day  She has not had any seizure-like episodes since last seen, thankfully  I ordered a repeat EEG in April for her to have done, and she still did not get this done  She is very noncompliant with her medications and with testing I order for her  I reminded her today to get this done at her earliest convenience  She still takes Emgality injection for chronic migraines Q 30 days and denies side effects  She finds it to reduce migraines greater than 50%  Since taking it she has had 4 headaches approximately per month, or fewer, alleviated well with Tylenol  She is having a little swelling at the injection sites each time since last seen, but no hives, no shortness of breath or other symptoms of anaphylaxis  Prior documentation:  Since our last telephone encounter with her  in the beginning of April, the patient reported to the emergency room with a seizure    Please see the ED documentation in the chart regarding her seizure history on 4/20/2020      The patient states she does not remember any part of the event  Per family the patient was standing in the kitchen and she fell to the floor, hit her head and began seizing  There were convulsions of the extremities  Per ED the fall was unwitnessed but the family did hear the fall and came to help her  She was transported via ambulance to the ED and complained of a headache  It was noted that she had about 5 minutes of "decreased responsiveness    Patient does not remember anything of the event and she states she was very lethargic the entire day  The next day she felt better and she became more lucid the next day    It was documented in the ED chart that in the emergency room on 04/20 she was lucid and answering questions      She had gone a very long time without seizures, and her seizure activity was never clearly defined especially because she is a poor historian and primarily Tunisian-speaking  Her EEG in 2018 showed theta slowing consistent with mild-to-moderate diffuse cerebral dysfunction but no epileptiform discharges or focal abnormalities  I asked the patient to have a repeat EMG but she did not get this yet      When she saw Dr Cassie Miles in May 2018 it was noted that she only had 1 seizure at that time, but since then had at least 2 further possible generalized tonic-clonic seizures without any evidence on EEG      She is stop Depakote in the past because she was not compliant with it due to the size of the pill which she could not swallow  She states she was not compliant with Topamax as well  She has a lot of support from her daughter, son and  who assist her with ADLs  The following portions of the patient's history were reviewed and updated as appropriate:   She  has a past medical history of B12 deficiency, 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 ([address] ), Chronic migraine without aura, Dermatographism, Gait disturbance, Iron deficiency anemia, Lyme neuropathy, Seizures ([address] ), Visual disturbance, and Vitamin D deficiency    She   Patient Active Problem List    Diagnosis Date Noted    Prediabetes 08/18/2020    Anorexia symptom 05/22/2020    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    Ataxia 04/13/2020    Noncompliance with medications 04/13/2020    Cerebral atrophy ([address] ) 04/10/2020    Unsteady gait 04/10/2020    Gait instability 05/13/2019    Gastroesophageal reflux disease without esophagitis 03/01/2019    Seizure ([address] )     Microcytic anemia 04/03/2018    Chronic migraine without aura without status migrainosus, not intractable 03/02/2018    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 ([address] ) 03/02/2018    Dermatographism 05/16/2017    Allergic reaction 04/27/2017    Urticaria, chronic 04/27/2017    Diplopia 04/18/2017    Nystagmus 04/18/2017    Excessive vaginal bleeding 03/20/2017    B12 deficiency 02/20/2017    Iron deficiency anemia 2017    Lyme neuropathy 2017    Vitamin D deficiency 2017    Acid indigestion 2017     She  has a past surgical history that includes  section; Oophorectomy; Tubal ligation; and Esophagogastroduodenoscopy (N/A, 3/8/2019)  Her family history includes Alcohol abuse in her father; HIV in her maternal grandmother; Other in her mother and sister; Seizures in her mother  She  reports that she has never smoked  She has never used smokeless tobacco  She reports that she does not drink alcohol or use drugs  Current Outpatient Medications   Medication Sig Dispense Refill    EMGALITY 120 MG/ML SOAJ Inject 120mg (1 pen) subcutaneously once a month  1 pen 10    levETIRAcetam (KEPPRA) 500 mg tablet Take 1 tablet by mouth every 12 hours 180 tablet 0    ammonium lactate (LAC-HYDRIN) 12 % cream       cetirizine (ZyrTEC) 10 mg tablet Take by mouth      cholecalciferol (VITAMIN D3) 1,000 units tablet Take 1 tablet (1,000 Units total) by mouth daily (Patient not taking: Reported on 7/10/2020) 30 tablet 5    cyanocobalamin (VITAMIN B-12) 1000 MCG tablet Take 0 5 tablets (500 mcg total) by mouth daily (Patient not taking: Reported on 7/10/2020) 30 tablet 5    hydrocortisone 1 % cream       hydrOXYzine HCL (ATARAX) 10 mg tablet       magnesium oxide (MAG-OX) 400 mg Take 1 tablet (400 mg total) by mouth daily (Patient not taking: Reported on 7/10/2020) 30 tablet 3    Misc   Devices (CANE) MISC Use daily for balance (Patient not taking: Reported on 7/10/2020) 1 each 0    naproxen (NAPROSYN) 250 mg tablet Take 1 tablet (250 mg total) by mouth 3 (three) times a day with meals for 7 days 21 tablet 0    naproxen (NAPROSYN) 500 mg tablet Take 1 tablet (500 mg total) by mouth 2 (two) times a day with meals (Patient not taking: Reported on 7/10/2020) 30 tablet 0    omeprazole (PriLOSEC) 20 mg delayed release capsule Take 1 capsule (20 mg total) by mouth daily 90 capsule 0     No current facility-administered medications for this visit  She is allergic to dye fdc red  [red dye] and morphine            Objective:    Blood pressure 102/57, pulse 88, temperature 97 9 °F (36 6 °C), weight 79 8 kg (176 lb)  Physical Exam    Neurological Exam  Vital signs reviewed  Well developed, well nourished  Speech is fluent and articulate  Mood is pleasant  She does not converse very much  Head: Normocephalic, atraumatic  CN 1-90: intact and symmetric, including EOMs which are normal b/l and PERRL  Fundi b/l are normal to crude ophthalmological examination  MSK: 5/5 t/o  ROM normal x all 4 extr   Dysmetria in both upper extremities  Sensation: Inact to LT and temp x4 extr  Romberg positive  Reflexes: 2+ and symmetric in all 4 extr  , except trace in the ankles b/l  Coordination: dystaxic with HTS b/l   Gait: Wide-based and ataxic gait  Holds onto her  to walk  Difficulty arising from the exam table due to low back pain/ stiffness  ROS:    Review of Systems   Constitutional: Negative  Negative for appetite change and fever  HENT: Negative  Negative for hearing loss, tinnitus, trouble swallowing and voice change  Eyes: Negative  Negative for photophobia and pain  Respiratory: Negative  Negative for shortness of breath  Cardiovascular: Negative  Negative for palpitations  Gastrointestinal: Negative  Negative for nausea and vomiting  Endocrine: Negative  Negative for cold intolerance  Genitourinary: Negative  Negative for dysuria, frequency and urgency  Musculoskeletal: Negative  Negative for myalgias and neck pain  Skin: Negative  Negative for rash  Neurological: Negative  Negative for dizziness, tremors, seizures, syncope, facial asymmetry, speech difficulty, weakness, light-headedness, numbness and headaches  Hematological: Negative  Does not bruise/bleed easily  Psychiatric/Behavioral: Negative    Negative for confusion, hallucinations and sleep disturbance  The following portions of the patient's history were reviewed and updated as appropriate: allergies, current medications/ medication history, past family history, past medical history, past social history, past surgical history and problem list     Review of systems was reviewed and otherwise unremarkable from a neurological perspective

## 2020-08-04 ENCOUNTER — TELEPHONE (OUTPATIENT)
Dept: FAMILY MEDICINE CLINIC | Facility: CLINIC | Age: 40
End: 2020-08-04

## 2020-08-04 NOTE — TELEPHONE ENCOUNTER
Called patient to remind her to get her labs done - called via -  patient rescheduled appointment and agreed to get labs done before next appointment

## 2020-08-15 LAB — HBA1C MFR BLD HPLC: 5.9 %

## 2020-08-18 ENCOUNTER — OFFICE VISIT (OUTPATIENT)
Dept: FAMILY MEDICINE CLINIC | Facility: CLINIC | Age: 40
End: 2020-08-18
Payer: COMMERCIAL

## 2020-08-18 VITALS
RESPIRATION RATE: 17 BRPM | OXYGEN SATURATION: 97 % | DIASTOLIC BLOOD PRESSURE: 60 MMHG | WEIGHT: 178 LBS | SYSTOLIC BLOOD PRESSURE: 110 MMHG | TEMPERATURE: 97.2 F | HEIGHT: 69 IN | BODY MASS INDEX: 26.36 KG/M2 | HEART RATE: 85 BPM

## 2020-08-18 DIAGNOSIS — A04.8 H. PYLORI INFECTION: ICD-10-CM

## 2020-08-18 DIAGNOSIS — K21.9 GASTROESOPHAGEAL REFLUX DISEASE WITHOUT ESOPHAGITIS: ICD-10-CM

## 2020-08-18 DIAGNOSIS — R56.9 SEIZURE (HCC): ICD-10-CM

## 2020-08-18 DIAGNOSIS — R73.03 PREDIABETES: ICD-10-CM

## 2020-08-18 DIAGNOSIS — D50.9 IRON DEFICIENCY ANEMIA, UNSPECIFIED IRON DEFICIENCY ANEMIA TYPE: Primary | ICD-10-CM

## 2020-08-18 PROBLEM — R41.82 ALTERED MENTAL STATUS: Status: RESOLVED | Noted: 2018-04-03 | Resolved: 2020-08-18

## 2020-08-18 PROCEDURE — 3725F SCREEN DEPRESSION PERFORMED: CPT | Performed by: FAMILY MEDICINE

## 2020-08-18 PROCEDURE — 1036F TOBACCO NON-USER: CPT | Performed by: FAMILY MEDICINE

## 2020-08-18 PROCEDURE — 99214 OFFICE O/P EST MOD 30 MIN: CPT | Performed by: FAMILY MEDICINE

## 2020-08-18 PROCEDURE — 3008F BODY MASS INDEX DOCD: CPT | Performed by: FAMILY MEDICINE

## 2020-08-18 RX ORDER — OMEPRAZOLE 20 MG/1
20 CAPSULE, DELAYED RELEASE ORAL DAILY
Qty: 90 CAPSULE | Refills: 0 | Status: SHIPPED | OUTPATIENT
Start: 2020-08-18 | End: 2020-11-23 | Stop reason: SDUPTHER

## 2020-08-18 NOTE — ASSESSMENT & PLAN NOTE
Discussed importance of diet and lifestyle modifications to control GERD symptoms  Avoid things which worsen heartburn (ex-coffee,tomato based products, spicy foods,  tobacco,alcohol,obesity,tight fitting clothing, citrus fruits, garlic, onion, chocolate and mint)  Discussed the importance of eating small, frequent meals instead of large meals  Elevate head end of the bed and do not lay down 2-3 hours following a meal   Use omeprazole daily  Will titrate down after 3 months to Pepcid  Pt also reports symptoms with having milk products and mayonnaise  Advised to avoid these, substitute lactacid or non daily milk or take lactacid tablets

## 2020-08-18 NOTE — PROGRESS NOTES
Assessment/Plan:    Gastroesophageal reflux disease without esophagitis  Discussed importance of diet and lifestyle modifications to control GERD symptoms  Avoid things which worsen heartburn (ex-coffee,tomato based products, spicy foods,  tobacco,alcohol,obesity,tight fitting clothing, citrus fruits, garlic, onion, chocolate and mint)  Discussed the importance of eating small, frequent meals instead of large meals  Elevate head end of the bed and do not lay down 2-3 hours following a meal   Use omeprazole daily  Will titrate down after 3 months to Pepcid  Pt also reports symptoms with having milk products and mayonnaise  Advised to avoid these, substitute lactacid or non daily milk or take lactacid tablets  Seizure ([address] )  Pt advised to set up an appt with neurology  Prediabetes  Worse  Discussed dietary modifications  Recheck in 6 months  BMI Counseling: Body mass index is 26 29 kg/m²  The BMI is above normal  Nutrition recommendations include decreasing portion sizes, encouraging healthy choices of fruits and vegetables, decreasing fast food intake, consuming healthier snacks and limiting drinks that contain sugar  Exercise recommendations include moderate physical activity 150 minutes/week  Diagnoses and all orders for this visit:    Iron deficiency anemia, unspecified iron deficiency anemia type  -     CBC and differential; Future    H  pylori infection  -     omeprazole (PriLOSEC) 20 mg delayed release capsule; Take 1 capsule (20 mg total) by mouth daily    Prediabetes  -     Hemoglobin A1C; Future    Seizure (HCC)  -     Comprehensive metabolic panel; Future    Gastroesophageal reflux disease without esophagitis          Subjective: Chronic conditions checkup     Patient ID: Keya Sawyer is a 47 y o  female  HPI    Labs reviewed with patient today  vitamin-D is 15, hemoglobin A1c is worse at 5 9 from 5 7, lipid panel with , HDL 66, triglycerides 62 and total cholesterol 180   Pt reports a seizure in July  She did not go to the ER or talk to neurology because she doesn't want to see extra doctors with covid around  She agrees to see neurology or at least speak with them  Also reports that emgality isn't working  Again advised her to set up an appt with neurology  Needs refills of omeprazole  She reports having abdominal pain almost every day  Has not tried lifestyle or dietary modifications  The following portions of the patient's history were reviewed and updated as appropriate: allergies, current medications, past family history, past medical history, past social history, past surgical history and problem list     Review of Systems   Constitutional: Negative for fever and unexpected weight change  HENT: Negative for ear pain, sore throat and trouble swallowing  Eyes: Negative for pain and visual disturbance  Respiratory: Negative for cough, chest tightness, shortness of breath and wheezing  Cardiovascular: Negative for chest pain  Gastrointestinal: Negative for abdominal distention, abdominal pain, blood in stool, constipation, diarrhea, nausea and vomiting  Endocrine: Negative for polydipsia and polyuria  Genitourinary: Negative for dysuria and hematuria  Musculoskeletal: Negative for back pain and myalgias  Skin: Negative for rash  Neurological: Negative for syncope and headaches  Psychiatric/Behavioral: Negative for suicidal ideas           PHQ-9 Depression Screening    PHQ-9:    Frequency of the following problems over the past two weeks:       Little interest or pleasure in doing things:  0 - not at all  Feeling down, depressed, or hopeless:  0 - not at all  PHQ-2 Score:  0           Objective:      /60 (BP Location: Left arm, Patient Position: Sitting, Cuff Size: Standard)   Pulse 85   Temp (!) 97 2 °F (36 2 °C) (Tympanic)   Resp 17   Ht 5' 9" (1 753 m)   Wt 80 7 kg (178 lb)   SpO2 97%   BMI 26 29 kg/m²          Physical Exam  Constitutional:       Appearance: She is well-developed  HENT:      Head: Normocephalic and atraumatic  Right Ear: External ear normal       Left Ear: External ear normal       Mouth/Throat:      Pharynx: No oropharyngeal exudate  Eyes:      General: No scleral icterus  Conjunctiva/sclera: Conjunctivae normal       Pupils: Pupils are equal, round, and reactive to light  Neck:      Musculoskeletal: Normal range of motion and neck supple  Cardiovascular:      Rate and Rhythm: Normal rate and regular rhythm  Heart sounds: No murmur  No friction rub  No gallop  Pulmonary:      Effort: Pulmonary effort is normal  No respiratory distress  Breath sounds: Normal breath sounds  No wheezing or rales  Abdominal:      General: Bowel sounds are normal  There is no distension  Palpations: Abdomen is soft  There is no mass  Tenderness: There is no abdominal tenderness  There is no rebound  Musculoskeletal: Normal range of motion  Skin:     General: Skin is warm and dry  Neurological:      Mental Status: She is alert and oriented to person, place, and time        Gait: Gait abnormal

## 2020-08-18 NOTE — PATIENT INSTRUCTIONS
Discussed importance of diet and lifestyle modifications to control GERD symptoms  Avoid things which worsen heartburn (ex-coffee,tomato based products, spicy foods,  tobacco,alcohol,obesity,tight fitting clothing, citrus fruits, garlic, onion, chocolate and mint)  Discussed the importance of eating small, frequent meals instead of large meals  Elevate head end of the bed and do not lay down 2-3 hours following a meal      Exercise should be 30 minutes 5 times weekly of moderate intensity activity, brisk walking acceptable  Recommended diet include mediterranean and DASH  Primary focus should be unprocessed foods, fresh fruits &  vegetables, plant based fats and protein, legumes, whole grain and nuts  Vegetables and fruit should make up 1/2 each meal  Limit red meats, fast food and eating out at restaurants

## 2020-09-14 NOTE — ASSESSMENT & PLAN NOTE
Her migraine headaches are well controlled with Emgality injection Q 30 days and she denies side effects  P r n  Headache she will use Tylenol  She describes site injection reaction of swelling and sometimes redness after the Emgality injection  I instructed her to place ice on the injection for a few minutes prior, and take Benadryl as well if needed  If this does not help she should call me right away  She denies any symptoms consistent with anaphylaxis, denies hives

## 2020-09-14 NOTE — ASSESSMENT & PLAN NOTE
Continue Keppra 500 mg q 12 hours  She is noncompliant with this medication, as she has been in the past with other seizure medications  Thankfully she has had no seizure-like activity since last seen  I reminded her that she has to take this twice a day or her seizure threshold will decrease  Seizure precautions again discussed in great detail with her and her   I reminded her that she needs to get the EEG that was ordered back in April

## 2020-09-30 ENCOUNTER — ANNUAL EXAM (OUTPATIENT)
Dept: FAMILY MEDICINE CLINIC | Facility: CLINIC | Age: 40
End: 2020-09-30
Payer: COMMERCIAL

## 2020-09-30 VITALS
TEMPERATURE: 97.2 F | SYSTOLIC BLOOD PRESSURE: 102 MMHG | WEIGHT: 176.2 LBS | HEIGHT: 69 IN | RESPIRATION RATE: 16 BRPM | DIASTOLIC BLOOD PRESSURE: 60 MMHG | BODY MASS INDEX: 26.1 KG/M2 | HEART RATE: 100 BPM | OXYGEN SATURATION: 99 %

## 2020-09-30 DIAGNOSIS — Z12.4 PAP SMEAR FOR CERVICAL CANCER SCREENING: ICD-10-CM

## 2020-09-30 DIAGNOSIS — Z01.419 WOMEN'S ANNUAL ROUTINE GYNECOLOGICAL EXAMINATION: Primary | ICD-10-CM

## 2020-09-30 PROCEDURE — 99214 OFFICE O/P EST MOD 30 MIN: CPT | Performed by: FAMILY MEDICINE

## 2020-09-30 PROCEDURE — G0145 SCR C/V CYTO,THINLAYER,RESCR: HCPCS | Performed by: FAMILY MEDICINE

## 2020-09-30 PROCEDURE — 87624 HPV HI-RISK TYP POOLED RSLT: CPT | Performed by: FAMILY MEDICINE

## 2020-09-30 NOTE — ASSESSMENT & PLAN NOTE
Normal gynecological physical examination  Patient will be seen in 1 year for routine gynecologic and medical examination  Patient will call office for any problems, concerns, or issues which may arise during the interim

## 2020-09-30 NOTE — PROGRESS NOTES
ANNUAL GYNECOLOGICAL EXAMINATION    Silvia Canela is a 47 y o  female who presents today for annual GYN exam   Her last pap smear was performed  and result was normal   She reports no history of abnormal pap smears in her past  She contraceptive method is none  Her general medical history has been reviewed and she reports it as follows:    Past Medical History:   Diagnosis Date    B12 deficiency     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 ([address] )     Chronic migraine without aura     Dermatographism     Gait disturbance     Iron deficiency anemia     Lyme neuropathy     Seizures (HCC)     Visual disturbance     Diplopia, nystagmus, decreased visual acuity    Vitamin D deficiency      Past Surgical History:   Procedure Laterality Date     SECTION      ESOPHAGOGASTRODUODENOSCOPY N/A 3/8/2019    Procedure: ESOPHAGOGASTRODUODENOSCOPY (EGD); Surgeon: Gaviota Chua MD;  Location: [facility]; Service: Gastroenterology    OOPHORECTOMY      unilateral --  last assessed 17    TUBAL LIGATION      last assessed 17     OB History    No obstetric history on file  Social History     Tobacco Use    Smoking status: Never Smoker    Smokeless tobacco: Never Used   Substance Use Topics    Alcohol use: No    Drug use: No     Cancer-related family history is not on file      Current Outpatient Medications:     levETIRAcetam (KEPPRA) 500 mg tablet, Take 1 tablet by mouth every 12 hours, Disp: 180 tablet, Rfl: 0    ammonium lactate (LAC-HYDRIN) 12 % cream, , Disp: , Rfl:     cetirizine (ZyrTEC) 10 mg tablet, Take by mouth, Disp: , Rfl:     cholecalciferol (VITAMIN D3) 1,000 units tablet, Take 1 tablet (1,000 Units total) by mouth daily (Patient not taking: Reported on 7/10/2020), Disp: 30 tablet, Rfl: 5    cyanocobalamin (VITAMIN B-12) 1000 MCG tablet, Take 0 5 tablets (500 mcg total) by mouth daily (Patient not taking: Reported on 7/10/2020), Disp: 30 tablet, Rfl: 5    EMGALITY 120 MG/ML SOAJ, Inject 120mg (1 pen) subcutaneously once a month  (Patient not taking: Reported on 9/30/2020), Disp: 1 pen, Rfl: 10    hydrocortisone 1 % cream, , Disp: , Rfl:     hydrOXYzine HCL (ATARAX) 10 mg tablet, , Disp: , Rfl:     magnesium oxide (MAG-OX) 400 mg, Take 1 tablet (400 mg total) by mouth daily (Patient not taking: Reported on 7/10/2020), Disp: 30 tablet, Rfl: 3    Misc  Devices (CANE) MISC, Use daily for balance (Patient not taking: Reported on 7/10/2020), Disp: 1 each, Rfl: 0    naproxen (NAPROSYN) 250 mg tablet, Take 1 tablet (250 mg total) by mouth 3 (three) times a day with meals for 7 days, Disp: 21 tablet, Rfl: 0    naproxen (NAPROSYN) 500 mg tablet, Take 1 tablet (500 mg total) by mouth 2 (two) times a day with meals (Patient not taking: Reported on 7/10/2020), Disp: 30 tablet, Rfl: 0    omeprazole (PriLOSEC) 20 mg delayed release capsule, Take 1 capsule (20 mg total) by mouth daily, Disp: 90 capsule, Rfl: 0    Review of Systems:  Review of Systems   Constitutional: Negative for fever and unexpected weight change  HENT: Negative for ear pain, sore throat and trouble swallowing  Eyes: Negative for pain and visual disturbance  Respiratory: Negative for cough, chest tightness, shortness of breath and wheezing  Cardiovascular: Negative for chest pain  Gastrointestinal: Negative for abdominal distention, abdominal pain, blood in stool, constipation, diarrhea, nausea and vomiting  Endocrine: Negative for polydipsia and polyuria  Genitourinary: Negative for dysuria and hematuria  Musculoskeletal: Negative for back pain and myalgias  Skin: Negative for rash  Neurological: Negative for syncope and headaches  Psychiatric/Behavioral: Negative for suicidal ideas  Physical Exam:  Physical Exam  Chest:      Breasts: Breasts are symmetrical          Right: No inverted nipple, mass, nipple discharge, skin change or tenderness           Left: No inverted nipple, mass, nipple discharge, skin change or tenderness  Abdominal:      Hernia: There is no hernia in the left inguinal area  Genitourinary:     Labia:         Right: No rash, tenderness, lesion or injury  Left: No rash, tenderness, lesion or injury  Vagina: No foreign body  No vaginal discharge, erythema or tenderness  Cervix: No cervical motion tenderness, discharge or friability  Adnexa:         Right: No mass, tenderness or fullness  Left: No mass, tenderness or fullness  Assessment:   1  Normal well-woman GYN exam     Plan:   1   Pap smear done with HPV co-testing

## 2020-10-05 LAB
HPV HR 12 DNA CVX QL NAA+PROBE: NEGATIVE
HPV16 DNA CVX QL NAA+PROBE: NEGATIVE
HPV18 DNA CVX QL NAA+PROBE: NEGATIVE

## 2020-10-08 LAB
LAB AP GYN PRIMARY INTERPRETATION: NORMAL
Lab: NORMAL

## 2020-10-09 ENCOUNTER — TELEPHONE (OUTPATIENT)
Dept: NEUROLOGY | Facility: CLINIC | Age: 40
End: 2020-10-09

## 2020-10-10 DIAGNOSIS — R56.9 SEIZURE (HCC): ICD-10-CM

## 2020-10-12 RX ORDER — LEVETIRACETAM 500 MG/1
TABLET ORAL
Qty: 180 TABLET | Refills: 0 | Status: SHIPPED | OUTPATIENT
Start: 2020-10-12 | End: 2021-01-19 | Stop reason: SDUPTHER

## 2020-10-13 ENCOUNTER — TELEPHONE (OUTPATIENT)
Dept: NEUROLOGY | Facility: CLINIC | Age: 40
End: 2020-10-13

## 2020-10-14 ENCOUNTER — OFFICE VISIT (OUTPATIENT)
Dept: NEUROLOGY | Facility: CLINIC | Age: 40
End: 2020-10-14
Payer: COMMERCIAL

## 2020-10-14 VITALS
BODY MASS INDEX: 26.1 KG/M2 | WEIGHT: 176.2 LBS | SYSTOLIC BLOOD PRESSURE: 97 MMHG | HEART RATE: 84 BPM | HEIGHT: 69 IN | DIASTOLIC BLOOD PRESSURE: 68 MMHG

## 2020-10-14 DIAGNOSIS — R56.9 SEIZURE (HCC): ICD-10-CM

## 2020-10-14 DIAGNOSIS — R21 RASH: ICD-10-CM

## 2020-10-14 DIAGNOSIS — G43.709 CHRONIC MIGRAINE WITHOUT AURA WITHOUT STATUS MIGRAINOSUS, NOT INTRACTABLE: Primary | ICD-10-CM

## 2020-10-14 DIAGNOSIS — R27.0 ATAXIA: ICD-10-CM

## 2020-10-14 PROCEDURE — 1036F TOBACCO NON-USER: CPT | Performed by: PHYSICIAN ASSISTANT

## 2020-10-14 PROCEDURE — 99214 OFFICE O/P EST MOD 30 MIN: CPT | Performed by: PHYSICIAN ASSISTANT

## 2020-10-14 PROCEDURE — 96372 THER/PROPH/DIAG INJ SC/IM: CPT | Performed by: PHYSICIAN ASSISTANT

## 2020-10-14 RX ORDER — DIAPER,BRIEF,INFANT-TODD,DISP
EACH MISCELLANEOUS
Qty: 30 G | Refills: 0 | Status: SHIPPED | OUTPATIENT
Start: 2020-10-14

## 2020-10-14 RX ORDER — KETOROLAC TROMETHAMINE 30 MG/ML
60 INJECTION, SOLUTION INTRAMUSCULAR; INTRAVENOUS ONCE
Status: COMPLETED | OUTPATIENT
Start: 2020-10-14 | End: 2020-10-14

## 2020-10-14 RX ORDER — FREMANEZUMAB-VFRM 225 MG/1.5ML
INJECTION SUBCUTANEOUS
Qty: 1 PEN | Refills: 11 | Status: SHIPPED | OUTPATIENT
Start: 2020-10-14 | End: 2020-10-23 | Stop reason: SDUPTHER

## 2020-10-14 RX ORDER — LEVETIRACETAM 250 MG/1
TABLET ORAL
Qty: 180 TABLET | Refills: 3 | Status: SHIPPED | OUTPATIENT
Start: 2020-10-14 | End: 2021-05-12 | Stop reason: ALTCHOICE

## 2020-10-14 RX ADMIN — KETOROLAC TROMETHAMINE 60 MG: 30 INJECTION, SOLUTION INTRAMUSCULAR; INTRAVENOUS at 08:43

## 2020-10-15 PROBLEM — R26.81 UNSTEADY GAIT: Status: RESOLVED | Noted: 2020-04-10 | Resolved: 2020-10-15

## 2020-10-15 PROBLEM — R63.0 ANOREXIA SYMPTOM: Status: RESOLVED | Noted: 2020-05-22 | Resolved: 2020-10-15

## 2020-10-16 ENCOUNTER — TELEPHONE (OUTPATIENT)
Dept: NEUROLOGY | Facility: CLINIC | Age: 40
End: 2020-10-16

## 2020-10-16 DIAGNOSIS — G43.709 CHRONIC MIGRAINE WITHOUT AURA WITHOUT STATUS MIGRAINOSUS, NOT INTRACTABLE: ICD-10-CM

## 2020-10-19 ENCOUNTER — TELEPHONE (OUTPATIENT)
Dept: FAMILY MEDICINE CLINIC | Facility: CLINIC | Age: 40
End: 2020-10-19

## 2020-10-26 ENCOUNTER — OFFICE VISIT (OUTPATIENT)
Dept: FAMILY MEDICINE CLINIC | Facility: CLINIC | Age: 40
End: 2020-10-26
Payer: COMMERCIAL

## 2020-10-26 VITALS
SYSTOLIC BLOOD PRESSURE: 110 MMHG | RESPIRATION RATE: 16 BRPM | TEMPERATURE: 97 F | WEIGHT: 174.8 LBS | HEART RATE: 90 BPM | DIASTOLIC BLOOD PRESSURE: 68 MMHG | BODY MASS INDEX: 25.89 KG/M2 | HEIGHT: 69 IN | OXYGEN SATURATION: 97 %

## 2020-10-26 DIAGNOSIS — B96.89 BACTERIAL VAGINITIS: ICD-10-CM

## 2020-10-26 DIAGNOSIS — N89.8 VAGINAL ODOR: Primary | ICD-10-CM

## 2020-10-26 DIAGNOSIS — N76.0 BACTERIAL VAGINITIS: ICD-10-CM

## 2020-10-26 PROCEDURE — 87491 CHLMYD TRACH DNA AMP PROBE: CPT | Performed by: FAMILY MEDICINE

## 2020-10-26 PROCEDURE — 99214 OFFICE O/P EST MOD 30 MIN: CPT | Performed by: FAMILY MEDICINE

## 2020-10-26 PROCEDURE — 3008F BODY MASS INDEX DOCD: CPT | Performed by: FAMILY MEDICINE

## 2020-10-26 PROCEDURE — 87591 N.GONORRHOEAE DNA AMP PROB: CPT | Performed by: FAMILY MEDICINE

## 2020-10-26 RX ORDER — FREMANEZUMAB-VFRM 225 MG/1.5ML
INJECTION SUBCUTANEOUS
Qty: 1 PEN | Refills: 11 | Status: SHIPPED | OUTPATIENT
Start: 2020-10-26 | End: 2021-03-12 | Stop reason: SDUPTHER

## 2020-10-26 RX ORDER — METRONIDAZOLE 7.5 MG/G
1 GEL VAGINAL 2 TIMES DAILY
Qty: 70 G | Refills: 0 | Status: SHIPPED | OUTPATIENT
Start: 2020-10-26 | End: 2020-10-31

## 2020-10-27 LAB
C TRACH DNA SPEC QL NAA+PROBE: NEGATIVE
N GONORRHOEA DNA SPEC QL NAA+PROBE: NEGATIVE

## 2020-11-23 DIAGNOSIS — A04.8 H. PYLORI INFECTION: ICD-10-CM

## 2020-11-23 RX ORDER — OMEPRAZOLE 20 MG/1
20 CAPSULE, DELAYED RELEASE ORAL DAILY
Qty: 90 CAPSULE | Refills: 0 | Status: SHIPPED | OUTPATIENT
Start: 2020-11-23 | End: 2021-02-25 | Stop reason: SDUPTHER

## 2021-01-19 DIAGNOSIS — R56.9 SEIZURE (HCC): ICD-10-CM

## 2021-01-19 RX ORDER — LEVETIRACETAM 500 MG/1
TABLET ORAL
Qty: 180 TABLET | Refills: 0 | Status: SHIPPED | OUTPATIENT
Start: 2021-01-19 | End: 2021-05-12 | Stop reason: ALTCHOICE

## 2021-01-19 NOTE — TELEPHONE ENCOUNTER
levETIRAcetam (KEPPRA) 500 mg tablet     I called Moreno Vidales to check and see why she has not done her Levetiracetam Level blood work and she stated she will be going tomorrow 01/20/21 to have it done  She also stated she had been trying to get through to the office to request a refill on her Levetiracetam 500 mg  She is completely out  Medication refill check list    Correct patient? yes   Correct medication name, dose, and pill size? yes   Correct provider? yes   Last and Next appt  scheduled? Yes, last date 10/14/20 & next date 02/02/21   Right pharmacy listed? yes   Correct quantity for 30 or 90 days? yes   Is the patient out of refills? When was it last prescribed? Yes, last date 10/12/20   Directions match what the patient says they are taking?  yes   Enough refills? (none for controlled substances, 1 year for routine medications) yes

## 2021-01-21 LAB — HBA1C MFR BLD HPLC: 5.7 %

## 2021-01-26 ENCOUNTER — TELEPHONE (OUTPATIENT)
Dept: FAMILY MEDICINE CLINIC | Facility: CLINIC | Age: 41
End: 2021-01-26

## 2021-01-26 NOTE — TELEPHONE ENCOUNTER
----- Message from Calvin Roper MD sent at 1/25/2021  8:48 AM EST -----  Please inform patient of her labs  She is iron deficient  Restart ferrous sulfate (iron tablets) which are over the counter and take with vit c tablets which are also over the counter

## 2021-01-28 ENCOUNTER — TELEPHONE (OUTPATIENT)
Dept: NEUROLOGY | Facility: CLINIC | Age: 41
End: 2021-01-28

## 2021-01-28 NOTE — TELEPHONE ENCOUNTER
Upper sor[place] speaking patient  LM on VM to move 2/2 appt with [address] to 2/1 as a virtual or in person in CV for  815am, 12pm, 1pm, 145pm---OK to overbook per [facility]  Please transfer call to [facility]  or [phone]

## 2021-01-29 NOTE — TELEPHONE ENCOUNTER
Spoke with patient  She is agreeable to rescheduling 2/2 appt  Accepted 2/1 Virtual visit on Monday 2/1 at 230pm     D[employer]im[employer] video visit explained to patient, advised her that spouse must be present to be able to serve as [facility]    She expressed understanding

## 2021-02-01 ENCOUNTER — TELEPHONE (OUTPATIENT)
Dept: NEUROLOGY | Facility: CLINIC | Age: 41
End: 2021-02-01

## 2021-02-01 PROBLEM — D64.9 LOW HEMOGLOBIN: Status: ACTIVE | Noted: 2021-02-01

## 2021-02-01 NOTE — TELEPHONE ENCOUNTER
T/C to patient with assistance from  Triny Ochoa, # [id number], to assist patient in rescheduling her no-show appointment today  Patient accepted visit for 3/12 at [facility] office with Lyly Young

## 2021-02-25 DIAGNOSIS — A04.8 H. PYLORI INFECTION: ICD-10-CM

## 2021-02-25 RX ORDER — OMEPRAZOLE 20 MG/1
20 CAPSULE, DELAYED RELEASE ORAL DAILY
Qty: 30 CAPSULE | Refills: 2 | Status: SHIPPED | OUTPATIENT
Start: 2021-02-25 | End: 2021-03-03 | Stop reason: SDUPTHER

## 2021-03-03 ENCOUNTER — OFFICE VISIT (OUTPATIENT)
Dept: FAMILY MEDICINE CLINIC | Facility: CLINIC | Age: 41
End: 2021-03-03
Payer: COMMERCIAL

## 2021-03-03 VITALS
WEIGHT: 171 LBS | OXYGEN SATURATION: 97 % | BODY MASS INDEX: 25.33 KG/M2 | DIASTOLIC BLOOD PRESSURE: 70 MMHG | RESPIRATION RATE: 16 BRPM | HEART RATE: 85 BPM | HEIGHT: 69 IN | SYSTOLIC BLOOD PRESSURE: 100 MMHG | TEMPERATURE: 98.1 F

## 2021-03-03 DIAGNOSIS — D50.9 IRON DEFICIENCY ANEMIA, UNSPECIFIED IRON DEFICIENCY ANEMIA TYPE: ICD-10-CM

## 2021-03-03 DIAGNOSIS — Z00.00 ANNUAL PHYSICAL EXAM: Primary | ICD-10-CM

## 2021-03-03 DIAGNOSIS — R56.9 SEIZURE (HCC): ICD-10-CM

## 2021-03-03 DIAGNOSIS — E53.8 B12 DEFICIENCY: ICD-10-CM

## 2021-03-03 DIAGNOSIS — G43.709 CHRONIC MIGRAINE WITHOUT AURA WITHOUT STATUS MIGRAINOSUS, NOT INTRACTABLE: ICD-10-CM

## 2021-03-03 DIAGNOSIS — G31.9 CEREBELLAR ATROPHY (HCC): ICD-10-CM

## 2021-03-03 DIAGNOSIS — N92.1 MENORRHAGIA WITH IRREGULAR CYCLE: ICD-10-CM

## 2021-03-03 DIAGNOSIS — A04.8 H. PYLORI INFECTION: ICD-10-CM

## 2021-03-03 PROBLEM — N93.9 EXCESSIVE VAGINAL BLEEDING: Status: RESOLVED | Noted: 2017-03-20 | Resolved: 2021-03-03

## 2021-03-03 PROCEDURE — 99395 PREV VISIT EST AGE 18-39: CPT | Performed by: FAMILY MEDICINE

## 2021-03-03 PROCEDURE — 3725F SCREEN DEPRESSION PERFORMED: CPT | Performed by: FAMILY MEDICINE

## 2021-03-03 PROCEDURE — 3008F BODY MASS INDEX DOCD: CPT | Performed by: FAMILY MEDICINE

## 2021-03-03 PROCEDURE — 1036F TOBACCO NON-USER: CPT | Performed by: FAMILY MEDICINE

## 2021-03-03 RX ORDER — FERROUS SULFATE TAB EC 324 MG (65 MG FE EQUIVALENT) 324 (65 FE) MG
324 TABLET DELAYED RESPONSE ORAL DAILY
Qty: 90 TABLET | Refills: 1 | Status: SHIPPED | OUTPATIENT
Start: 2021-03-03 | End: 2021-07-15 | Stop reason: SDUPTHER

## 2021-03-03 RX ORDER — OMEPRAZOLE 20 MG/1
20 CAPSULE, DELAYED RELEASE ORAL DAILY
Qty: 90 CAPSULE | Refills: 1 | Status: SHIPPED | OUTPATIENT
Start: 2021-03-03 | End: 2021-07-15 | Stop reason: SDUPTHER

## 2021-03-03 RX ORDER — KETOROLAC TROMETHAMINE 30 MG/ML
30 INJECTION, SOLUTION INTRAMUSCULAR; INTRAVENOUS ONCE
Status: DISCONTINUED | OUTPATIENT
Start: 2021-03-03 | End: 2021-03-03

## 2021-03-03 RX ORDER — ACETAMINOPHEN AND CODEINE PHOSPHATE 120; 12 MG/5ML; MG/5ML
1 SOLUTION ORAL DAILY
Qty: 28 TABLET | Refills: 5 | Status: SHIPPED | OUTPATIENT
Start: 2021-03-03 | End: 2022-01-05 | Stop reason: SDUPTHER

## 2021-03-03 RX ORDER — KETOROLAC TROMETHAMINE 30 MG/ML
60 INJECTION, SOLUTION INTRAMUSCULAR; INTRAVENOUS ONCE
Status: DISCONTINUED | OUTPATIENT
Start: 2021-03-03 | End: 2021-03-03

## 2021-03-03 RX ORDER — KETOROLAC TROMETHAMINE 30 MG/ML
30 INJECTION, SOLUTION INTRAMUSCULAR; INTRAVENOUS ONCE
Status: COMPLETED | OUTPATIENT
Start: 2021-03-03 | End: 2021-03-03

## 2021-03-03 RX ADMIN — KETOROLAC TROMETHAMINE 30 MG: 30 INJECTION, SOLUTION INTRAMUSCULAR; INTRAVENOUS at 18:30

## 2021-03-03 NOTE — ASSESSMENT & PLAN NOTE
Because of menorrhagia, heavy menses and iron def anemia, start on minipill  Discussed risks of blood clots

## 2021-03-03 NOTE — ASSESSMENT & PLAN NOTE
Patient is deficient iron  Take ferrous sulfate every day and if constipation occurs take every other day  Recheck C BC and iron profile in 3 months

## 2021-03-03 NOTE — PATIENT INSTRUCTIONS

## 2021-03-03 NOTE — PROGRESS NOTES
[address] [facility] PRACTICE    NAME: Geovanni Fletcher  AGE: 47 y o  SEX: female  : 1980     DATE: 3/3/2021     Assessment and Plan:     Problem List Items Addressed This Visit        Cardiovascular and Mediastinum    Chronic migraine without aura without status migrainosus, not intractable       Continue management per Neurology  Relevant Medications    ketorolac (TORADOL) 60 mg/2 mL IM injection 60 mg (Start on 3/3/2021  6:30 PM)       Nervous and Auditory    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 (HCC)     Given refills vit b12  Goes to neurology  Continue mgmt per neurology  Relevant Medications    cyanocobalamin (VITAMIN B-12) 1000 MCG tablet       Other    B12 deficiency       Patient reminded to take B12 supplements  Iron deficiency anemia      Patient is deficient iron  Take ferrous sulfate every day and if constipation occurs take every other day  Recheck C BC and iron profile in 3 months  Relevant Medications    cyanocobalamin (VITAMIN B-12) 1000 MCG tablet    ferrous sulfate 324 (65 Fe) mg    norethindrone (MICRONOR) 0 35 MG tablet    Other Relevant Orders    CBC and differential    Iron Panel (Includes Ferritin, Iron Sat%, Iron, and TIBC)    Seizure (HCC)       Well controlled on Keppra  Continue management per Neurology  Annual physical exam - Primary       Normal exam   Up-to-date with Pap smear  Blood work reviewed and patient found to be anemic so will repeat CBC and iron panel in 3 months and start on ferrous sulfate  Menorrhagia with irregular cycle     Because of menorrhagia, heavy menses and iron def anemia, start on minipill  Discussed risks of blood clots            Relevant Medications    norethindrone (MICRONOR) 0 35 MG tablet      Other Visit Diagnoses     H  pylori infection        Relevant Medications    omeprazole (PriLOSEC) 20 mg delayed release capsule          Immunizations and preventive care screenings were discussed with patient today  Appropriate education was printed on patient's after visit summary  Declines flu shot today and accepts the risks  Counseling:  Alcohol/drug use: discussed moderation in alcohol intake, the recommendations for healthy alcohol use, and avoidance of illicit drug use  Dental Health: discussed importance of regular tooth brushing, flossing, and dental visits  · Exercise: the importance of regular exercise/physical activity was discussed  Recommend exercise 3-5 times per week for at least 30 minutes  Return in about 3 months (around 6/3/2021) for recheck anemia and heavy menses  Chief Complaint:     Chief Complaint   Patient presents with    Follow-up     6 month      History of Present Illness:     Adult Annual Physical   Patient here for a comprehensive physical exam     reviewed blood work  CBC hemoglobin 8 9, hematocrit 27 6, MCV 71, WBC 3 8, RDW 16 8, platelet count 869  CMP sodium 140, potassium 4 3, BUN 10, creatinine 0 60, fasting glucose 104  The patient reports problems - heavy menses and headache  Diet and Physical Activity  · Diet/Nutrition: well balanced diet  · Exercise: no formal exercise  Depression Screening  PHQ-9 Depression Screening    PHQ-9:   Frequency of the following problems over the past two weeks:           General Health  · Sleep: sleeps well  · Hearing: no issues  · Vision: vision problems: double vision  · Dental: no dental visits for >1 year  /GYN Health  · Last menstrual period: 2/23/21  · Contraceptive method: tubal ligation  · History of STDs?: no      Review of Systems:     Review of Systems   Constitutional: Negative for fever and unexpected weight change  HENT: Negative for ear pain, sore throat and trouble swallowing  Eyes: Negative for pain and visual disturbance  Respiratory: Negative for cough, chest tightness, shortness of breath and wheezing      Cardiovascular: Negative for chest pain  Gastrointestinal: Negative for abdominal distention, abdominal pain, blood in stool, constipation, diarrhea, nausea and vomiting  Endocrine: Negative for polydipsia and polyuria  Genitourinary: Negative for dysuria and hematuria  Musculoskeletal: Negative for back pain and myalgias  Skin: Negative for rash  Neurological: Negative for syncope and headaches  Psychiatric/Behavioral: Negative for suicidal ideas  Past Medical History:     Past Medical History:   Diagnosis Date    B12 deficiency     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 ([address] )     Chronic migraine without aura     Dermatographism     Gait disturbance     Iron deficiency anemia     Lyme neuropathy     Seizures (HCC)     Visual disturbance     Diplopia, nystagmus, decreased visual acuity    Vitamin D deficiency       Past Surgical History:     Past Surgical History:   Procedure Laterality Date     SECTION      ESOPHAGOGASTRODUODENOSCOPY N/A 3/8/2019    Procedure: ESOPHAGOGASTRODUODENOSCOPY (EGD); Surgeon: Gayatri Brown MD;  Location: [facility];   Service: Gastroenterology    OOPHORECTOMY      unilateral --  last assessed 17    TUBAL LIGATION      last assessed 17      Social History:     E-Cigarette/Vaping    E-Cigarette Use Never User      E-Cigarette/Vaping Substances    Nicotine No     THC No     CBD No     Flavoring No     Other No     Unknown No      Social History     Socioeconomic History    Marital status: Single     Spouse name: None    Number of children: 3    Years of education: None    Highest education level: None   Occupational History    Occupation: [employer] / [employer] Needs    Financial resource strain: Not hard at all   [employer] insecurity     Worry: Never true     Inability: Never true    Transportation needs     Medical: No     Non-medical: No   Tobacco Use    Smoking status: Never Smoker    Smokeless tobacco: Never Used   Substance and Sexual Activity  Alcohol use: No    Drug use: No    Sexual activity: None   Lifestyle    Physical activity     Days per week: 0 days     Minutes per session: 0 min    Stress: Not at all   Relationships    Social connections     Talks on phone: Patient refused     Gets together: Patient refused     Attends Sabianism service: Patient refused     Active member of club or organization: Patient refused     Attends meetings of clubs or organizations: Patient refused     Relationship status: Patient refused    Intimate partner violence     Fear of current or ex partner: No     Emotionally abused: No     Physically abused: No     Forced sexual activity: No   Other Topics Concern    None   Social History Narrative    Always uses seatbelt    Lives with children      Family History:     Family History   Problem Relation Age of Onset    Other Mother         AIDS, Brain Tumor balance disorder    Seizures Mother     Alcohol abuse Father     Other Sister         Balance disorder    HIV Maternal Grandmother       Current Medications:     Current Outpatient Medications   Medication Sig Dispense Refill    levETIRAcetam (KEPPRA) 250 mg tablet 1 tab q12 hours (with 500 mg tab) 180 tablet 3    levETIRAcetam (KEPPRA) 500 mg tablet TAKE ONE TABLET BY MOUTH EVERY TWELVE HOURS 180 tablet 0    Misc   Devices (CANE) MISC Use daily for balance 1 each 0    omeprazole (PriLOSEC) 20 mg delayed release capsule Take 1 capsule (20 mg total) by mouth daily 90 capsule 1    ammonium lactate (LAC-HYDRIN) 12 % cream       cetirizine (ZyrTEC) 10 mg tablet Take by mouth      cholecalciferol (VITAMIN D3) 1,000 units tablet Take 1 tablet (1,000 Units total) by mouth daily (Patient not taking: Reported on 7/10/2020) 30 tablet 5    cyanocobalamin (VITAMIN B-12) 1000 MCG tablet Take 1 tablet (1,000 mcg total) by mouth daily 90 tablet 1    ferrous sulfate 324 (65 Fe) mg Take 1 tablet (324 mg total) by mouth daily 90 tablet 1    Fremanezumab-vfrm (Ajovy) 225 MG/1 5ML SOAJ 1 subcutaneous injection q30 days (Patient not taking: Reported on 1/29/2021) 1 pen 11    hydrocortisone 1 % cream Apply small amount to skin PRN rash  Do not use daily  (Patient not taking: Reported on 1/29/2021) 30 g 0    hydrOXYzine HCL (ATARAX) 10 mg tablet       magnesium oxide (MAG-OX) 400 mg Take 1 tablet (400 mg total) by mouth daily (Patient not taking: Reported on 7/10/2020) 30 tablet 3    naproxen (NAPROSYN) 250 mg tablet Take 1 tablet (250 mg total) by mouth 3 (three) times a day with meals for 7 days 21 tablet 0    naproxen (NAPROSYN) 500 mg tablet Take 1 tablet (500 mg total) by mouth 2 (two) times a day with meals (Patient not taking: Reported on 3/3/2021) 30 tablet 0    norethindrone (MICRONOR) 0 35 MG tablet Take 1 tablet (0 35 mg total) by mouth daily 28 tablet 5     Current Facility-Administered Medications   Medication Dose Route Frequency Provider Last Rate Last Admin    ketorolac (TORADOL) 60 mg/2 mL IM injection 60 mg  60 mg Intramuscular Once Jj Umaña MD          Allergies: Allergies   Allergen Reactions    Dye Fdc Red  [Red Dye (Food Allergy)]     Morphine       Physical Exam:     /70 (BP Location: Left arm, Patient Position: Sitting, Cuff Size: Adult)   Pulse 85   Temp 98 1 °F (36 7 °C) (Tympanic)   Resp 16   Ht 5' 9" (1 753 m)   Wt 77 6 kg (171 lb)   SpO2 97%   BMI 25 25 kg/m²     Physical Exam  Constitutional:       Appearance: She is well-developed  HENT:      Head: Normocephalic and atraumatic  Eyes:      General: No scleral icterus  Conjunctiva/sclera: Conjunctivae normal       Pupils: Pupils are equal, round, and reactive to light  Neck:      Musculoskeletal: Normal range of motion and neck supple  Cardiovascular:      Rate and Rhythm: Normal rate and regular rhythm  Heart sounds: Normal heart sounds  No murmur  No friction rub  No gallop      Pulmonary:      Effort: Pulmonary effort is normal  No respiratory distress  Breath sounds: No wheezing or rales  Chest:      Chest wall: No tenderness  Abdominal:      General: Bowel sounds are normal  There is no distension  Palpations: Abdomen is soft  There is no mass  Tenderness: There is no abdominal tenderness  Musculoskeletal: Normal range of motion  Lymphadenopathy:      Cervical: No cervical adenopathy  Skin:     General: Skin is warm and dry  Capillary Refill: Capillary refill takes less than 2 seconds  Findings: No rash  Neurological:      Mental Status: She is alert and oriented to person, place, and time  Cranial Nerves: No cranial nerve deficit            Robyn Kilpatrick MD   [address] [place] [address]

## 2021-03-03 NOTE — ASSESSMENT & PLAN NOTE
Normal exam   Up-to-date with Pap smear  Blood work reviewed and patient found to be anemic so will repeat CBC and iron panel in 3 months and start on ferrous sulfate

## 2021-03-04 NOTE — PROGRESS NOTES
BMI Counseling: Body mass index is 25 25 kg/m²  The BMI is above normal  Nutrition recommendations include reducing portion sizes, decreasing overall calorie intake, 3-5 servings of fruits/vegetables daily and reducing fast food intake  Exercise recommendations include exercising 3-5 times per week

## 2021-03-12 ENCOUNTER — OFFICE VISIT (OUTPATIENT)
Dept: NEUROLOGY | Facility: CLINIC | Age: 41
End: 2021-03-12
Payer: COMMERCIAL

## 2021-03-12 VITALS
BODY MASS INDEX: 24.6 KG/M2 | SYSTOLIC BLOOD PRESSURE: 108 MMHG | DIASTOLIC BLOOD PRESSURE: 61 MMHG | WEIGHT: 166.6 LBS | HEART RATE: 84 BPM

## 2021-03-12 DIAGNOSIS — R27.0 ATAXIA: ICD-10-CM

## 2021-03-12 DIAGNOSIS — H53.2 TRANSIENT DIPLOPIA: ICD-10-CM

## 2021-03-12 DIAGNOSIS — R63.4 WEIGHT LOSS: ICD-10-CM

## 2021-03-12 DIAGNOSIS — R56.9 SEIZURE (HCC): ICD-10-CM

## 2021-03-12 DIAGNOSIS — G43.709 CHRONIC MIGRAINE WITHOUT AURA WITHOUT STATUS MIGRAINOSUS, NOT INTRACTABLE: Primary | ICD-10-CM

## 2021-03-12 PROCEDURE — 99215 OFFICE O/P EST HI 40 MIN: CPT | Performed by: PHYSICIAN ASSISTANT

## 2021-03-12 RX ORDER — BUTALBITAL, ACETAMINOPHEN AND CAFFEINE 50; 325; 40 MG/1; MG/1; MG/1
TABLET ORAL
Qty: 10 TABLET | Refills: 0 | Status: SHIPPED | OUTPATIENT
Start: 2021-03-12

## 2021-03-12 RX ORDER — FREMANEZUMAB-VFRM 225 MG/1.5ML
INJECTION SUBCUTANEOUS
Qty: 1 PEN | Refills: 11 | Status: SHIPPED | OUTPATIENT
Start: 2021-03-12

## 2021-03-12 NOTE — PROGRESS NOTES
Patient ID: Iván Corrales is a 47 y o  female  Assessment/Plan:     Diagnoses and all orders for this visit:    Chronic migraine without aura without status migrainosus, not intractable  -     fremanezumab-vfrm (Ajovy) 225 MG/1 5ML auto-injector; 1 subcutaneous injection q30 days  -     butalbital-acetaminophen-caffeine (FIORICET,ESGIC) -40 mg per tablet; 1 tab q6 hours prn migraine  No more than 2-3 per week  -     Ambulatory Referral to Ophthalmology; Future    Weight loss  -     Ambulatory referral to Nutrition Services; Future  -     Ambulatory referral to Weight Management; Future    Transient diplopia  -     Ambulatory Referral to Ophthalmology; Future    Ataxia    Seizure ([facility] [address] )         Regarding seizures, she had 3 consecutive, alleged seizures on December 3rd as noted below  I would like her to return back to see Dr Gigi Baltazar for management/monitoring as she continues to have seizures which are concerning despite medication compliance per her history, and use of several oral antiepileptics which have either caused side effects or she was noncompliant with  Either way, since she is not well controlled I expressed the importance of returning back to the specialist in this regard  Notably hemoglobin is 8 9, and currently being worked up by [employer] by her PCP  I asked her to maintain compliance with this workup  It could be contributing to seizures    Regarding migraine headaches, she was well controlled with Emgality in the past but this caused site injection reaction characterized by a huge welt  The patient was agreeable to Ajovy  Her  will assist the injection  She should apply ice and/or hydrocortisone cream after the injection if needed, and let me know immediately if any allergic reaction occurs such as with the Emgality  The patient would not be a good candidate for Aimovig since this can cause constipation and the patient is already dealing with this    She may be a good candidate for Botox if Ajovy fails  P r n  Migraine onset, Fioricet but no more than 2-3 doses per week to prevent medication overuse headache  Toradol has worked in the past as well  The patient and her  are concerned with weight loss  She states over the past year her appetite has decreased and she lost approximately 50 lb  On 1/8/2020 her weight was 198, and today 166  I provided reassurance that she is still within normal BMI, however she may benefit from seeing weight management or nutrition for her appetite  She was encouraged to continue small, frequent meals throughout the day especially in light of migraine and seizure prevention  I have messaged her PCP for further recommendations  Time was spent on counseling her about gait ataxia  In light of cerebellar degenerative condition and associated gait instability with ataxia, she should be using a cane at all times and possibly even a walker  Fall precautions were discussed in detail  The patient should not hesitate to call me prior to her follow up with any questions or concerns  Subjective:    HPI        The patient is present with her  today who provides most of the history and translation when necessary, as the patient is primarily Yakut speaking  She is a poor historian  Her  states that she had 3 seizures in a row on December 3rd  These occurred at 10:00 p m , 11:15 p m  and 4:00 a m  Her  states they occurred in her sleep  We do not know what triggered it, other than possible stress  They were characterized minor convulsions of the extremities  Denies urinary incontinence or tongue biting  Her  states she had severe memory loss the next day and fatigue  She is compliant with Keppra  In the past she was not compliant with Depakote due to the size of the tablet  She did not feel that she could swallow it easily  Since December 3rd she has been seizure-free     She continues Keppra 750 mg q 12 hours  This was increased in the past due to more frequent seizures on a lower dose, and then a Keppra level was ordered but the patient was noncompliant with this blood work  I reminded them of this today  CMP is unremarkable except for elevated glucose  A1c 5 7%  Hemoglobin 8 9 which is significantly low and her PCP is monitoring this  Iron panel pending  Emgality was discontinued due to severe skin reaction and pain  She had significant swelling after the injection  Her migraine headaches have been worse since stopping this  She would like other suggestions for headaches  Migraine headaches are the same as described in prior visits  Her  is concerned about weight loss  The patient states she does not have an appetite  The following portions of the patient's history were reviewed and updated as appropriate:   She  has a past medical history of B12 deficiency, 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 ([address] ), Chronic migraine without aura, Dermatographism, Gait disturbance, Iron deficiency anemia, Lyme neuropathy, Seizures ([address] ), Visual disturbance, and Vitamin D deficiency    She   Patient Active Problem List    Diagnosis Date Noted    Weight loss 04/04/2021    Transient diplopia 04/04/2021    Menorrhagia with irregular cycle 03/03/2021    Low hemoglobin 02/01/2021    Annual physical exam 09/30/2020    Prediabetes 08/18/2020    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    Ataxia 04/13/2020    Noncompliance with medications 04/13/2020    Cerebral atrophy ([address] ) 04/10/2020    Gait instability 05/13/2019    Gastroesophageal reflux disease without esophagitis 03/01/2019    Seizure (HCC)     Microcytic anemia 04/03/2018    Chronic migraine without aura without status migrainosus, not intractable 03/02/2018    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 ([address] ) 03/02/2018    Dermatographism 05/16/2017    Allergic reaction 04/27/2017    Urticaria, chronic 04/27/2017    Diplopia 04/18/2017    Nystagmus 2017    B12 deficiency 2017    Iron deficiency anemia 2017    Lyme neuropathy 2017    Vitamin D deficiency 2017    Acid indigestion 2017     She  has a past surgical history that includes  section; Oophorectomy; Tubal ligation; and Esophagogastroduodenoscopy (N/A, 3/8/2019)  Her family history includes Alcohol abuse in her father; HIV in her maternal grandmother; Other in her mother and sister; Seizures in her mother  She  reports that she has never smoked  She has never used smokeless tobacco  She reports that she does not drink alcohol or use drugs  Current Outpatient Medications   Medication Sig Dispense Refill    ammonium lactate (LAC-HYDRIN) 12 % cream       cetirizine (ZyrTEC) 10 mg tablet Take by mouth      cyanocobalamin (VITAMIN B-12) 1000 MCG tablet Take 1 tablet (1,000 mcg total) by mouth daily 90 tablet 1    hydrOXYzine HCL (ATARAX) 10 mg tablet       levETIRAcetam (KEPPRA) 250 mg tablet 1 tab q12 hours (with 500 mg tab) 180 tablet 3    levETIRAcetam (KEPPRA) 500 mg tablet TAKE ONE TABLET BY MOUTH EVERY TWELVE HOURS 180 tablet 0    Misc  Devices (CANE) MISC Use daily for balance 1 each 0    norethindrone (MICRONOR) 0 35 MG tablet Take 1 tablet (0 35 mg total) by mouth daily 28 tablet 5    omeprazole (PriLOSEC) 20 mg delayed release capsule Take 1 capsule (20 mg total) by mouth daily 90 capsule 1    butalbital-acetaminophen-caffeine (FIORICET,ESGIC) -40 mg per tablet 1 tab q6 hours prn migraine  No more than 2-3 per week   10 tablet 0    cholecalciferol (VITAMIN D3) 1,000 units tablet Take 1 tablet (1,000 Units total) by mouth daily (Patient not taking: Reported on 7/10/2020) 30 tablet 5    ferrous sulfate 324 (65 Fe) mg Take 1 tablet (324 mg total) by mouth daily 90 tablet 1    fremanezumab-vfrm (Ajovy) 225 MG/1 5ML auto-injector 1 subcutaneous injection q30 days 1 pen 11    hydrocortisone 1 % cream Apply small amount to skin PRN rash  Do not use daily  (Patient not taking: Reported on 1/29/2021) 30 g 0    magnesium oxide (MAG-OX) 400 mg Take 1 tablet (400 mg total) by mouth daily (Patient not taking: Reported on 7/10/2020) 30 tablet 3    naproxen (NAPROSYN) 250 mg tablet Take 1 tablet (250 mg total) by mouth 3 (three) times a day with meals for 7 days 21 tablet 0    naproxen (NAPROSYN) 500 mg tablet Take 1 tablet (500 mg total) by mouth 2 (two) times a day with meals (Patient not taking: Reported on 3/3/2021) 30 tablet 0     No current facility-administered medications for this visit  She is allergic to dye fdc red  [red dye - food allergy] and morphine            Objective:    Blood pressure 108/61, pulse 84, weight 75 6 kg (166 lb 9 6 oz)  Body mass index is 24 6 kg/m²  Physical Exam    Neurological Exam  Vital signs reviewed  Well developed, well nourished  She is pleasantly interactive, with decreased verbal fluency  She does not offer much to the conversation, however when she does there does not seem to be dysarthria  Head: Normocephalic, atraumatic  Neck: Neck flexors 5/5  CN 2-12: intact and symmetric, including EOMs which are normal b/l and PERRL  MSK: 5/5 t/o  Tremor in both upper extremities with finger-to-nose testing, consistent with dysmetria, equal b/l  Sensation: Inact to LT, temp and vibration x4 extr  Romberg floridly positive  Reflexes: 2+ and symmetric in all 4 extr  Babinski equivocal b/l  Robbie's positive b/l  Gait: WB gait, ataxic  She holds on to her husbands arm while walking  ROS:    Review of Systems   Constitutional: Negative  Negative for appetite change and fever  HENT: Negative  Negative for hearing loss, tinnitus, trouble swallowing and voice change  Eyes: Negative  Negative for photophobia and pain  Respiratory: Negative  Negative for shortness of breath  Cardiovascular: Negative  Negative for palpitations  Gastrointestinal: Negative    Negative for nausea and vomiting  Endocrine: Negative  Negative for cold intolerance  Genitourinary: Negative  Negative for dysuria, frequency and urgency  Musculoskeletal: Positive for back pain (constant chronic pain ) and neck pain  Negative for myalgias  Skin: Negative  Negative for rash  Neurological: Positive for weakness (balance problems, difficulty walking ), numbness (both hands ) and headaches (headaches happen every night )  Negative for dizziness, tremors, seizures, syncope, facial asymmetry, speech difficulty and light-headedness  Hematological: Negative  Does not bruise/bleed easily  Psychiatric/Behavioral: Negative  Negative for confusion, hallucinations and sleep disturbance  The following portions of the patient's history were reviewed and updated as appropriate: allergies, current medications/ medication history, past family history, past medical history, past social history, past surgical history and problem list     Review of systems was reviewed and otherwise unremarkable from a neurological perspective  I have spent 40 minutes with the patient and her  today in which greater than 50% of this time was spent in counseling/coordination of care regarding diagnoses, test results, impression, plan and potential medication side effects

## 2021-03-15 ENCOUNTER — TELEPHONE (OUTPATIENT)
Dept: NEUROLOGY | Facility: CLINIC | Age: 41
End: 2021-03-15

## 2021-03-15 NOTE — TELEPHONE ENCOUNTER
Mailed patients Opthalmology referral to patients home per [address]       ----- Message from Rad Ford PA-C sent at 3/15/2021  8:03 AM EDT -----  Can you please mail the last ophtho referral to her? Thanks

## 2021-04-04 PROBLEM — H53.2 TRANSIENT DIPLOPIA: Status: ACTIVE | Noted: 2021-04-04

## 2021-04-04 PROBLEM — R63.4 WEIGHT LOSS: Status: ACTIVE | Noted: 2021-04-04

## 2021-04-17 ENCOUNTER — IMMUNIZATIONS (OUTPATIENT)
Dept: FAMILY MEDICINE CLINIC | Facility: HOSPITAL | Age: 41
End: 2021-04-17

## 2021-04-17 DIAGNOSIS — Z23 ENCOUNTER FOR IMMUNIZATION: Primary | ICD-10-CM

## 2021-04-17 PROCEDURE — 91300 SARS-COV-2 / COVID-19 MRNA VACCINE (PFIZER-BIONTECH) 30 MCG: CPT

## 2021-04-17 PROCEDURE — 0001A SARS-COV-2 / COVID-19 MRNA VACCINE (PFIZER-BIONTECH) 30 MCG: CPT

## 2021-05-11 DIAGNOSIS — R56.9 SEIZURE (HCC): ICD-10-CM

## 2021-05-11 RX ORDER — LEVETIRACETAM 500 MG/1
TABLET ORAL
Qty: 180 TABLET | Refills: 0 | Status: CANCELLED | OUTPATIENT
Start: 2021-05-11

## 2021-05-12 DIAGNOSIS — R56.9 SEIZURE (HCC): Primary | ICD-10-CM

## 2021-05-12 RX ORDER — LEVETIRACETAM 750 MG/1
750 TABLET ORAL 2 TIMES DAILY
Qty: 60 TABLET | Refills: 3 | Status: SHIPPED | OUTPATIENT
Start: 2021-05-12 | End: 2021-09-29 | Stop reason: SDUPTHER

## 2021-05-12 RX ORDER — LEVETIRACETAM 500 MG/1
TABLET ORAL
Qty: 180 TABLET | Refills: 0 | OUTPATIENT
Start: 2021-05-12

## 2021-05-12 NOTE — TELEPHONE ENCOUNTER
Patient's PCP calling for medication refill on behalf of patient  Patient needs refill of her keppra  Patient still taking 750mg bid  I have queued up a 750mg tablet for patient  Patient was taking a 500mg tablet and a 250mg tablet before  I have d/c those in the system so she can take 1 tablet instead of two to hopefully increase patient compliance  Patient to be called back by PCP office to discuss  Patient is scheduled to be seen by Dr Sulaiman Miranda in June  I have queued this up for you  Please sign if agreeable

## 2021-05-13 ENCOUNTER — IMMUNIZATIONS (OUTPATIENT)
Dept: FAMILY MEDICINE CLINIC | Facility: HOSPITAL | Age: 41
End: 2021-05-13

## 2021-05-13 DIAGNOSIS — Z23 ENCOUNTER FOR IMMUNIZATION: Primary | ICD-10-CM

## 2021-05-13 PROCEDURE — 0002A SARS-COV-2 / COVID-19 MRNA VACCINE (PFIZER-BIONTECH) 30 MCG: CPT

## 2021-05-13 PROCEDURE — 91300 SARS-COV-2 / COVID-19 MRNA VACCINE (PFIZER-BIONTECH) 30 MCG: CPT

## 2021-06-21 ENCOUNTER — TELEPHONE (OUTPATIENT)
Dept: NEUROLOGY | Facility: CLINIC | Age: 41
End: 2021-06-21

## 2021-06-29 DIAGNOSIS — R27.0 NEURODEGENERATIVE DISEASE WITH DEMENTIA, ATAXIA, AND SPASTICITY (HCC): ICD-10-CM

## 2021-06-29 DIAGNOSIS — H53.2 DIPLOPIA: ICD-10-CM

## 2021-06-29 DIAGNOSIS — F02.80 NEURODEGENERATIVE DISEASE WITH DEMENTIA, ATAXIA, AND SPASTICITY (HCC): ICD-10-CM

## 2021-06-29 DIAGNOSIS — G43.711 INTRACTABLE CHRONIC MIGRAINE WITHOUT AURA AND WITH STATUS MIGRAINOSUS: Primary | ICD-10-CM

## 2021-06-29 DIAGNOSIS — R56.9 GENERALIZED-ONSET SEIZURES (HCC): ICD-10-CM

## 2021-06-29 DIAGNOSIS — G31.9 NEURODEGENERATIVE DISEASE WITH DEMENTIA, ATAXIA, AND SPASTICITY (HCC): ICD-10-CM

## 2021-06-29 DIAGNOSIS — R25.2 NEURODEGENERATIVE DISEASE WITH DEMENTIA, ATAXIA, AND SPASTICITY (HCC): ICD-10-CM

## 2021-07-15 DIAGNOSIS — D50.9 IRON DEFICIENCY ANEMIA, UNSPECIFIED IRON DEFICIENCY ANEMIA TYPE: ICD-10-CM

## 2021-07-15 DIAGNOSIS — A04.8 H. PYLORI INFECTION: ICD-10-CM

## 2021-07-15 RX ORDER — FERROUS SULFATE TAB EC 324 MG (65 MG FE EQUIVALENT) 324 (65 FE) MG
324 TABLET DELAYED RESPONSE ORAL DAILY
Qty: 90 TABLET | Refills: 1 | Status: SHIPPED | OUTPATIENT
Start: 2021-07-15 | End: 2021-09-29 | Stop reason: SDUPTHER

## 2021-07-15 RX ORDER — OMEPRAZOLE 20 MG/1
20 CAPSULE, DELAYED RELEASE ORAL DAILY
Qty: 90 CAPSULE | Refills: 1 | Status: SHIPPED | OUTPATIENT
Start: 2021-07-15 | End: 2021-07-16 | Stop reason: SDUPTHER

## 2021-07-16 DIAGNOSIS — A04.8 H. PYLORI INFECTION: ICD-10-CM

## 2021-07-18 RX ORDER — OMEPRAZOLE 20 MG/1
20 CAPSULE, DELAYED RELEASE ORAL DAILY
Qty: 90 CAPSULE | Refills: 1 | Status: SHIPPED | OUTPATIENT
Start: 2021-07-18 | End: 2021-09-29 | Stop reason: SDUPTHER

## 2021-09-29 ENCOUNTER — OFFICE VISIT (OUTPATIENT)
Dept: FAMILY MEDICINE CLINIC | Facility: CLINIC | Age: 41
End: 2021-09-29
Payer: COMMERCIAL

## 2021-09-29 VITALS
OXYGEN SATURATION: 98 % | TEMPERATURE: 96.1 F | WEIGHT: 164.8 LBS | SYSTOLIC BLOOD PRESSURE: 110 MMHG | HEART RATE: 80 BPM | DIASTOLIC BLOOD PRESSURE: 58 MMHG | BODY MASS INDEX: 24.41 KG/M2 | RESPIRATION RATE: 16 BRPM | HEIGHT: 69 IN

## 2021-09-29 DIAGNOSIS — D50.9 IRON DEFICIENCY ANEMIA, UNSPECIFIED IRON DEFICIENCY ANEMIA TYPE: ICD-10-CM

## 2021-09-29 DIAGNOSIS — G11.8 SPINOCEREBELLAR ATAXIA (HCC): ICD-10-CM

## 2021-09-29 DIAGNOSIS — Z11.59 NEED FOR HEPATITIS C SCREENING TEST: ICD-10-CM

## 2021-09-29 DIAGNOSIS — A04.8 H. PYLORI INFECTION: ICD-10-CM

## 2021-09-29 DIAGNOSIS — R56.9 SEIZURE (HCC): ICD-10-CM

## 2021-09-29 DIAGNOSIS — Z13.220 SCREENING FOR HYPERLIPIDEMIA: Primary | ICD-10-CM

## 2021-09-29 DIAGNOSIS — Z13.1 SCREENING FOR DIABETES MELLITUS (DM): ICD-10-CM

## 2021-09-29 DIAGNOSIS — G43.709 CHRONIC MIGRAINE WITHOUT AURA WITHOUT STATUS MIGRAINOSUS, NOT INTRACTABLE: ICD-10-CM

## 2021-09-29 DIAGNOSIS — Z11.4 ENCOUNTER FOR SCREENING FOR HIV: ICD-10-CM

## 2021-09-29 PROBLEM — D64.9 LOW HEMOGLOBIN: Status: RESOLVED | Noted: 2021-02-01 | Resolved: 2021-09-29

## 2021-09-29 PROBLEM — H53.2 DIPLOPIA: Status: RESOLVED | Noted: 2017-04-18 | Resolved: 2021-09-29

## 2021-09-29 PROBLEM — K30 ACID INDIGESTION: Status: RESOLVED | Noted: 2017-01-27 | Resolved: 2021-09-29

## 2021-09-29 PROBLEM — T78.40XA ALLERGIC REACTION: Status: RESOLVED | Noted: 2017-04-27 | Resolved: 2021-09-29

## 2021-09-29 PROCEDURE — 99214 OFFICE O/P EST MOD 30 MIN: CPT | Performed by: FAMILY MEDICINE

## 2021-09-29 RX ORDER — LEVETIRACETAM 750 MG/1
750 TABLET ORAL 2 TIMES DAILY
Qty: 60 TABLET | Refills: 3 | Status: SHIPPED | OUTPATIENT
Start: 2021-09-29

## 2021-09-29 RX ORDER — FAMOTIDINE 20 MG/1
20 TABLET, FILM COATED ORAL DAILY
COMMUNITY

## 2021-09-29 RX ORDER — FERROUS SULFATE TAB EC 324 MG (65 MG FE EQUIVALENT) 324 (65 FE) MG
324 TABLET DELAYED RESPONSE ORAL DAILY
Qty: 90 TABLET | Refills: 1 | Status: SHIPPED | OUTPATIENT
Start: 2021-09-29 | End: 2022-01-26 | Stop reason: SDUPTHER

## 2021-09-29 RX ORDER — OMEPRAZOLE 20 MG/1
20 CAPSULE, DELAYED RELEASE ORAL DAILY
Qty: 90 CAPSULE | Refills: 1 | Status: SHIPPED | OUTPATIENT
Start: 2021-09-29 | End: 2022-01-26 | Stop reason: SDUPTHER

## 2021-09-29 NOTE — ASSESSMENT & PLAN NOTE
Due to heavy menses  Repeat blood work showed a resolution of anemia however iron is still mildly low with low ferritin  Continue daily iron and repeat in 6 months

## 2021-09-29 NOTE — ASSESSMENT & PLAN NOTE
On Keppra  Pt had a break through seizure 2 months ago  Continue management per Neurology  Pt has an appt with neurology tomorrow

## 2021-09-29 NOTE — PROGRESS NOTES
Assessment/Plan:    Chronic migraine without aura without status migrainosus, not intractable    Continue management per Neurology  Iron deficiency anemia  Due to heavy menses  Repeat blood work showed a resolution of anemia however iron is still mildly low with low ferritin  Continue daily iron and repeat in 6 months  Seizure ([address] )  On Keppra  Pt had a break through seizure 2 months ago  Continue management per Neurology  Pt has an appt with neurology tomorrow  Diagnoses and all orders for this visit:    Screening for hyperlipidemia  -     Cancel: Lipid panel; Future  -     Lipid panel; Future    Screening for diabetes mellitus (DM)  -     Cancel: Comprehensive metabolic panel; Future  -     Comprehensive metabolic panel; Future    Chronic migraine without aura without status migrainosus, not intractable    Iron deficiency anemia, unspecified iron deficiency anemia type  -     ferrous sulfate 324 (65 Fe) mg; Take 1 tablet (324 mg total) by mouth daily  -     CBC and differential; Future  -     Iron Panel (Includes Ferritin, Iron Sat%, Iron, and TIBC); Future    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[address] )    Encounter for screening for HIV  -     Cancel: HIV 1/2 Antigen/Antibody (4th Generation) w Reflex [facility]; Future  -     HIV 1/2 Antigen/Antibody (4th Generation) w Reflex [facility]; Future    Need for hepatitis C screening test  -     Cancel: Hepatitis C antibody; Future  -     Hepatitis C antibody; Future    Seizure (HCC)  -     levETIRAcetam (KEPPRA) 750 mg tablet; Take 1 tablet (750 mg total) by mouth 2 (two) times a day    H  pylori infection  -     omeprazole (PriLOSEC) 20 mg delayed release capsule; Take 1 capsule (20 mg total) by mouth daily    Other orders  -     famotidine (PEPCID) 20 mg tablet;  Take 20 mg by mouth daily          Subjective: chronic conditions check up      Patient ID: Junaid Burrows is a 46 y o  female with a ho GERD, spinocerebellar ataxia, iron def anemia, prediabetes, seizures, vit d deficiency    HPI  Reviewed notes from neurology  Pt has anemia on last blood work  She was informed that breakthrough seizures may be exacerbated by anemia and since then has been compliant with iron supplements  The following portions of the patient's history were reviewed and updated as appropriate:   She   Patient Active Problem List    Diagnosis Date Noted    Weight loss 2021    Transient diplopia 2021    Menorrhagia with irregular cycle 2021    Annual physical exam 2020    Prediabetes 2020    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[facility] [facility] ) 2020    Ataxia 2020    Noncompliance with medications 2020    Cerebral atrophy ([facility] ) 04/10/2020    Gait instability 2019    Gastroesophageal reflux disease without esophagitis 2019    Seizure ([facility] )     Chronic migraine without aura without status migrainosus, not intractable 2018    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 ([facility] [facility] ) 2018    Dermatographism 2017    Urticaria, chronic 2017    Nystagmus 2017    B12 deficiency 2017    Iron deficiency anemia 2017    Lyme neuropathy 2017    Vitamin D deficiency 2017     She  has a past surgical history that includes  section; Oophorectomy; Tubal ligation; and Esophagogastroduodenoscopy (N/A, 3/8/2019)  Her family history includes Alcohol abuse in her father; HIV in her maternal grandmother; Other in her mother and sister; Seizures in her mother  She  reports that she has never smoked  She has never used smokeless tobacco  She reports that she does not drink alcohol and does not use drugs    Current Outpatient Medications   Medication Sig Dispense Refill    ammonium lactate (LAC-HYDRIN) 12 % cream       cetirizine (ZyrTEC) 10 mg tablet Take by mouth      cholecalciferol (VITAMIN D3) 1,000 units tablet Take 1 tablet (1,000 Units total) by mouth daily 30 tablet 5    cyanocobalamin (VITAMIN B-12) 1000 MCG tablet Take 1 tablet (1,000 mcg total) by mouth daily 90 tablet 1    ferrous sulfate 324 (65 Fe) mg Take 1 tablet (324 mg total) by mouth daily 90 tablet 1    hydrocortisone 1 % cream Apply small amount to skin PRN rash  Do not use daily  30 g 0    levETIRAcetam (KEPPRA) 750 mg tablet Take 1 tablet (750 mg total) by mouth 2 (two) times a day 60 tablet 3    Misc  Devices (CANE) MISC Use daily for balance 1 each 0    naproxen (NAPROSYN) 500 mg tablet Take 1 tablet (500 mg total) by mouth 2 (two) times a day with meals 30 tablet 0    butalbital-acetaminophen-caffeine (FIORICET,ESGIC) -40 mg per tablet 1 tab q6 hours prn migraine  No more than 2-3 per week  (Patient not taking: Reported on 9/29/2021) 10 tablet 0    famotidine (PEPCID) 20 mg tablet Take 20 mg by mouth daily      fremanezumab-vfrm (Ajovy) 225 MG/1 5ML auto-injector 1 subcutaneous injection q30 days (Patient not taking: Reported on 9/29/2021) 1 pen 11    hydrOXYzine HCL (ATARAX) 10 mg tablet  (Patient not taking: Reported on 9/29/2021)      magnesium oxide (MAG-OX) 400 mg Take 1 tablet (400 mg total) by mouth daily (Patient not taking: Reported on 7/10/2020) 30 tablet 3    norethindrone (MICRONOR) 0 35 MG tablet Take 1 tablet (0 35 mg total) by mouth daily (Patient not taking: Reported on 9/29/2021) 28 tablet 5    omeprazole (PriLOSEC) 20 mg delayed release capsule Take 1 capsule (20 mg total) by mouth daily 90 capsule 1     No current facility-administered medications for this visit       Review of Systems   Constitutional: Negative for fever and unexpected weight change  HENT: Negative for ear pain, sore throat and trouble swallowing  Eyes: Negative for pain and visual disturbance  Respiratory: Negative for cough, chest tightness, shortness of breath and wheezing  Cardiovascular: Negative for chest pain  Gastrointestinal: Negative for abdominal distention, abdominal pain, blood in stool, constipation, diarrhea, nausea and vomiting  Endocrine: Negative for polydipsia and polyuria  Genitourinary: Negative for dysuria and hematuria  Musculoskeletal: Negative for back pain and myalgias  Skin: Negative for rash  Neurological: Positive for dizziness and headaches  Negative for syncope  Psychiatric/Behavioral: Negative for suicidal ideas  PHQ-9 Depression Screening    PHQ-9:   Frequency of the following problems over the past two weeks:               Objective:      /58 (BP Location: Left arm, Patient Position: Sitting, Cuff Size: Adult)   Pulse 80   Temp (!) 96 1 °F (35 6 °C) (Tympanic)   Resp 16   Ht 5' 9" (1 753 m)   Wt 74 8 kg (164 lb 12 8 oz)   SpO2 98%   BMI 24 34 kg/m²          Physical Exam  Constitutional:       Appearance: She is well-developed  HENT:      Head: Normocephalic and atraumatic  Right Ear: External ear normal       Left Ear: External ear normal       Mouth/Throat:      Pharynx: No oropharyngeal exudate  Eyes:      General: No scleral icterus  Conjunctiva/sclera: Conjunctivae normal       Pupils: Pupils are equal, round, and reactive to light  Cardiovascular:      Rate and Rhythm: Normal rate and regular rhythm  Heart sounds: No murmur heard  No friction rub  No gallop  Pulmonary:      Effort: Pulmonary effort is normal  No respiratory distress  Breath sounds: Normal breath sounds  No wheezing or rales  Abdominal:      General: Bowel sounds are normal  There is no distension  Palpations: Abdomen is soft  There is no mass  Tenderness: There is no abdominal tenderness  There is no rebound  Musculoskeletal:         General: Normal range of motion  Cervical back: Normal range of motion and neck supple  Skin:     General: Skin is warm and dry  Neurological:      Mental Status: She is alert and oriented to person, place, and time

## 2021-10-18 ENCOUNTER — TELEPHONE (OUTPATIENT)
Dept: FAMILY MEDICINE CLINIC | Facility: CLINIC | Age: 41
End: 2021-10-18

## 2021-12-08 ENCOUNTER — TELEPHONE (OUTPATIENT)
Dept: NEUROLOGY | Facility: CLINIC | Age: 41
End: 2021-12-08

## 2022-01-05 ENCOUNTER — OFFICE VISIT (OUTPATIENT)
Dept: FAMILY MEDICINE CLINIC | Facility: CLINIC | Age: 42
End: 2022-01-05
Payer: COMMERCIAL

## 2022-01-05 VITALS
RESPIRATION RATE: 16 BRPM | TEMPERATURE: 96.8 F | OXYGEN SATURATION: 99 % | HEART RATE: 80 BPM | HEIGHT: 69 IN | DIASTOLIC BLOOD PRESSURE: 60 MMHG | SYSTOLIC BLOOD PRESSURE: 110 MMHG | WEIGHT: 167.6 LBS | BODY MASS INDEX: 24.82 KG/M2

## 2022-01-05 DIAGNOSIS — D50.9 IRON DEFICIENCY ANEMIA, UNSPECIFIED IRON DEFICIENCY ANEMIA TYPE: ICD-10-CM

## 2022-01-05 DIAGNOSIS — N92.1 MENORRHAGIA WITH IRREGULAR CYCLE: ICD-10-CM

## 2022-01-05 DIAGNOSIS — R56.9 SEIZURE (HCC): Primary | ICD-10-CM

## 2022-01-05 PROCEDURE — 99214 OFFICE O/P EST MOD 30 MIN: CPT | Performed by: FAMILY MEDICINE

## 2022-01-05 RX ORDER — ERENUMAB-AOOE 70 MG/ML
70 INJECTION SUBCUTANEOUS
COMMUNITY
Start: 2021-10-18

## 2022-01-05 RX ORDER — RIZATRIPTAN BENZOATE 10 MG/1
10 TABLET ORAL
COMMUNITY
Start: 2021-09-30 | End: 2022-09-30

## 2022-01-05 RX ORDER — ACETAMINOPHEN AND CODEINE PHOSPHATE 120; 12 MG/5ML; MG/5ML
1 SOLUTION ORAL DAILY
Qty: 28 TABLET | Refills: 5 | Status: SHIPPED | OUTPATIENT
Start: 2022-01-05

## 2022-01-05 NOTE — ASSESSMENT & PLAN NOTE
Likely due to menorrhagia  Recheck labs  Pt is complaint with iron but not the minipill  Discussed again that the minipill may lighten the periods and improve anemia

## 2022-01-05 NOTE — PROGRESS NOTES
Assessment/Plan:    Iron deficiency anemia  Likely due to menorrhagia  Recheck labs  Pt is complaint with iron but not the minipill  Discussed again that the minipill may lighten the periods and improve anemia  Seizure ([address] )  Darcus Handing will increase am keppra to 2 tabs for at least 2 weeks and if unable to tolerate the dose she will call neurology  Diagnoses and all orders for this visit:    Seizure ([address] )    Iron deficiency anemia, unspecified iron deficiency anemia type  -     norethindrone (MICRONOR) 0 35 MG tablet; Take 1 tablet (0 35 mg total) by mouth daily  -     CBC and differential; Future  -     Iron Panel (Includes Ferritin, Iron Sat%, Iron, and TIBC); Future    Menorrhagia with irregular cycle  -     norethindrone (MICRONOR) 0 35 MG tablet; Take 1 tablet (0 35 mg total) by mouth daily    Other orders  -     Erenumab-aooe (Aimovig) 70 MG/ML SOAJ; Inject 70 mg under the skin every 28 days  -     rizatriptan (MAXALT) 10 MG tablet; Take 10 mg by mouth (Patient not taking: Reported on 1/5/2022 )          Subjective:      Patient ID: Pérez Ascencio is a 46 y o  female with a ho GERD, spinocerebellar ataxia, iron def anemia, prediabetes, seizures, vit d deficiency    HPI  No issues today  Reviewed notes from neurology  She has been using keppra 750 1 tab in the am and 2 tabs in the pm  She had 1 small breakthrough seizure recently  The prescribed keppra dose is 2 tabs BID  Discussed about the importance of compliance but the patient is hesitant due to drowsiness with the high dosage in the am       Blood work has not been done  Patient reminded  Declines flu shot  The following portions of the patient's history were reviewed and updated as appropriate: allergies, current medications, past family history, past medical history, past social history, past surgical history and problem list     Review of Systems   Constitutional: Negative for fever and unexpected weight change     HENT: Negative for ear pain, sore throat and trouble swallowing  Eyes: Negative for pain and visual disturbance  Respiratory: Negative for cough, chest tightness, shortness of breath and wheezing  Cardiovascular: Negative for chest pain  Gastrointestinal: Negative for abdominal distention, abdominal pain, blood in stool, constipation, diarrhea, nausea and vomiting  Endocrine: Negative for polydipsia and polyuria  Genitourinary: Negative for dysuria and hematuria  Musculoskeletal: Negative for back pain and myalgias  Skin: Negative for rash  Neurological: Negative for syncope and headaches  Psychiatric/Behavioral: Negative for suicidal ideas  PHQ-2/9 Depression Screening             Objective:      /60 (BP Location: Left arm, Patient Position: Sitting, Cuff Size: Standard)   Pulse 80   Temp (!) 96 8 °F (36 °C) (Tympanic)   Resp 16   Ht 5' 9" (1 753 m)   Wt 76 kg (167 lb 9 6 oz)   SpO2 99%   BMI 24 75 kg/m²          Physical Exam  Constitutional:       Appearance: She is well-developed  HENT:      Head: Normocephalic and atraumatic  Right Ear: External ear normal       Left Ear: External ear normal       Mouth/Throat:      Pharynx: No oropharyngeal exudate  Eyes:      General: No scleral icterus  Conjunctiva/sclera: Conjunctivae normal       Pupils: Pupils are equal, round, and reactive to light  Cardiovascular:      Rate and Rhythm: Normal rate and regular rhythm  Heart sounds: No murmur heard  No friction rub  No gallop  Pulmonary:      Effort: Pulmonary effort is normal  No respiratory distress  Breath sounds: Normal breath sounds  No wheezing or rales  Abdominal:      General: Bowel sounds are normal  There is no distension  Palpations: Abdomen is soft  There is no mass  Tenderness: There is no abdominal tenderness  There is no rebound  Musculoskeletal:         General: Normal range of motion        Cervical back: Normal range of motion and neck supple  Skin:     General: Skin is warm and dry  Neurological:      Mental Status: She is alert and oriented to person, place, and time

## 2022-01-05 NOTE — ASSESSMENT & PLAN NOTE
Guerrero Crowell will increase am keppra to 2 tabs for at least 2 weeks and if unable to tolerate the dose she will call neurology

## 2022-01-26 DIAGNOSIS — D50.9 IRON DEFICIENCY ANEMIA, UNSPECIFIED IRON DEFICIENCY ANEMIA TYPE: ICD-10-CM

## 2022-01-26 DIAGNOSIS — A04.8 H. PYLORI INFECTION: ICD-10-CM

## 2022-01-26 RX ORDER — OMEPRAZOLE 20 MG/1
20 CAPSULE, DELAYED RELEASE ORAL DAILY
Qty: 90 CAPSULE | Refills: 1 | Status: SHIPPED | OUTPATIENT
Start: 2022-01-26 | End: 2022-02-25

## 2022-01-26 RX ORDER — FERROUS SULFATE TAB EC 324 MG (65 MG FE EQUIVALENT) 324 (65 FE) MG
324 TABLET DELAYED RESPONSE ORAL DAILY
Qty: 90 TABLET | Refills: 1 | Status: SHIPPED | OUTPATIENT
Start: 2022-01-26

## 2022-02-08 DIAGNOSIS — R56.9 SEIZURE (HCC): ICD-10-CM

## 2022-02-08 RX ORDER — LEVETIRACETAM 750 MG/1
750 TABLET ORAL 2 TIMES DAILY
Qty: 60 TABLET | Refills: 3 | OUTPATIENT
Start: 2022-02-08

## 2022-05-10 DIAGNOSIS — D50.9 IRON DEFICIENCY ANEMIA, UNSPECIFIED IRON DEFICIENCY ANEMIA TYPE: ICD-10-CM

## 2022-07-06 ENCOUNTER — TELEPHONE (OUTPATIENT)
Dept: PHYSICAL THERAPY | Facility: OTHER | Age: 42
End: 2022-07-06

## 2022-07-06 NOTE — TELEPHONE ENCOUNTER
S  I  # [id number] Zuleika Coolidge patient per online appointment request that was filled out  Voice message left for patient to call back  Phone number and hours of business provided       Will wait for call back to discuss our program

## 2022-08-17 ENCOUNTER — OFFICE VISIT (OUTPATIENT)
Dept: FAMILY MEDICINE CLINIC | Facility: CLINIC | Age: 42
End: 2022-08-17
Payer: COMMERCIAL

## 2022-08-17 VITALS
DIASTOLIC BLOOD PRESSURE: 60 MMHG | HEIGHT: 69 IN | TEMPERATURE: 97.6 F | RESPIRATION RATE: 16 BRPM | WEIGHT: 163.8 LBS | HEART RATE: 90 BPM | SYSTOLIC BLOOD PRESSURE: 102 MMHG | BODY MASS INDEX: 24.26 KG/M2 | OXYGEN SATURATION: 99 %

## 2022-08-17 DIAGNOSIS — G11.8 SPINOCEREBELLAR ATAXIA (HCC): ICD-10-CM

## 2022-08-17 DIAGNOSIS — Z87.891 FORMER SMOKER: ICD-10-CM

## 2022-08-17 DIAGNOSIS — D50.9 IRON DEFICIENCY ANEMIA, UNSPECIFIED IRON DEFICIENCY ANEMIA TYPE: ICD-10-CM

## 2022-08-17 DIAGNOSIS — Z12.39 ENCOUNTER FOR SCREENING FOR MALIGNANT NEOPLASM OF BREAST, UNSPECIFIED SCREENING MODALITY: ICD-10-CM

## 2022-08-17 DIAGNOSIS — R07.2 PRECORDIAL PAIN: ICD-10-CM

## 2022-08-17 DIAGNOSIS — N92.1 MENORRHAGIA WITH IRREGULAR CYCLE: Primary | ICD-10-CM

## 2022-08-17 DIAGNOSIS — G43.709 CHRONIC MIGRAINE WITHOUT AURA WITHOUT STATUS MIGRAINOSUS, NOT INTRACTABLE: ICD-10-CM

## 2022-08-17 PROCEDURE — 99214 OFFICE O/P EST MOD 30 MIN: CPT | Performed by: FAMILY MEDICINE

## 2022-08-17 RX ORDER — OXCARBAZEPINE 300 MG/1
TABLET, FILM COATED ORAL
COMMUNITY
Start: 2022-07-18

## 2022-08-17 NOTE — ASSESSMENT & PLAN NOTE
Referral placed for IN  Discussed with patient that they should speak with OBGYN about surgical options  Patient may likely benefit from a possible ablation versus hysterectomy given that the they do not want any more children  If we are able to correct the heavy bleeding, patient may not need to be on iron supplementation which would also resolve her constipation    Follow-up with PCP in 3 months

## 2022-08-17 NOTE — ASSESSMENT & PLAN NOTE
Neurology note reviewed  Patient had Aimovig increase to 140 mg injection  Also started on Trileptal 300 mg b i d  Patient reports still having migraines  Advised patient to ensure that she does take medications daily and then follow-up with Neurology if still having breakthroughs      Follow-up with PCP in 3 months

## 2022-08-17 NOTE — ASSESSMENT & PLAN NOTE
Patient had chest pain and rib was recently seen in the ER  A cardiac a enzymes were negative  EKG was negative  Patient was sent home as she was stable  Given former history of smoking quarter pack per day a for 13 years, and recent episode of chest discomfort  Recommend that patient get nuclear stress test   Patient is unable to run on treadmill    Follow-up with PCP in 3 months

## 2022-08-17 NOTE — PATIENT INSTRUCTIONS
1   Please call Neurology if her still having migraines  2  Please make appointment OBGYN for excessive bleeding with medical periods  Consider ablation or other surgical procedure  3  Make appointment for stress test to evaluate for chest pain  Enfermedad de las arterias coronarias   CUIDADO AMBULATORIO:   La enfermedad de las arterias coronarias (EAC) es un estrechamiento de las arterias a slaughter corazón debido a berny acumulación de placa (colesterol y otras sustancias)  El estrechamiento de las arterias disminuye la cantidad de oscar que puede circular hacia slaughter corazón  [place] hace que slaughter corazón reciba menos oxígeno, lo que puede ser potencialmente mortal        Poppy Outlaw síntomas más comunes incluyen los siguientes:  Dolor de [place] (angina) que le causa [place], apretamiento o aplastamiento en slaughter pecho    Dolor que se propaga a slaughter lit, mandíbula u omóplato    Náusea, vómito, transpiración, [place]  [place]  Ramandeep y [place] y pies que están demasiado fríos al tacto    Llame al número local de emergencias (911 en los [place]), o pídale a alguien que llame si:  Tiene alguno de los siguientes signos de un ataque cardíaco:      Estrujamiento, presión o tensión en slaughter pecho    Usted también podría presentar alguno de los siguientes:     Malestar o dolor en slaughter espalda, lit, mandíbula, abdomen, o brazo    Falta de [employer] o vómitos    Desvanecimiento o sudor frío repentino      [employer] atención médica de inmediato si:  Siente dolor de pecho que ocurre a menudo  Siente dolor de pecho en reposo  Llame a slaughter médico o cardiólogo si:  Usted tiene preguntas o inquietudes acerca de slaughter condición o cuidado  Medicamentos utilizados para el tratamiento de la EAC: Es posible que usted necesite alguno de los siguientes:  Medicamentos para la presión arterial se recetan para bajar slaughter presión arterial  Estos medicamentos pueden incluir los inhibidores ECA y los bloqueadores beta   Los inhibidores de la ECA [place] a mantener relajados y abiertos flora vasos sanguíneos  [place] ayuda a que la oscar circule en el interior de slaughter corazón  Los bloqueadores beta mantienen a slaughter corazón bombeando mendoza y regularmente  [place] xuan que slaughter corazón tenga que trabajar más fuertemente para obtener el oxígeno  Los medicamentos para el colesterol ayudan a bajar los niveles de colesterol en la [place]  Nitratos , arnav la nitroglicerina relajan las arterias del corazón para que reciba más oxígeno  Los nitratos también pueden ayudar a aliviar el dolor de [place]  [place] a slaughter cuerpo a eliminar líquido adicional y proteger slaughter corazón de tener más daño  Es posible que orine más seguido mientras glenna diuréticos  Los medicamentos antiplaquetarios , arnav la aspirina, [place] a prevenir coágulos de [place]  [place] flora antiplaquetarios exactamente arnav se le haya indicado  Estos medicamentos podrían causar mas probabilidad para sangrado y para desarrollar moretones  Si le mccullough indicado el uso de aspirina, no tome acetaminofén o ibuprofeno en slaughter lugar  Los anticoagulantes evitan la formación de coágulos sanguíneos  Los coágulos podrían causar ataques cardíacos, derrame cerebral o la muerte  Zoie [employer] va a causar más probabilidad para sangrado y moretones  Otras formas de tratar la EAC: El médico colaborará con usted para diseñar un plan de tratamiento  Además de los medicamentos, puede recomendar un procedimiento o cirugía para abrir las arterias  Hulen Jock los beneficios y los riesgos de cada tratamiento  Los siguientes son tratamientos frecuentes de la EAC:  Karla angioplastia se puede hacer para abrir karla arteria bloqueada con placa  Se introduce un tubo con un balón en la punta en la arteria obstruida  Karla vez que el tubo está dentro de la arteria, el globo se infla  A medida que el balón se infla, éste presiona la placa contra la pared de la arteria para abrir la arteria   Se le podría colocar un stent en la arteria para [place]  La cirugía de injerto de revascularización coronaria (RVC) es berny cirugía a corazón abierto  Los médicos ej arterias o venas de otras zonas del cuerpo  Se utilizan para hacer un bypass (rodear) de las arterias obstruidas del corazón  La rehabilitación cardíaca es un programa dirigido por especialistas que lo ayudarán a fortalecer slaughter corazón y reducir el riesgo de presentar más enfermedades cardíacas en forma arnold  El plan incluye ejercicio, relajación, manejo de estrés, nutrición saludable para el corazón  Los [employer] se asegurarán de que cualquier medicamento que usted tome esté dando [place]  Controle o evite la EAC:  No tome ciertos medicamentos sin antes preguntarle a slaughter médico  Estos incluyen STEPHANIE, suplementos vitamínicos o a base de hierbas u hormonas (estrógeno o progestágeno)  No fume  La nicotina y otros químicos en los cigarrillos y cigarros pueden provocar daño al corazón y al pulmón  Pida información a slaughter médico si usted actualmente fuma y necesita ayuda para dejar de fumar  Los cigarrillos electrónicos o el tabaco sin humo igualmente contienen nicotina  Consulte con slaughter médico antes de [employer]  Sea físicamente [place]  La actividad física, arnav el ejercicio, puede disminuir la presión arterial, el colesterol, el peso y el nivel de azúcar en la oscar  Los médicos lo ayudarán a crear metas de [place]  También pueden ayudarlo a hacer un plan para alcanzar flora metas  Por ejemplo, puede dividir la actividad en períodos de 10 minutos, 3 veces al día  Busque actividades que disfruta  De esta forma será más fácil alcanzar flora metas  Mantenga un peso saludable  Pregúntele a slaughter médico cuál es el peso ideal para usted  Puede ayudarlo a crear un plan para perder peso de manera arnold, si lo necesita  Berny pérdida de al menos el 10% de peso puede mejorar la jamal de slaughter corazón      Consuma alimentos saludables para el corazón  Danne Indio y verduras frescas en shepard plan de comidas  Escoja alimentos bajos en grasas, arnav [employer] de 1% de [place] y yogur bajos en grasas, pescado, luna (sin piel) y [place]  [employer] porciones de 4 onzas de pescado alto en grasas omega 3 cada semana, arnav por ejemplo salmón, atún fresco y arenque  Limite el sodio (la sal) arnav se le haya indicado  Evite alimentos altos en sodio, arnav alimentos enlatados, katty tostadas, comidas saladas rápidas y viry frías para emparedado  Si añade carolyn cuando cocina la comida, no añada más en la christina  Limite o no consuma bebidas alcohólicas  Un trago equivale a 12 onzas de cerveza, 5 onzas de vino o 1 onza y ½ de licor  Shepard médico puede decirle cuántas bebidas puede kinza en 24 horas y en 1 semana  Controle otras afecciones de [employer]  Siga las indicaciones de shepard médico sobre cómo manejar otros trastornos médicos que puedan afectar la jamal de shepard corazón  Estas incluyen diabetes, presión arterial anusha y colesterol alto  Es posible que necesite kinza medicamentos para estas afecciones y hacer otros cambios en shepard estilo de vijay  Controle el estrés  El estrés puede aumentar shepard presión arterial  Busque nuevas maneras de [place], arnav la respiración profunda o escuchar música  Pregunte sobre las vacunas que pudiera necesitar  Shepard médico puede indicarle qué vacunas necesita y cuándo aplicárselas  Las siguientes vacunas ayudan a prevenir enfermedades que pueden ser graves para berny persona con EAC:    La vacuna contra la gripe se aplica todos los [place]  Vacúnese contra la gripe tan pronto anrav se recomiende, normalmente en septiembre u octubre  La vacuna contra la neumonía generalmente se aplica cada 5 años  Shepard médico puede recomendarle la vacuna contra la neumonía si tiene 70 años o [place]  Las vacunas contra la COVID-19 se administran a los adultos en forma de inyección en 1 o 2 dosis     Se recomienda la vacunación para todos los adultos  Se administra berny dosis de refuerzo (adicional) para ayudar al sistema inmunitario a seguir protegiéndose contra la COVID-19 grave  La dosis de refuerzo puede ser [employer] judy diferente de la vacuna contra la COVID-19 que recibió originalmente  El momento de la dosis de refuerzo depende del tipo de vacuna que haya recibido:    [place] de 1 dosis: La dosis de refuerzo se administra al menos 2 meses después de vipin recibido la vacuna  [place] de 2 dosis: La dosis de refuerzo se administra al menos 5 o 6 meses después de la segunda dosis  Acuda a flora consultas de control con slaughter médico o cardiólogo según le indicaron: Es posible que necesite regresar para que le burke otros exámenes  También lo pueden referir a un cirujano del corazón  Anote flora preguntas para que se acuerde de hacerlas sonja flora visitas  © Copyright [employer] 2022 Information is for End User's use only and may not be sold, redistributed or otherwise used for commercial purposes  All illustrations and images included in CareNotes® are the copyrighted property of [employer] A [employer]  or [address] [place] [address] Avenue es sólo para uso en educación  Slaughter intención no es darle un consejo médico sobre enfermedades o tratamientos  Colsulte con slaughter Maggielene [place] farmacéutico antes de seguir cualquier régimen médico para saber si es seguro y efectivo para usted

## 2022-08-17 NOTE — PROGRESS NOTES
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[facility])  Patient has her spinal cerebellar ataxia  Referral placed for physical therapy for gait training    Iron deficiency anemia  Patient has iron deficiency anemia likely secondary to heavy menses    Menorrhagia with irregular cycle  Referral placed for IN  Discussed with patient that they should speak with OBGYN about surgical options  Patient may likely benefit from a possible ablation versus hysterectomy given that the they do not want any more children  If we are able to correct the heavy bleeding, patient may not need to be on iron supplementation which would also resolve her constipation  Follow-up with PCP in 3 months    Precordial pain  Patient had chest pain and rib was recently seen in the ER  A cardiac a enzymes were negative  EKG was negative  Patient was sent home as she was stable  Given former history of smoking quarter pack per day a for 13 years, and recent episode of chest discomfort  Recommend that patient get nuclear stress test   Patient is unable to run on treadmill  Follow-up with PCP in 3 months       Diagnoses and all orders for this visit:    Menorrhagia with irregular cycle  -     Ambulatory Referral to Obstetrics / Gynecology; Future    Iron deficiency anemia, unspecified iron deficiency anemia type    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[facility] [address] )  -     Ambulatory Referral to Physical Therapy;  Future    Chronic migraine without aura without status migrainosus, not intractable    Precordial pain  -     NM myocardial perfusion spect (rx stress and/or rest); Future    Former smoker  -     NM myocardial perfusion spect (rx stress and/or rest); Future    Encounter for screening for malignant neoplasm of breast, unspecified screening modality  -     Mammo screening bilateral w 3d & cad; Future    Other orders  -     OXcarbazepine (TRILEPTAL) 300 mg tablet        Subjective:   Chief Complaint   Patient presents with    Follow-up     6 month follow up      Health Maintenance   Topic Date Due    Hepatitis C Screening  Never done    HIV Screening  Never done    DTaP,Tdap,and Td Vaccines (1 - Tdap) Never done    COVID-19 Vaccine (3 - Booster for [employer] series) 10/13/2021    Annual Physical  03/03/2022    Breast Cancer Screening: Mammogram  08/03/2022    Influenza Vaccine (1) 09/01/2022    Depression Screening  01/05/2023    BMI: Adult  08/17/2023    Cervical Cancer Screening  09/30/2023    Pneumococcal Vaccine: Pediatrics (0 to 5 Years) and At-Risk Patients (6 to 59 Years)  Aged Out    HIB Vaccine  Aged Out    Hepatitis B Vaccine  Aged Out    IPV Vaccine  Aged Out    Hepatitis A Vaccine  Aged Out    Meningococcal ACWY Vaccine  Aged Out    HPV Vaccine  Aged Out        Patient ID: Hank Jimenez is a 38 y o  female  HPI    Patient presents for six-month follow-up  Iron deficiency anemia:  Currently on birth control to help with bleeding  And iron pill  Seizure:  Was recommended to use walker  Increase Aimovig to 140 mg monthly for migraine prophylaxis  Started on Trileptal 300 mg half tab twice a day and then increase to full tab twice a day  Advised patient for follow-up for blood work recheck  The following portions of the patient's history were reviewed and updated as appropriate: allergies, current medications, past family history, past medical history, past social history, past surgical history, and problem list     Review of Systems   Constitutional: Negative for chills and fever  HENT: Negative for congestion      Respiratory: Negative for cough and shortness of breath  Cardiovascular: Negative for chest pain and palpitations  Gastrointestinal: Negative for nausea and vomiting  Genitourinary: Negative for dysuria  Musculoskeletal: Negative for myalgias  Skin: Negative for rash  Neurological: Positive for headaches  Negative for dizziness  Objective:  /60 (BP Location: Left arm, Patient Position: Sitting, Cuff Size: Adult)   Pulse 90   Temp 97 6 °F (36 4 °C) (Tympanic)   Resp 16   Ht 5' 9" (1 753 m)   Wt 74 3 kg (163 lb 12 8 oz)   SpO2 99%   BMI 24 19 kg/m²      Physical Exam  Vitals and nursing note reviewed  Constitutional:       General: She is not in acute distress  HENT:      Head: Normocephalic and atraumatic  Eyes:      Conjunctiva/sclera: Conjunctivae normal    Cardiovascular:      Rate and Rhythm: Normal rate and regular rhythm  Pulses: Normal pulses  Heart sounds: No murmur heard  Pulmonary:      Effort: Pulmonary effort is normal  No respiratory distress  Breath sounds: No wheezing, rhonchi or rales  Musculoskeletal:         General: No swelling  Lymphadenopathy:      Cervical: No cervical adenopathy  Neurological:      Mental Status: She is alert  Psychiatric:         Mood and Affect: Mood normal            A a labs from 08/14/2022  A tropes 13 and 7  D-dimer 0 37  Hemoglobin 10 4, MCV 80  Sodium a 138, potassium 3 8, creatinine 0 81  This note has been constructed using a voice recognition system  There may be translation, syntax, or grammatical errors  If you have an questions, please contact the dictating provider

## 2022-09-01 ENCOUNTER — OFFICE VISIT (OUTPATIENT)
Dept: OBGYN CLINIC | Facility: CLINIC | Age: 42
End: 2022-09-01
Payer: COMMERCIAL

## 2022-09-01 VITALS
BODY MASS INDEX: 23.99 KG/M2 | DIASTOLIC BLOOD PRESSURE: 78 MMHG | WEIGHT: 162 LBS | HEIGHT: 69 IN | SYSTOLIC BLOOD PRESSURE: 120 MMHG

## 2022-09-01 DIAGNOSIS — N92.0 MENORRHAGIA WITH REGULAR CYCLE: Primary | ICD-10-CM

## 2022-09-01 DIAGNOSIS — N92.1 MENORRHAGIA WITH IRREGULAR CYCLE: ICD-10-CM

## 2022-09-01 PROBLEM — R10.84 GENERALIZED ABDOMINAL PAIN: Status: ACTIVE | Noted: 2021-06-12

## 2022-09-01 PROBLEM — G43.909 MIGRAINES: Status: ACTIVE | Noted: 2021-06-12

## 2022-09-01 PROBLEM — R79.89 ELEVATED LACTIC ACID LEVEL: Status: ACTIVE | Noted: 2021-06-12

## 2022-09-01 PROCEDURE — 99203 OFFICE O/P NEW LOW 30 MIN: CPT | Performed by: NURSE PRACTITIONER

## 2022-09-01 RX ORDER — TRANEXAMIC ACID 650 MG/1
1300 TABLET ORAL 3 TIMES DAILY
Qty: 30 TABLET | Refills: 1 | Status: SHIPPED | OUTPATIENT
Start: 2022-09-01 | End: 2022-09-06

## 2022-09-01 NOTE — PROGRESS NOTES
Reny Riggs 72-year-old female  with  x 2 and 1 C/S with tubal ligation  She complains of very heavy menses  States over the course of about 4 years her cycles have worsened  She states for the first 3 days of her cycle she has to use a depends brief and then she can use tampons or pads  She also experiences severe menstrual cramps for the first 3 days  Her cycle last for about 5 days  Her cycles are every 28 days  She also experiences pelvic pain with intimacy  History had a left oophorectomy for an ovarian cyst      Last pap smear was 2020 - normal     Patient's last menstrual period was 08/15/2022  ROS:  As indicated in HPI  All other ROS negative  Physical Exam  Constitutional:       Appearance: Normal appearance  Genitourinary:      Right Labia: No rash, tenderness, lesions, skin changes or Bartholin's cyst      Left Labia: No tenderness, lesions, skin changes, Bartholin's cyst or rash  No labial fusion noted  No vaginal discharge, erythema, tenderness, bleeding or granulation tissue  No vaginal prolapse present  No vaginal atrophy present  Right Adnexa: full  Right Adnexa: not tender and no mass present  Left Adnexa: absent  Cervix is parous  No cervical motion tenderness, discharge, friability, lesion, polyp or nabothian cyst       Uterus is enlarged and irregular  Uterus is not fixed, tender or prolapsed  Uterine mass present  Uterus exam comments: approx 10 to 15 cm in size  Prudence Malin Uterus is anteverted  HENT:      Head: Normocephalic and atraumatic  Musculoskeletal:      Cervical back: Neck supple  Neurological:      Mental Status: She is alert  Skin:     General: Skin is warm  Psychiatric:         Behavior: Behavior is cooperative  Vitals and nursing note reviewed  Atul Chew was seen today for menorrhagia      Diagnoses and all orders for this visit:    Menorrhagia with regular cycle  -     US pelvis complete w transvaginal; Future  -     Tranexamic Acid 650 MG TABS; Take 2 tablets (1,300 mg total) by mouth 3 (three) times a day for 5 days    Menorrhagia with irregular cycle  -     Ambulatory Referral to Obstetrics / Gynecology      Presumed fibroid uterus  Pelvic US ordered for further evaluation  In the meantime I have offered patient to give Lysteda a try to help manage her menorrhagia  Once I receive her US results we can discuss further an appropriate plan of care  All questions and concerns addressed and patient verbalized understanding  She is agreeable with the current plan  Results will be released to [place], if abnormal will call to review and discuss treatment plan

## 2022-09-07 ENCOUNTER — TELEPHONE (OUTPATIENT)
Dept: OBGYN CLINIC | Facility: CLINIC | Age: 42
End: 2022-09-07

## 2022-09-07 NOTE — TELEPHONE ENCOUNTER
Called [facility] mohan Norris) to inform prior auth for lysteda apptoved 9/7/2022 until 9/7/2023

## 2022-09-15 ENCOUNTER — HOSPITAL ENCOUNTER (OUTPATIENT)
Dept: ULTRASOUND IMAGING | Facility: HOSPITAL | Age: 42
Discharge: HOME | End: 2022-09-15
Payer: COMMERCIAL

## 2022-09-15 DIAGNOSIS — N92.0 MENORRHAGIA WITH REGULAR CYCLE: ICD-10-CM

## 2022-09-15 PROCEDURE — 76856 US EXAM PELVIC COMPLETE: CPT

## 2022-09-15 PROCEDURE — 76830 TRANSVAGINAL US NON-OB: CPT

## 2022-09-19 ENCOUNTER — TELEPHONE (OUTPATIENT)
Dept: OBGYN CLINIC | Facility: CLINIC | Age: 42
End: 2022-09-19

## 2022-09-20 ENCOUNTER — EVALUATION (OUTPATIENT)
Dept: PHYSICAL THERAPY | Facility: CLINIC | Age: 42
End: 2022-09-20
Payer: COMMERCIAL

## 2022-09-20 DIAGNOSIS — G11.8 SPINOCEREBELLAR ATAXIA (HCC): Primary | ICD-10-CM

## 2022-09-20 PROCEDURE — 97162 PT EVAL MOD COMPLEX 30 MIN: CPT

## 2022-09-20 NOTE — LETTER
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[facility])  G11 8        Dear Dr Victor Manuel Gee:    Thank you for your recent referral of Hank Jimenez  Please review the attached evaluation summary from Kissy's recent visit  Please verify that you agree with the plan of care by signing the attached order  If you have any questions or concerns, please do not hesitate to call  I sincerely appreciate the opportunity to share in the care of one of your patients and hope to have another opportunity to work with you in the near future  Sincerely,    Ld Herrmann, PT      Referring Provider:      I certify that I have read the below Plan of Care and certify the need for these services furnished under this plan of treatment while under my care  Yola Boyd DO  [facility] [place]          PT Evaluation     Today's date: 2022  Patient name: Hank Jimenez  : 1980  MRN: [id number]  Referring provider: Debbie Jo DO  Dx:   Encounter Diagnosis     ICD-10-CM    1  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[facility] [address] )  G11 8        Start Time: 845  Stop Time: 926  Total time in clinic (min): 41 minutes    Assessment  Assessment details: Patient is a 38 y o  female who reports to outpatient physical therapy with spinocerebellar ataxia  Probable movement impairment diagnosis of gait/balance dysfunction secondary to spinocerebellar ataxia resulting in pathoanatomical symptoms of decreased independence with most activity, poor gait/balance, decreased LE strength/ROM, core/abdominal weakness and limiting ability to stand/transition/walk, perform normal daily activity  Due to patient's need for assistance and poor balance, patient is at an increased risk of falling   Patient would benefit from being evaluated for and fit for proper assistive device and for physical therapy to increase LE strength, improve balance and gait, and increase functional activity and independence  Skilled physical therapy is warranted for the application of the interventions found below in the planned intervention section  Etiologic factors include chronic weakness/balance dysfunction  Prognosis is good given HEP compliance and attendance to physical therapy 2x a week for 8 weeks  Positive prognostic indicators include good motivation  Negative prognostic indicators include chronic weakness  Please contact me if you have any questions or recommendations  Thank you for the referral and the opportunity to share in [address]  Patient verbalized understanding of POC, HEP, and return demonstrated HEP  Impairments: abnormal gait, abnormal or restricted ROM, abnormal movement, activity intolerance, impaired balance, impaired physical strength, lacks appropriate home exercise program, pain with function and weight-bearing intolerance    Plan  Plan details: Re-Evaluation in 4 weeks, follow up with neurological physical therapy in [place]  Planned therapy interventions: manual therapy, neuromuscular re-education, patient education, postural training, therapeutic activities, therapeutic exercise, home exercise program, gait training and balance  Frequency: 2x week  Duration in weeks: 12  Plan of Care beginning date: 9/20/2022  Plan of Care expiration date: 12/20/2022  Treatment plan discussed with: patient        Subjective Evaluation    History of Present Illness  Mechanism of injury: Vivian Aguila presents to outpatient physical therapy with a referral for Spinocerebellar ataxia and gait training  Vivian Aguila reports walking has been difficult for about 4 years; does not think it is gradually decreasing  Has not previously been to physical therapy       HPI/Primary Complaint: difficulty walking, trouble with balance; legs feel weak; legs feel weird but no change in sensation; denies any pain; balance is the same all day, nothing changes it or makes it worse/better; has frequent seizures, about every 3 months, forgets what happens when they occur; will have some pain in legs with walking  AD: has never used assistive device; needs assistance to walk; thinks she may be able to walk with use of cane of other AD  Relevant PMH: 4 yr history; history of seizures  Exercise: no regular exercise  Aggravating factors/limitations: getting out of car, getting up out of chair;    Patient Goals: improve walking/balance;     Social Support  Stairs in house: yes (BL hand rail)   Lives with: adult children and parents          Objective     Strength/Myotome Testing     Left Wrist/Hand      (2nd hand position)     Trial 1: 60    Right Wrist/Hand      (2nd hand position)     Trial 1: 55    Left Hip   Planes of Motion   Flexion: 3-  Extension: 3-  Abduction: 3-    Right Hip   Planes of Motion   Flexion: 3-  Extension: 3-  Abduction: 3-    Left Knee   Flexion: 3  Extension: 3    Right Knee   Flexion: 3  Extension: 3    Left Ankle/Foot   Dorsiflexion: 3+  Plantar flexion: 3+    Right Ankle/Foot   Dorsiflexion: 3+  Plantar flexion: 3+    Ambulation   Weight-Bearing Status   Assistive device used: none  Neuro Exam:     Sensation   Light touch LE: left WNL    Coordination   Heel to shin: left WNL and right WNL    Transfers Sit to stand: minimum assist Sit to supine: minimum assist Supine to sit: minimum assist     Functional outcomes   5x sit to stand: 42 (seconds)  TU (seconds)  Functional outcome comment: FT EO: LOB, shakiness, <5"  Feet wide EO: <15"    *fatigued after 5x STS- SOB and LE fatigue    Amputee   Gait comment: Needs assistance for walking; step to gait pattern with ataxia; needs UE support for balance; some freezing episodes; slow gait speed             Precautions: high risk of falls- use gait belt; history of seizures; spinocerebellar ataxia  *needs use of - primarily French speaking   HEP: n/a ()  RE: (10/20)  POC: (12/20)  FOTO: 23 (9/20)    Manuals 9/20                                                                Neuro Re-Ed             Static Balance             Seated balance/trunk control                                                                              Ther Ex             Nustep                                                                                                        Ther Activity             Fwd walking  Bwd walking                                                                 Gait Training             Assistive device training                          Modalities

## 2022-09-21 ENCOUNTER — TELEPHONE (OUTPATIENT)
Dept: OBGYN CLINIC | Facility: CLINIC | Age: 42
End: 2022-09-21

## 2022-09-21 DIAGNOSIS — N92.1 MENORRHAGIA WITH IRREGULAR CYCLE: ICD-10-CM

## 2022-09-21 DIAGNOSIS — N80.03 ADENOMYOSIS OF UTERUS: Primary | ICD-10-CM

## 2022-09-21 DIAGNOSIS — N92.0 MENORRHAGIA WITH REGULAR CYCLE: ICD-10-CM

## 2022-09-21 NOTE — TELEPHONE ENCOUNTER
Spoke with patient and reviewed pelvic US results  US shows an appearance consistent with adenomyosis  I explained that adenomyosis is when the endometrial tissue that lines the uterus grows into the muscle of the uterus  This causes pain and heavy menstrual bleeding  I recommended she have a consult with Dr Briana Bangura to discuss surgical management  Referral provided and office number provided

## 2022-10-04 DIAGNOSIS — D50.9 IRON DEFICIENCY ANEMIA, UNSPECIFIED IRON DEFICIENCY ANEMIA TYPE: ICD-10-CM

## 2022-10-04 DIAGNOSIS — G31.9 CEREBELLAR ATROPHY (HCC): ICD-10-CM

## 2022-10-04 RX ORDER — FERROUS SULFATE TAB EC 324 MG (65 MG FE EQUIVALENT) 324 (65 FE) MG
324 TABLET DELAYED RESPONSE ORAL DAILY
Qty: 90 TABLET | Refills: 0 | Status: SHIPPED | OUTPATIENT
Start: 2022-10-04

## 2022-10-05 ENCOUNTER — OFFICE VISIT (OUTPATIENT)
Dept: PHYSICAL THERAPY | Facility: CLINIC | Age: 42
End: 2022-10-05
Payer: COMMERCIAL

## 2022-10-05 DIAGNOSIS — G11.8 SPINOCEREBELLAR ATAXIA (HCC): Primary | ICD-10-CM

## 2022-10-05 PROCEDURE — 97110 THERAPEUTIC EXERCISES: CPT

## 2022-10-05 PROCEDURE — 97116 GAIT TRAINING THERAPY: CPT

## 2022-10-05 PROCEDURE — 97112 NEUROMUSCULAR REEDUCATION: CPT

## 2022-10-05 NOTE — PROGRESS NOTES
Daily Note     Today's date: 10/5/2022  Patient name: Pat Chamberlain  : 1980  MRN: [id number]  Referring provider: Norma Centeno Edy, DO  Dx:   Encounter Diagnosis     ICD-10-CM    1  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[facility] [address] )  G11 8                   Subjective: Pt non-english speaking Pt son/nephew present t/o rx session  Pt reports no recent falls  Walks with assistance from another individual and no AD  Pt furniture walking in home setting  Co's of BL knee pain       Objective: See treatment diary below      Assessment: Tolerated treatment well pt reported 0/10 pain BL knee end rx session  Pt demon ataxia gait  Pt currently ambul in home via furniture walking  Does not use AD  Consider assessing appropriate AD upcoming for safety with ambul   Pt issued HEP/ handouts provided encourage carryover  Patient would benefit from continued PT      Plan: Continue per plan of care        Precautions: high risk of falls- use gait belt; history of seizures; spinocerebellar ataxia  *needs use of - primarily [address] speaking   HEP: n/a ()  RE: (10/20)  POC: ()  FOTO: 23 ()      Re-eval Date: 10/20/22    Date  10/5      Visit Count  2      FOTO        Pain In  BL Knee 5/10      Pain Out          * pt son present t/o rx session for English<>english translation  Manuals                                        Neuro Re-Ed        Static Balance  FA  EO 30"  EC 30"  Foam/EO 30"  CS      Seated balance/trunk control        STS  5x  BL UE  28 "        Side step  SOLO  4x ea dir  Cues  CS/CGA                              Ther Ex        Nustep  L3 10 min  Cues Pacing > 60 SPM                                                              Ther Activity        Fwd walking  Bwd walking                                        Gait Training        Assistive device training  SOLO  6 laps   1 brief seated rest  CS/CGA  Cues turn nego                Modalities                            Access Code: [facility]  URL: [email or web address]  Date: 10/05/2022  Prepared by: Yumiko Mcfadden    Exercises  · Sit to Stand with Armchair - 1 x daily - 7 x weekly - 3 sets - 10 reps  · Side Stepping with Counter Support - 1 x daily - 7 x weekly - 3 sets - 10 reps

## 2022-10-12 ENCOUNTER — CONSULT (OUTPATIENT)
Dept: GYNECOLOGY | Facility: CLINIC | Age: 42
End: 2022-10-12
Payer: COMMERCIAL

## 2022-10-12 VITALS — DIASTOLIC BLOOD PRESSURE: 58 MMHG | BODY MASS INDEX: 24.54 KG/M2 | SYSTOLIC BLOOD PRESSURE: 104 MMHG | WEIGHT: 166.2 LBS

## 2022-10-12 DIAGNOSIS — Z01.812 PRE-OPERATIVE LABORATORY EXAMINATION: Primary | ICD-10-CM

## 2022-10-12 DIAGNOSIS — N94.6 DYSMENORRHEA: ICD-10-CM

## 2022-10-12 DIAGNOSIS — N80.03 ADENOMYOSIS OF UTERUS: ICD-10-CM

## 2022-10-12 DIAGNOSIS — N92.0 MENORRHAGIA WITH REGULAR CYCLE: ICD-10-CM

## 2022-10-12 PROCEDURE — 99244 OFF/OP CNSLTJ NEW/EST MOD 40: CPT | Performed by: OBSTETRICS & GYNECOLOGY

## 2022-10-12 NOTE — PROGRESS NOTES
Assessment/Plan:    Discussed with patient my suspicion for adenomyosis  Discussed treatment options including medical management versus surgical management  The patient is opted proceed with a hysterectomy  Discussed LSH BS versus TLH BS  Patient is opted proceed with an [facility] BS  Also will be scheduled for possible right oophorectomy  She is aware that with removal of her remaining ovary, if this becomes necessary, this will place her into surgical menopause which may necessitate hormone replacement therapy if she cannot tolerate vasomotor symptoms     Diagnoses and all orders for this visit:    Dysmenorrhea    Adenomyosis of uterus  -     Ambulatory Referral to Gynecology    Menorrhagia with regular cycle  -     Ambulatory Referral to Gynecology      Right ovarian cyst/endometrioma    Subjective:      Patient ID: Kemi Jackson is a 38 y o  female  HPI G 3 P 3,  X2, C section with tubal  X 1, new patient referred to office by Lopez French, secondary to adenomyosis  Patient states that for years she has been experiencing menorrhagia and worsening dysmenorrhea minimally responsive to nonsteroidals  Pelvic ultrasound:    PELVIC ULTRASOUND, COMPLETE     INDICATION:  The patient is 38years old  N92 0: Excessive and frequent menstruation with regular cycle      COMPARISON: Pelvic ultrasound 4/3/2014      TECHNIQUE:   Transabdominal pelvic ultrasound was performed in sagittal and transverse planes with a curvilinear transducer  Additional transvaginal imaging was performed to better evaluate the endometrium and ovaries  Imaging included volumetric   sweeps as well as traditional still imaging technique      FINDINGS:     UTERUS:  The uterus is anteverted in position, measuring 11 4 x 6 1 x 7 9 cm  The uterus has a bulbous contour and coarsened heterogeneous echotexture with small myometrial cysts, most consistent with the appearance of diffuse adenomyosis     The cervix appears within normal limits      ENDOMETRIUM:    The endometrial echo complex has an AP caliber of 7 0 mm  Its appearance is within normal limits for age and cycle and shows no filling defects      OVARIES/ADNEXA:  Right ovary:  3 8 x 2 6 x 2 3 cm  12 2 mL  Unilocular cystic ovarian lesion with low-level internal echoes and no internal Doppler flow is noted measuring 2 9 x 1 9 x 2 0, compatible with endometrioma  Doppler flow within normal limits      Left ovary:  Surgically absent  No suspicious adnexal mass or loculated collections  There is no free fluid      IMPRESSION:     1   Bulbous uterus with findings consistent with diffuse adenomyosis  2   Cystic left ovarian lesion measuring up to 2 9 cm low-level internal echoes  Based on the ACR O-RADS system, this is an endometrioma, O-RADS category 2 (almost certain benign with <5% risk of malignancy ) The management recommendation is optional   initial follow-up pelvic ultrasound in 8 to 12 weeks, with consideration of annual follow-up ultrasound if not removed surgically  Reference: Radiology 2020; [id number]   3   Left oophorectomy      The following portions of the patient's history were reviewed and updated as appropriate:   She  has a past medical history of B12 deficiency, 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 ([address] ), Chronic migraine without aura, Dermatographism, Gait disturbance, Iron deficiency anemia, Lyme neuropathy, Seizures ([address] ), Visual disturbance, and Vitamin D deficiency    She   Patient Active Problem List    Diagnosis Date Noted   • Precordial pain 08/17/2022   • Elevated lactic acid level 06/12/2021   • Generalized abdominal pain 06/12/2021   • Migraines 06/12/2021   • Weight loss 04/04/2021   • Transient diplopia 04/04/2021   • Menorrhagia with irregular cycle 03/03/2021   • Annual physical exam 09/30/2020   • Prediabetes 08/18/2020   • 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   • Ataxia 04/13/2020   • Noncompliance with medications 04/13/2020   • Cerebral atrophy ([address] ) 04/10/2020   • Gait instability 2019   • Gastroesophageal reflux disease without esophagitis 2019   • Seiz[address] [address] )    • Chronic migraine without aura without status migrainosus, not intractable 2018   • 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 ([facility] [address] ) 2018   • Dermatographism 2017   • Urticaria, chronic 2017   • Nystagmus 2017   • B12 deficiency 2017   • Iron deficiency anemia 2017   • Lyme neuropathy 2017   • Vitamin D deficiency 2017     She  has a past surgical history that includes  section; Oophorectomy; Tubal ligation; and Esophagogastroduodenoscopy (N/A, 3/8/2019)  Her family history includes Alcohol abuse in her father; HIV in her maternal grandmother; Other in her mother and sister; Seizures in her mother  She  reports that she has never smoked  She has never used smokeless tobacco  She reports that she does not drink alcohol and does not use drugs  Current Outpatient Medications   Medication Sig Dispense Refill   • cyanocobalamin (VITAMIN B-12) 1000 MCG tablet Take 1 tablet (1,000 mcg total) by mouth daily 90 tablet 0   • ferrous sulfate 324 (65 Fe) mg Take 1 tablet (324 mg total) by mouth daily 90 tablet 0   • levETIRAcetam (KEPPRA) 750 mg tablet Take 1 tablet (750 mg total) by mouth 2 (two) times a day 60 tablet 3   • omeprazole (PriLOSEC) 20 mg delayed release capsule Take 1 capsule (20 mg total) by mouth daily 90 capsule 1   • Erenumab-aooe (Aimovig) 70 MG/ML SOAJ Inject 70 mg under the skin every 28 days (Patient not taking: Reported on 10/12/2022)     • OXcarbazepine (TRILEPTAL) 300 mg tablet  (Patient not taking: Reported on 10/12/2022)       No current facility-administered medications for this visit       Current Outpatient Medications on File Prior to Visit   Medication Sig   • cyanocobalamin (VITAMIN B-12) 1000 MCG tablet Take 1 tablet (1,000 mcg total) by mouth daily   • ferrous sulfate 324 (65 Fe) mg Take 1 tablet (324 mg total) by mouth daily   • levETIRAcetam (KEPPRA) 750 mg tablet Take 1 tablet (750 mg total) by mouth 2 (two) times a day   • omeprazole (PriLOSEC) 20 mg delayed release capsule Take 1 capsule (20 mg total) by mouth daily   • Erenumab-aooe (Aimovig) 70 MG/ML SOAJ Inject 70 mg under the skin every 28 days (Patient not taking: Reported on 10/12/2022)   • OXcarbazepine (TRILEPTAL) 300 mg tablet  (Patient not taking: Reported on 10/12/2022)     No current facility-administered medications on file prior to visit  She is allergic to dye fdc red  [red dye - food allergy] and morphine       Review of Systems   Constitutional: Positive for fatigue  Gastrointestinal: Positive for abdominal pain  Negative for blood in stool, constipation, diarrhea, nausea and vomiting  Genitourinary: Positive for menstrual problem and pelvic pain  Negative for difficulty urinating  Objective:      /58   Wt 75 4 kg (166 lb 3 2 oz)   BMI 24 54 kg/m²          Physical Exam  Vitals reviewed  Cardiovascular:      Rate and Rhythm: Normal rate and regular rhythm  Pulses: Normal pulses  Heart sounds: Normal heart sounds  Pulmonary:      Effort: Pulmonary effort is normal       Breath sounds: Normal breath sounds  Abdominal:      General: There is no distension  Palpations: Abdomen is soft  There is no mass  Tenderness: There is no abdominal tenderness  There is no rebound  Hernia: No hernia is present  There is no hernia in the left inguinal area or right inguinal area  Genitourinary:     General: Normal vulva  Labia:         Right: No rash, tenderness or lesion  Left: No rash, tenderness or lesion  Cervix: Normal       Uterus: Enlarged (top normal size) and tender  Not deviated, not fixed and no uterine prolapse  Lymphadenopathy:      Lower Body: No right inguinal adenopathy  No left inguinal adenopathy  Neurological:      Mental Status: She is alert

## 2022-10-13 ENCOUNTER — HOSPITAL ENCOUNTER (OUTPATIENT)
Facility: HOSPITAL | Age: 42
Setting detail: OUTPATIENT SURGERY
Discharge: HOME | End: 2022-10-13
Attending: OBSTETRICS & GYNECOLOGY | Admitting: OBSTETRICS & GYNECOLOGY

## 2022-11-18 ENCOUNTER — TELEPHONE (OUTPATIENT)
Dept: PHYSICAL THERAPY | Facility: CLINIC | Age: 42
End: 2022-11-18

## 2022-11-18 NOTE — TELEPHONE ENCOUNTER
POC was sent 9- by Wenceslao Mason    10-20-22 by Writer    11-18-22 by [id number]      Fax Number: [phone]

## 2023-01-03 RX ORDER — CEFAZOLIN SODIUM 1 G/50ML
1000 SOLUTION INTRAVENOUS ONCE
OUTPATIENT
Start: 2023-01-23 | End: 2023-01-03

## 2023-01-03 NOTE — H&P
Discussed with patient my suspicion for adenomyosis  Discussed treatment options including medical management versus surgical management  The patient is opted proceed with a hysterectomy  Discussed LS BS versus TLH BS  Patient has opted to proceed with an [facility] BS  Also will be scheduled for possible right oophorectomy  She is aware that with removal of her remaining ovary, if this becomes necessary, this will place her into surgical menopause which may necessitate hormone replacement therapy if she cannot tolerate vasomotor symptoms      Diagnoses and all orders for this visit:     Dysmenorrhea     Adenomyosis of uterus  -     Ambulatory Referral to Gynecology     Menorrhagia with regular cycle  -     Ambulatory Referral to Gynecology      Right ovarian cyst/endometrioma     Subjective:       Patient ID: Vicky Leone is a 38 y o  female      HPI G 3 P 3,  X2, C section with tubal  X 1, new patient referred to office by Rossana Ragland, secondary to adenomyosis  Patient states that for years she has been experiencing menorrhagia and worsening dysmenorrhea minimally responsive to nonsteroidals      Pelvic ultrasound:     PELVIC ULTRASOUND, COMPLETE     INDICATION:  The patient is 38years old   N92 0: Excessive and frequent menstruation with regular cycle      COMPARISON: Pelvic ultrasound 4/3/2014      TECHNIQUE:   Transabdominal pelvic ultrasound was performed in sagittal and transverse planes with a curvilinear transducer   Additional transvaginal imaging was performed to better evaluate the endometrium and ovaries   Imaging included volumetric   sweeps as well as traditional still imaging technique      FINDINGS:     UTERUS:  The uterus is anteverted in position, measuring 11 4 x 6 1 x 7 9 cm  The uterus has a bulbous contour and coarsened heterogeneous echotexture with small myometrial cysts, most consistent with the appearance of diffuse adenomyosis     The cervix appears within normal limits      ENDOMETRIUM:    The endometrial echo complex has an AP caliber of 7 0 mm  Its appearance is within normal limits for age and cycle and shows no filling defects      OVARIES/ADNEXA:  Right ovary:  3 8 x 2 6 x 2 3 cm  12 2 mL  Unilocular cystic ovarian lesion with low-level internal echoes and no internal Doppler flow is noted measuring 2 9 x 1 9 x 2 0, compatible with endometrioma  Doppler flow within normal limits      Left ovary:  Surgically absent  No suspicious adnexal mass or loculated collections  There is no free fluid      IMPRESSION:     1   Bulbous uterus with findings consistent with diffuse adenomyosis  2   Cystic left ovarian lesion measuring up to 2 9 cm low-level internal echoes  Based on the ACR O-RADS system, this is an endometrioma, O-RADS category 2 (almost certain benign with <3% risk of malignancy ) The management recommendation is optional   initial follow-up pelvic ultrasound in 8 to 12 weeks, with consideration of annual follow-up ultrasound if not removed surgically   Reference: Radiology 2020; [id number]  3   Left oophorectomy      The following portions of the patient's history were reviewed and updated as appropriate:   She  has a past medical history of B12 deficiency, 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 ([address] ), Chronic migraine without aura, Dermatographism, Gait disturbance, Iron deficiency anemia, Lyme neuropathy, Seizures ([address] ), Visual disturbance, and Vitamin D deficiency    She        Patient Active Problem List     Diagnosis Date Noted   • Precordial pain 08/17/2022   • Elevated lactic acid level 06/12/2021   • Generalized abdominal pain 06/12/2021   • Migraines 06/12/2021   • Weight loss 04/04/2021   • Transient diplopia 04/04/2021   • Menorrhagia with irregular cycle 03/03/2021   • Annual physical exam 09/30/2020   • Prediabetes 08/18/2020   • 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   • Ataxia 04/13/2020   • Noncompliance with medications 04/13/2020   • Cerebral atrophy ([address] ) 04/10/2020   • Gait instability 2019   • Gastroesophageal reflux disease without esophagitis 2019   • Seizure [facility])     • Chronic migraine without aura without status migrainosus, not intractable 2018   • 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 ([facility] [address] ) 2018   • Dermatographism 2017   • Urticaria, chronic 2017   • Nystagmus 2017   • B12 deficiency 2017   • Iron deficiency anemia 2017   • Lyme neuropathy 2017   • Vitamin D deficiency 2017      She  has a past surgical history that includes  section; Oophorectomy; Tubal ligation; and Esophagogastroduodenoscopy (N/A, 3/8/2019)  Her family history includes Alcohol abuse in her father; HIV in her maternal grandmother; Other in her mother and sister; Seizures in her mother  She  reports that she has never smoked  She has never used smokeless tobacco  She reports that she does not drink alcohol and does not use drugs           Current Outpatient Medications   Medication Sig Dispense Refill   • cyanocobalamin (VITAMIN B-12) 1000 MCG tablet Take 1 tablet (1,000 mcg total) by mouth daily 90 tablet 0   • ferrous sulfate 324 (65 Fe) mg Take 1 tablet (324 mg total) by mouth daily 90 tablet 0   • levETIRAcetam (KEPPRA) 750 mg tablet Take 1 tablet (750 mg total) by mouth 2 (two) times a day 60 tablet 3   • omeprazole (PriLOSEC) 20 mg delayed release capsule Take 1 capsule (20 mg total) by mouth daily 90 capsule 1   • Erenumab-aooe (Aimovig) 70 MG/ML SOAJ Inject 70 mg under the skin every 28 days (Patient not taking: Reported on 10/12/2022)       • OXcarbazepine (TRILEPTAL) 300 mg tablet  (Patient not taking: Reported on 10/12/2022)          No current facility-administered medications for this visit            Current Outpatient Medications on File Prior to Visit   Medication Sig   • cyanocobalamin (VITAMIN B-12) 1000 MCG tablet Take 1 tablet (1,000 mcg total) by mouth daily   • ferrous sulfate 324 (65 Fe) mg Take 1 tablet (324 mg total) by mouth daily   • levETIRAcetam (KEPPRA) 750 mg tablet Take 1 tablet (750 mg total) by mouth 2 (two) times a day   • omeprazole (PriLOSEC) 20 mg delayed release capsule Take 1 capsule (20 mg total) by mouth daily   • Erenumab-aooe (Aimovig) 70 MG/ML SOAJ Inject 70 mg under the skin every 28 days (Patient not taking: Reported on 10/12/2022)   • OXcarbazepine (TRILEPTAL) 300 mg tablet  (Patient not taking: Reported on 10/12/2022)      No current facility-administered medications on file prior to visit       She is allergic to dye fdc red  [red dye - food allergy] and morphine        Review of Systems   Constitutional: Positive for fatigue  Gastrointestinal: Positive for abdominal pain  Negative for blood in stool, constipation, diarrhea, nausea and vomiting  Genitourinary: Positive for menstrual problem and pelvic pain  Negative for difficulty urinating           Objective:        /58   Wt 75 4 kg (166 lb 3 2 oz)   BMI 24 54 kg/m²             Physical Exam  Vitals reviewed  Cardiovascular:      Rate and Rhythm: Normal rate and regular rhythm  Pulses: Normal pulses  Heart sounds: Normal heart sounds  Pulmonary:      Effort: Pulmonary effort is normal       Breath sounds: Normal breath sounds  Abdominal:      General: There is no distension  Palpations: Abdomen is soft  There is no mass  Tenderness: There is no abdominal tenderness  There is no rebound  Hernia: No hernia is present  There is no hernia in the left inguinal area or right inguinal area  Genitourinary:     General: Normal vulva  Labia:         Right: No rash, tenderness or lesion  Left: No rash, tenderness or lesion  Cervix: Normal       Uterus: Enlarged (top normal size) and tender  Not deviated, not fixed and no uterine prolapse  Lymphadenopathy:      Lower Body: No right inguinal adenopathy  No left inguinal adenopathy  Neurological:      Mental Status: She is alert

## 2023-01-17 ENCOUNTER — DOCUMENTATION (OUTPATIENT)
Dept: GYNECOLOGY | Facility: CLINIC | Age: 43
End: 2023-01-17

## 2023-01-19 VITALS — BODY MASS INDEX: 24.59 KG/M2 | HEIGHT: 69 IN | WEIGHT: 166 LBS

## 2023-01-19 DIAGNOSIS — Z01.818 PREOP TESTING: Primary | ICD-10-CM

## 2023-01-19 LAB — HBA1C MFR BLD HPLC: 5.1 %

## 2023-01-19 NOTE — PRE-PROCEDURE INSTRUCTIONS
Pre-Surgery Instructions:   Medication Instructions   • cyanocobalamin (VITAMIN B-12) 1000 MCG tablet Hold day of surgery     • Erenumab-aooe (Aimovig) 70 MG/ML SOAJ Hold day of surgery     • ferrous sulfate 324 (65 Fe) mg Hold day of surgery     • levETIRAcetam (KEPPRA) 750 mg tablet Take this medication day of surgery if normally taken in the morning  • omeprazole (PriLOSEC) 20 mg delayed release capsule Take this medication day of surgery if normally taken in the morning  • OXcarbazepine (TRILEPTAL) 300 mg tablet Take this medication day of surgery if normally taken in the morning  My Surgical Experience    The following information was developed to assist you to prepare for your operation  What do I need to do before coming to the hospital?  • Arrange for a responsible person to drive you to and from the hospital   • Arrange care for your children at home  Children are not allowed in the recovery areas of the hospital  • Plan to wear clothing that is easy to put on and take off  If you are having shoulder surgery, wear a shirt that buttons or zippers in the front  Bathing  o Shower the evening before and the morning of your surgery with an antibacterial soap  Please refer to the Pre Op Showering Instructions for Surgery Patients Sheet   o Remove nail polish and all body piercing jewelry  o Do not shave any body part for at least 24 hours before surgery-this includes face, arms, legs and upper body  Food  o Nothing to eat or drink after midnight the night before your surgery   This includes candy and chewing gum  o Exception: If your surgery is after 12:00pm (noon), you may have clear liquids such as 7-Up®, ginger ale, apple or cranberry juice, Jell-O®, water, or clear broth until 8:00 am  o Do not drink milk or juice with pulp on the morning before surgery  o Do not drink alcohol 24 hours before surgery  Medicine  o Follow instructions you received from your surgeon about which medicines you may take on the day of surgery  o If instructed to take medicine on the morning of surgery, take pills with just a small sip of water  Call your prescribing doctor for specific infroamtion on what to do if you take insulin    What should I bring to the hospital?    Bring:  • Crutches or a walker, if you have them, for foot or knee surgery  • A list of the daily medicines, vitamins, minerals, herbals and nutritional supplements you take  Include the dosages of medicines and the time you take them each day  • Glasses, dentures or hearing aids  • Minimal clothing; you will be wearing hospital sleepwear  • Photo ID; required to verify your identity  • If you have a Living Will or Power of , bring a copy of the documents  • If you have an ostomy, bring an extra pouch and any supplies you use    Do not bring  • Medicines or inhalers  • Money, valuables or jewelry    What other information should I know about the day of surgery? • Notify your surgeons if you develop a cold, sore throat, cough, fever, rash or any other illness  • Report to the Ambulatory Surgical/Same Day Surgery Unit  • You will be instructed to stop at Registration only if you have not been pre-registered  • Inform your  fi they do not stay that they will be asked by the staff to leave a phone number where they can be reached  • Be available to be reached before surgery  In the event the operating room schedule changes, you may be asked to come in earlier or later than expected    *It is important to tell your doctor and others involved in your health care if you are taking or have been taking any non-prescription drugs, vitamins, minerals, herbals or other nutritional supplements   Any of these may interact with some food or medicines and cause a reaction

## 2023-01-20 ENCOUNTER — APPOINTMENT (OUTPATIENT)
Dept: LAB | Facility: HOSPITAL | Age: 43
End: 2023-01-20

## 2023-01-20 DIAGNOSIS — Z01.818 PREOP TESTING: ICD-10-CM

## 2023-01-20 LAB
ANION GAP SERPL CALCULATED.3IONS-SCNC: 4 MMOL/L (ref 4–13)
ATRIAL RATE: 72 BPM
BASOPHILS # BLD AUTO: 0.03 THOUSANDS/ÂΜL (ref 0–0.1)
BASOPHILS NFR BLD AUTO: 1 % (ref 0–1)
BUN SERPL-MCNC: 8 MG/DL (ref 5–25)
CALCIUM SERPL-MCNC: 8.8 MG/DL (ref 8.4–10.2)
CHLORIDE SERPL-SCNC: 108 MMOL/L (ref 96–108)
CO2 SERPL-SCNC: 27 MMOL/L (ref 21–32)
CREAT SERPL-MCNC: 0.64 MG/DL (ref 0.6–1.3)
EOSINOPHIL # BLD AUTO: 0.05 THOUSAND/ÂΜL (ref 0–0.61)
EOSINOPHIL NFR BLD AUTO: 1 % (ref 0–6)
ERYTHROCYTE [DISTWIDTH] IN BLOOD BY AUTOMATED COUNT: 13.2 % (ref 11.6–15.1)
GFR SERPL CREATININE-BSD FRML MDRD: 111 ML/MIN/1.73SQ M
GLUCOSE SERPL-MCNC: 106 MG/DL (ref 65–140)
HCT VFR BLD AUTO: 37 % (ref 34.8–46.1)
HGB BLD-MCNC: 11.8 G/DL (ref 11.5–15.4)
IMM GRANULOCYTES # BLD AUTO: 0 THOUSAND/UL (ref 0–0.2)
IMM GRANULOCYTES NFR BLD AUTO: 0 % (ref 0–2)
LYMPHOCYTES # BLD AUTO: 1.25 THOUSANDS/ÂΜL (ref 0.6–4.47)
LYMPHOCYTES NFR BLD AUTO: 35 % (ref 14–44)
MCH RBC QN AUTO: 27.8 PG (ref 26.8–34.3)
MCHC RBC AUTO-ENTMCNC: 31.9 G/DL (ref 31.4–37.4)
MCV RBC AUTO: 87 FL (ref 82–98)
MONOCYTES # BLD AUTO: 0.37 THOUSAND/ÂΜL (ref 0.17–1.22)
MONOCYTES NFR BLD AUTO: 10 % (ref 4–12)
NEUTROPHILS # BLD AUTO: 1.86 THOUSANDS/ÂΜL (ref 1.85–7.62)
NEUTS SEG NFR BLD AUTO: 53 % (ref 43–75)
NRBC BLD AUTO-RTO: 0 /100 WBCS
P AXIS: 10 DEGREES
PLATELET # BLD AUTO: 178 THOUSANDS/UL (ref 149–390)
PMV BLD AUTO: 12.5 FL (ref 8.9–12.7)
POTASSIUM SERPL-SCNC: 4.1 MMOL/L (ref 3.5–5.3)
PR INTERVAL: 134 MS
QRS AXIS: 50 DEGREES
QRSD INTERVAL: 70 MS
QT INTERVAL: 360 MS
QTC INTERVAL: 394 MS
RBC # BLD AUTO: 4.24 MILLION/UL (ref 3.81–5.12)
SODIUM SERPL-SCNC: 139 MMOL/L (ref 135–147)
T WAVE AXIS: 41 DEGREES
VENTRICULAR RATE: 72 BPM
WBC # BLD AUTO: 3.56 THOUSAND/UL (ref 4.31–10.16)

## 2023-01-20 RX ORDER — ONDANSETRON 2 MG/ML
4 INJECTION INTRAMUSCULAR; INTRAVENOUS ONCE AS NEEDED
OUTPATIENT
Start: 2023-01-20

## 2023-01-20 RX ORDER — HYDROMORPHONE HCL/PF 1 MG/ML
0.5 SYRINGE (ML) INJECTION
OUTPATIENT
Start: 2023-01-20

## 2023-01-20 RX ORDER — FENTANYL CITRATE/PF 50 MCG/ML
50 SYRINGE (ML) INJECTION
OUTPATIENT
Start: 2023-01-20

## 2023-01-20 RX ORDER — SODIUM CHLORIDE 9 MG/ML
125 INJECTION, SOLUTION INTRAVENOUS CONTINUOUS
OUTPATIENT
Start: 2023-01-23

## 2023-01-20 RX ORDER — PROMETHAZINE HYDROCHLORIDE 25 MG/ML
6.25 INJECTION, SOLUTION INTRAMUSCULAR; INTRAVENOUS ONCE AS NEEDED
OUTPATIENT
Start: 2023-01-20

## 2023-01-20 RX ORDER — MEPERIDINE HYDROCHLORIDE 25 MG/ML
12.5 INJECTION INTRAMUSCULAR; INTRAVENOUS; SUBCUTANEOUS ONCE AS NEEDED
OUTPATIENT
Start: 2023-01-20

## 2023-01-22 ENCOUNTER — APPOINTMENT (EMERGENCY)
Dept: CT IMAGING | Facility: HOSPITAL | Age: 43
End: 2023-01-22

## 2023-01-22 ENCOUNTER — HOSPITAL ENCOUNTER (EMERGENCY)
Facility: HOSPITAL | Age: 43
Discharge: HOME | End: 2023-01-22
Attending: EMERGENCY MEDICINE

## 2023-01-22 VITALS
SYSTOLIC BLOOD PRESSURE: 113 MMHG | HEART RATE: 73 BPM | RESPIRATION RATE: 18 BRPM | DIASTOLIC BLOOD PRESSURE: 51 MMHG | OXYGEN SATURATION: 99 % | TEMPERATURE: 98.3 F

## 2023-01-22 DIAGNOSIS — R10.9 ABDOMINAL PAIN: Primary | ICD-10-CM

## 2023-01-22 DIAGNOSIS — R11.0 NAUSEA: ICD-10-CM

## 2023-01-22 DIAGNOSIS — R93.89 ABNORMAL FINDING ON CT SCAN: ICD-10-CM

## 2023-01-22 LAB
ALBUMIN SERPL BCP-MCNC: 3.8 G/DL (ref 3.5–5)
ALP SERPL-CCNC: 51 U/L (ref 34–104)
ALT SERPL W P-5'-P-CCNC: 6 U/L (ref 7–52)
ANION GAP SERPL CALCULATED.3IONS-SCNC: 5 MMOL/L (ref 4–13)
AST SERPL W P-5'-P-CCNC: 12 U/L (ref 13–39)
BACTERIA UR QL AUTO: ABNORMAL /HPF
BASOPHILS # BLD AUTO: 0.02 THOUSANDS/ÂΜL (ref 0–0.1)
BASOPHILS NFR BLD AUTO: 0 % (ref 0–1)
BILIRUB DIRECT SERPL-MCNC: 0.12 MG/DL (ref 0–0.2)
BILIRUB SERPL-MCNC: 0.25 MG/DL (ref 0.2–1)
BILIRUB UR QL STRIP: ABNORMAL
BUN SERPL-MCNC: 10 MG/DL (ref 5–25)
CALCIUM SERPL-MCNC: 8.5 MG/DL (ref 8.4–10.2)
CHLORIDE SERPL-SCNC: 110 MMOL/L (ref 96–108)
CLARITY UR: CLEAR
CO2 SERPL-SCNC: 25 MMOL/L (ref 21–32)
COLOR UR: ABNORMAL
CREAT SERPL-MCNC: 0.75 MG/DL (ref 0.6–1.3)
EOSINOPHIL # BLD AUTO: 0.02 THOUSAND/ÂΜL (ref 0–0.61)
EOSINOPHIL NFR BLD AUTO: 0 % (ref 0–6)
ERYTHROCYTE [DISTWIDTH] IN BLOOD BY AUTOMATED COUNT: 13.2 % (ref 11.6–15.1)
GFR SERPL CREATININE-BSD FRML MDRD: 100 ML/MIN/1.73SQ M
GLUCOSE SERPL-MCNC: 98 MG/DL (ref 65–140)
GLUCOSE UR STRIP-MCNC: NEGATIVE MG/DL
HCT VFR BLD AUTO: 32.3 % (ref 34.8–46.1)
HGB BLD-MCNC: 10.3 G/DL (ref 11.5–15.4)
HGB UR QL STRIP.AUTO: ABNORMAL
IMM GRANULOCYTES # BLD AUTO: 0.02 THOUSAND/UL (ref 0–0.2)
IMM GRANULOCYTES NFR BLD AUTO: 0 % (ref 0–2)
KETONES UR STRIP-MCNC: ABNORMAL MG/DL
LEUKOCYTE ESTERASE UR QL STRIP: NEGATIVE
LIPASE SERPL-CCNC: 37 U/L (ref 11–82)
LYMPHOCYTES # BLD AUTO: 0.9 THOUSANDS/ÂΜL (ref 0.6–4.47)
LYMPHOCYTES NFR BLD AUTO: 15 % (ref 14–44)
MAGNESIUM SERPL-MCNC: 1.8 MG/DL (ref 1.9–2.7)
MCH RBC QN AUTO: 27.7 PG (ref 26.8–34.3)
MCHC RBC AUTO-ENTMCNC: 31.9 G/DL (ref 31.4–37.4)
MCV RBC AUTO: 87 FL (ref 82–98)
MONOCYTES # BLD AUTO: 0.46 THOUSAND/ÂΜL (ref 0.17–1.22)
MONOCYTES NFR BLD AUTO: 8 % (ref 4–12)
NEUTROPHILS # BLD AUTO: 4.57 THOUSANDS/ÂΜL (ref 1.85–7.62)
NEUTS SEG NFR BLD AUTO: 77 % (ref 43–75)
NITRITE UR QL STRIP: POSITIVE
NON-SQ EPI CELLS URNS QL MICRO: ABNORMAL /HPF
NRBC BLD AUTO-RTO: 0 /100 WBCS
PH UR STRIP.AUTO: 6 [PH] (ref 4.5–8)
PLATELET # BLD AUTO: 170 THOUSANDS/UL (ref 149–390)
PMV BLD AUTO: 13 FL (ref 8.9–12.7)
POTASSIUM SERPL-SCNC: 3.8 MMOL/L (ref 3.5–5.3)
PROT SERPL-MCNC: 6.3 G/DL (ref 6.4–8.4)
PROT UR STRIP-MCNC: >=300 MG/DL
RBC # BLD AUTO: 3.72 MILLION/UL (ref 3.81–5.12)
RBC #/AREA URNS AUTO: ABNORMAL /HPF
SODIUM SERPL-SCNC: 140 MMOL/L (ref 135–147)
SP GR UR STRIP.AUTO: 1.02 (ref 1–1.03)
UROBILINOGEN UR QL STRIP.AUTO: 1 E.U./DL
WBC # BLD AUTO: 5.99 THOUSAND/UL (ref 4.31–10.16)
WBC #/AREA URNS AUTO: ABNORMAL /HPF

## 2023-01-22 RX ORDER — KETOROLAC TROMETHAMINE 30 MG/ML
15 INJECTION, SOLUTION INTRAMUSCULAR; INTRAVENOUS ONCE
Status: COMPLETED | OUTPATIENT
Start: 2023-01-22 | End: 2023-01-22

## 2023-01-22 RX ORDER — KETOROLAC TROMETHAMINE 30 MG/ML
1 INJECTION, SOLUTION INTRAMUSCULAR; INTRAVENOUS ONCE
Status: COMPLETED | OUTPATIENT
Start: 2023-01-22 | End: 2023-01-22

## 2023-01-22 RX ORDER — ONDANSETRON 4 MG/1
4 TABLET, ORALLY DISINTEGRATING ORAL EVERY 6 HOURS PRN
Qty: 20 TABLET | Refills: 0 | Status: SHIPPED | OUTPATIENT
Start: 2023-01-22

## 2023-01-22 RX ORDER — ONDANSETRON 2 MG/ML
4 INJECTION INTRAMUSCULAR; INTRAVENOUS ONCE
Status: COMPLETED | OUTPATIENT
Start: 2023-01-22 | End: 2023-01-22

## 2023-01-22 RX ADMIN — KETOROLAC TROMETHAMINE 15 MG: 30 INJECTION, SOLUTION INTRAMUSCULAR; INTRAVENOUS at 21:00

## 2023-01-22 RX ADMIN — SODIUM CHLORIDE 1000 ML: 0.9 INJECTION, SOLUTION INTRAVENOUS at 20:59

## 2023-01-22 RX ADMIN — ONDANSETRON 4 MG: 2 INJECTION INTRAMUSCULAR; INTRAVENOUS at 21:00

## 2023-01-22 RX ADMIN — IOHEXOL 100 ML: 350 INJECTION, SOLUTION INTRAVENOUS at 22:12

## 2023-01-23 ENCOUNTER — APPOINTMENT (EMERGENCY)
Dept: CT IMAGING | Facility: HOSPITAL | Age: 43
End: 2023-01-23

## 2023-01-23 ENCOUNTER — HOSPITAL ENCOUNTER (OUTPATIENT)
Facility: HOSPITAL | Age: 43
Setting detail: OBSERVATION
Discharge: HOME | End: 2023-01-24
Attending: EMERGENCY MEDICINE | Admitting: INTERNAL MEDICINE

## 2023-01-23 DIAGNOSIS — R56.9 SEIZURES (HCC): Primary | ICD-10-CM

## 2023-01-23 DIAGNOSIS — G40.919 BREAKTHROUGH SEIZURE (HCC): ICD-10-CM

## 2023-01-23 DIAGNOSIS — R56.9 SEIZURE (HCC): ICD-10-CM

## 2023-01-23 DIAGNOSIS — E83.39 HYPOPHOSPHATEMIA: ICD-10-CM

## 2023-01-23 DIAGNOSIS — G43.909 MIGRAINE: ICD-10-CM

## 2023-01-23 PROBLEM — G11.9 SPINOCEREBELLAR DEGENERATION (HCC): Status: ACTIVE | Noted: 2023-01-23

## 2023-01-23 PROBLEM — Q45.3 PANCREATIC ABNORMALITY: Status: ACTIVE | Noted: 2023-01-23

## 2023-01-23 PROBLEM — G40.909 SEIZURE DISORDER (HCC): Status: ACTIVE | Noted: 2023-01-23

## 2023-01-23 LAB
AMPHETAMINES SERPL QL SCN: NEGATIVE
ANION GAP SERPL CALCULATED.3IONS-SCNC: 5 MMOL/L (ref 4–13)
ATRIAL RATE: 115 BPM
ATRIAL RATE: 89 BPM
BACTERIA UR QL AUTO: ABNORMAL /HPF
BARBITURATES UR QL: NEGATIVE
BENZODIAZ UR QL: NEGATIVE
BILIRUB UR QL STRIP: NEGATIVE
BUN SERPL-MCNC: 8 MG/DL (ref 5–25)
CALCIUM SERPL-MCNC: 8.4 MG/DL (ref 8.4–10.2)
CHLORIDE SERPL-SCNC: 111 MMOL/L (ref 96–108)
CLARITY UR: CLEAR
CO2 SERPL-SCNC: 22 MMOL/L (ref 21–32)
COCAINE UR QL: NEGATIVE
COLOR UR: YELLOW
CREAT SERPL-MCNC: 0.68 MG/DL (ref 0.6–1.3)
EXT PREGNANCY TEST URINE: NEGATIVE
EXT. CONTROL: NORMAL
FLUAV RNA RESP QL NAA+PROBE: NEGATIVE
FLUAV RNA RESP QL NAA+PROBE: NEGATIVE
FLUBV RNA RESP QL NAA+PROBE: NEGATIVE
FLUBV RNA RESP QL NAA+PROBE: NEGATIVE
GFR SERPL CREATININE-BSD FRML MDRD: 109 ML/MIN/1.73SQ M
GLUCOSE SERPL-MCNC: 104 MG/DL (ref 65–140)
GLUCOSE SERPL-MCNC: 96 MG/DL (ref 65–140)
GLUCOSE UR STRIP-MCNC: NEGATIVE MG/DL
HGB UR QL STRIP.AUTO: ABNORMAL
KETONES UR STRIP-MCNC: NEGATIVE MG/DL
LEUKOCYTE ESTERASE UR QL STRIP: NEGATIVE
MAGNESIUM SERPL-MCNC: 2.2 MG/DL (ref 1.9–2.7)
METHADONE UR QL: NEGATIVE
NITRITE UR QL STRIP: NEGATIVE
NON-SQ EPI CELLS URNS QL MICRO: ABNORMAL /HPF
OPIATES UR QL SCN: NEGATIVE
OXYCODONE+OXYMORPHONE UR QL SCN: NEGATIVE
P AXIS: 60 DEGREES
P AXIS: 77 DEGREES
PCP UR QL: NEGATIVE
PH UR STRIP.AUTO: 5.5 [PH] (ref 4.5–8)
PHOSPHATE SERPL-MCNC: 1.9 MG/DL (ref 2.7–4.5)
POTASSIUM SERPL-SCNC: 4 MMOL/L (ref 3.5–5.3)
PR INTERVAL: 130 MS
PR INTERVAL: 168 MS
PROT UR STRIP-MCNC: NEGATIVE MG/DL
QRS AXIS: 71 DEGREES
QRS AXIS: 73 DEGREES
QRSD INTERVAL: 70 MS
QRSD INTERVAL: 72 MS
QT INTERVAL: 292 MS
QT INTERVAL: 330 MS
QTC INTERVAL: 401 MS
QTC INTERVAL: 403 MS
RBC #/AREA URNS AUTO: ABNORMAL /HPF
RSV RNA RESP QL NAA+PROBE: NEGATIVE
RSV RNA RESP QL NAA+PROBE: NEGATIVE
SARS-COV-2 RNA RESP QL NAA+PROBE: NEGATIVE
SARS-COV-2 RNA RESP QL NAA+PROBE: NEGATIVE
SODIUM SERPL-SCNC: 138 MMOL/L (ref 135–147)
SP GR UR STRIP.AUTO: >=1.03 (ref 1–1.03)
T WAVE AXIS: 60 DEGREES
T WAVE AXIS: 63 DEGREES
THC UR QL: NEGATIVE
URATE CRY URNS QL MICRO: ABNORMAL /HPF
UROBILINOGEN UR QL STRIP.AUTO: 0.2 E.U./DL
VENTRICULAR RATE: 115 BPM
VENTRICULAR RATE: 89 BPM
WBC #/AREA URNS AUTO: ABNORMAL /HPF

## 2023-01-23 RX ORDER — LORAZEPAM 2 MG/ML
1 INJECTION INTRAMUSCULAR EVERY 4 HOURS PRN
Status: DISCONTINUED | OUTPATIENT
Start: 2023-01-23 | End: 2023-01-24 | Stop reason: HOSPADM

## 2023-01-23 RX ORDER — ENOXAPARIN SODIUM 100 MG/ML
40 INJECTION SUBCUTANEOUS DAILY
Status: DISCONTINUED | OUTPATIENT
Start: 2023-01-23 | End: 2023-01-24 | Stop reason: HOSPADM

## 2023-01-23 RX ORDER — SODIUM CHLORIDE 9 MG/ML
100 INJECTION, SOLUTION INTRAVENOUS CONTINUOUS
Status: DISCONTINUED | OUTPATIENT
Start: 2023-01-23 | End: 2023-01-24

## 2023-01-23 RX ORDER — LORAZEPAM 2 MG/ML
2 INJECTION INTRAMUSCULAR ONCE
Status: COMPLETED | OUTPATIENT
Start: 2023-01-23 | End: 2023-01-23

## 2023-01-23 RX ORDER — WATER 1000 ML/1000ML
INJECTION, SOLUTION INTRAVENOUS
Status: COMPLETED
Start: 2023-01-23 | End: 2023-01-23

## 2023-01-23 RX ORDER — ACETAMINOPHEN 325 MG/1
650 TABLET ORAL EVERY 6 HOURS PRN
Status: DISCONTINUED | OUTPATIENT
Start: 2023-01-23 | End: 2023-01-24 | Stop reason: HOSPADM

## 2023-01-23 RX ORDER — ONDANSETRON 2 MG/ML
4 INJECTION INTRAMUSCULAR; INTRAVENOUS EVERY 4 HOURS PRN
Status: DISCONTINUED | OUTPATIENT
Start: 2023-01-23 | End: 2023-01-24 | Stop reason: HOSPADM

## 2023-01-23 RX ORDER — OLANZAPINE 10 MG/1
5 INJECTION, POWDER, LYOPHILIZED, FOR SOLUTION INTRAMUSCULAR ONCE
Status: COMPLETED | OUTPATIENT
Start: 2023-01-23 | End: 2023-01-23

## 2023-01-23 RX ORDER — LORAZEPAM 2 MG/ML
INJECTION INTRAMUSCULAR
Status: COMPLETED
Start: 2023-01-23 | End: 2023-01-23

## 2023-01-23 RX ADMIN — SODIUM CHLORIDE 100 ML/HR: 0.9 INJECTION, SOLUTION INTRAVENOUS at 13:38

## 2023-01-23 RX ADMIN — LORAZEPAM 2 MG: 2 INJECTION INTRAMUSCULAR at 12:20

## 2023-01-23 RX ADMIN — WATER 1 ML: 1 INJECTION INTRAMUSCULAR; INTRAVENOUS; SUBCUTANEOUS at 10:35

## 2023-01-23 RX ADMIN — LORAZEPAM 2 MG: 2 INJECTION INTRAMUSCULAR; INTRAVENOUS at 12:20

## 2023-01-23 RX ADMIN — DIBASIC SODIUM PHOSPHATE, MONOBASIC POTASSIUM PHOSPHATE AND MONOBASIC SODIUM PHOSPHATE 2 TABLET: 852; 155; 130 TABLET ORAL at 11:36

## 2023-01-23 RX ADMIN — LEVETIRACETAM 500 MG: 100 INJECTION, SOLUTION INTRAVENOUS at 16:10

## 2023-01-23 RX ADMIN — ENOXAPARIN SODIUM 40 MG: 40 INJECTION SUBCUTANEOUS at 13:37

## 2023-01-23 RX ADMIN — LEVETIRACETAM 1500 MG: 100 INJECTION, SOLUTION INTRAVENOUS at 20:42

## 2023-01-23 RX ADMIN — SODIUM CHLORIDE 200 MG: 9 INJECTION, SOLUTION INTRAVENOUS at 12:22

## 2023-01-23 RX ADMIN — OLANZAPINE 5 MG: 10 INJECTION, POWDER, FOR SOLUTION INTRAMUSCULAR at 10:32

## 2023-01-23 RX ADMIN — LEVETIRACETAM 1500 MG: 100 INJECTION, SOLUTION INTRAVENOUS at 13:37

## 2023-01-23 NOTE — ASSESSMENT & PLAN NOTE
· May be secondary to benzodiazepine and zyprexa  Continue IV fluids  · No evidence of acute infection

## 2023-01-23 NOTE — ED PROVIDER NOTES
History  Chief Complaint   Patient presents with   • Abdominal Pain     Per EMS pt has endometriosis and is currently on period  Pt scheduled to have hysterectomy tomorrow  Pt c/o 6/10 lower abd pain after EMS gave 30mg Toradol  Pt reports was using wipes to prep in shower for surgery tomorrow and started feeling dizzy with abd pain  39 YO female presents with lower abdominal pain, states this began suddenly this morning  The pain has been sharp, constant, non-radiating  She has had associated nausea, no vomiting  Patient denies diarrhea, no fevers  She has had similar pain in the past but states this is more severe than previous episodes  Family states patient has a history of endometriosis, she has been seeing gynecology for this and is to have a hysterectomy tomorrow  Patient did not take anything for discomfort at home  She called EMS and was given Toradol en route which brought her pain down from 10/10 to 5/10  Pt denies CP/SOB/F/C/D/C, no dysuria, burning on urination or blood in urine  History provided by:  Patient and relative   used: Yes    Abdominal Pain  Associated symptoms: nausea    Associated symptoms: no chest pain, no chills, no diarrhea, no dysuria, no fatigue, no fever, no shortness of breath and no vomiting        Prior to Admission Medications   Prescriptions Last Dose Informant Patient Reported? Taking?    Erenumab-aooe (Aimovig) 79 MG/ML SOAJ   Yes No   Sig: Inject 70 mg under the skin every 28 days   cyanocobalamin (VITAMIN B-12) 1000 MCG tablet   No No   Sig: Take 1 tablet (1,000 mcg total) by mouth daily   ferrous sulfate 324 (65 Fe) mg   No No   Sig: Take 1 tablet (324 mg total) by mouth daily   levETIRAcetam (KEPPRA) 750 mg tablet   No No   Sig: Take 1 tablet (750 mg total) by mouth 2 (two) times a day   Patient taking differently: Take 1,500 mg by mouth 2 (two) times a day   omeprazole (PriLOSEC) 20 mg delayed release capsule   No No   Sig: Take 1 capsule (20 mg total) by mouth daily      Facility-Administered Medications: None       Past Medical History:   Diagnosis Date   • B12 deficiency    • Chronic migraine without aura    • Dermatographism    • Iron deficiency anemia    • Lyme neuropathy    • Seizure disorder (HCC)    • 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HCC)    • Visual disturbance     Diplopia, nystagmus, decreased visual acuity   • Vitamin D deficiency        Past Surgical History:   Procedure Laterality Date   •  SECTION     • ESOPHAGOGASTRODUODENOSCOPY N/A 3/8/2019    Procedure: ESOPHAGOGASTRODUODENOSCOPY (EGD); Surgeon: Kristi Greer MD;  Location: [facility]; Service: Gastroenterology   • OOPHORECTOMY      unilateral --  last assessed 17   • TUBAL LIGATION      last assessed 17       Family History   Problem Relation Age of Onset   • Other Mother         AIDS, Brain Tumor balance disorder   • Seizures Mother    • Alcohol abuse Father    • Other Sister         Balance disorder   • HIV Maternal Grandmother      I have reviewed and agree with the history as documented  E-Cigarette/Vaping   • E-Cigarette Use Never User      E-Cigarette/Vaping Substances   • Nicotine No    • THC No    • CBD No    • Flavoring No    • Other No    • Unknown No      Social History     Tobacco Use   • Smoking status: Never   • Smokeless tobacco: Never   Vaping Use   • Vaping Use: Never used   Substance Use Topics   • Alcohol use: No   • Drug use: No       Review of Systems   Constitutional: Negative for chills, fatigue and fever  HENT: Negative for dental problem  Eyes: Negative for visual disturbance  Respiratory: Negative for shortness of breath  Cardiovascular: Negative for chest pain  Gastrointestinal: Positive for abdominal pain and nausea  Negative for diarrhea and vomiting  Genitourinary: Negative for dysuria and frequency  Musculoskeletal: Negative for arthralgias  Skin: Negative for rash     Neurological: Negative for dizziness, weakness and light-headedness  Psychiatric/Behavioral: Negative for agitation, behavioral problems and confusion  All other systems reviewed and are negative  Physical Exam  Physical Exam  Vitals and nursing note reviewed  Constitutional:       Appearance: Normal appearance  HENT:      Head: Normocephalic and atraumatic  Eyes:      Extraocular Movements: Extraocular movements intact  Conjunctiva/sclera: Conjunctivae normal    Cardiovascular:      Rate and Rhythm: Normal rate  Pulmonary:      Effort: Pulmonary effort is normal    Abdominal:      General: There is no distension  Tenderness: There is abdominal tenderness in the right lower quadrant, suprapubic area and left lower quadrant  Musculoskeletal:         General: Normal range of motion  Cervical back: Normal range of motion  Skin:     Findings: No rash  Neurological:      General: No focal deficit present  Mental Status: She is alert  Cranial Nerves: No cranial nerve deficit     Psychiatric:         Mood and Affect: Mood normal          Vital Signs  ED Triage Vitals   Temperature Pulse Respirations Blood Pressure SpO2   01/22/23 2024 01/22/23 2026 01/22/23 2026 01/22/23 2026 01/22/23 2026   98 3 °F (36 8 °C) 90 18 107/59 100 %      Temp Source Heart Rate Source Patient Position - Orthostatic VS BP Location FiO2 (%)   01/22/23 2024 01/22/23 2024 01/22/23 2024 01/22/23 2024 --   Oral Monitor Lying Right arm       Pain Score       01/22/23 2059       6           Vitals:    01/22/23 2024 01/22/23 2026 01/22/23 2250   BP:  107/59 113/51   Pulse:  90 73   Patient Position - Orthostatic VS: Lying Lying Lying         Visual Acuity      ED Medications  Medications   ketorolac (FOR EMS ONLY) (TORADOL) injection 30 mg (0 mg Does not apply Given to EMS 1/22/23 2032)   sodium chloride 0 9 % bolus 1,000 mL (0 mL Intravenous Stopped 1/22/23 2219)   ondansetron (ZOFRAN) injection 4 mg (4 mg Intravenous Given 1/22/23 2100) ketorolac (TORADOL) injection 15 mg (15 mg Intravenous Given 1/22/23 2100)   iohexol (OMNIPAQUE) 350 MG/ML injection (SINGLE-DOSE) 100 mL (100 mL Intravenous Given 1/22/23 2212)       Diagnostic Studies  Results Reviewed     Procedure Component Value Units Date/Time    Urine Microscopic [[id number]]  (Abnormal) Collected: 01/22/23 2148    Lab Status: Final result Specimen: Urine, Clean Catch Updated: 01/22/23 2212     RBC, UA Innumerable /hpf      WBC, UA None Seen /hpf      Epithelial Cells Occasional /hpf      Bacteria, UA Occasional /hpf     Urine Macroscopic, POC [[id number]]  (Abnormal) Collected: 01/22/23 2148    Lab Status: Final result Specimen: Urine Updated: 01/22/23 2150     Color, UA Bloody     Clarity, UA Clear     pH, UA 6 0     Leukocytes, UA Negative     Nitrite, UA Positive     Protein, UA >=300 mg/dl      Glucose, UA Negative mg/dl      Ketones, UA Trace mg/dl      Urobilinogen, UA 1 0 E U /dl      Bilirubin, UA Small     Occult Blood, UA Large     Specific Gravity, UA 1 025    Narrative:      CLINITEK RESULT    Basic metabolic panel [[id number]]  (Abnormal) Collected: 01/22/23 2059    Lab Status: Final result Specimen: Blood from Arm, Left Updated: 01/22/23 2121     Sodium 140 mmol/L      Potassium 3 8 mmol/L      Chloride 110 mmol/L      CO2 25 mmol/L      ANION GAP 5 mmol/L      BUN 10 mg/dL      Creatinine 0 75 mg/dL      Glucose 98 mg/dL      Calcium 8 5 mg/dL      eGFR 100 ml/min/1 73sq m     Narrative:      [facility] guidelines for Chronic Kidney Disease (CKD):   •  Stage 1 with normal or high GFR (GFR > 90 mL/min/1 73 square meters)  •  Stage 2 Mild CKD (GFR = 60-89 mL/min/1 73 square meters)  •  Stage 3A Moderate CKD (GFR = 45-59 mL/min/1 73 square meters)  •  Stage 3B Moderate CKD (GFR = 30-44 mL/min/1 73 square meters)  •  Stage 4 Severe CKD (GFR = 15-29 mL/min/1 73 square meters)  •  Stage 5 End Stage CKD (GFR <15 mL/min/1 73 square meters)  Note: GFR calculation is accurate only with a steady state creatinine    Hepatic function panel [[id number]]  (Abnormal) Collected: 01/22/23 2059    Lab Status: Final result Specimen: Blood from Arm, Left Updated: 01/22/23 2121     Total Bilirubin 0 25 mg/dL      Bilirubin, Direct 0 12 mg/dL      Alkaline Phosphatase 51 U/L      AST 12 U/L      ALT 6 U/L      Total Protein 6 3 g/dL      Albumin 3 8 g/dL     Magnesium [[id number]]  (Abnormal) Collected: 01/22/23 2059    Lab Status: Final result Specimen: Blood from Arm, Left Updated: 01/22/23 2121     Magnesium 1 8 mg/dL     Lipase [[id number]]  (Normal) Collected: 01/22/23 2059    Lab Status: Final result Specimen: Blood from Arm, Left Updated: 01/22/23 2121     Lipase 37 u/L     CBC and differential [[id number]]  (Abnormal) Collected: 01/22/23 2059    Lab Status: Final result Specimen: Blood from Arm, Left Updated: 01/22/23 2106     WBC 5 99 Thousand/uL      RBC 3 72 Million/uL      Hemoglobin 10 3 g/dL      Hematocrit 32 3 %      MCV 87 fL      MCH 27 7 pg      MCHC 31 9 g/dL      RDW 13 2 %      MPV 13 0 fL      Platelets 813 Thousands/uL      nRBC 0 /100 WBCs      Neutrophils Relative 77 %      Immat GRANS % 0 %      Lymphocytes Relative 15 %      Monocytes Relative 8 %      Eosinophils Relative 0 %      Basophils Relative 0 %      Neutrophils Absolute 4 57 Thousands/µL      Immature Grans Absolute 0 02 Thousand/uL      Lymphocytes Absolute 0 90 Thousands/µL      Monocytes Absolute 0 46 Thousand/µL      Eosinophils Absolute 0 02 Thousand/µL      Basophils Absolute 0 02 Thousands/µL                  CT abdomen pelvis with contrast   Final Result by Marek Gregg MD (01/22 2251)      No evidence of acute intra-abdominal or pelvic pathology  9 mm hypodensity at the neck of the pancreas  For simple cyst(s) less than 1 5 cm, recommend yearly followup 5 times, then every 2 year for 2 times  If cyst(s) stable after 9 years, no further followups  Recommend next followup in 1 year   Preferred imaging modality: abdomen MRI and MRCP with and without IV contrast, or triple phase abdomen CT with IV contrast, or abdomen MRI and MRCP without IV contrast                                  Workstation performed: [id number]                    Procedures  Procedures         ED Course  ED Course as of 01/23/23 1420   Sun Jan 22, 2023   2224 Nitrite, UA(!): Positive  Patient has no WBC, she denies dysuria, large blood which is consistent with patient's current menses  Likely positive due to contamination  2302 Discussed finding of pancreatic cyst with family and patient  Explained it is necessary to have this evaluated further with imaging in one year and it will require further imaging after this  Told family patient will need to speak with her family doctor regarding this finding as the PCP will need to set up these studies  Will add this finding into the patient's discharge instructions and will paste radiology's recommendations for future studies in the comments section for PCP  SBIRT 22yo+    Flowsheet Row Most Recent Value   SBIRT (23 yo +)    In order to provide better care to our patients, we are screening all of our patients for alcohol and drug use  Would it be okay to ask you these screening questions? No Filed at: 01/22/2023 2034                    Medical Decision Making  1  Abdominal pain - Family states patient's pain does seem similar to her previous endometriosis, though it is more severe  Will check CBC for leukocytosis, metabolic panel for electrolyte abnormalities and dehydration,  LFT's to assess GB dysfunction, lipase for pancreatitis, Will CT abdomen as she does have some discomfort over the RLQ  Will give hydration, antiemetics, further analgesia as needed       Abdominal pain: chronic illness or injury  Abnormal finding on CT scan: undiagnosed new problem with uncertain prognosis  Nausea: acute illness or injury  Amount and/or Complexity of Data Reviewed  Independent Historian:      Details: Family assists [facility] translation and history  External Data Reviewed: notes  Details: Reviewed chart to determine patient's current medical status, i e  she is to have hysterectomy tomorrow  Labs: ordered  Decision-making details documented in ED Course  Radiology: ordered and independent interpretation performed  Decision-making details documented in ED Course  Risk  Prescription drug management  Disposition  Final diagnoses:   Abdominal pain   Nausea   Abnormal finding on CT scan     Time reflects when diagnosis was documented in both MDM as applicable and the Disposition within this note     Time User Action Codes Description Comment    1/22/2023 11:04 PM Lurene Postal E Add [R10 9] Abdominal pain     1/22/2023 11:04 PM Lurene Postal E Add [R11 0] Nausea     1/22/2023 11:10 PM Lurene Postal E Add [R93 89] Abnormal finding on CT scan       ED Disposition     ED Disposition   Discharge    Condition   Stable    Date/Time   Sun Jan 22, 2023 11:04 PM    [address] Tremaine Zambrano discharge to home/self care  Follow-up Information     Follow up With Specialties Details Why [address], MD Family Medicine   [address] [place], [place] [address]  [phone]            Discharge Medication List as of 1/22/2023 11:10 PM      START taking these medications    Details   ondansetron (ZOFRAN-ODT) 4 mg disintegrating tablet Take 1 tablet (4 mg total) by mouth every 6 (six) hours as needed for nausea, Starting Sun 1/22/2023, Normal         CONTINUE these medications which have NOT CHANGED    Details   cyanocobalamin (VITAMIN B-12) 1000 MCG tablet Take 1 tablet (1,000 mcg total) by mouth daily, Starting Tue 10/4/2022, Normal      Erenumab-aooe (Aimovig) 70 MG/ML SOAJ Inject 70 mg under the skin every 28 days, Starting Mon 10/18/2021, Historical Med      ferrous sulfate 324 (65 Fe) mg Take 1 tablet (324 mg total) by mouth daily, Starting Tue 10/4/2022, Normal      levETIRAcetam (KEPPRA) 750 mg tablet Take 1 tablet (750 mg total) by mouth 2 (two) times a day, Starting Wed 9/29/2021, Normal      omeprazole (PriLOSEC) 20 mg delayed release capsule Take 1 capsule (20 mg total) by mouth daily, Starting Wed 1/26/2022, Until Thu 1/19/2023, Normal      OXcarbazepine (TRILEPTAL) 300 mg tablet Take 300 mg by mouth in the morning, Starting Mon 7/18/2022, Historical Med             No discharge procedures on file      PDMP Review     None          ED Provider  Electronically Signed by           Juan Carlos Herron MD  01/23/23 8720

## 2023-01-23 NOTE — ASSESSMENT & PLAN NOTE
38 y o  female with vitamin B12 and D deficiency, migraines, iron deficiency anemia, spinocerebellar degeneration, and known seizure disorder presented on 1/23/23 after multiple seizure events  Family reports she missed 1-2 doses of Keppra, which is the likely cause of her breakthrough seizures  However, they report she does seem to have at least one seizure per month despite medication compliance  Pertinent results:   CTH negative for acute pathology  UDS negative  UA on 1/22 with nitrites, occasional bacteria  UA on 1/24 negative  Magnesium 1 8, phosphorus 1 9, leukopenia     Plan   - Current AED Regimen:   · S/p Keppra 2g bolus  · Continue on Keppra 1500 mg BID  · S/p Vimpat 200 mg load (This was administered prior to information regarding missed doses of Keppra)  · Ativan PRN convulsive seizure activity lasting >2-3 minutes  - Given seizure frequency despite maximum dosing of Keppra, offered patient ability to initiate Vimpat  Patient does not want to add any medications at this time and would rather return to her outpatient neurologist to discuss  Advised patient that if she is to undergo surgery in the future, would highly recommend adding a second agent  - Continue telemetry  - Continue seizure precautions   - [employer] form completed and faxed 1/23  Patient aware they are not to drive     - Keppra level pending   - Medical management and supportive care per primary team   Correction of any metabolic or infectious disturbances   -Follow-up with outpatient neurologist at [address] following discharge

## 2023-01-23 NOTE — ED NOTES
Called to bedside by patient's  due to tonic/clonic seizure activity, upon arrival to the bedside patient appeared post ictal, neuro assessment as documented  Oral secretions suctioned and patient give supplemental oxygen briefly until Sp02 returned to normal  Attending provider at the bedside, additional orders received        Eudora Scheuermann, RN  01/23/23 5380

## 2023-01-23 NOTE — ASSESSMENT & PLAN NOTE
Background: Spinocerebellar degeneration with migraine headaches and known seizure disorder follows with [facility] neurology presents with breakthrough seizures  · Prior to admission on keppra 1500 mg twice daily  Was placed on oxcarbazepine but discontinued approximately 2 months ago reported by spouse due to leukopenia  · Was in the ER yesterday and missed evening dose of keppra  Has been anxious and subsequently had 4 seizures since 2 AM and 1 more in the department  · Seen by neurology    Continue keppra IV until can take oral   Further vimpat defer to neurology

## 2023-01-23 NOTE — DISCHARGE INSTRUCTIONS
Take ibuprofen for discomfort  Take the zofran as needed for nausea, this can be placed under the tongue to dissolve if you are vomiting  You can follow up for surgery tomorrow as scheduled  The CT performed tonight displayed a small area of the pancreas that appeared to be a cyst, the radiologist has recommended that this be followed to make sure it does not change  You soul have imaging again in one year and then yearly after this  The recommendations are included below  Speak with your family doctor, you should follow up in the office and bring these discharge instructions with you to show your doctor as they will need to order these studies  CT findings and recommendations:    9 mm hypodensity at the neck of the pancreas  For simple cyst(s) less than 1 5 cm, recommend yearly followup 5 times, then every 2 year for 2 times  If cyst(s) stable after 9 years, no further followups  Recommend next followup in 1 year  Preferred imaging modality: abdomen MRI and MRCP with and without IV contrast, or triple phase abdomen CT with IV contrast, or abdomen MRI and MRCP without IV contrast       Sadia ibuprofeno para las molestias  [place] el zofran según sea necesario para las náuseas, puede colocarse debajo de la lengua para disolverlo si está vomitando  Puede hacer un seguimiento para la cirugía mañana según lo programado  La tomografía computarizada realizada esta noche mostró berny pequeña área del páncreas que parecía ser un quiste, [place] radiólogo recomendó que se siga para asegurarse de que no cambie  Tu sam tiene imágenes nuevamente en un año y luego anualmente después de esto  Las recomendaciones se incluyen a continuación  Hable con slaughter médico de renetta, debe hacer un seguimiento en el consultorio y traer estas instrucciones de anusha para mostrárselas a slaughter médico, ya que necesitará ordenar [employer]  Hallazgos y recomendaciones de la TC:    Hipodensidad de 9 mm en el lit del páncreas   Para quistes simples de menos de 1,5 cm, recomiende un seguimiento anual 5 veces, luego cada 2 años 2 veces  Si los quistes son estables después de 9 años, no hay más seguimientos  Recomendar el próximo seguimiento en 1 año  Modalidad de imagen preferida: MRI y MRCP de abdomen con y sin contraste IV, o CT de abdomen de fase triple con contraste IV, o MRI y MRCP de abdomen sin contraste IV

## 2023-01-23 NOTE — H&P
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 follows with  neurology presents with breakthrough seizures  · Prior to admission on keppra 1500 mg twice daily  Was placed on oxcarbazepine but discontinued approximately 2 months ago reported by spouse due to leukopenia  · Was in the ER yesterday and missed evening dose of keppra  Has been anxious and subsequently had 4 seizures since 2 AM and 1 more in the department  · Seen by neurology  Continue keppra IV until can take oral   Further vimpat defer to neurology    Pancreatic abnormality  Assessment & Plan  · Incidental finding of pancreatic cyst   Will need repeat imaging as recommended    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[facility] [address] )  Assessment & Plan  · Follows with  neurology  Does not use any assistive devices  Menorrhagia with irregular cycle  Assessment & Plan  · Patient was scheduled for hysterectomy today  Will need to be rescheduled  Hypotension  Assessment & Plan  · May be secondary to benzodiazepine and zyprexa  Continue IV fluids  · No evidence of acute infection  Chronic migraine without aura without status migrainosus, not intractable  Assessment & Plan  · On injectables every month with  neurology      VTE Prophylaxis: Enoxaparin (Lovenox)  Code Status: Level 1 - Full Code  Anticipated Length of Stay:  Patient will be admitted on an Observation basis with an anticipated length of stay of less than 2 midnights  Justification for Hospital Stay: Seizure disorder [facility])  Total Time for Visit, including Counseling / Coordination of Care: xx mins   Greater than 50% of this total time spent on direct patient counseling and coordination of care  Chief Complaint:     Seizure - Prior Hx Of (Per ems, patient experienced 2 seizures last night  Information obtained from the   Last one was 0715 today  Patient appears postictal upon arrival  Last seizure was 2 months before this episode  )    History of Present Illness:    Lorraine Newman is a 38 y o  female with a past medical history of spinocerebellar atrophy, seizure disorder, and migraine headaches who presents with seizure  Spouse at bedside to provide history  Patient was supposed to have hysterectomy today for uterine bleeding  She was in the ER last night for abdominal pain and by the time she got home she was unable to take her Keppra because she was so tired  At baseline she takes 1500 mg twice daily  She was having increased anxiety overnight  Since 2 AM she subsequently had 4 seizures and was brought to the hospital where she had another 1 requiring zyprexa and lorazepam   She is now sedated but is not having any convulsions  Patient previously was on oxcarbazepine with [facility] neurology but spouse reports this was discontinued due to low WBC  Case was discussed by ED with neurology and the patient has been given IV keppra and vimpat  Review of Systems:  Review of Systems   Unable to perform ROS: Patient unresponsive (Spouse at bedside to help provide history)   Constitutional: Negative for chills, diaphoresis and fever  HENT: Negative for facial swelling and trouble swallowing  Eyes: Negative for discharge  Respiratory: Negative for shortness of breath and wheezing  Cardiovascular: Negative for chest pain and palpitations  Gastrointestinal: Negative for abdominal distention, abdominal pain, diarrhea, nausea and vomiting  Genitourinary: Negative for hematuria  Musculoskeletal: Negative for back pain  Skin: Negative for rash  Neurological: Positive for seizures  Negative for speech difficulty  Psychiatric/Behavioral: The patient is nervous/anxious  All other systems reviewed and are negative  Past Medical and Surgical History:   Past Medical History:   Diagnosis Date   • B12 deficiency    • Chronic migraine without aura    • Dermatographism    • Iron deficiency anemia    • Lyme neuropathy    • Seizure disorder (HCC)    • 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HCC)    • Visual disturbance     Diplopia, nystagmus, decreased visual acuity   • Vitamin D deficiency      Past Surgical History:   Procedure Laterality Date   •  SECTION     • ESOPHAGOGASTRODUODENOSCOPY N/A 3/8/2019    Procedure: ESOPHAGOGASTRODUODENOSCOPY (EGD); Surgeon: Marrian Boxer, MD;  Location: [facility]; Service: Gastroenterology   • OOPHORECTOMY      unilateral --  last assessed 17   • TUBAL LIGATION      last assessed 17     Meds/Allergies: Allergies: Allergies   Allergen Reactions   • Morphine Itching     Pt denies breathing issues   • Dye Fdc Red  [Red Dye - Food Allergy]      Prior to Admission Medications   Prescriptions Last Dose Informant Patient Reported? Taking?    Erenumab-aooe (Aimovig) 79 MG/ML SOAJ   Yes No   Sig: Inject 70 mg under the skin every 28 days   cyanocobalamin (VITAMIN B-12) 1000 MCG tablet   No No   Sig: Take 1 tablet (1,000 mcg total) by mouth daily   ferrous sulfate 324 (65 Fe) mg   No No   Sig: Take 1 tablet (324 mg total) by mouth daily   levETIRAcetam (KEPPRA) 750 mg tablet   No No   Sig: Take 1 tablet (750 mg total) by mouth 2 (two) times a day   Patient taking differently: Take 1,500 mg by mouth 2 (two) times a day   omeprazole (PriLOSEC) 20 mg delayed release capsule   No Yes   Sig: Take 1 capsule (20 mg total) by mouth daily   ondansetron (ZOFRAN-ODT) 4 mg disintegrating tablet   No No   Sig: Take 1 tablet (4 mg total) by mouth every 6 (six) hours as needed for nausea      Facility-Administered Medications: None     Social History:     Social History     Socioeconomic History   • Marital status: Single     Spouse name: Not on file   • Number of children: 3   • Years of education: Not on file   • Highest education level: Not on file   Occupational History   • Occupation: Housewife / Homemaker   Tobacco Use   • Smoking status: Never   • Smokeless tobacco: Never   Vaping Use   • Vaping Use: Never used   Substance and Sexual Activity   • Alcohol use: No   • Drug use: No   • Sexual activity: Yes     Partners: Male   Other Topics Concern   • Not on file   Social History Narrative    Always uses seatbelt    Lives with children     Social Determinants of Health     Financial Resource Strain: Not on file   Food Insecurity: Not on file   Transportation Needs: Not on file   Physical Activity: Not on file   Stress: Not on file   Social Connections: Not on file   Intimate Partner Violence: Not on file   Housing Stability: Not on file     Patient Pre-hospital Living Situation:   Patient Pre-hospital Level of Mobility:   Patient Pre-hospital Diet Restrictions:     Family History:  Family History   Problem Relation Age of Onset   • Other Mother         AIDS, Brain Tumor balance disorder   • Seizures Mother    • Alcohol abuse Father    • Other Sister         Balance disorder   • HIV Maternal Grandmother      Physical Exam:   Vitals:   Blood Pressure: (!) 72/39 (admitting provider at the bedside) (01/23/23 1315)  Pulse: 96 (01/23/23 1315)  Temperature: 98 9 °F (37 2 °C) (01/23/23 0920)  Temp Source: Oral (01/23/23 0920)  Respirations: 12 (01/23/23 1315)  Weight - Scale: 77 3 kg (170 lb 6 7 oz) (01/23/23 0926)  SpO2: 96 % (01/23/23 1315)    Physical Exam  Vitals reviewed  Constitutional:       General: She is sleeping  She is not in acute distress  Appearance: Normal appearance  HENT:      Head: Atraumatic  Eyes:      General: No scleral icterus  Cardiovascular:      Rate and Rhythm: Regular rhythm  Heart sounds: Normal heart sounds     Pulmonary:      Breath sounds: Decreased breath sounds present  No wheezing  Abdominal:      General: Bowel sounds are normal       Palpations: Abdomen is soft  Tenderness: There is no guarding or rebound  Musculoskeletal:         General: No swelling  Skin:     General: Skin is warm  Neurological:      Motor: No tremor  Lab Results: I have personally reviewed pertinent reports  Results from last 7 days   Lab Units 01/22/23 2059   WBC Thousand/uL 5 99   HEMOGLOBIN g/dL 10 3*   HEMATOCRIT % 32 3*   PLATELETS Thousands/uL 170   NEUTROS PCT % 77*   LYMPHS PCT % 15   MONOS PCT % 8   EOS PCT % 0     Results from last 7 days   Lab Units 01/23/23  0847 01/22/23 2059 01/20/23  0703   SODIUM mmol/L 138 140 139   POTASSIUM mmol/L 4 0 3 8 4 1   CHLORIDE mmol/L 111* 110* 108   CO2 mmol/L 22 25 27   ANION GAP mmol/L 5 5 4   BUN mg/dL 8 10 8   CREATININE mg/dL 0 68 0 75 0 64   CALCIUM mg/dL 8 4 8 5 8 8   ALBUMIN g/dL  --  3 8  --    TOTAL BILIRUBIN mg/dL  --  0 25  --    ALK PHOS U/L  --  51  --    ALT U/L  --  6*  --    AST U/L  --  12*  --    EGFR ml/min/1 73sq m 109 100 111   GLUCOSE RANDOM mg/dL 104 98 106                              Results from last 7 days   Lab Units 01/22/23  2148   COLOR UA  Bloody   CLARITY UA  Clear   SPEC GRAV UA  1 025   PH UA  6 0   LEUKOCYTES UA  Negative   NITRITE UA  Positive*   GLUCOSE UA mg/dl Negative   KETONES UA mg/dl Trace*   BILIRUBIN UA  Small*   BLOOD UA  Large*      Results from last 7 days   Lab Units 01/22/23  2148   RBC UA /hpf Innumerable*   WBC UA /hpf None Seen   EPITHELIAL CELLS WET PREP /hpf Occasional   BACTERIA UA /hpf Occasional      Results from last 7 days   Lab Units 01/23/23  0944   SARS-COV-2  Negative   INFLUENZA A PCR  Negative   INFLUENZA B PCR  Negative   RSV PCR  Negative       Imaging: I have personally reviewed pertinent films in PACS  CT head without contrast    Result Date: 1/23/2023  Impression: No evidence of acute cranial process or significant interval change   Chronic bilateral cerebellar atrophy  Workstation performed: [id number]       EKG, Pathology, and Other Studies Reviewed on Admission:   EKG  Result Date: 01/23/23  Personally reviewed strips with impression of: Sinus tachycardia 115 bpm    Allscripts/ Epic Records Reviewed: Yes    ** Please Note: This note has been constructed using a voice recognition system   **

## 2023-01-23 NOTE — ED PROVIDER NOTES
History  Chief Complaint   Patient presents with   • Seizure - Prior Hx Of     Per ems, patient experienced 2 seizures last night  Information obtained from the   Last one was 0715 today  Patient appears postictal upon arrival  Last seizure was 2 months before this episode       [de-identified] y/o F who has a hx of seizures who's most recent seizure prior to today was 2 months ago  pt is on 1500 mg keppra bid and trileptal 300 mg daily with no missed doses as per   pt was at her baseline when she went to bed last evening   reports 4 separate seizures lasting 2-3 minutes each time with return to her baseline in between starting at 2 am with most recent at 38  described as generalized whole body shaking  pt is currently drowsy, confused intermittently following commands      History provided by:  Patient and relative  History limited by:  Mental status change  Seizure - Prior Hx Of  Seizure activity on arrival: no    Seizure type:  Grand mal  Initial focality:  Diffuse  Episode characteristics: abnormal movements    Postictal symptoms: confusion    Return to baseline: yes    Severity:  Moderate  Duration: 4 seperate episodes since 220 am each lasting 2-3 minutes with return to baseline inbetween  Context: not change in medication and medical compliance    Recent head injury:  No recent head injuries  PTA treatment:  None  History of seizures: yes        Prior to Admission Medications   Prescriptions Last Dose Informant Patient Reported? Taking?    Erenumab-aooe (Aimovig) 79 MG/ML SOAJ   Yes No   Sig: Inject 70 mg under the skin every 28 days   OXcarbazepine (TRILEPTAL) 300 mg tablet   Yes No   Sig: Take 300 mg by mouth in the morning   cyanocobalamin (VITAMIN B-12) 1000 MCG tablet   No No   Sig: Take 1 tablet (1,000 mcg total) by mouth daily   ferrous sulfate 324 (65 Fe) mg   No No   Sig: Take 1 tablet (324 mg total) by mouth daily   levETIRAcetam (KEPPRA) 750 mg tablet   No No   Sig: Take 1 tablet (750 mg total) by mouth 2 (two) times a day   omeprazole (PriLOSEC) 20 mg delayed release capsule   No No   Sig: Take 1 capsule (20 mg total) by mouth daily   ondansetron (ZOFRAN-ODT) 4 mg disintegrating tablet   No No   Sig: Take 1 tablet (4 mg total) by mouth every 6 (six) hours as needed for nausea      Facility-Administered Medications: None       Past Medical History:   Diagnosis Date   • B12 deficiency    • 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 (HCC)    • Chronic migraine without aura    • Dermatographism    • Gait disturbance    • Iron deficiency anemia    • Lyme neuropathy    • Seizures (HCC)    • Visual disturbance     Diplopia, nystagmus, decreased visual acuity   • Vitamin D deficiency        Past Surgical History:   Procedure Laterality Date   •  SECTION     • ESOPHAGOGASTRODUODENOSCOPY N/A 3/8/2019    Procedure: ESOPHAGOGASTRODUODENOSCOPY (EGD); Surgeon: Holden Begum MD;  Location: [facility] GI LAB; Service: Gastroenterology   • OOPHORECTOMY      unilateral --  last assessed 17   • TUBAL LIGATION      last assessed 17       Family History   Problem Relation Age of Onset   • Other Mother         AIDS, Brain Tumor balance disorder   • Seizures Mother    • Alcohol abuse Father    • Other Sister         Balance disorder   • HIV Maternal Grandmother      I have reviewed and agree with the history as documented  E-Cigarette/Vaping   • E-Cigarette Use Never User      E-Cigarette/Vaping Substances   • Nicotine No    • THC No    • CBD No    • Flavoring No    • Other No    • Unknown No      Social History     Tobacco Use   • Smoking status: Never   • Smokeless tobacco: Never   Vaping Use   • Vaping Use: Never used   Substance Use Topics   • Alcohol use: No   • Drug use: No       Review of Systems   Unable to perform ROS: Mental status change   Neurological: Positive for seizures  Physical Exam  Physical Exam  Vitals and nursing note reviewed  Constitutional:       Appearance: Normal appearance     HENT: Right Ear: External ear normal       Left Ear: External ear normal       Nose: Nose normal    Eyes:      General:         Right eye: No discharge  Left eye: No discharge  Extraocular Movements: Extraocular movements intact  Cardiovascular:      Rate and Rhythm: Normal rate and regular rhythm  Pulmonary:      Effort: Pulmonary effort is normal       Breath sounds: Normal breath sounds  Abdominal:      General: There is no distension  Tenderness: There is no abdominal tenderness  Musculoskeletal:      Cervical back: Normal range of motion and neck supple  No rigidity or tenderness  Right lower leg: No edema  Left lower leg: No edema  Lymphadenopathy:      Cervical: No cervical adenopathy  Skin:     Coloration: Skin is not jaundiced or pale  Findings: No bruising  Neurological:      General: No focal deficit present  Mental Status: She is alert  She is disoriented           Vital Signs  ED Triage Vitals   Temperature Pulse Respirations Blood Pressure SpO2   01/23/23 0920 01/23/23 0836 01/23/23 0836 01/23/23 0836 01/23/23 0836   98 9 °F (37 2 °C) 92 14 105/70 98 %      Temp Source Heart Rate Source Patient Position - Orthostatic VS BP Location FiO2 (%)   01/23/23 0920 01/23/23 0836 01/23/23 0836 01/23/23 0836 --   Oral Monitor Lying Right arm       Pain Score       01/23/23 1133       No Pain           Vitals:    01/23/23 0836 01/23/23 1045 01/23/23 1133 01/23/23 1215   BP: 105/70 104/65 104/65 (!) 77/40   Pulse: 92 76 71 101   Patient Position - Orthostatic VS: Lying  Lying Lying         Visual Acuity  Visual Acuity    Flowsheet Row Most Recent Value   L Pupil Size (mm) 4   R Pupil Size (mm) 4          ED Medications  Medications   lacosamide (VIMPAT) 200 mg in sodium chloride 0 9 % 100 mL IVPB (has no administration in time range)   OLANZapine (ZyPREXA) IM injection 5 mg (5 mg Intramuscular Given 1/23/23 1032)   sterile water injection **ADS Override Pull** (1 mL Given 1/23/23 1035)   potassium-sodium phosphateS (K-PHOS,PHOSPHA 250) -250 mg tablet 2 tablet (2 tablets Oral Given 1/23/23 1136)   LORazepam (ATIVAN) injection 2 mg (2 mg Intravenous Given 1/23/23 1220)       Diagnostic Studies  Results Reviewed     Procedure Component Value Units Date/Time    FLU/RSV/COVID - if FLU/RSV clinically relevant [[id number]]  (Normal) Collected: 01/23/23 0944    Lab Status: Final result Specimen: Nares from Nose Updated: 01/23/23 1035     SARS-CoV-2 Negative     INFLUENZA A PCR Negative     INFLUENZA B PCR Negative     RSV PCR Negative    Narrative:      FOR PEDIATRIC PATIENTS - copy/paste COVID Guidelines URL to browser: [email or web address]  [employer]    SARS-CoV-2 assay is a Nucleic Acid Amplification assay intended for the  qualitative detection of nucleic acid from SARS-CoV-2 in nasopharyngeal  swabs  Results are for the presumptive identification of SARS-CoV-2 RNA  Positive results are indicative of infection with SARS-CoV-2, the virus  causing COVID-19, but do not rule out bacterial infection or co-infection  with other viruses  Laboratories within the [place] and its  territories are required to report all positive results to the appropriate  public health authorities  Negative results do not preclude SARS-CoV-2  infection and should not be used as the sole basis for treatment or other  patient management decisions  Negative results must be combined with  clinical observations, patient history, and epidemiological information  This test has not been FDA cleared or approved  This test has been authorized by FDA under an Emergency Use Authorization  (EUA)   This test is only authorized for the duration of time the  declaration that circumstances exist justifying the authorization of the  emergency use of an in vitro diagnostic tests for detection of SARS-CoV-2  virus and/or diagnosis of COVID-19 infection under section 564(b)(1) of  the Act, 21 U  S C  190RFF-9(Q)(0), unless the authorization is terminated  or revoked sooner  The test has been validated but independent review by FDA  and CLIA is pending  Test performed using "[employer] GeneXpert: This RT-PCR assay targets N2,  a region unique to SARS-CoV-2  A conserved region in the E-gene was chosen  for pan-Sarbecovirus detection which includes SARS-CoV-2  According to CMS-2020-01-R, this platform meets the definition of high-throughput technology  Phosphorus [[id number]]  (Abnormal) Collected: 01/23/23 0943    Lab Status: Final result Specimen: Blood from Hand, Left Updated: 01/23/23 1005     Phosphorus 1 9 mg/dL     Magnesium [[id number]]  (Normal) Collected: 01/23/23 0943    Lab Status: Final result Specimen: Blood from Hand, Left Updated: 01/23/23 1005     Magnesium 2 2 mg/dL     Oxcarbazepine level [[id number]] Collected: 01/23/23 0943    Lab Status: In process Specimen: Blood from Hand, Left Updated: 01/23/23 0948    Levetiracetam level [[id number]] Collected: 01/23/23 0943    Lab Status:  In process Specimen: Blood from Hand, Left Updated: 01/23/23 1410    Basic metabolic panel [[id number]]  (Abnormal) Collected: 01/23/23 0847    Lab Status: Final result Specimen: Blood from Arm, Right Updated: 01/23/23 0916     Sodium 138 mmol/L      Potassium 4 0 mmol/L      Chloride 111 mmol/L      CO2 22 mmol/L      ANION GAP 5 mmol/L      BUN 8 mg/dL      Creatinine 0 68 mg/dL      Glucose 104 mg/dL      Calcium 8 4 mg/dL      eGFR 109 ml/min/1 73sq m     Narrative:      [place] guidelines for Chronic Kidney Disease (CKD):   •  Stage 1 with normal or high GFR (GFR > 90 mL/min/1 73 square meters)  •  Stage 2 Mild CKD (GFR = 60-89 mL/min/1 73 square meters)  •  Stage 3A Moderate CKD (GFR = 45-59 mL/min/1 73 square meters)  •  Stage 3B Moderate CKD (GFR = 30-44 mL/min/1 73 square meters)  •  Stage 4 Severe CKD (GFR = 15-29 mL/min/1 73 square meters)  •  Stage 5 End Stage CKD (GFR <15 mL/min/1 73 square meters)  Note: GFR calculation is accurate only with a steady state creatinine    Fingerstick Glucose (POCT) [[id number]]  (Normal) Collected: 01/23/23 0838    Lab Status: Final result Updated: 01/23/23 0915     POC Glucose 96 mg/dl     POCT pregnancy, urine [[id number]]     Lab Status: No result     Rapid drug screen, urine [[id number]]     Lab Status: No result Specimen: Urine                  CT head without contrast   Final Result by Elia Bojorquez MD (01/23 1200)      No evidence of acute cranial process or significant interval change  Chronic bilateral cerebellar atrophy  Workstation performed: [id number]                    Procedures  ECG 12 Lead Documentation Only    Date/Time: 1/23/2023 8:50 AM  Performed by: Elizabeth Martino MD  Authorized by: Elizabeth Martino MD     ECG reviewed by me, the ED Provider: yes    Patient location:  ED  Rate:     ECG rate:  89    ECG rate assessment: normal    Rhythm:     Rhythm: sinus rhythm    Ectopy:     Ectopy: none    QRS:     QRS axis:  Normal    QRS intervals:  Normal  Conduction:     Conduction: normal    ST segments:     ST segments:  Normal  T waves:     T waves: normal               ED Course  ED Course as of 01/23/23 1223   Mon Jan 23, 2023   0846 On call neurologist consulted   0935 Case reviewed with dr King Rosado of neurology  Will add mag/phos levels to work up  If work up is neg and pt is back at baseline will dc to home with increasing her dose of trileptal     1022 Phosphorus(!): 1 9  Will replete     1031 Case d/w dr Eloise Bocanegra  Will load with vimpat, dc on vimpat bid since pt is no longer on trileptal                               SBIRT 20yo+    Flowsheet Row Most Recent Value   SBIRT (23 yo +)    In order to provide better care to our patients, we are screening all of our patients for alcohol and drug use  Would it be okay to ask you these screening questions?  Unable to answer at this time Filed at: 01/23/2023 1142                    Medical Decision Making  46-year-old female with seizure history presents today for multiple seizures with return to baseline as per significant other  We will consult neurology  Will CT head to rule acute sinus pathology, labs electrolyte normality, urine dip, UTI, urine drug screen, dispo based upon work-up    Amount and/or Complexity of Data Reviewed  Labs: ordered  Radiology: ordered  Risk  Prescription drug management  Disposition  Final diagnoses:   Seizures ([facility] [address] )   [facility]   Migraine     Time reflects when diagnosis was documented in both MDM as applicable and the Disposition within this note     Time User Action Codes Description Comment    1/23/2023 12:21 PM Vesna Lisa Add [R56 [id number]] Seizures ([facility] )     1/23/2023 12:21 PM Vesna Lisa Add [[id number]] Breakthrough seizure ([facility] )     1/23/2023 12:21 PM Vesna Lisa Add [[id number]] Migraine       ED Disposition     ED Disposition   Admit    Condition   Stable    Date/Time   Mon Jan 23, 2023 12:18 PM    Comment   Case was discussed with Dr Kristie Barry and the patient's admission status was agreed to be Admission Status: observation status to the service of Dr Kristie Barry   Follow-up Information    None         Patient's Medications   Discharge Prescriptions    No medications on file       No discharge procedures on file      PDMP Review     None          ED Provider  Electronically Signed by           Marcella Rodriguez MD  01/23/23 9519

## 2023-01-23 NOTE — CONSULTS
Consultation - Neurology   Myrtle Duggan 38 y o  female MRN: [id number]  Unit/Bed#: ED 28 Encounter: [id number]      Assessment/Plan     * Seizure disorder [facility])  Assessment & Plan  38 y o   female with vitamin B12 and D deficiency, migraines, iron deficiency anemia, spinocerebellar degeneration, and known seizure disorder who presents to [facility] on 1/23/23 after multiple seizure events  Suspect breakthrough seizure in setting of missed doses of Keppra    Plan   - CT head 1/23/23:   "No evidence of acute cranial process or significant interval change  - Patient loaded with Vimpat 200 mg IV; this was administered due to neurology not being aware pt had missed multiple doses of Keppra prior to 5th seizure event while pt in ED   - Pt given Keppra 2 g IV bolus following neurology evaluation when it was revealed that pt missed Keppra 1/22/23 PM dose and Keppra 1/23/23 AM dose   - Current AED regimen includes:  • Keppra 1500 mg BID (home regimen)   - Administer Ativan prn seizure-like activity  (GTC activity >3 minutes without resolution)  - Continue telemetry  - Labs 1/23/23 (unless otherwise noted):  • CBC: Pending  • BMP: Sodium 138, potassium 4 0, BUN 8, creatinine 0 68, glucose 104  • LFTs pending  • Ethanol level   • UDS pending  • UA 1/22/2023: Negative for infection  • Pregnancy test pending  • Phosphorus 1 9  • Magnesium 2 2  • Flu/COVID/RSV negative  - Continue seizure precautions   - [employer] form completed and faxed 1/23  Patient aware they are not to drive  - Discussed seizure precautions  - Frequent neuro checks  Continue to monitor and notify Neurology with any changes    - Medical management and supportive care per primary team   Correction of any metabolic or infectious disturbances   -Follow-up with outpatient neurologist at [facility] following discharge            Case and plan discussed with attending neurologist   Please see attending attestation for any further recommendations and/or changes to plan         Recommend follow-up with outpatient neurologist at [facility] following hospital discharge  History of Present Illness     Reason for Consult / Principal Problem: Seizures  Hx and PE limited by: Pt unable to provide history due to being extremely somnolent after receiving sedative medications; pt's , Carlos Enrique Olmos, able to supplement history   HPI: Lakeshia Oviedo is a 38 y o   female with vitamin B12 and D deficiency, migraines, iron deficiency anemia, spinocerebellar degeneration, and known seizure disorder who presents to [facility] on 1/23/23 after multiple seizure events  Patient's  reports pt was at the ED on 1/22/23 for abdominal pain  Pt's  reports pt missed Keppra 1/22/23 since she was in the ED and her PM dose was not administered there  Pt's  reports pt was at her baseline 1/22/23  prior to going to bed  Throughout the night, pt's  was awoken at 0252 AM, 0412 AM, 0550 AM and 0715 AM due to shaking activity and a "yell out" of patient on 1/23/23  Pt's  estimates each seizure episode lasted 2 to 3 minutes each time  Pt's  was unsure if pt returned to baseline in between seizure events because she would immediately fall back asleep after seizure  Pt's  notes that if pt needed to use the restroom throughout the night, he had to "basically carry her to the bathroom because she was very weak "  Patient's  called EMS due to patient having 4 events in the night which is the most that he is witnessed in 1 night with patient  Upon arrival to ED, /70, however her pressure went as low as 70/36  CT head negative for acute intracranial pathology  While in the ED, patient had 5th seizure -she required supplemental oxygen briefly after the event  Pt had not received AM dose Keppra 1/23/23 prior to this event  Patient was loaded with Vimpat 200 mg x 1, Ativan 2 mg x 1 and Zyprexa 5 mg injection      Per chart review, patient has known seizure disorder which she follows with [address] [place] [address] Street for  She began having seizures in her 35s with unknown etiology, possibly genetic(?) as pt's  reports pt's mother and sister have seizure disorders  She last saw outpatient neurology July 18, 2022 for follow-up  It is documented that most of her seizures are nocturnal and described as jerking with urinary incontinence and tongue bite  Each seizure lasted approximately 4 to 5 minutes and she is able to go back to sleep until morning  During that visit, it is documented that she has a seizure every 3 months  Patient had one routine EEG in 2018 that was negative for epileptiform discharges or seizure-like activity  She is currently on Keppra 1500 mg twice daily and Trileptal 300 mg twice daily, however patient's  reported that she recently stopped Trileptal about 4-6 months ago due to "issues with the medication and her blood "  Patient had previously tried Dilantin, Depakote (weight loss), and Topamax (no improvement)  In regards to her migraine history, she was previously well controlled on Emgality however she had an injection site reaction characterized by a welt  She is currently on Aimovog and Advil PRN  She previously followed with [facility]'s neurology for migraines however switched providers to [facility] in 2022 as they had a  onsite  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, patient was followed at [facility] where 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, heterozygous see a [id number] disorder  Neuroimaging revealed bilateral cerebellar atrophy      Consult to neurology  Consult performed by: Camden Lopez PA-C  Consult ordered by: Cristy Love MD          Review of Systems   Reason unable to perform ROS: pt somnolent from sedating medications        Historical Information   Past Medical History:   Diagnosis Date   • B12 deficiency    • Chronic migraine without aura    • Dermatographism    • Iron deficiency anemia    • Lyme neuropathy    • Seizure disorder (HCC)    • 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HCC)    • Visual disturbance     Diplopia, nystagmus, decreased visual acuity   • Vitamin D deficiency      Past Surgical History:   Procedure Laterality Date   •  SECTION     • ESOPHAGOGASTRODUODENOSCOPY N/A 3/8/2019    Procedure: ESOPHAGOGASTRODUODENOSCOPY (EGD); Surgeon: Flower Vail MD;  Location: [facility] GI LAB; Service: Gastroenterology   • OOPHORECTOMY      unilateral --  last assessed 17   • TUBAL LIGATION      last assessed 17     Social History   Social History     Substance and Sexual Activity   Alcohol Use No     Social History     Substance and Sexual Activity   Drug Use No     E-Cigarette/Vaping   • E-Cigarette Use Never User      E-Cigarette/Vaping Substances   • Nicotine No    • THC No    • CBD No    • Flavoring No    • Other No    • Unknown No      Social History     Tobacco Use   Smoking Status Never   Smokeless Tobacco Never     Family History:   Family History   Problem Relation Age of Onset   • Other Mother         AIDS, Brain Tumor balance disorder   • Seizures Mother    • Alcohol abuse Father    • Other Sister         Balance disorder   • HIV Maternal Grandmother        Review of previous medical records was completed       Meds/Allergies   all current active meds have been reviewed, current meds:   Current Facility-Administered Medications   Medication Dose Route Frequency   • acetaminophen (TYLENOL) tablet 650 mg  650 mg Oral Q6H PRN   • enoxaparin (LOVENOX) subcutaneous injection 40 mg  40 mg Subcutaneous Daily   • lacosamide (VIMPAT) 200 mg in sodium chloride 0 9 % 100 mL IVPB  200 mg Intravenous Once   • levETIRAcetam (KEPPRA) 1,500 mg in sodium chloride 0 9 % 100 mL IVPB  1,500 mg Intravenous Q12H [facility]   • levETIRAcetam (KEPPRA) 500 mg in sodium chloride 0 9 % 100 mL IVPB  500 mg Intravenous Once   • LORazepam (ATIVAN) injection 1 mg  1 mg Intravenous Q4H PRN   • ondansetron (ZOFRAN) injection 4 mg  4 mg Intravenous Q4H PRN   • sodium chloride 0 9 % infusion  100 mL/hr Intravenous Continuous    and PTA meds:   Prior to Admission Medications   Prescriptions Last Dose Informant Patient Reported? Taking? Erenumab-aooe (Aimovig) 79 MG/ML SOAJ   Yes No   Sig: Inject 70 mg under the skin every 28 days   cyanocobalamin (VITAMIN B-12) 1000 MCG tablet   No No   Sig: Take 1 tablet (1,000 mcg total) by mouth daily   ferrous sulfate 324 (65 Fe) mg   No No   Sig: Take 1 tablet (324 mg total) by mouth daily   levETIRAcetam (KEPPRA) 750 mg tablet   No No   Sig: Take 1 tablet (750 mg total) by mouth 2 (two) times a day   Patient taking differently: Take 1,500 mg by mouth 2 (two) times a day   omeprazole (PriLOSEC) 20 mg delayed release capsule   No Yes   Sig: Take 1 capsule (20 mg total) by mouth daily   ondansetron (ZOFRAN-ODT) 4 mg disintegrating tablet   No No   Sig: Take 1 tablet (4 mg total) by mouth every 6 (six) hours as needed for nausea      Facility-Administered Medications: None       Allergies   Allergen Reactions   • Morphine Itching     Pt denies breathing issues   • Dye Fdc Red  [Red Dye - Food Allergy]        Objective   Vitals:Blood pressure 97/54, pulse 90, temperature 98 9 °F (37 2 °C), temperature source Oral, resp  rate 16, weight 77 3 kg (170 lb 6 7 oz), SpO2 96 %  ,Body mass index is 25 17 kg/m²  Intake/Output Summary (Last 24 hours) at 1/23/2023 1552  Last data filed at 1/23/2023 1352  Gross per 24 hour   Intake 200 ml   Output --   Net 200 ml       Invasive Devices: Invasive Devices     Peripheral Intravenous Line  Duration           Peripheral IV 01/23/23 Right Antecubital <1 day                Physical Exam  Vitals and nursing note reviewed  Exam conducted with a chaperone present (pt's  )  Constitutional:       General: She is not in acute distress  Appearance: She is not diaphoretic  Comments: Pt sleeping and snoring, withdraws to noxious stimuli    HENT:      Nose: Nose normal       Mouth/Throat:      Mouth: Mucous membranes are moist    Cardiovascular:      Rate and Rhythm: Normal rate  Pulmonary:      Effort: Pulmonary effort is normal    Neurological:      Coordination: Finger-nose-finger test: unable to assess due to pt's arousal level s/p sedating medications   Heel-to-shin test: unable to assess due to pt's arousal level s/p sedating medications    Psychiatric:      Comments: Pt somnolent, does not offer any verbal answers during exam        Neurologic Exam     Mental Status   Attention: decreased  Concentration: decreased  Level of consciousness: responsive to painful stimuli  - pt somnolent and is unable to be aroused enough/for a long enough period of time to produce any speech; thus unable to assess speech, language  - pt did not follow commands      Cranial Nerves   - pt very somnolent; therefore, cranial nerve testing significantly limited   - pt's face appeared symmetric at rest       Motor Exam - withdraws equally in extremities x 4 to noxious stimuli (nailbed pressure)      Sensory Exam   - withdraws equally to noxious stimuli in extremities x 4      Gait, Coordination, and Reflexes     Gait  Gait: (deferred for patient's safety )    Coordination   Finger-nose-finger test: unable to assess due to pt's arousal level s/p sedating medications    Heel-to-shin test: unable to assess due to pt's arousal level s/p sedating medications       Lab Results:   I have personally reviewed pertinent reports    , CBC:   Results from last 7 days   Lab Units 01/22/23 2059 01/20/23  0703   WBC Thousand/uL 5 99 3 56*   RBC Million/uL 3 72* 4 24   HEMOGLOBIN g/dL 10 3* 11 8   HEMATOCRIT % 32 3* 37 0   MCV fL 87 87   PLATELETS Thousands/uL 170 178   , BMP/CMP:   Results from last 7 days   Lab Units 01/23/23  0847 01/22/23 2059 01/20/23  0703   SODIUM mmol/L 138 140 139   POTASSIUM mmol/L 4 0 3 8 4  1   CHLORIDE mmol/L 111* 110* 108   CO2 mmol/L 22 25 27   BUN mg/dL 8 10 8   CREATININE mg/dL 0 68 0 75 0 64   CALCIUM mg/dL 8 4 8 5 8 8   AST U/L  --  12*  --    ALT U/L  --  6*  --    ALK PHOS U/L  --  51  --    EGFR ml/min/1 73sq m 109 100 111    HgBA1C:   Results from last 7 days   Lab Units 01/19/23  0633   HEMOGLOBIN A1C % 5 1   , Urinalysis:   Results from last 7 days   Lab Units 01/22/23  2148   COLOR UA  Bloody   CLARITY UA  Clear   SPEC GRAV UA  1 025   PH UA  6 0   LEUKOCYTES UA  Negative   NITRITE UA  Positive*   GLUCOSE UA mg/dl Negative   KETONES UA mg/dl Trace*   BILIRUBIN UA  Small*   BLOOD UA  Large*   Imaging Studies: I have personally reviewed pertinent reports  and I have personally reviewed pertinent films in PACS [facility] 1/23/23  EKG, Pathology, and Other Studies: I have personally reviewed pertinent reports  and I have personally reviewed pertinent films in PACS  VTE Prophylaxis: Patient currently in ED    Code Status: Level 1 - Full Code  Advance Directive and Living Will:      Power of :    POLST:      Counseling / Coordination of Care  Total time spent today 60 minutes  Greater than 50% of total time was spent with the patient and / or family counseling and / or coordination of care  A description of the counseling / coordination of care: : discussion of likely causes of symptoms, recommended workup at this time, recommended treatment at this time, risks if choosing not to undergo further workup/treatment    This note was completed in part utilizing [email or web address] Direct Software  Grammatical errors, random word insertions, spelling mistakes, and incomplete sentences may be an occasional consequence of this system secondary to software limitations, ambient noise, and hardware issues  If you have any questions or concerns about the content, text, or information contained within the body of this dictation, please contact the provider for clarification

## 2023-01-24 VITALS
BODY MASS INDEX: 25.17 KG/M2 | SYSTOLIC BLOOD PRESSURE: 108 MMHG | RESPIRATION RATE: 16 BRPM | DIASTOLIC BLOOD PRESSURE: 63 MMHG | OXYGEN SATURATION: 95 % | HEART RATE: 80 BPM | TEMPERATURE: 99.8 F | WEIGHT: 170.42 LBS

## 2023-01-24 LAB
ALBUMIN SERPL BCP-MCNC: 3.1 G/DL (ref 3.5–5)
ALP SERPL-CCNC: 45 U/L (ref 34–104)
ALT SERPL W P-5'-P-CCNC: 6 U/L (ref 7–52)
ANION GAP SERPL CALCULATED.3IONS-SCNC: 3 MMOL/L (ref 4–13)
AST SERPL W P-5'-P-CCNC: 14 U/L (ref 13–39)
BILIRUB SERPL-MCNC: 0.28 MG/DL (ref 0.2–1)
BUN SERPL-MCNC: 6 MG/DL (ref 5–25)
CALCIUM ALBUM COR SERPL-MCNC: 8.5 MG/DL (ref 8.3–10.1)
CALCIUM SERPL-MCNC: 7.8 MG/DL (ref 8.4–10.2)
CHLORIDE SERPL-SCNC: 116 MMOL/L (ref 96–108)
CO2 SERPL-SCNC: 23 MMOL/L (ref 21–32)
CREAT SERPL-MCNC: 0.6 MG/DL (ref 0.6–1.3)
ERYTHROCYTE [DISTWIDTH] IN BLOOD BY AUTOMATED COUNT: 13.5 % (ref 11.6–15.1)
GFR SERPL CREATININE-BSD FRML MDRD: 114 ML/MIN/1.73SQ M
GLUCOSE P FAST SERPL-MCNC: 89 MG/DL (ref 65–99)
GLUCOSE SERPL-MCNC: 89 MG/DL (ref 65–140)
HCT VFR BLD AUTO: 27 % (ref 34.8–46.1)
HGB BLD-MCNC: 8.6 G/DL (ref 11.5–15.4)
MCH RBC QN AUTO: 28 PG (ref 26.8–34.3)
MCHC RBC AUTO-ENTMCNC: 31.9 G/DL (ref 31.4–37.4)
MCV RBC AUTO: 88 FL (ref 82–98)
PLATELET # BLD AUTO: 141 THOUSANDS/UL (ref 149–390)
PMV BLD AUTO: 12.6 FL (ref 8.9–12.7)
POTASSIUM SERPL-SCNC: 3.2 MMOL/L (ref 3.5–5.3)
PROT SERPL-MCNC: 5.4 G/DL (ref 6.4–8.4)
RBC # BLD AUTO: 3.07 MILLION/UL (ref 3.81–5.12)
SODIUM SERPL-SCNC: 142 MMOL/L (ref 135–147)
WBC # BLD AUTO: 2.83 THOUSAND/UL (ref 4.31–10.16)

## 2023-01-24 RX ORDER — LEVETIRACETAM 750 MG/1
1500 TABLET ORAL 2 TIMES DAILY
Start: 2023-01-24

## 2023-01-24 RX ORDER — POTASSIUM CHLORIDE 20 MEQ/1
40 TABLET, EXTENDED RELEASE ORAL ONCE
Status: COMPLETED | OUTPATIENT
Start: 2023-01-24 | End: 2023-01-24

## 2023-01-24 RX ORDER — LEVETIRACETAM 750 MG/1
1500 TABLET ORAL EVERY 12 HOURS SCHEDULED
Status: DISCONTINUED | OUTPATIENT
Start: 2023-01-24 | End: 2023-01-24 | Stop reason: HOSPADM

## 2023-01-24 RX ADMIN — LEVETIRACETAM 1500 MG: 750 TABLET, FILM COATED ORAL at 10:14

## 2023-01-24 RX ADMIN — POTASSIUM CHLORIDE 40 MEQ: 1500 TABLET, EXTENDED RELEASE ORAL at 09:49

## 2023-01-24 RX ADMIN — ACETAMINOPHEN 650 MG: 325 TABLET ORAL at 01:57

## 2023-01-24 RX ADMIN — SODIUM CHLORIDE 100 ML/HR: 0.9 INJECTION, SOLUTION INTRAVENOUS at 01:57

## 2023-01-24 NOTE — PROGRESS NOTES
Progress Note - Neurology   Louis Trujillo 38 y o  female [id number]  Unit/Bed#: East 5 /E5 -*    Assessment/Plan:    * Seizure disorder [facility])  Assessment & Plan  38 y o  female with vitamin B12 and D deficiency, migraines, iron deficiency anemia, spinocerebellar degeneration, and known seizure disorder presented on 1/23/23 after multiple seizure events  Family reports she missed 1-2 doses of Keppra, which is the likely cause of her breakthrough seizures  However, they report she does seem to have at least one seizure per month despite medication compliance  Pertinent results:   CTH negative for acute pathology  UDS negative  UA on 1/22 with nitrites, occasional bacteria  UA on 1/24 negative  Magnesium 1 8, phosphorus 1 9, leukopenia     Plan   - Current AED Regimen:   · S/p Keppra 2g bolus  · Continue on Keppra 1500 mg BID  · S/p Vimpat 200 mg load (This was administered prior to information regarding missed doses of Keppra)  · Ativan PRN convulsive seizure activity lasting >2-3 minutes  - Given seizure frequency despite maximum dosing of Keppra, offered patient ability to initiate Vimpat  Patient does not want to add any medications at this time and would rather return to her outpatient neurologist to discuss  Advised patient that if she is to undergo surgery in the future, would highly recommend adding a second agent  - Continue telemetry  - Continue seizure precautions   - [employer] form completed and faxed 1/23  Patient aware they are not to drive  - Keppra level pending   - Medical management and supportive care per primary team   Correction of any metabolic or infectious disturbances   -Follow-up with outpatient neurologist at [facility] following discharge        Subjective:   Patient was seen and examined  Family was at bedside and assisted with translation  Patient reports she is tired today and is having pain all over, but has not had any recurrent events    Family reports that she did miss 1 dose of Keppra  After discussion regarding the second agent for seizure prevention, patient wishes to hold off at this time  Past Medical History:   Diagnosis Date   • B12 deficiency    • Chronic migraine without aura    • Dermatographism    • Iron deficiency anemia    • Lyme neuropathy    • Seizure disorder (HCC)    • 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HCC)    • Visual disturbance     Diplopia, nystagmus, decreased visual acuity   • Vitamin D deficiency      Past Surgical History:   Procedure Laterality Date   •  SECTION     • ESOPHAGOGASTRODUODENOSCOPY N/A 3/8/2019    Procedure: ESOPHAGOGASTRODUODENOSCOPY (EGD); Surgeon: Marrian Boxer, MD;  Location: [facility] GI LAB;   Service: Gastroenterology   • OOPHORECTOMY      unilateral --  last assessed 17   • TUBAL LIGATION      last assessed 17     Family History   Problem Relation Age of Onset   • Other Mother         AIDS, Brain Tumor balance disorder   • Seizures Mother    • Alcohol abuse Father    • Other Sister         Balance disorder   • HIV Maternal Grandmother      Social History     Socioeconomic History   • Marital status: Single     Spouse name: None   • Number of children: 3   • Years of education: None   • Highest education level: None   Occupational History   • Occupation: Housewife / Homemaker   Tobacco Use   • Smoking status: Never   • Smokeless tobacco: Never   Vaping Use   • Vaping Use: Never used   Substance and Sexual Activity   • Alcohol use: No   • Drug use: No   • Sexual activity: Yes     Partners: Male   Other Topics Concern   • None   Social History Narrative    Always uses seatbelt    Lives with children     Social Determinants of Health     Financial Resource Strain: Not on file   Food Insecurity: Not on file   Transportation Needs: Not on file   Physical Activity: Not on file   Stress: Not on file   Social Connections: Not on file   Intimate Partner Violence: Not on file   Housing Stability: Not on file Medications: All current active meds have been reviewed and current meds:  Scheduled Meds:  Current Facility-Administered Medications   Medication Dose Route Frequency Provider Last Rate   • acetaminophen  650 mg Oral Q6H PRN Chelsey Kent DO     • enoxaparin  40 mg Subcutaneous Daily Chelsey Kent DO     • lacosamide (VIMPAT) IVPB  200 mg Intravenous Once Imtiaz Gonzales MD Stopped (01/23/23 1246)   • levETIRAcetam  1,500 mg Intravenous Q12H [address] Tammie Aquino PA-C Stopped (01/23/23 2057)   • LORazepam  1 mg Intravenous Q4H PRN Marvel Ray DO     • ondansetron  4 mg Intravenous Q4H PRN Marvel Ray DO       Continuous Infusions:   PRN Meds: •  acetaminophen  •  LORazepam  •  ondansetron       ROS:   Review of Systems   Constitutional: Positive for fatigue  Musculoskeletal: Positive for myalgias  Neurological: Positive for seizures  All other systems reviewed and are negative  Vitals:   /63 (BP Location: Right arm)   Pulse 80   Temp 99 8 °F (37 7 °C) (Oral)   Resp 16   Wt 77 3 kg (170 lb 6 7 oz)   SpO2 95%   BMI 25 17 kg/m²     Physical Exam:   Vital signs and nursing notes reviewed  Constitutional: Patient is resting in bed  Well developed, well nourished, in no acute distress  No diaphoresis  HENT: Normocephalic, atraumatic  Right and left external ear normal  Oropharynx clear and moist  Nose normal    Eyes: EOMs intact  No scleral icterus or injection  No discharge  Neck: Supple, normal ROM  No stridor noted  Cardiovascular: Regular rate  Pulmonary: No respiratory distress  Effort normal    Musculoskeletal: Moves all extremities spontaneously and anti-gravity  Neurological:  Detailed below  Skin: Warm and dry  Psychiatric: Normal mood and affect  No hallucinations or agitation  Neurologic Exam:  Mental Status: Patient is awake and alert  Oriented to person, place, month, year  Follows commands without difficulty  No dysarthria  No aphasia     Cranial Nerves: Cranial nerves 2-12 intact  Motor: Moves all extremities spontaneously and anti-gravity  5/5 strength throughout, though pain noted  Sensation: Sensation to light touch intact  Coordination: No evidence of ataxia  No tremors noted  Gait: Deferred         Labs: I have personally reviewed pertinent reports     Recent Results (from the past 24 hour(s))   ECG 12 lead    Collection Time: 01/23/23 12:11 PM   Result Value Ref Range    Ventricular Rate 115 BPM    Atrial Rate 115 BPM    SC Interval 130 ms    QRSD Interval 72 ms    QT Interval 292 ms    QTC Interval 403 ms    P [place] 77 degrees    QRS Axis 73 degrees    T Wave Axis 60 degrees   FLU/RSV/COVID - if FLU/RSV clinically relevant    Collection Time: 01/23/23  5:34 PM    Specimen: Nasopharyngeal Swab; Nares   Result Value Ref Range    SARS-CoV-2 Negative Negative    INFLUENZA A PCR Negative Negative    INFLUENZA B PCR Negative Negative    RSV PCR Negative Negative   Rapid drug screen, urine    Collection Time: 01/23/23  8:51 PM   Result Value Ref Range    Amph/Meth UR Negative Negative    Barbiturate Ur Negative Negative    Benzodiazepine Urine Negative Negative    Cocaine Urine Negative Negative    Methadone Urine Negative Negative    Opiate Urine Negative Negative    PCP Ur Negative Negative    THC Urine Negative Negative    Oxycodone Urine Negative Negative   Urine Macroscopic, POC    Collection Time: 01/23/23  8:53 PM   Result Value Ref Range    Color, UA Yellow     Clarity, UA Clear     pH, UA 5 5 4 5 - 8 0    Leukocytes, UA Negative Negative    Nitrite, UA Negative Negative    Protein, UA Negative Negative mg/dl    Glucose, UA Negative Negative mg/dl    Ketones, UA Negative Negative mg/dl    Urobilinogen, UA 0 2 0 2, 1 0 E U /dl E U /dl    Bilirubin, UA Negative Negative    Occult Blood, UA Moderate (A) Negative    Specific Gravity, UA >=1 030 1 003 - 1 030   Urine Microscopic    Collection Time: 01/23/23  8:53 PM   Result Value Ref Range RBC, UA None Seen None Seen, 2-4 /hpf    WBC, UA 0-1 (A) None Seen, 2-4, 5-60 /hpf    Epithelial Cells Occasional None Seen, Occasional /hpf    Bacteria, UA None Seen None Seen, Occasional /hpf    Uric Acid Marta, UA Moderate /hpf   POCT pregnancy, urine    Collection Time: 01/23/23  8:56 PM   Result Value Ref Range    EXT Preg Test, Ur Negative     Control Valid    Comprehensive metabolic panel    Collection Time: 01/24/23  5:07 AM   Result Value Ref Range    Sodium 142 135 - 147 mmol/L    Potassium 3 2 (L) 3 5 - 5 3 mmol/L    Chloride 116 (H) 96 - 108 mmol/L    CO2 23 21 - 32 mmol/L    ANION GAP 3 (L) 4 - 13 mmol/L    BUN 6 5 - 25 mg/dL    Creatinine 0 60 0 60 - 1 30 mg/dL    Glucose 89 65 - 140 mg/dL    Glucose, Fasting 89 65 - 99 mg/dL    Calcium 7 8 (L) 8 4 - 10 2 mg/dL    Corrected Calcium 8 5 8 3 - 10 1 mg/dL    AST 14 13 - 39 U/L    ALT 6 (L) 7 - 52 U/L    Alkaline Phosphatase 45 34 - 104 U/L    Total Protein 5 4 (L) 6 4 - 8 4 g/dL    Albumin 3 1 (L) 3 5 - 5 0 g/dL    Total Bilirubin 0 28 0 20 - 1 00 mg/dL    eGFR 114 ml/min/1 73sq m   CBC (With Platelets)    Collection Time: 01/24/23  5:07 AM   Result Value Ref Range    WBC 2 83 (L) 4 31 - 10 16 Thousand/uL    RBC 3 07 (L) 3 81 - 5 12 Million/uL    Hemoglobin 8 6 (L) 11 5 - 15 4 g/dL    Hematocrit 27 0 (L) 34 8 - 46 1 %    MCV 88 82 - 98 fL    MCH 28 0 26 8 - 34 3 pg    MCHC 31 9 31 4 - 37 4 g/dL    RDW 13 5 11 6 - 15 1 %    Platelets 529 (L) 432 - 390 Thousands/uL    MPV 12 6 8 9 - 12 7 fL       Imaging: I have personally reviewed pertinent imaging in PACS, including [facility],  and I have personally reviewed PACS reports  EKG, Pathology, and Other Studies: I have personally reviewed pertinent reports  VTE Prophylaxis: Enoxaparin (Lovenox)      Counseling / Coordination of Care  Total time spent today 18 minutes    Discussed importance of taking medications, seizure restrictions (driving, swimming unattended etc)

## 2023-01-24 NOTE — PLAN OF CARE
Problem: PAIN - ADULT  Goal: Verbalizes/displays adequate comfort level or baseline comfort level  Description: Interventions:  - Encourage patient to monitor pain and request assistance  - Assess pain using appropriate pain scale  - Administer analgesics based on type and severity of pain and evaluate response  - Implement non-pharmacological measures as appropriate and evaluate response  - Consider cultural and social influences on pain and pain management  - Notify physician/advanced practitioner if interventions unsuccessful or patient reports new pain  Outcome: Progressing     Problem: INFECTION - ADULT  Goal: Absence or prevention of progression during hospitalization  Description: INTERVENTIONS:  - Assess and monitor for signs and symptoms of infection  - Monitor lab/diagnostic results  - Monitor all insertion sites, i e  indwelling lines, tubes, and drains  - Monitor endotracheal if appropriate and nasal secretions for changes in amount and color  - [place] appropriate cooling/warming therapies per order  - Administer medications as ordered  - Instruct and encourage patient and family to use good hand hygiene technique  - Identify and instruct in appropriate isolation precautions for identified infection/condition  Outcome: Progressing  Goal: Absence of fever/infection during neutropenic period  Description: INTERVENTIONS:  - Monitor WBC    Outcome: Progressing     Problem: SAFETY ADULT  Goal: Patient will remain free of falls  Description: INTERVENTIONS:  - Educate patient/family on patient safety including physical limitations  - Instruct patient to call for assistance with activity   - Consult OT/PT to assist with strengthening/mobility   - Keep Call bell within reach  - Keep bed low and locked with side rails adjusted as appropriate  - Keep care items and personal belongings within reach  - Initiate and maintain comfort rounds  - Make Fall Risk Sign visible to staff  - Obtain necessary fall risk management equipment: nonskid footwear on and call bell in reach  - Apply yellow socks and bracelet for high fall risk patients  - Consider moving patient to room near nurses station  Outcome: Progressing     Problem: DISCHARGE PLANNING  Goal: Discharge to home or other facility with appropriate resources  Description: INTERVENTIONS:  - Identify barriers to discharge w/patient and caregiver  - Arrange for needed discharge resources and transportation as appropriate  - Identify discharge learning needs (meds, wound care, etc )  - Arrange for interpretive services to assist at discharge as needed  - Refer to Case Management Department for coordinating discharge planning if the patient needs post-hospital services based on physician/advanced practitioner order or complex needs related to functional status, cognitive ability, or social support system  Outcome: Progressing     Problem: Knowledge Deficit  Goal: Patient/family/caregiver demonstrates understanding of disease process, treatment plan, medications, and discharge instructions  Description: Complete learning assessment and assess knowledge base    Interventions:  - Provide teaching at level of understanding  - Provide teaching via preferred learning methods  Outcome: Progressing

## 2023-01-24 NOTE — DISCHARGE SUMMARY
[address] [facility]  Discharge- Ade Conner 1980, 38 y o  female MRN: [id number]  Unit/Bed#: E5 -01 Encounter: [id number]  Primary Care Provider: Aurelio Nelson MD   Date and time admitted to hospital: 1/23/2023  8:32 AM    * Seizure disorder 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 follows with  neurology presents with breakthrough seizures  · Prior to admission on keppra 1500 mg twice daily  · Was in the ER yesterday and missed evening dose of keppra  Has been anxious and subsequently had 4 seizures since 2 AM and 1 more in the department  · Seen by neurology  Continue keppra IV until can take oral   Further vimpat defer to neurology outpatient  · Continue keppra 1500mg BID on discharge  · Close follow up with outpt neurologist, may require 2nd AED but neurology will defer to primary    Pancreatic abnormality  Assessment & Plan  · Incidental finding of pancreatic cyst   Will need repeat imaging as recommended    Menorrhagia with irregular cycle  Assessment & Plan  · Patient was scheduled for hysterectomy yesterday  Will need to be rescheduled  Chronic migraine without aura without status migrainosus, not intractable  Assessment & Plan  · On injectables every month with  neurology        Discharging Physician / Practitioner: Kavya Cherry MD  PCP: Aurelio Nelson MD  Admission Date:   Admission Orders (From admission, onward)     Ordered        01/23/23 1221  Place in Observation  Once                      Discharge Date: 01/24/23    Medical Problems     Resolved Problems  Date Reviewed: 1/24/2023   None         Consultations During Hospital Stay:  · Neurology    Procedures Performed:   · none    Significant Findings / Test Results:   CT head without contrast    Result Date: 1/23/2023  · Impression: No evidence of acute cranial process or significant interval change  Chronic bilateral cerebellar atrophy  Workstation performed: [id number]   ·     Incidental Findings:   · none     Test Results Pending at Discharge (will require follow up):   · none     Outpatient Tests Requested:  · none    Complications:  none    Reason for Admission: Seizures    Hospital Course:     Lisha Marte is a 38 y o  female patient who originally presented to the hospital on 1/23/2023 due to breakthrough seizures  History of seizure disorder, reportedly missed a dose of her Keppra and subsequently had multiple seizures  CT scan in the ED without any acute process  Patient was evaluated by neurology which recommended patient be loaded with Keppra 2 g and patient was started on Vimpat  She does have a history of leukopenia on carbamazepine  She was monitored over the course of 24 hours, no further seizures noted  Patient was recommend to follow-up with her outpatient neurologist at [facility], neurology does recommend that patient may benefit from second AED if she is going for surgery  Lidia Garre Her hysterectomy was canceled, patient is to follow-up with outpatient gynecologist to reschedule as needed  No change in medications on discharge  Please see above list of diagnoses and related plan for additional information  Condition at Discharge: fair     Discharge Day Visit / Exam:     Subjective:    No events overnight  Reports feeling well with no reports of seizures since admission  Denies HA, dizziness, nausea or vomiting  Vitals: Blood Pressure: 108/63 (01/24/23 0804)  Pulse: 80 (01/24/23 0804)  Temperature: 99 8 °F (37 7 °C) (01/24/23 0804)  Temp Source: Oral (01/24/23 0804)  Respirations: 16 (01/24/23 0804)  Weight - Scale: 77 3 kg (170 lb 6 7 oz) (01/23/23 0926)  SpO2: 95 % (01/24/23 0804)  Exam:   Physical Exam  Vitals and nursing note reviewed  Constitutional:       Appearance: Normal appearance  She is normal weight  HENT:      Head: Normocephalic     Eyes: General: No scleral icterus  Conjunctiva/sclera: Conjunctivae normal    Cardiovascular:      Rate and Rhythm: Normal rate and regular rhythm  Pulmonary:      Breath sounds: Normal breath sounds  No wheezing or rhonchi  Abdominal:      General: Bowel sounds are normal  There is no distension  Palpations: Abdomen is soft  Musculoskeletal:         General: No swelling  Right lower leg: No edema  Left lower leg: No edema  Skin:     General: Skin is warm and dry  Neurological:      General: No focal deficit present  Mental Status: She is alert  Mental status is at baseline  Discharge instructions/Information to patient and family:   See after visit summary for information provided to patient and family  Provisions for Follow-Up Care:  See after visit summary for information related to follow-up care and any pertinent home health orders  Disposition:     Home    Planned Readmission: none     Discharge Statement:  I spent 40 minutes discharging the patient  This time was spent on the day of discharge  I had direct contact with the patient on the day of discharge  Greater than 50% of the total time was spent examining patient, answering all patient questions, arranging and discussing plan of care with patient as well as directly providing post-discharge instructions  Additional time then spent on discharge activities  Discharge Medications:  See after visit summary for reconciled discharge medications provided to patient and family        ** Please Note: This note has been constructed using a voice recognition system **

## 2023-01-24 NOTE — UTILIZATION REVIEW
Initial Clinical Review    Admission: Date/Time/Statement:   Admission Orders (From admission, onward)     Ordered        01/23/23 1221  Place in Observation  Once                      Orders Placed This Encounter   Procedures   • Place in Observation     Standing Status:   Standing     Number of Occurrences:   1     Order Specific Question:   Level of Care     Answer:   Med Surg [16]     ED Arrival Information     Expected   -    Arrival   1/23/2023 08:32    Acuity   Emergent            Means of arrival   Ambulance    Escorted by   [facility]    Admission type   Emergency            Arrival complaint   medical problem           Chief Complaint   Patient presents with   • Seizure - Prior Hx Of     Per ems, patient experienced 2 seizures last night  Information obtained from the   Last one was 0715 today  Patient appears postictal upon arrival  Last seizure was 2 months before this episode  Initial Presentation: 38 y o  female presents to ED via  EMS  From home after  4 seizures since early the  Morning  Of admission, had another  On ED arrival requiring zyprexa and  Ativan  Seen in ED  The  Evening prior to this for abdominal pain  PMH  Is  Seizure disorder,  Migraines, spinocerebellar atrophy,  Had  A planned  Hysterectomy  The day of admission  Was on  Tegretol but was  D/C  D/T  Low  WBC  Admit  Observation with  Seizure disorder and plan is  Neuro consult,  IV  keppra and close  Monitoring  Neuro consult  Suspect breakthrough seizure D/T  Missed  keppra  Dose  Remain on seizure precautions  Monitor labs  Remains on tele  Needs frequent neuro checks  Continue IV  Keppra       ED Triage Vitals   Temperature Pulse Respirations Blood Pressure SpO2   01/23/23 0920 01/23/23 0836 01/23/23 0836 01/23/23 0836 01/23/23 0836   98 9 °F (37 2 °C) 92 14 105/70 98 %      Temp Source Heart Rate Source Patient Position - Orthostatic VS BP Location FiO2 (%)   01/23/23 0920 01/23/23 0836 01/23/23 0836 01/23/23 0836 --   Oral Monitor Lying Right arm       Pain Score       01/23/23 1133       No Pain          Wt Readings from Last 1 Encounters:   01/23/23 77 3 kg (170 lb 6 7 oz)     Additional Vital Signs:   /24/23 0115 -- 82 16 100/55 72 98 % None (Room air) Lying   01/23/23 2330 -- 78 16 101/59 76 97 % None (Room air) Lying   01/23/23 2315 -- 82 16 100/58 76 97 % None (Room air) Lying   01/23/23 2200 -- 78 16 91/53 66 96 % -- --   01/23/23 2100 -- 84 16 105/62 78 97 % -- --   01/23/23 2000 -- 78 16 92/55 68 97 % -- --   01/23/23 1915 -- 74 18 101/60 76 98 % None (Room air) Lying   01/23/23 1830 -- 80 16 88/51 Abnormal  64 Abnormal  97 % -- --   01/23/23 1800 -- 88 16 83/51 Abnormal  63 Abnormal  98 % -- --   01/23/23 1700 -- 84 14 83/51 Abnormal  62 Abnormal  96 % -- --   01/23/23 1600 -- 84 16 94/50 66 96 % -- --   01/23/23 1500 -- 90 16 97/54 67 96 % -- --   01/23/23 1415 -- 86 -- 96/52 67 97 % None (Room air) Lying   01/23/23 1400 -- 92 16 81/48 Abnormal  58 Abnormal  96 % None (Room air) Lying   01/23/23 1330 -- 100 14 79/45 Abnormal  57 Abnormal  96 % None (Room air) Lying   01/23/23 1315 -- 96 12 72/39 Abnormal  49 Abnormal  96 % None (Room air) Lying   01/23/23 1245 -- 102 12 70/36 Abnormal  49 Abnormal  95 % None (Room air) Lying   01/23/23 1215 -- 101 12 77/40 Abnormal  -- 99 % None (Room air) Lying   01/23/23 1133 -- 71 14 104/65 -- 99 % None (Room air) Lying   01/23/23 1045 -- 76 -- 104/65 80 100 % -- --   01/23/23 0926 -- -- -- -- -- -- None (Room air) --   01/23/23 0920 98 9 °F (37 2 °C) -- -- -- -- -- -- --   01/23/23 0850 -- -- -- -- -- -- None (Room air) --   01/23/23 0836 -- 92 14 105/70 -- 98 % None (Room air) Lying       Pertinent Labs/Diagnostic Test Results:   CT head without contrast   Final Result by Radha Souza MD (01/23 1200)      No evidence of acute cranial process or significant interval change  Chronic bilateral cerebellar atrophy  Workstation performed: [id number]           Results from last 7 days   Lab Units 01/23/23  1734 01/23/23  0944   SARS-COV-2  Negative Negative     Results from last 7 days   Lab Units 01/24/23  0507 01/22/23 2059 01/20/23  0703   WBC Thousand/uL 2 83* 5 99 3 56*   HEMOGLOBIN g/dL 8 6* 10 3* 11 8   HEMATOCRIT % 27 0* 32 3* 37 0   PLATELETS Thousands/uL 141* 170 178   NEUTROS ABS Thousands/µL  --  4 57 1 86         Results from last 7 days   Lab Units 01/24/23  0507 01/23/23  0943 01/23/23  0847 01/22/23 2059 01/20/23  0703   SODIUM mmol/L 142  --  138 140 139   POTASSIUM mmol/L 3 2*  --  4 0 3 8 4 1   CHLORIDE mmol/L 116*  --  111* 110* 108   CO2 mmol/L 23  --  22 25 27   ANION GAP mmol/L 3*  --  5 5 4   BUN mg/dL 6  --  8 10 8   CREATININE mg/dL 0 60  --  0 68 0 75 0 64   EGFR ml/min/1 73sq m 114  --  109 100 111   CALCIUM mg/dL 7 8*  --  8 4 8 5 8 8   MAGNESIUM mg/dL  --  2 2  --  1 8*  --    PHOSPHORUS mg/dL  --  1 9*  --   --   --      Results from last 7 days   Lab Units 01/24/23  0507 01/22/23 2059   AST U/L 14 12*   ALT U/L 6* 6*   ALK PHOS U/L 45 51   TOTAL PROTEIN g/dL 5 4* 6 3*   ALBUMIN g/dL 3 1* 3 8   TOTAL BILIRUBIN mg/dL 0 28 0 25   BILIRUBIN DIRECT mg/dL  --  0 12     Results from last 7 days   Lab Units 01/23/23  0838   POC GLUCOSE mg/dl 96     Results from last 7 days   Lab Units 01/24/23  0507 01/23/23  0847 01/22/23 2059 01/20/23  0703   GLUCOSE RANDOM mg/dL 89 104 98 106         Results from last 7 days   Lab Units 01/19/23  0633 01/19/23  0000   HEMOGLOBIN A1C % 5 1 5 1   EAG mg/dL 100  --                  Results from last 7 days   Lab Units 01/22/23  2059   LIPASE u/L 37                 Results from last 7 days   Lab Units 01/23/23 2053 01/22/23 2148   CLARITY UA  Clear Clear   COLOR UA  Yellow Bloody   SPEC GRAV UA  >=1 030 1 025   PH UA  5 5 6 0   GLUCOSE UA mg/dl Negative Negative   KETONES UA mg/dl Negative Trace*   BLOOD UA  Moderate* Large*   PROTEIN UA mg/dl Negative >=300*   NITRITE UA  Negative Positive*   BILIRUBIN UA  Negative Small*   UROBILINOGEN UA E U /dl 0 2 1 0   LEUKOCYTES UA  Negative Negative   WBC UA /hpf 0-1* None Seen   RBC UA /hpf None Seen Innumerable*   BACTERIA UA /hpf None Seen Occasional   EPITHELIAL CELLS WET PREP /hpf Occasional Occasional     Results from last 7 days   Lab Units 01/23/23  1734 01/23/23  0944   INFLUENZA A PCR  Negative Negative   INFLUENZA B PCR  Negative Negative   RSV PCR  Negative Negative         Results from last 7 days   Lab Units 01/23/23  2051   AMPH/METH  Negative   BARBITURATE UR  Negative   BENZODIAZEPINE UR  Negative   COCAINE UR  Negative   METHADONE URINE  Negative   OPIATE UR  Negative   PCP UR  Negative   THC UR  Negative             ED Treatment:   Medication Administration from 01/23/2023 0832 to 01/24/2023 0149       Date/Time Order Dose Route Action Comments     01/23/2023 1032 EST OLANZapine (ZyPREXA) IM injection 5 mg 5 mg Intramuscular Given --     01/23/2023 1035 EST sterile water injection **ADS Override Pull** 1 mL  Given reconstituted 2 1 mL with Zyprexa     01/23/2023 1136 EST potassium-sodium phosphateS (K-PHOS,PHOSPHA 250) -250 mg tablet 2 tablet 2 tablet Oral Given --     01/23/2023 1252 EST lacosamide (VIMPAT) 200 mg in sodium chloride 0 9 % 100 mL IVPB 0 mg Intravenous Stopped --     01/23/2023 1222 EST lacosamide (VIMPAT) 200 mg in sodium chloride 0 9 % 100 mL IVPB 200 mg Intravenous New Bag delivered from pharmacy at this time     01/23/2023 1220 EST LORazepam (ATIVAN) injection 2 mg 2 mg Intravenous Given --     01/23/2023 1246 EST lacosamide (VIMPAT) 200 mg in sodium chloride 0 9 % 100 mL IVPB 0 mg Intravenous Hold duplicate order     84/24/3760 2057 EST levETIRAcetam (KEPPRA) 1,500 mg in sodium chloride 0 9 % 100 mL IVPB 0 mg Intravenous Stopped --     01/23/2023 2042 EST levETIRAcetam (KEPPRA) 1,500 mg in sodium chloride 0 9 % 100 mL IVPB 1,500 mg Intravenous New Bag --     01/23/2023 1352 EST levETIRAcetam (KEPPRA) 1,500 mg in sodium chloride 0 9 % 100 mL IVPB 0 mg Intravenous Stopped --     01/23/2023 1337 EST levETIRAcetam (KEPPRA) 1,500 mg in sodium chloride 0 9 % 100 mL IVPB 1,500 mg Intravenous New Bag --     01/23/2023 1338 EST sodium chloride 0 9 % infusion 100 mL/hr Intravenous New Bag --     01/23/2023 1337 EST enoxaparin (LOVENOX) subcutaneous injection 40 mg 40 mg Subcutaneous Given --     01/23/2023 1625 EST levETIRAcetam (KEPPRA) 500 mg in sodium chloride 0 9 % 100 mL IVPB 0 mg Intravenous Stopped --     01/23/2023 1610 EST levETIRAcetam (KEPPRA) 500 mg in sodium chloride 0 9 % 100 mL IVPB 500 mg Intravenous New Bag --          Present on Admission:  • Chronic migraine without aura without status migrainosus, not intractable  • Menorrhagia with irregular cycle  • Hypotension      Admitting Diagnosis: Migraine [G43 909]  Seizures ([facility]) [R56 9]  Hypophosphatemia [E83 39]  Breakthrough seizure ([facility] [address] ) [G40 919]  Known medical problems [Z78 9]  Age/Sex: 38 y o  female  Admission Orders:  Scheduled Medications:  enoxaparin, 40 mg, Subcutaneous, Daily  lacosamide (VIMPAT) IVPB, 200 mg, Intravenous, Once  levETIRAcetam, 1,500 mg, Intravenous, Q12H [address]      Continuous IV Infusions:  sodium chloride, 100 mL/hr, Intravenous, Continuous      PRN Meds:  acetaminophen, 650 mg, Oral, Q6H PRN  LORazepam, 1 mg, Intravenous, Q4H PRN  ondansetron, 4 mg, Intravenous, Q4H PRN    Seizure precautions  Neuro checks  tele      IP CONSULT TO NEUROLOGY    Network Utilization Review Department  ATTENTION: Please call with any questions or concerns to [phone] and carefully listen to the prompts so that you are directed to the right person  All voicemails are confidential   Mikaela Cummings all requests for admission clinical reviews, approved or denied determinations and any other requests to dedicated fax number below belonging to the campus where the patient is receiving treatment   List of dedicated fax numbers for the Facilities:  FACILITY NAME UR FAX NUMBER   ADMISSION DENIALS (Administrative/Medical Necessity) [phone]   [address]  (Maternity/NICU/Pediatrics) [phone]   [address] [place] [address] [phone]   [address] [place] [id number] [place] [address] [address] [place] [facility] [address] [facility] [place] [address] [facility] [phone]

## 2023-01-24 NOTE — ASSESSMENT & PLAN NOTE
Background: Spinocerebellar degeneration with migraine headaches and known seizure disorder follows with [facility]y presents with breakthrough seizures  · Prior to admission on keppra 1500 mg twice daily  · Was in the ER yesterday and missed evening dose of keppra  Has been anxious and subsequently had 4 seizures since 2 AM and 1 more in the department  · Seen by neurology    Continue keppra IV until can take oral   Further vimpat defer to neurology outpatient  · Continue keppra 1500mg BID on discharge  · Close follow up with outpt neurologist, may require 2nd AED but neurology will defer to primary

## 2023-01-24 NOTE — PLAN OF CARE
Problem: PAIN - ADULT  Goal: Verbalizes/displays adequate comfort level or baseline comfort level  Description: Interventions:  - Encourage patient to monitor pain and request assistance  - Assess pain using appropriate pain scale  - Administer analgesics based on type and severity of pain and evaluate response  - Implement non-pharmacological measures as appropriate and evaluate response  - Consider cultural and social influences on pain and pain management  - Notify physician/advanced practitioner if interventions unsuccessful or patient reports new pain  Outcome: Progressing     Problem: PAIN - ADULT  Goal: Verbalizes/displays adequate comfort level or baseline comfort level  Description: Interventions:  - Encourage patient to monitor pain and request assistance  - Assess pain using appropriate pain scale  - Administer analgesics based on type and severity of pain and evaluate response  - Implement non-pharmacological measures as appropriate and evaluate response  - Consider cultural and social influences on pain and pain management  - Notify physician/advanced practitioner if interventions unsuccessful or patient reports new pain  Outcome: Progressing     Problem: INFECTION - ADULT  Goal: Absence or prevention of progression during hospitalization  Description: INTERVENTIONS:  - Assess and monitor for signs and symptoms of infection  - Monitor lab/diagnostic results  - Monitor all insertion sites, i e  indwelling lines, tubes, and drains  - Monitor endotracheal if appropriate and nasal secretions for changes in amount and color  - [place] appropriate cooling/warming therapies per order  - Administer medications as ordered  - Instruct and encourage patient and family to use good hand hygiene technique  - Identify and instruct in appropriate isolation precautions for identified infection/condition  Outcome: Progressing  Goal: Absence of fever/infection during neutropenic period  Description: INTERVENTIONS:  - Monitor WBC    Outcome: Progressing     Problem: SAFETY ADULT  Goal: Patient will remain free of falls  Description: INTERVENTIONS:  - Educate patient/family on patient safety including physical limitations  - Instruct patient to call for assistance with activity   - Consult OT/PT to assist with strengthening/mobility   - Keep Call bell within reach  - Keep bed low and locked with side rails adjusted as appropriate  - Keep care items and personal belongings within reach  - Initiate and maintain comfort rounds  - Make Fall Risk Sign visible to staff  - Offer Toileting every  Hours, in advance of need  - Initiate/Maintain alarm  - Obtain necessary fall risk management equipment:   -Apply yellow socks and bracelet for high fall risk patients  - Consider moving patient to room near nurses station  Outcome: Progressing     Problem: DISCHARGE PLANNING  Goal: Discharge to home or other facility with appropriate resources  Description: INTERVENTIONS:  - Identify barriers to discharge w/patient and caregiver  - Arrange for needed discharge resources and transportation as appropriate  - Identify discharge learning needs (meds, wound care, etc )  - Arrange for interpretive services to assist at discharge as needed  - Refer to Case Management Department for coordinating discharge planning if the patient needs post-hospital services based on physician/advanced practitioner order or complex needs related to functional status, cognitive ability, or social support system  Outcome: Progressing     Problem: Knowledge Deficit  Goal: Patient/family/caregiver demonstrates understanding of disease process, treatment plan, medications, and discharge instructions  Description: Complete learning assessment and assess knowledge base    Interventions:  - Provide teaching at level of understanding  - Provide teaching via preferred learning methods  Outcome: Progressing

## 2023-01-25 LAB
LEVETIRACETAM SERPL-MCNC: 30.9 UG/ML (ref 10–40)
OXCARBAZEPINE SERPL-MCNC: <1 UG/ML (ref 10–35)

## 2023-01-26 ENCOUNTER — TRANSITIONAL CARE MANAGEMENT (OUTPATIENT)
Dept: FAMILY MEDICINE CLINIC | Facility: CLINIC | Age: 43
End: 2023-01-26

## 2023-01-26 ENCOUNTER — TELEPHONE (OUTPATIENT)
Dept: GYNECOLOGY | Facility: CLINIC | Age: 43
End: 2023-01-26

## 2023-01-26 NOTE — TELEPHONE ENCOUNTER
patients surgery was cancelled on 1/23 due to seizures  She wants to reschedule    Please call her to reschedule

## 2023-01-31 ENCOUNTER — DOCUMENTATION (OUTPATIENT)
Dept: GYNECOLOGY | Facility: CLINIC | Age: 43
End: 2023-01-31

## 2023-04-21 ENCOUNTER — HOSPITAL ENCOUNTER (INPATIENT)
Facility: HOSPITAL | Age: 43
LOS: 3 days | Discharge: HOME | End: 2023-04-26
Attending: OBSTETRICS & GYNECOLOGY | Admitting: OBSTETRICS & GYNECOLOGY

## 2023-04-21 DIAGNOSIS — A41.9 SEPSIS (HCC): ICD-10-CM

## 2023-04-21 DIAGNOSIS — N70.93 TOA (TUBO-OVARIAN ABSCESS): Primary | ICD-10-CM

## 2023-04-21 PROBLEM — N80.9 ENDOMETRIOSIS: Status: ACTIVE | Noted: 2023-04-21

## 2023-04-21 RX ORDER — IBUPROFEN 600 MG/1
600 TABLET ORAL EVERY 6 HOURS PRN
Status: DISCONTINUED | OUTPATIENT
Start: 2023-04-22 | End: 2023-04-26 | Stop reason: HOSPADM

## 2023-04-21 RX ORDER — METRONIDAZOLE 500 MG/100ML
500 INJECTION, SOLUTION INTRAVENOUS EVERY 12 HOURS
Status: DISCONTINUED | OUTPATIENT
Start: 2023-04-22 | End: 2023-04-24

## 2023-04-21 RX ORDER — OXYCODONE HYDROCHLORIDE 10 MG/1
10 TABLET ORAL EVERY 4 HOURS PRN
Status: DISCONTINUED | OUTPATIENT
Start: 2023-04-21 | End: 2023-04-21

## 2023-04-21 RX ORDER — OXYCODONE HYDROCHLORIDE 10 MG/1
10 TABLET ORAL EVERY 4 HOURS PRN
Status: DISCONTINUED | OUTPATIENT
Start: 2023-04-21 | End: 2023-04-25

## 2023-04-21 RX ORDER — ACETAMINOPHEN 325 MG/1
650 TABLET ORAL EVERY 6 HOURS PRN
Status: DISCONTINUED | OUTPATIENT
Start: 2023-04-21 | End: 2023-04-21

## 2023-04-21 RX ORDER — ZOLPIDEM TARTRATE 5 MG/1
5 TABLET ORAL ONCE
Status: COMPLETED | OUTPATIENT
Start: 2023-04-21 | End: 2023-04-21

## 2023-04-21 RX ORDER — OXYCODONE HYDROCHLORIDE 5 MG/1
5 TABLET ORAL EVERY 4 HOURS PRN
Status: DISCONTINUED | OUTPATIENT
Start: 2023-04-21 | End: 2023-04-22

## 2023-04-21 RX ORDER — LEVETIRACETAM 750 MG/1
1500 TABLET ORAL 2 TIMES DAILY
Status: DISCONTINUED | OUTPATIENT
Start: 2023-04-21 | End: 2023-04-26 | Stop reason: HOSPADM

## 2023-04-21 RX ORDER — DOCUSATE SODIUM 100 MG/1
100 CAPSULE, LIQUID FILLED ORAL 2 TIMES DAILY
Status: DISCONTINUED | OUTPATIENT
Start: 2023-04-21 | End: 2023-04-26 | Stop reason: HOSPADM

## 2023-04-21 RX ORDER — SODIUM CHLORIDE 9 MG/ML
125 INJECTION, SOLUTION INTRAVENOUS CONTINUOUS
Status: DISCONTINUED | OUTPATIENT
Start: 2023-04-21 | End: 2023-04-22

## 2023-04-21 RX ORDER — FERROUS SULFATE 7.5 MG/0.5
150 SYRINGE (EA) ORAL DAILY
Status: DISCONTINUED | OUTPATIENT
Start: 2023-04-22 | End: 2023-04-26 | Stop reason: HOSPADM

## 2023-04-21 RX ORDER — ACETAMINOPHEN 325 MG/1
975 TABLET ORAL EVERY 6 HOURS PRN
Status: DISCONTINUED | OUTPATIENT
Start: 2023-04-21 | End: 2023-04-26 | Stop reason: HOSPADM

## 2023-04-21 RX ORDER — OXYCODONE HYDROCHLORIDE 5 MG/1
5 TABLET ORAL EVERY 4 HOURS PRN
Status: DISCONTINUED | OUTPATIENT
Start: 2023-04-21 | End: 2023-04-21

## 2023-04-21 RX ORDER — ONDANSETRON 2 MG/ML
4 INJECTION INTRAMUSCULAR; INTRAVENOUS EVERY 6 HOURS PRN
Status: DISCONTINUED | OUTPATIENT
Start: 2023-04-21 | End: 2023-04-26 | Stop reason: HOSPADM

## 2023-04-21 RX ORDER — HYDROMORPHONE HCL/PF 1 MG/ML
1 SYRINGE (ML) INJECTION EVERY 4 HOURS PRN
Status: DISCONTINUED | OUTPATIENT
Start: 2023-04-21 | End: 2023-04-25

## 2023-04-21 RX ORDER — KETOROLAC TROMETHAMINE 30 MG/ML
30 INJECTION, SOLUTION INTRAMUSCULAR; INTRAVENOUS EVERY 6 HOURS SCHEDULED
Status: COMPLETED | OUTPATIENT
Start: 2023-04-21 | End: 2023-04-22

## 2023-04-21 RX ORDER — GABAPENTIN 100 MG/1
100 CAPSULE ORAL 3 TIMES DAILY
Status: DISCONTINUED | OUTPATIENT
Start: 2023-04-21 | End: 2023-04-26 | Stop reason: HOSPADM

## 2023-04-21 RX ADMIN — GABAPENTIN 100 MG: 100 CAPSULE ORAL at 22:23

## 2023-04-21 RX ADMIN — HYDROMORPHONE HYDROCHLORIDE 1 MG: 1 INJECTION, SOLUTION INTRAMUSCULAR; INTRAVENOUS; SUBCUTANEOUS at 22:23

## 2023-04-21 RX ADMIN — DOCUSATE SODIUM 100 MG: 100 CAPSULE, LIQUID FILLED ORAL at 20:12

## 2023-04-21 RX ADMIN — OXYCODONE HYDROCHLORIDE 10 MG: 10 TABLET ORAL at 20:12

## 2023-04-21 RX ADMIN — KETOROLAC TROMETHAMINE 30 MG: 30 INJECTION, SOLUTION INTRAMUSCULAR; INTRAVENOUS at 20:12

## 2023-04-21 RX ADMIN — ZOLPIDEM TARTRATE 5 MG: 5 TABLET, COATED ORAL at 22:22

## 2023-04-21 RX ADMIN — LEVETIRACETAM 1500 MG: 750 TABLET, FILM COATED ORAL at 22:08

## 2023-04-21 RX ADMIN — SODIUM CHLORIDE 125 ML/HR: 0.9 INJECTION, SOLUTION INTRAVENOUS at 20:17

## 2023-04-21 RX ADMIN — KETOROLAC TROMETHAMINE 30 MG: 30 INJECTION, SOLUTION INTRAMUSCULAR; INTRAVENOUS at 23:59

## 2023-04-21 NOTE — H&P
"H&P Exam - Gynecology   Baltazar Alvarado 38 y o  female MRN: [id number]  Unit/Bed#: E5 -01 Encounter: [id number]      Assessment/Plan      All Ross is a 46yo  who presents for persistent RLQ pain  Hx significant for endometriosis, chronic pelvic pain, suspected adenomyosis  Vitals WNL, stable  Non-surgical abdomen  Concern for TOA, receiving IV antibiotic treatment  Plan by problem:    * TOA (tubo-ovarian abscess)  Assessment & Plan  Persistent RLQ pain despite medical management  CT scan concerning for TOA presenting with \"Right adnexal inflammatory mass consistent with tubo-ovarian abscess with mild pyosalpinx  Small amount of free fluid in the pelvis  \"  +straight leg test with pain emanating from right lower back to right quadriceps muscle  +Carnett sign, -bed bump test  Non-surgical abdomen  Antibiotics: IV Rocephin 1g q24h, IV Doxycyline 100mg q12h, IV Flagyl 500mg q12h  Pain: Tylenol/Toradol, Roxicodone 5/10mg, IV dilaudid 1mg q4h for breakthrough; Gabapentin 100mg TID for radicular pain  1x dose Zolpidem for rest and insomnia  FEN: 125cc/h NS, replete PRN, NPO/sips with meds  DVT ppx: SCDs        Code Status: Level 1 - Full Code  Advance Directive and Living Will:      Power of :    POLST:        History of Present Illness     HPI:  Baltazar Alvarado is a 38 y o  female who presents with intractable RLQ pain  She is accompanied by her   Patient reports that she has had constant and worsening abdominal pain, 10/10, for the past week  Denies other associated symptoms or relief from pain medications  Denies dysuria or urinary complaints  Denies vaginal pain or bleeding  Recently presented to [facility] on  for similar presentation  At that time, low suspicion for [facility], [place] or torsion and patient was discharged with pain medication  Today, we discussed her CT findings and concern for evolving TOA  Recommended IV antibiotic management with advancement of pain regimen for relief overnight   We " discussed that although patient is scheduled for supracervical TLH, RSO on 6/12/23, plan will be to medically manage current condition given concern for infection and potentially worsening of infection and peritonitis should TOA rupture during surgical excision  Spencer Holt expressed understanding and agreed to treatment plan  We also discussed positive straight leg test and Carnett sign on exam, relating to radicular and abdominal wall pain respectively  There is concern for right sided sciatic pain radiating to the right quadriceps muscle and point RLQ abdominal wall tenderness on flexion of rectus muscles  Although her current presentation of pain may relate directly to evolving TOA, there is a component of radicular pain and musculoskeletal pain that complicates her presentation  At this time, low concern for acute surgical abdomen  Continue medical management  Review of Systems   Constitutional: Negative for chills and fever  HENT: Negative for ear pain and sore throat  Eyes: Negative for pain and visual disturbance  Respiratory: Negative for cough and shortness of breath  Cardiovascular: Negative for chest pain and palpitations  Gastrointestinal: Negative for abdominal pain and vomiting  Genitourinary: Positive for flank pain and pelvic pain  Negative for dysuria, hematuria, vaginal bleeding, vaginal discharge and vaginal pain  Musculoskeletal: Positive for back pain  Negative for arthralgias  Skin: Negative for color change and rash  Neurological: Negative for seizures and syncope  All other systems reviewed and are negative        Historical Information   Past Medical History:   Diagnosis Date   • B12 deficiency    • Chronic migraine without aura    • Dermatographism    • Endometriosis    • Iron deficiency anemia    • Lyme neuropathy    • Seizure disorder ([facility] [address] )    • 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HCC)    • Visual disturbance     Diplopia, nystagmus, decreased visual acuity   • Vitamin D "deficiency      Past Surgical History:   Procedure Laterality Date   •  SECTION     • ESOPHAGOGASTRODUODENOSCOPY N/A 3/8/2019    Procedure: ESOPHAGOGASTRODUODENOSCOPY (EGD); Surgeon: Brenden Santos MD;  Location: [facility] GI LAB; Service: Gastroenterology   • OOPHORECTOMY      unilateral --  last assessed 17   • TUBAL LIGATION      last assessed 17     Family History   Problem Relation Age of Onset   • Other Mother         AIDS, Brain Tumor balance disorder   • Seizures Mother    • Alcohol abuse Father    • Other Sister         Balance disorder   • HIV Maternal Grandmother      Social History   Social History     Substance and Sexual Activity   Alcohol Use No     Social History     Substance and Sexual Activity   Drug Use No     Social History     Tobacco Use   Smoking Status Never   Smokeless Tobacco Never       Meds/Allergies   Medications Prior to Admission   Medication   • cyanocobalamin (VITAMIN B-12) 1000 MCG tablet   • Erenumab-aooe (Aimovig) 70 MG/ML SOAJ   • ferrous sulfate 324 (65 Fe) mg   • levETIRAcetam (KEPPRA) 750 mg tablet   • omeprazole (PriLOSEC) 20 mg delayed release capsule   • ondansetron (ZOFRAN-ODT) 4 mg disintegrating tablet     Allergies   Allergen Reactions   • Morphine Itching     Pt denies breathing issues   • Dye Fdc Red  [Red Dye - Food Allergy]        Objective   /63   Pulse 80   Temp 98 6 °F (37 °C) (Oral)   Resp 19   Ht 5' 9 02\" (1 753 m)   Wt 77 3 kg (170 lb 6 7 oz)   SpO2 97%   BMI 25 15 kg/m²     No intake or output data in the 24 hours ending 23 [id number]     Imaging:  CT abd/pelvis with IV contrast    FINDINGS:     ABDOMEN     LOWER CHEST: No clinically significant abnormality is identified in the visualized lower chest  No consolidation or effusion      LIVER: Hepatomegaly measuring 19 cm in maximum craniocaudal dimension  No suspicious lesions  Single calcified granuloma in the right lobe      BILIARY: No intrahepatic biliary ductal dilatation   " Normal caliber common bile duct      GALLBLADDER: No calcified gallstones  Normal wall thickness  No pericholecystic inflammatory changes      SPLEEN: Within normal limits  No suspicious lesion  Normal spleen size      PANCREAS: No change in a 9 mm cystic structure in the pancreatic neck (series 2/68)  The remainder of the pancreas is within normal limits  No main pancreatic ductal dilatation  No acute inflammation      ADRENAL GLANDS: Within normal limits      KIDNEYS/URETERS: Normal size and position  Symmetric enhancement  No suspicious lesion  No calcified stones or hydronephrosis  Ureters within normal limits      STOMACH AND BOWEL: Stomach is grossly within normal limits  Normal caliber small bowel  Normal caliber large bowel  No evidence of active small or large bowel inflammatory process      APPENDIX: Normal appendix (series 2/137, 3/53)      ABDOMINOPELVIC CAVITY: Small amount of simple free fluid in the pelvis  No intraperitoneal free air  No bulky lymphadenopathy  A 1 3 cm cystic structure along the left external iliac vessels is unchanged since 2017, may represent a tiny lymphocele  No   retroperitoneal hematoma       VESSELS: Normal caliber abdominal aorta with no detectable atherosclerotic plaque  The celiac, SMA, and RIGOBERTO are patent  The SMV and splenic vein are patent  The portal veins are patent  The hepatic veins are patent         PELVIS     REPRODUCTIVE ORGANS:  Anteverted, retroflexed bulbous uterus  New since prior study, a mildly dilated right fallopian tube (series 3/48-49) leads to a multiloculated cystic right adnexal structure measuring 4 3 x 3 9 x 5 6 cm (2/156, 3/39) with thick   enhancing rim; there are surrounding inflammatory changes in the right lower quadrant and a small amount of free fluid in the vesicouterine recess and the pouch of Adrian       URINARY BLADDER:  Underdistended limiting evaluation of wall thickness   No calculi      ABDOMINAL WALL/INGUINAL REGIONS:  Diastases recti with tiny fat-containing umbilical hernia       BONES:  Vertebral body height is maintained  No acute fracture or destructive osseous lesion         IMPRESSION:     1  Right adnexal inflammatory mass consistent with tubo-ovarian abscess with mild pyosalpinx  Small amount of free fluid in the pelvis  2   Otherwise no acute findings in the abdomen or pelvis  Normal appendix  3   No change in a 9 mm cystic pancreatic neck lesion likely representing side branch IPMN, for which follow-up imaging in one year was previously recommended  Physical Exam  Vitals and nursing note reviewed  Exam conducted with a chaperone present  Constitutional:       General: She is in acute distress  HENT:      Head: Normocephalic  Right Ear: External ear normal       Left Ear: External ear normal    Eyes:      General: No scleral icterus  Right eye: No discharge  Left eye: No discharge  Conjunctiva/sclera: Conjunctivae normal    Cardiovascular:      Rate and Rhythm: Normal rate and regular rhythm  Pulses: Normal pulses  Heart sounds: Normal heart sounds  Pulmonary:      Effort: Pulmonary effort is normal  No respiratory distress  Breath sounds: Normal breath sounds  Abdominal:      General: Abdomen is flat  There is no distension  Palpations: Abdomen is soft  Tenderness: There is abdominal tenderness in the right lower quadrant  There is right CVA tenderness  There is no left CVA tenderness, guarding or rebound  Negative signs include Bond's sign and McBurney's sign  Musculoskeletal:         General: Tenderness present  No swelling  Normal range of motion  Cervical back: Normal range of motion  Right lower leg: No edema  Left lower leg: No edema  Comments: +Carnett sign  +Straight leg test   Skin:     General: Skin is warm and dry  Capillary Refill: Capillary refill takes less than 2 seconds     Neurological:      Mental Status: She is alert and oriented to person, place, and time  Mental status is at baseline     Psychiatric:         Mood and Affect: Mood normal          Behavior: Behavior normal       Comments: Bed bump test negative for signs of pain or discomfort         Lab Results:   Admission on 04/21/2023, Discharged on 04/21/2023   Component Date Value   • WBC 04/21/2023 8 03    • RBC 04/21/2023 3 28 (L)    • Hemoglobin 04/21/2023 8 4 (L)    • Hematocrit 04/21/2023 26 6 (L)    • MCV 04/21/2023 81 (L)    • MCH 04/21/2023 25 6 (L)    • MCHC 04/21/2023 31 6    • RDW 04/21/2023 13 1    • MPV 04/21/2023 11 3    • Platelets 22/43/1534 214    • nRBC 04/21/2023 0    • Neutrophils Relative 04/21/2023 79 (H)    • Immat GRANS % 04/21/2023 1    • Lymphocytes Relative 04/21/2023 12 (L)    • Monocytes Relative 04/21/2023 8    • Eosinophils Relative 04/21/2023 0    • Basophils Relative 04/21/2023 0    • Neutrophils Absolute 04/21/2023 6 41    • Immature Grans Absolute 04/21/2023 0 04    • Lymphocytes Absolute 04/21/2023 0 97    • Monocytes Absolute 04/21/2023 0 60    • Eosinophils Absolute 04/21/2023 0 00    • Basophils Absolute 04/21/2023 0 01    • Sodium 04/21/2023 140    • Potassium 04/21/2023 3 3 (L)    • Chloride 04/21/2023 106    • CO2 04/21/2023 26    • ANION GAP 04/21/2023 8    • BUN 04/21/2023 5    • Creatinine 04/21/2023 0 56 (L)    • Glucose 04/21/2023 97    • Calcium 04/21/2023 9 0    • AST 04/21/2023 14    • ALT 04/21/2023 20    • Alkaline Phosphatase 04/21/2023 58    • Total Protein 04/21/2023 6 4    • Albumin 04/21/2023 3 6    • Total Bilirubin 04/21/2023 0 44    • eGFR 04/21/2023 117    • EXT Preg Test, Ur 04/21/2023 Negative    • Control 04/21/2023 Valid    • Color, UA 04/21/2023 Tiny (A)    • Clarity, UA 04/21/2023 Clear    • Specific Gravity, UA 04/21/2023 1 010    • pH, UA 04/21/2023 8 0    • Leukocytes, UA 04/21/2023 25 0 (A)    • Nitrite, UA 04/21/2023 Negative    • Protein, UA 04/21/2023 Negative    • Glucose, UA 04/21/2023 Negative • Ketones, UA 04/21/2023 50 (2+) (A)    • Bilirubin, UA 04/21/2023 Negative    • Occult Blood, UA 04/21/2023 25 0 (A)    • UROBILINOGEN UA 04/21/2023 4 0 (A)    • RBC, UA 04/21/2023 1-2    • WBC, UA 04/21/2023 1-2    • Epithelial Cells 04/21/2023 Occasional    • Bacteria, UA 04/21/2023 Occasional           Gale Zeng MD  4/21/2023  11:26 PM

## 2023-04-22 LAB
ALBUMIN SERPL BCP-MCNC: 3.1 G/DL (ref 3.5–5)
ALBUMIN SERPL BCP-MCNC: 3.1 G/DL (ref 3.5–5)
ALP SERPL-CCNC: 57 U/L (ref 34–104)
ALP SERPL-CCNC: 60 U/L (ref 34–104)
ALT SERPL W P-5'-P-CCNC: 13 U/L (ref 7–52)
ALT SERPL W P-5'-P-CCNC: 16 U/L (ref 7–52)
ANION GAP SERPL CALCULATED.3IONS-SCNC: 6 MMOL/L (ref 4–13)
ANION GAP SERPL CALCULATED.3IONS-SCNC: 6 MMOL/L (ref 4–13)
AST SERPL W P-5'-P-CCNC: 10 U/L (ref 13–39)
AST SERPL W P-5'-P-CCNC: 9 U/L (ref 13–39)
BASOPHILS # BLD AUTO: 0.01 THOUSANDS/ΜL (ref 0–0.1)
BASOPHILS NFR BLD AUTO: 0 % (ref 0–1)
BILIRUB SERPL-MCNC: 0.3 MG/DL (ref 0.2–1)
BILIRUB SERPL-MCNC: 0.32 MG/DL (ref 0.2–1)
BUN SERPL-MCNC: 7 MG/DL (ref 5–25)
BUN SERPL-MCNC: 7 MG/DL (ref 5–25)
CALCIUM ALBUM COR SERPL-MCNC: 9.1 MG/DL (ref 8.3–10.1)
CALCIUM ALBUM COR SERPL-MCNC: 9.2 MG/DL (ref 8.3–10.1)
CALCIUM SERPL-MCNC: 8.4 MG/DL (ref 8.4–10.2)
CALCIUM SERPL-MCNC: 8.5 MG/DL (ref 8.4–10.2)
CHLORIDE SERPL-SCNC: 106 MMOL/L (ref 96–108)
CHLORIDE SERPL-SCNC: 110 MMOL/L (ref 96–108)
CO2 SERPL-SCNC: 24 MMOL/L (ref 21–32)
CO2 SERPL-SCNC: 24 MMOL/L (ref 21–32)
CREAT SERPL-MCNC: 0.45 MG/DL (ref 0.6–1.3)
CREAT SERPL-MCNC: 0.57 MG/DL (ref 0.6–1.3)
EOSINOPHIL # BLD AUTO: 0.01 THOUSAND/ΜL (ref 0–0.61)
EOSINOPHIL NFR BLD AUTO: 0 % (ref 0–6)
ERYTHROCYTE [DISTWIDTH] IN BLOOD BY AUTOMATED COUNT: 13.3 % (ref 11.6–15.1)
ERYTHROCYTE [DISTWIDTH] IN BLOOD BY AUTOMATED COUNT: 13.4 % (ref 11.6–15.1)
GFR SERPL CREATININE-BSD FRML MDRD: 116 ML/MIN/1.73SQ M
GFR SERPL CREATININE-BSD FRML MDRD: 125 ML/MIN/1.73SQ M
GLUCOSE P FAST SERPL-MCNC: 83 MG/DL (ref 65–99)
GLUCOSE SERPL-MCNC: 121 MG/DL (ref 65–140)
GLUCOSE SERPL-MCNC: 83 MG/DL (ref 65–140)
HCT VFR BLD AUTO: 24.1 % (ref 34.8–46.1)
HCT VFR BLD AUTO: 24.8 % (ref 34.8–46.1)
HGB BLD-MCNC: 7.4 G/DL (ref 11.5–15.4)
HGB BLD-MCNC: 7.5 G/DL (ref 11.5–15.4)
IMM GRANULOCYTES # BLD AUTO: 0.05 THOUSAND/UL (ref 0–0.2)
IMM GRANULOCYTES NFR BLD AUTO: 1 % (ref 0–2)
LYMPHOCYTES # BLD AUTO: 0.36 THOUSANDS/ΜL (ref 0.6–4.47)
LYMPHOCYTES NFR BLD AUTO: 6 % (ref 14–44)
MAGNESIUM SERPL-MCNC: 1.8 MG/DL (ref 1.9–2.7)
MCH RBC QN AUTO: 24.7 PG (ref 26.8–34.3)
MCH RBC QN AUTO: 25.4 PG (ref 26.8–34.3)
MCHC RBC AUTO-ENTMCNC: 29.8 G/DL (ref 31.4–37.4)
MCHC RBC AUTO-ENTMCNC: 31.1 G/DL (ref 31.4–37.4)
MCV RBC AUTO: 82 FL (ref 82–98)
MCV RBC AUTO: 83 FL (ref 82–98)
MONOCYTES # BLD AUTO: 0.27 THOUSAND/ΜL (ref 0.17–1.22)
MONOCYTES NFR BLD AUTO: 4 % (ref 4–12)
NEUTROPHILS # BLD AUTO: 5.4 THOUSANDS/ΜL (ref 1.85–7.62)
NEUTS SEG NFR BLD AUTO: 89 % (ref 43–75)
NRBC BLD AUTO-RTO: 0 /100 WBCS
PLATELET # BLD AUTO: 207 THOUSANDS/UL (ref 149–390)
PLATELET # BLD AUTO: 215 THOUSANDS/UL (ref 149–390)
PMV BLD AUTO: 11.6 FL (ref 8.9–12.7)
PMV BLD AUTO: 11.9 FL (ref 8.9–12.7)
POTASSIUM SERPL-SCNC: 3.2 MMOL/L (ref 3.5–5.3)
POTASSIUM SERPL-SCNC: 3.7 MMOL/L (ref 3.5–5.3)
PROT SERPL-MCNC: 6 G/DL (ref 6.4–8.4)
PROT SERPL-MCNC: 6.3 G/DL (ref 6.4–8.4)
RBC # BLD AUTO: 2.95 MILLION/UL (ref 3.81–5.12)
RBC # BLD AUTO: 2.99 MILLION/UL (ref 3.81–5.12)
SODIUM SERPL-SCNC: 136 MMOL/L (ref 135–147)
SODIUM SERPL-SCNC: 140 MMOL/L (ref 135–147)
WBC # BLD AUTO: 6.1 THOUSAND/UL (ref 4.31–10.16)
WBC # BLD AUTO: 6.91 THOUSAND/UL (ref 4.31–10.16)

## 2023-04-22 RX ORDER — ACETAMINOPHEN 325 MG/1
975 TABLET ORAL EVERY 6 HOURS PRN
Qty: 60 TABLET | Refills: 0
Start: 2023-04-22

## 2023-04-22 RX ORDER — METRONIDAZOLE 500 MG/1
500 TABLET ORAL EVERY 12 HOURS SCHEDULED
Qty: 28 TABLET | Refills: 0 | Status: SHIPPED | OUTPATIENT
Start: 2023-04-22 | End: 2023-05-06

## 2023-04-22 RX ORDER — DOXYCYCLINE 100 MG/1
100 CAPSULE ORAL 2 TIMES DAILY
Qty: 28 CAPSULE | Refills: 0 | Status: SHIPPED | OUTPATIENT
Start: 2023-04-22 | End: 2023-05-06

## 2023-04-22 RX ORDER — OXYCODONE HYDROCHLORIDE 5 MG/1
5 TABLET ORAL EVERY 4 HOURS PRN
Status: DISCONTINUED | OUTPATIENT
Start: 2023-04-22 | End: 2023-04-26

## 2023-04-22 RX ORDER — POTASSIUM CHLORIDE 20 MEQ/1
40 TABLET, EXTENDED RELEASE ORAL ONCE
Status: COMPLETED | OUTPATIENT
Start: 2023-04-22 | End: 2023-04-22

## 2023-04-22 RX ADMIN — GABAPENTIN 100 MG: 100 CAPSULE ORAL at 09:00

## 2023-04-22 RX ADMIN — LEVETIRACETAM 1500 MG: 750 TABLET, FILM COATED ORAL at 08:59

## 2023-04-22 RX ADMIN — KETOROLAC TROMETHAMINE 30 MG: 30 INJECTION, SOLUTION INTRAMUSCULAR; INTRAVENOUS at 05:25

## 2023-04-22 RX ADMIN — OXYCODONE HYDROCHLORIDE 10 MG: 10 TABLET ORAL at 05:23

## 2023-04-22 RX ADMIN — DOCUSATE SODIUM 100 MG: 100 CAPSULE, LIQUID FILLED ORAL at 09:00

## 2023-04-22 RX ADMIN — CEFTRIAXONE SODIUM 1000 MG: 10 INJECTION, POWDER, FOR SOLUTION INTRAVENOUS at 17:11

## 2023-04-22 RX ADMIN — KETOROLAC TROMETHAMINE 30 MG: 30 INJECTION, SOLUTION INTRAMUSCULAR; INTRAVENOUS at 11:30

## 2023-04-22 RX ADMIN — POTASSIUM CHLORIDE 40 MEQ: 1500 TABLET, EXTENDED RELEASE ORAL at 21:50

## 2023-04-22 RX ADMIN — DOCUSATE SODIUM 100 MG: 100 CAPSULE, LIQUID FILLED ORAL at 17:10

## 2023-04-22 RX ADMIN — LEVETIRACETAM 1500 MG: 750 TABLET, FILM COATED ORAL at 21:29

## 2023-04-22 RX ADMIN — Medication 150 MG: at 09:04

## 2023-04-22 RX ADMIN — GABAPENTIN 100 MG: 100 CAPSULE ORAL at 21:28

## 2023-04-22 RX ADMIN — DOXYCYCLINE 100 MG: 100 INJECTION, POWDER, LYOPHILIZED, FOR SOLUTION INTRAVENOUS at 18:09

## 2023-04-22 RX ADMIN — METRONIDAZOLE 500 MG: 500 INJECTION, SOLUTION INTRAVENOUS at 04:05

## 2023-04-22 RX ADMIN — METRONIDAZOLE 500 MG: 500 INJECTION, SOLUTION INTRAVENOUS at 16:09

## 2023-04-22 RX ADMIN — OXYCODONE 5 MG: 5 TABLET ORAL at 09:04

## 2023-04-22 RX ADMIN — ACETAMINOPHEN 325MG 975 MG: 325 TABLET ORAL at 19:47

## 2023-04-22 RX ADMIN — GABAPENTIN 100 MG: 100 CAPSULE ORAL at 16:09

## 2023-04-22 RX ADMIN — ACETAMINOPHEN 325MG 975 MG: 325 TABLET ORAL at 05:24

## 2023-04-22 RX ADMIN — DOXYCYCLINE 100 MG: 100 INJECTION, POWDER, LYOPHILIZED, FOR SOLUTION INTRAVENOUS at 05:25

## 2023-04-22 NOTE — UTILIZATION REVIEW
Initial Clinical Review    OBSERVATION 4/21 1942 CHANGED TO INPATIENT 4/23 0735 DUE TO CONTINUED TREATMENT  TUBO-OVARIAN ABSCESS, SEPSIS         PATIENT INITIALLY PRESENTED TO [address] ED 4/21  TRANSFER TO [facility] FOR OB/GYN SERVICE NOT AVAILABLE AT [address] [place] [address]  Admission: Date/Time/Statement:     Admission Orders (From admission, onward)     Ordered        04/23/23 0735  Inpatient Admission  Once            04/21/23 1942  Place in Observation  Once                                               No chief complaint on file  Initial Presentation: 38 y o  female presents as transfer from [place] [address] ED via EMS due to persistent RLQ pain  Imaging [facility] for TOA  Exam notes +straight leg test with pain emanating from right lower back to right quadriceps muscle +Carnett sign, -bed bump test  RLQ abdominal and RCVA tenderness, abdomen soft, flat, non distended Admitted as observation to med surg for tubo-ovarian abscess Plan IV antibiotics, rocephin, Flagyl and Doxycycline , pain management tyleonl, toradol, roxicodone, gabapentin, Dilaudis IV for breakthrough pain, IV fluids SCD's NPO replete electrolytes as needed     4/22  Plan for discharge to home this evening following second dose of IV Rocephin  However patient developed tachycardia, shaking and fever 102 9  Discharge canceled  Will check  Urine culture, blood cultures x 2, CBC , CMP     4/23 CHANGED TO INPATIENT    clinical status has deteriorated and she now meets sepsis criteria  Overnight she had two fevers of 102 9 and 102 6  She has been hypotensive with intermittent tachycardia  Given new onset sepsis, will plan for source control with IR drainage   Consult IR, NPO, Continue IV abx Transfuse 1 unit PRBC for Hgb 6 9    Consult infectious Disease      Interventional radiology 4/23   Procedure:  Successful right tubo-ovarian abscess drain placement   Specimens 20ml of reddish brown fluid aspirate sent to lab,   EBL 0   Anesthesia conscious sedation and local        ED Triage Vitals   Temperature Pulse Respirations Blood Pressure SpO2   04/21/23 2313 04/21/23 1940 04/21/23 1940 04/21/23 1940 04/21/23 1940   98 6 °F (37 °C) 80 19 109/63 97 %      Temp Source Heart Rate Source Patient Position - Orthostatic VS BP Location FiO2 (%)   04/21/23 2313 04/23/23 0131 04/21/23 2313 04/21/23 2313 --   Oral Monitor Lying Right arm       Pain Score       04/21/23 2012       10 - Worst Possible Pain          Wt Readings from Last 1 Encounters:   04/21/23 77 3 kg (170 lb 6 7 oz)     Additional Vital Signs:      Date/Time Temp Pulse Resp BP MAP (mmHg) SpO2 O2 Device Patient Position - Orthostatic VS   04/23/23 07:00:27 98 8 °F (37 1 °C) 79 17 93/53 66 97 % None (Room air) Lying   04/23/23 06:59:38 98 8 °F (37 1 °C) 81 17 89/56 Abnormal  67 97 % None (Room air) Lying   04/23/23 0557 -- -- -- 109/56 -- -- -- --   04/23/23 05:55:43 100 9 °F (38 3 °C) Abnormal  91 19 86/49 Abnormal  61 Abnormal  94 % None (Room air) Lying   04/23/23 03:29:36 102 6 °F (39 2 °C) Abnormal  94 20 118/62 -- 100 % None (Room air) Lying   04/23/23 01:31:52 99 7 °F (37 6 °C) 84 20 89/49 Abnormal  62 Abnormal  97 % None (Room air) Lying   04/22/23 23:00:22 99 5 °F (37 5 °C) 91 20 89/49 Abnormal  62 Abnormal  94 % None (Room air) Lying   04/22/23 21:50:13 100 8 °F (38 2 °C) Abnormal  99 20 88/45 Abnormal  59 Abnormal  94 % None (Room air) Lying   04/22/23 21:48:24 100 8 °F (38 2 °C) Abnormal  101 20 79/38 Abnormal  52 Abnormal  95 % None (Room air) Lying   04/22/23 19:18:52 102 9 °F (39 4 °C) Abnormal  109 Abnormal  -- 112/65 81 98 % -- --   04/22/23 18:33:35 99 3 °F (37 4 °C) 113 Abnormal  36 Abnormal  104/72 83 98 % -- --   04/22/23 14:56:39 98 °F (36 7 °C) 86 18 90/56 67 96 % None (Room air) Lying   04/22/23 13:29:56 98 3 °F (36 8 °C) 81 17 97/57 70 97 % -- Lying   04/22/23 0906 -- -- -- -- -- -- None (Room air) --   04/22/23 08:19:02 98 5 °F (36 9 °C) 90 17 91/59 70 96 % None (Room air) Lying   04/21/23 2313 98 6 °F (37 °C) 80 19 109/63 72 99 % None (Room air) Lying       Pertinent Labs/Diagnostic Test Results:   IR drainage tube placement   Final Result by Jacob Locke MD (04/23 1511)      Right tubo-ovarian abscess drainage catheter placement using 10 Spanish catheter  Plan:       Return in 2 weeks for drain check        Workstation performed: [id number]         XR chest portable    (Results Pending)   IR drainage tube check/change/reposition/reinsertion/upsize    (Results Pending)     Results from last 7 days   Lab Units 04/23/23  0809   SARS-COV-2  Negative     Results from last 7 days   Lab Units 04/23/23  0522 04/22/23 2040 04/22/23  0450 04/21/23  1445   WBC Thousand/uL 7 24 6 10 6 91 8 03   HEMOGLOBIN g/dL 6 9* 7 5* 7 4* 8 4*   HEMATOCRIT % 21 8* 24 1* 24 8* 26 6*   PLATELETS Thousands/uL 220 215 207 214   NEUTROS ABS Thousands/µL 6 33 5 40  --  6 41         Results from last 7 days   Lab Units 04/23/23  0522 04/22/23 2040 04/22/23 0450 04/21/23  1445   SODIUM mmol/L 138 136 140 140   POTASSIUM mmol/L 3 7 3 2* 3 7 3 3*   CHLORIDE mmol/L 109* 106 110* 106   CO2 mmol/L 23 24 24 26   ANION GAP mmol/L 6 6 6 8   BUN mg/dL 6 7 7 5   CREATININE mg/dL 0 47* 0 57* 0 45* 0 56*   EGFR ml/min/1 73sq m 123 116 125 117   CALCIUM mg/dL 7 9* 8 5 8 4 9 0   MAGNESIUM mg/dL  --   --  1 8*  --      Results from last 7 days   Lab Units 04/23/23  0522 04/22/23 2040 04/22/23  0450 04/21/23  1445   AST U/L 11* 9* 10* 14   ALT U/L 12 13 16 20   ALK PHOS U/L 56 60 57 58   TOTAL PROTEIN g/dL 5 6* 6 3* 6 0* 6 4   ALBUMIN g/dL 2 8* 3 1* 3 1* 3 6   TOTAL BILIRUBIN mg/dL 0 34 0 30 0 32 0 44         Results from last 7 days   Lab Units 04/23/23 0522 04/22/23  2040 04/22/23  0450 04/21/23  1445   GLUCOSE RANDOM mg/dL 108 121 83 97                     Results from last 7 days   Lab Units 04/21/23  1501   CLARITY UA  Clear   COLOR UA  Tiny*   SPEC GRAV UA  1 010   PH UA  8 0   GLUCOSE UA mg/dl Negative KETONES UA mg/dl 50 (2+)*   BLOOD UA  25 0*   PROTEIN UA mg/dl Negative   NITRITE UA  Negative   BILIRUBIN UA  Negative   UROBILINOGEN UA mg/dL 4 0*   LEUKOCYTES UA  25 0*   WBC UA /hpf 1-2   RBC UA /hpf 1-2   BACTERIA UA /hpf Occasional   EPITHELIAL CELLS WET PREP /hpf Occasional     Results from last 7 days   Lab Units 04/23/23  0809   INFLUENZA A PCR  Negative   INFLUENZA B PCR  Negative   RSV PCR  Negative                             Results from last 7 days   Lab Units 04/22/23 2039   BLOOD CULTURE  Received in Microbiology Lab  Culture in Progress  Received in Microbiology Lab  Culture in Progress                 ED Treatment:   Medication Administration - No Administrations Displayed (No Start Event Found)     None        Past Medical History:   Diagnosis Date   • B12 deficiency    • Chronic migraine without aura    • Dermatographism    • Endometriosis    • Iron deficiency anemia    • Lyme neuropathy    • Seizure disorder (HCC)    • 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HCC)    • Visual disturbance     Diplopia, nystagmus, decreased visual acuity   • Vitamin D deficiency      Present on Admission:  • Endometriosis  • Generalized abdominal pain  • TOA (tubo-ovarian abscess)  • Acute on chronic anemia      Admitting Diagnosis: Tubo-ovarian abscess [N70 93]  Age/Sex: 38 y o  female  Admission Orders:  Scheduled Medications:  cefTRIAXone, 1,000 mg, Intravenous, Q24H  docusate sodium, 100 mg, Oral, BID  doxycycline, 100 mg, Intravenous, Q12H  ferrous sulfate, 150 mg, Oral, Daily  gabapentin, 100 mg, Oral, TID  levETIRAcetam, 1,500 mg, Oral, BID  metroNIDAZOLE, 500 mg, Intravenous, Q12H      Continuous IV Infusions:  sodium chloride, 125 mL/hr, Intravenous, Continuous      PRN Meds:  acetaminophen, 975 mg, Oral, Q6H PRN PO x 1   HYDROmorphone, 1 mg, Intravenous, Q4H PRN  IV x 1   ibuprofen, 600 mg, Oral, Q6H PRN  ondansetron, 4 mg, Intravenous, Q6H PRN  oxyCODONE, 10 mg, Oral, Q4H PRN  PO x 1   oxyCODONE, 5 mg, Oral, Q4H PRN      NPO I/O   INPATIENT CONSULT TO IR  IP CONSULT TO INFECTIOUS DISEASES    PCXR 4/23    COVID/FLU/RSV 4/23    SCD's    CBC 4/23 1600    CBC w/diff daily x 3 days start 4/24    CMP, MG 4/24       Network Utilization Review Department  ATTENTION: Please call with any questions or concerns to [phone] and carefully listen to the prompts so that you are directed to the right person  All voicemails are confidential   [facility] all requests for admission clinical reviews, approved or denied determinations and any other requests to dedicated fax number below belonging to the campus where the patient is receiving treatment   List of dedicated fax numbers for the Facilities:  [address] DENIALS (Administrative/Medical Necessity) [phone]   [address] (Maternity/NICU/Pediatrics) [phone]   912 Jaclyn Waldrop [phone]   [facility] [phone]   1309 [facility] [address] [place] [address] [facility] 28 [phone]   1552 [facility] 134 815 [facility] [phone]

## 2023-04-22 NOTE — PLAN OF CARE

## 2023-04-22 NOTE — NURSING NOTE
Pt reports pain in left arm, is tacchy, and temp is 99 3  GYN contacted and asked to evaluate  Doxy put on hold

## 2023-04-22 NOTE — PLAN OF CARE
Problem: PAIN - ADULT  Goal: Verbalizes/displays adequate comfort level or baseline comfort level  Description: Interventions:  - Encourage patient to monitor pain and request assistance  - Assess pain using appropriate pain scale  - Administer analgesics based on type and severity of pain and evaluate response  - Implement non-pharmacological measures as appropriate and evaluate response  - Consider cultural and social influences on pain and pain management  - Notify physician/advanced practitioner if interventions unsuccessful or patient reports new pain  Outcome: Progressing     Problem: INFECTION - ADULT  Goal: Absence or prevention of progression during hospitalization  Description: INTERVENTIONS:  - Assess and monitor for signs and symptoms of infection  - Monitor lab/diagnostic results  - Monitor all insertion sites, i e  indwelling lines, tubes, and drains  - Monitor endotracheal if appropriate and nasal secretions for changes in amount and color  - [place] appropriate cooling/warming therapies per order  - Administer medications as ordered  - Instruct and encourage patient and family to use good hand hygiene technique  - Identify and instruct in appropriate isolation precautions for identified infection/condition  Outcome: Progressing  Goal: Absence of fever/infection during neutropenic period  Description: INTERVENTIONS:  - Monitor WBC    Outcome: Progressing     Problem: SAFETY ADULT  Goal: Patient will remain free of falls  Description: INTERVENTIONS:  - Educate patient/family on patient safety including physical limitations  - Instruct patient to call for assistance with activity   - Consult OT/PT to assist with strengthening/mobility   - Keep Call bell within reach  - Keep bed low and locked with side rails adjusted as appropriate  - Keep care items and personal belongings within reach  - Initiate and maintain comfort rounds  - Make Fall Risk Sign visible to staff  - Apply yellow socks and bracelet for high fall risk patients  - Consider moving patient to room near nurses station  Outcome: Progressing  Goal: Maintain or return to baseline ADL function  Description: INTERVENTIONS:  -  Assess patient's ability to carry out ADLs; assess patient's baseline for ADL function and identify physical deficits which impact ability to perform ADLs (bathing, care of mouth/teeth, toileting, grooming, dressing, etc )  - Assess/evaluate cause of self-care deficits   - Assess range of motion  - Assess patient's mobility; develop plan if impaired  - Assess patient's need for assistive devices and provide as appropriate  - Encourage maximum independence but intervene and supervise when necessary  - Involve family in performance of ADLs  - Assess for home care needs following discharge   - Consider OT consult to assist with ADL evaluation and planning for discharge  - Provide patient education as appropriate  Outcome: Progressing  Goal: Maintains/Returns to pre admission functional level  Description: INTERVENTIONS:  - Perform BMAT or MOVE assessment daily    - Set and communicate daily mobility goal to care team and patient/family/caregiver     - Collaborate with rehabilitation services on mobility goals if consulted  - Out of bed for toileting  - Record patient progress and toleration of activity level   Outcome: Progressing     Problem: DISCHARGE PLANNING  Goal: Discharge to home or other facility with appropriate resources  Description: INTERVENTIONS:  - Identify barriers to discharge w/patient and caregiver  - Arrange for needed discharge resources and transportation as appropriate  - Identify discharge learning needs (meds, wound care, etc )  - Arrange for interpretive services to assist at discharge as needed  - Refer to Case Management Department for coordinating discharge planning if the patient needs post-hospital services based on physician/advanced practitioner order or complex needs related to functional status, cognitive ability, or social support system  Outcome: Progressing     Problem: Knowledge Deficit  Goal: Patient/family/caregiver demonstrates understanding of disease process, treatment plan, medications, and discharge instructions  Description: Complete learning assessment and assess knowledge base    Interventions:  - Provide teaching at level of understanding  - Provide teaching via preferred learning methods  Outcome: Progressing

## 2023-04-22 NOTE — ASSESSMENT & PLAN NOTE
"· CT scan concerning for TOA presenting with \"Right adnexal inflammatory mass consistent with tubo-ovarian abscess with mild pyosalpinx  Small amount of free fluid in the pelvis  \"  · Negative GC/chlamydia on 4/19/2023 at [facility]    · +Carnett sign, -bed bump test again on 4/24  · Suspect musculoskeletal irritation due to positive Carnett's sign, Robaxin ordered  · Non-surgical abdomen  · Antibiotics:  IV Rocephin 1g q24h, oral Flagyl 500mg q12h  · IR drain fluid growing E coli, sensitivities pending  · Pain: Tylenol/Toradol, Oxy, Robaxin  · Diet: regular  · DVT ppx: SCDs  "

## 2023-04-22 NOTE — PROGRESS NOTES
"Gynecology Progress note   Gala James 38 y o  female MRN: [id number]  Unit/Bed#: E5 -01 Encounter: [id number]    Gala James reports continued discomfort and pain  Pain is level of pain (1-10, 10 severe), 10  Patient is currently voiding  She is not ambulating  Patient is currently passing flatus and has had no bowel movement  She is tolerating PO, and denies nausea or vomitting  Patient denies fever, chills, chest pain, shortness of breath, or calf tenderness  /63 (BP Location: Right arm)   Pulse 80   Temp 98 6 °F (37 °C) (Oral)   Resp 19   Ht 5' 9 02\" (1 753 m)   Wt 77 3 kg (170 lb 6 7 oz)   SpO2 99%   BMI 25 15 kg/m²     No intake/output data recorded  No intake/output data recorded  Lab Results   Component Value Date    WBC 6 91 04/22/2023    HGB 7 4 (L) 04/22/2023    HCT 24 8 (L) 04/22/2023    MCV 83 04/22/2023     04/22/2023       Lab Results   Component Value Date    GLUCOSE 99 11/26/2014    CALCIUM 8 4 04/22/2023     11/26/2014    K 3 7 04/22/2023    CO2 24 04/22/2023     (H) 04/22/2023    BUN 7 04/22/2023    CREATININE 0 45 (L) 04/22/2023       Lab Results   Component Value Date/Time    POCGLU 96 01/23/2023 08:38 AM       Physical Exam  Vitals and nursing note reviewed  Exam conducted with a chaperone present  Constitutional:       General: She is in acute distress  HENT:      Head: Normocephalic  Right Ear: External ear normal       Left Ear: External ear normal    Eyes:      General: No scleral icterus  Right eye: No discharge  Left eye: No discharge  Conjunctiva/sclera: Conjunctivae normal    Cardiovascular:      Rate and Rhythm: Normal rate and regular rhythm  Pulses: Normal pulses  Heart sounds: Normal heart sounds  Pulmonary:      Effort: Pulmonary effort is normal  No respiratory distress  Breath sounds: Normal breath sounds  Abdominal:      General: Abdomen is flat  There is no distension        " "Palpations: Abdomen is soft  Tenderness: There is abdominal tenderness in the right lower quadrant  There is no guarding  Negative signs include Bond's sign and McBurney's sign  Musculoskeletal:         General: No swelling or tenderness  Normal range of motion  Cervical back: Normal range of motion  Right lower leg: No edema  Left lower leg: No edema  Skin:     General: Skin is warm and dry  Capillary Refill: Capillary refill takes less than 2 seconds  Neurological:      Mental Status: She is alert and oriented to person, place, and time  Mental status is at baseline  Psychiatric:         Mood and Affect: Mood normal          Behavior: Behavior normal            A/P: 38 y o   who presents for persistent RLQ pain  Hx significant for endometriosis, chronic pelvic pain, suspected adenomyosis  Vitals WNL, stable  Non-surgical abdomen  Concern for TOA, receiving IV antibiotic treatment  Plan by problem:    TOA (tubo-ovarian abscess)  Persistent RLQ pain despite medical management  CT scan concerning for TOA presenting with \"Right adnexal inflammatory mass consistent with tubo-ovarian abscess with mild pyosalpinx  Small amount of free fluid in the pelvis  \"  +straight leg test with pain emanating from right lower back to right quadriceps muscle  +Carnett sign, -bed bump test  Non-surgical abdomen  Antibiotics: IV Rocephin 1g q24h, IV Doxycyline 100mg q12h, IV Flagyl 500mg q12h  Pain: Tylenol/Toradol, Roxicodone 5/10mg, IV dilaudid 1mg q4h for breakthrough; Gabapentin 100mg TID for radicular pain  1x dose Zolpidem for rest and insomnia  FEN: 125cc/h NS, replete PRN, NPO/sips with meds  DVT ppx: SCDs  Continued pain this am  Per nursing, slept soundly all night but patient reports being in pain all night although sleeping  Continue antibiotic regimen  NPO if urgent surgery required            Daniel Loya MD  OB/GYN PGY-2  2023  6:36 AM      "

## 2023-04-22 NOTE — PROGRESS NOTES
SUBJECTIVE:  Notified by RN of arm pain near IV site shortly after administration of doxy  RN also reports patient was shaking and nervous with  bpm and temp 99 3 F  I evaluated the patient at the bedside, and her arm pain is improved  Given change in vitals, an additional set of vitals was collected and was notable for new fever  Patient reports feeling well and is disappointed she cannot go home  She denies urinary complaints, worsening abdominal pain (notes it is actually sigifnicantly improved), chest pain, SOB, subjective fever, rash, or any other concerns  She is amenable to staying for continued IV antibiotics and infectious workup including urine culture, blood culture x2, and CBC & CMP now and in the morning  OBJECTIVE:    Vitals:    04/22/23 1918   BP: 112/65   Pulse: (!) 109   Resp:    Temp: (!) 102 9 °F (39 4 °C)   SpO2: 98%     Physical Exam  Constitutional:       General: She is not in acute distress  Appearance: Normal appearance  She is not ill-appearing  HENT:      Mouth/Throat:      Mouth: Mucous membranes are moist    Eyes:      Extraocular Movements: Extraocular movements intact  Cardiovascular:      Rate and Rhythm: Normal rate  Pulmonary:      Effort: Pulmonary effort is normal    Abdominal:      General: There is no distension  Palpations: Abdomen is soft  Tenderness: There is no abdominal tenderness (mild RLQ tenderness to palpation)  There is no guarding  Musculoskeletal:         General: No swelling or tenderness  Right lower leg: No edema  Left lower leg: No edema  Neurological:      General: No focal deficit present  Mental Status: She is alert  Skin:     General: Skin is warm and dry  Coloration: Skin is not jaundiced or pale  Psychiatric:         Mood and Affect: Mood normal          Behavior: Behavior normal          Thought Content:  Thought content normal          Judgment: Judgment normal        Assessment / Plan:  · Blood culture x2  · Urine culture  · CBC and CMP now and in the morning  · Continue IV antibiotics        Discussed with Dr Zeny Chester MD  OBGYN Resident  04/22/23  7:58 PM

## 2023-04-23 ENCOUNTER — APPOINTMENT (OUTPATIENT)
Dept: RADIOLOGY | Facility: HOSPITAL | Age: 43
End: 2023-04-23

## 2023-04-23 ENCOUNTER — APPOINTMENT (INPATIENT)
Dept: CT IMAGING | Facility: HOSPITAL | Age: 43
End: 2023-04-23
Attending: RADIOLOGY

## 2023-04-23 PROBLEM — A41.9 SEPSIS (HCC): Status: ACTIVE | Noted: 2023-04-23

## 2023-04-23 LAB
ABO GROUP BLD: NORMAL
ABO GROUP BLD: NORMAL
ALBUMIN SERPL BCP-MCNC: 2.8 G/DL (ref 3.5–5)
ALP SERPL-CCNC: 56 U/L (ref 34–104)
ALT SERPL W P-5'-P-CCNC: 12 U/L (ref 7–52)
ANION GAP SERPL CALCULATED.3IONS-SCNC: 6 MMOL/L (ref 4–13)
AST SERPL W P-5'-P-CCNC: 11 U/L (ref 13–39)
BASOPHILS # BLD AUTO: 0.01 THOUSANDS/ΜL (ref 0–0.1)
BASOPHILS # BLD AUTO: 0.01 THOUSANDS/ΜL (ref 0–0.1)
BASOPHILS NFR BLD AUTO: 0 % (ref 0–1)
BASOPHILS NFR BLD AUTO: 0 % (ref 0–1)
BILIRUB SERPL-MCNC: 0.34 MG/DL (ref 0.2–1)
BLD GP AB SCN SERPL QL: NEGATIVE
BUN SERPL-MCNC: 6 MG/DL (ref 5–25)
CALCIUM ALBUM COR SERPL-MCNC: 8.9 MG/DL (ref 8.3–10.1)
CALCIUM SERPL-MCNC: 7.9 MG/DL (ref 8.4–10.2)
CHLORIDE SERPL-SCNC: 109 MMOL/L (ref 96–108)
CO2 SERPL-SCNC: 23 MMOL/L (ref 21–32)
CREAT SERPL-MCNC: 0.47 MG/DL (ref 0.6–1.3)
EOSINOPHIL # BLD AUTO: 0 THOUSAND/ΜL (ref 0–0.61)
EOSINOPHIL # BLD AUTO: 0.01 THOUSAND/ΜL (ref 0–0.61)
EOSINOPHIL NFR BLD AUTO: 0 % (ref 0–6)
EOSINOPHIL NFR BLD AUTO: 0 % (ref 0–6)
ERYTHROCYTE [DISTWIDTH] IN BLOOD BY AUTOMATED COUNT: 13.4 % (ref 11.6–15.1)
ERYTHROCYTE [DISTWIDTH] IN BLOOD BY AUTOMATED COUNT: 13.6 % (ref 11.6–15.1)
FLUAV RNA RESP QL NAA+PROBE: NEGATIVE
FLUBV RNA RESP QL NAA+PROBE: NEGATIVE
GFR SERPL CREATININE-BSD FRML MDRD: 123 ML/MIN/1.73SQ M
GLUCOSE P FAST SERPL-MCNC: 108 MG/DL (ref 65–99)
GLUCOSE SERPL-MCNC: 108 MG/DL (ref 65–140)
HCT VFR BLD AUTO: 21.8 % (ref 34.8–46.1)
HCT VFR BLD AUTO: 24.9 % (ref 34.8–46.1)
HGB BLD-MCNC: 6.9 G/DL (ref 11.5–15.4)
HGB BLD-MCNC: 7.9 G/DL (ref 11.5–15.4)
IMM GRANULOCYTES # BLD AUTO: 0.02 THOUSAND/UL (ref 0–0.2)
IMM GRANULOCYTES # BLD AUTO: 0.04 THOUSAND/UL (ref 0–0.2)
IMM GRANULOCYTES NFR BLD AUTO: 0 % (ref 0–2)
IMM GRANULOCYTES NFR BLD AUTO: 1 % (ref 0–2)
LACTATE SERPL-SCNC: 1 MMOL/L (ref 0.5–2)
LYMPHOCYTES # BLD AUTO: 0.43 THOUSANDS/ΜL (ref 0.6–4.47)
LYMPHOCYTES # BLD AUTO: 0.61 THOUSANDS/ΜL (ref 0.6–4.47)
LYMPHOCYTES NFR BLD AUTO: 13 % (ref 14–44)
LYMPHOCYTES NFR BLD AUTO: 6 % (ref 14–44)
MCH RBC QN AUTO: 24.7 PG (ref 26.8–34.3)
MCH RBC QN AUTO: 26.2 PG (ref 26.8–34.3)
MCHC RBC AUTO-ENTMCNC: 31.2 G/DL (ref 31.4–37.4)
MCHC RBC AUTO-ENTMCNC: 31.7 G/DL (ref 31.4–37.4)
MCV RBC AUTO: 79 FL (ref 82–98)
MCV RBC AUTO: 83 FL (ref 82–98)
MONOCYTES # BLD AUTO: 0.35 THOUSAND/ΜL (ref 0.17–1.22)
MONOCYTES # BLD AUTO: 0.43 THOUSAND/ΜL (ref 0.17–1.22)
MONOCYTES NFR BLD AUTO: 6 % (ref 4–12)
MONOCYTES NFR BLD AUTO: 7 % (ref 4–12)
NEUTROPHILS # BLD AUTO: 3.72 THOUSANDS/ΜL (ref 1.85–7.62)
NEUTROPHILS # BLD AUTO: 6.33 THOUSANDS/ΜL (ref 1.85–7.62)
NEUTS SEG NFR BLD AUTO: 80 % (ref 43–75)
NEUTS SEG NFR BLD AUTO: 87 % (ref 43–75)
NRBC BLD AUTO-RTO: 0 /100 WBCS
NRBC BLD AUTO-RTO: 0 /100 WBCS
PLATELET # BLD AUTO: 193 THOUSANDS/UL (ref 149–390)
PLATELET # BLD AUTO: 220 THOUSANDS/UL (ref 149–390)
PMV BLD AUTO: 11.3 FL (ref 8.9–12.7)
PMV BLD AUTO: 11.6 FL (ref 8.9–12.7)
POTASSIUM SERPL-SCNC: 3.7 MMOL/L (ref 3.5–5.3)
PROT SERPL-MCNC: 5.6 G/DL (ref 6.4–8.4)
RBC # BLD AUTO: 2.75 MILLION/UL (ref 3.81–5.12)
RBC # BLD AUTO: 3.01 MILLION/UL (ref 3.81–5.12)
RH BLD: POSITIVE
RH BLD: POSITIVE
RSV RNA RESP QL NAA+PROBE: NEGATIVE
SARS-COV-2 RNA RESP QL NAA+PROBE: NEGATIVE
SODIUM SERPL-SCNC: 138 MMOL/L (ref 135–147)
SPECIMEN EXPIRATION DATE: NORMAL
WBC # BLD AUTO: 4.72 THOUSAND/UL (ref 4.31–10.16)
WBC # BLD AUTO: 7.24 THOUSAND/UL (ref 4.31–10.16)

## 2023-04-23 PROCEDURE — 0U9570Z DRAINAGE OF RIGHT FALLOPIAN TUBE WITH DRAINAGE DEVICE, VIA NATURAL OR ARTIFICIAL OPENING: ICD-10-PCS | Performed by: RADIOLOGY

## 2023-04-23 PROCEDURE — 30233N1 TRANSFUSION OF NONAUTOLOGOUS RED BLOOD CELLS INTO PERIPHERAL VEIN, PERCUTANEOUS APPROACH: ICD-10-PCS | Performed by: OBSTETRICS & GYNECOLOGY

## 2023-04-23 RX ORDER — MIDAZOLAM HYDROCHLORIDE 2 MG/2ML
INJECTION, SOLUTION INTRAMUSCULAR; INTRAVENOUS AS NEEDED
Status: COMPLETED | OUTPATIENT
Start: 2023-04-23 | End: 2023-04-23

## 2023-04-23 RX ORDER — FENTANYL CITRATE 50 UG/ML
INJECTION, SOLUTION INTRAMUSCULAR; INTRAVENOUS AS NEEDED
Status: COMPLETED | OUTPATIENT
Start: 2023-04-23 | End: 2023-04-23

## 2023-04-23 RX ORDER — SODIUM CHLORIDE 9 MG/ML
125 INJECTION, SOLUTION INTRAVENOUS CONTINUOUS
Status: DISCONTINUED | OUTPATIENT
Start: 2023-04-23 | End: 2023-04-26 | Stop reason: HOSPADM

## 2023-04-23 RX ORDER — LIDOCAINE HYDROCHLORIDE 10 MG/ML
INJECTION, SOLUTION EPIDURAL; INFILTRATION; INTRACAUDAL; PERINEURAL AS NEEDED
Status: COMPLETED | OUTPATIENT
Start: 2023-04-23 | End: 2023-04-23

## 2023-04-23 RX ADMIN — CEFTRIAXONE SODIUM 1000 MG: 10 INJECTION, POWDER, FOR SOLUTION INTRAVENOUS at 18:16

## 2023-04-23 RX ADMIN — SODIUM CHLORIDE 1986 ML: 0.9 INJECTION, SOLUTION INTRAVENOUS at 11:38

## 2023-04-23 RX ADMIN — GABAPENTIN 100 MG: 100 CAPSULE ORAL at 17:30

## 2023-04-23 RX ADMIN — IBUPROFEN 600 MG: 600 TABLET, FILM COATED ORAL at 04:33

## 2023-04-23 RX ADMIN — METRONIDAZOLE 500 MG: 500 INJECTION, SOLUTION INTRAVENOUS at 17:30

## 2023-04-23 RX ADMIN — DOCUSATE SODIUM 100 MG: 100 CAPSULE, LIQUID FILLED ORAL at 17:30

## 2023-04-23 RX ADMIN — GABAPENTIN 100 MG: 100 CAPSULE ORAL at 22:00

## 2023-04-23 RX ADMIN — Medication 150 MG: at 09:11

## 2023-04-23 RX ADMIN — MIDAZOLAM 1 MG: 1 INJECTION INTRAMUSCULAR; INTRAVENOUS at 14:48

## 2023-04-23 RX ADMIN — ACETAMINOPHEN 325MG 975 MG: 325 TABLET ORAL at 04:44

## 2023-04-23 RX ADMIN — METRONIDAZOLE 500 MG: 500 INJECTION, SOLUTION INTRAVENOUS at 04:33

## 2023-04-23 RX ADMIN — DOCUSATE SODIUM 100 MG: 100 CAPSULE, LIQUID FILLED ORAL at 09:11

## 2023-04-23 RX ADMIN — DOXYCYCLINE 100 MG: 100 INJECTION, POWDER, LYOPHILIZED, FOR SOLUTION INTRAVENOUS at 19:16

## 2023-04-23 RX ADMIN — SODIUM CHLORIDE 125 ML/HR: 0.9 INJECTION, SOLUTION INTRAVENOUS at 13:49

## 2023-04-23 RX ADMIN — LEVETIRACETAM 1500 MG: 750 TABLET, FILM COATED ORAL at 22:00

## 2023-04-23 RX ADMIN — GABAPENTIN 100 MG: 100 CAPSULE ORAL at 09:11

## 2023-04-23 RX ADMIN — HYDROMORPHONE HYDROCHLORIDE 1 MG: 1 INJECTION, SOLUTION INTRAMUSCULAR; INTRAVENOUS; SUBCUTANEOUS at 19:39

## 2023-04-23 RX ADMIN — FENTANYL CITRATE 50 MCG: 50 INJECTION INTRAMUSCULAR; INTRAVENOUS at 14:43

## 2023-04-23 RX ADMIN — DOXYCYCLINE 100 MG: 100 INJECTION, POWDER, LYOPHILIZED, FOR SOLUTION INTRAVENOUS at 05:51

## 2023-04-23 RX ADMIN — ACETAMINOPHEN 325MG 975 MG: 325 TABLET ORAL at 19:38

## 2023-04-23 RX ADMIN — LIDOCAINE HYDROCHLORIDE 10 ML: 10 INJECTION, SOLUTION EPIDURAL; INFILTRATION; INTRACAUDAL; PERINEURAL at 14:45

## 2023-04-23 RX ADMIN — MIDAZOLAM 1 MG: 1 INJECTION INTRAMUSCULAR; INTRAVENOUS at 14:43

## 2023-04-23 RX ADMIN — LEVETIRACETAM 1500 MG: 750 TABLET, FILM COATED ORAL at 09:11

## 2023-04-23 NOTE — PLAN OF CARE
Problem: PAIN - ADULT  Goal: Verbalizes/displays adequate comfort level or baseline comfort level  Description: Interventions:  - Encourage patient to monitor pain and request assistance  - Assess pain using appropriate pain scale  - Administer analgesics based on type and severity of pain and evaluate response  - Implement non-pharmacological measures as appropriate and evaluate response  - Consider cultural and social influences on pain and pain management  - Notify physician/advanced practitioner if interventions unsuccessful or patient reports new pain  Outcome: Progressing     Problem: INFECTION - ADULT  Goal: Absence or prevention of progression during hospitalization  Description: INTERVENTIONS:  - Assess and monitor for signs and symptoms of infection  - Monitor lab/diagnostic results  - Monitor all insertion sites, i e  indwelling lines, tubes, and drains  - Monitor endotracheal if appropriate and nasal secretions for changes in amount and color  - [place] appropriate cooling/warming therapies per order  - Administer medications as ordered  - Instruct and encourage patient and family to use good hand hygiene technique  - Identify and instruct in appropriate isolation precautions for identified infection/condition  Outcome: Progressing  Goal: Absence of fever/infection during neutropenic period  Description: INTERVENTIONS:  - Monitor WBC    Outcome: Progressing     Problem: SAFETY ADULT  Goal: Patient will remain free of falls  Description: INTERVENTIONS:  - Educate patient/family on patient safety including physical limitations  - Instruct patient to call for assistance with activity   - Consult OT/PT to assist with strengthening/mobility   - Keep Call bell within reach  - Keep bed low and locked with side rails adjusted as appropriate  - Keep care items and personal belongings within reach  - Initiate and maintain comfort rounds  - Make Fall Risk Sign visible to staff  - Apply yellow socks and bracelet for high fall risk patients  - Consider moving patient to room near nurses station  Outcome: Progressing  Goal: Maintain or return to baseline ADL function  Description: INTERVENTIONS:  -  Assess patient's ability to carry out ADLs; assess patient's baseline for ADL function and identify physical deficits which impact ability to perform ADLs (bathing, care of mouth/teeth, toileting, grooming, dressing, etc )  - Assess/evaluate cause of self-care deficits   - Assess range of motion  - Assess patient's mobility; develop plan if impaired  - Assess patient's need for assistive devices and provide as appropriate  - Encourage maximum independence but intervene and supervise when necessary  - Involve family in performance of ADLs  - Assess for home care needs following discharge   - Consider OT consult to assist with ADL evaluation and planning for discharge  - Provide patient education as appropriate  Outcome: Progressing  Goal: Maintains/Returns to pre admission functional level  Description: INTERVENTIONS:  - Perform BMAT or MOVE assessment daily    - Set and communicate daily mobility goal to care team and patient/family/caregiver     - Collaborate with rehabilitation services on mobility goals if consulted  - Out of bed for toileting  - Record patient progress and toleration of activity level   Outcome: Progressing     Problem: DISCHARGE PLANNING  Goal: Discharge to home or other facility with appropriate resources  Description: INTERVENTIONS:  - Identify barriers to discharge w/patient and caregiver  - Arrange for needed discharge resources and transportation as appropriate  - Identify discharge learning needs (meds, wound care, etc )  - Arrange for interpretive services to assist at discharge as needed  - Refer to Case Management Department for coordinating discharge planning if the patient needs post-hospital services based on physician/advanced practitioner order or complex needs related to functional status, cognitive ability, or social support system  Outcome: Progressing     Problem: Knowledge Deficit  Goal: Patient/family/caregiver demonstrates understanding of disease process, treatment plan, medications, and discharge instructions  Description: Complete learning assessment and assess knowledge base    Interventions:  - Provide teaching at level of understanding  - Provide teaching via preferred learning methods  Outcome: Progressing

## 2023-04-23 NOTE — CONSULTS
e-Consult (IPC)  - Interventional Radiology  Promise Pedro 38 y o  female MRN: [id number]  Unit/Bed#: E5 -01 Encounter: [id number]          Interventional Radiology has been consulted to evaluate [place] [address] to IR  Consult performed by: Davide Del Castillo MD  Consult ordered by: Minh Sanderson MD        04/23/23    Assessment/Recommendation:   38year old female with right TOA found to be in clinical decline with sepsis  - Plan for right TOA drain placement today  - Please keep patient NPO  - Hold blood thinners      5-10 minutes, >50% of the total time devoted to medical consultative verbal/EMR discussion between providers  Written report will be generated in the EMR  Thank you for allowing Interventional Radiology to participate in the care of Promise Pedro  Please don't hesitate to call or TigerText us with any questions       Davide Del Castillo MD

## 2023-04-23 NOTE — SEPSIS NOTE
"  Sepsis Note   Agusto Ornelas 38 y o  female MRN: [id number]  Unit/Bed#: E5 -01 Encounter: [id number]    Plan:   - Continue sepsis protocol  - Vital signs stable and within normal limits at this time   - now s/p IR drainage of TOA completed c/w 20cc reddish brown fluid  - Follow-up fluid culture and gram stain results  - Continue IV ceftriaxone, doxycyline, flagyl   - ID consulted and plan to see 4/24      Initial Sepsis Screening     Row Name 04/23/23 0913 04/23/23 0909             Is the patient's history suggestive of a new or worsening infection? -- Yes (Proceed)  -JT       Suspected source of infection -- acute abdominal infection  -JT       Indicate SIRS criteria -- Hyperthemia > 38 3C (100 9F) OR Hypothermia <36C (96 8F); Tachycardia > 90 bpm  -JT       Are two or more of the above signs & symptoms of infection both present and new to the patient? -- Yes (Proceed)  -JT       Assess for evidence of organ dysfunction: Are any of the below criteria present within 6 hours of suspected infection and SIRS criteria that are NOT considered to be chronic conditions? -- SBP < 90  -JT       Date of presentation of septic shock -- --       Time of presentation of septic shock -- --       Fluid Resuscitation: 30 ml/kg IV fluid bolus will be given based on ideal body weight (use if BMI >30)  -JT --       Is the patient is persistently hypotensive in the hour after fluid bolus administration? If yes, patient meets criteria for vasopressor use  -- --       Sepsis Note: Click \"NEXT\" below (NOT \"close\") to generate sepsis note based on above information   -- --             User Key  (r) = Recorded By, (t) = Taken By, (c) = Cosigned By    [address] Name Provider Type    Hari Hatfield MD Physician                Default Flowsheet Data (last 720 hours)     Sepsis Reassess     Row Name 04/23/23 1849 04/23/23 1501                Repeat Volume Status and Tissue Perfusion Assessment Performed    Date of Reassessment: 04/23/23 " " -KG 04/23/23  -JOHN       Time of Reassessment: 1849  -KG 1505  -JOHN       Sepsis Reassessment Note: Click \"NEXT\" below (NOT \"close\") to generate sepsis reassessment note  YES (proceed by clicking \"NEXT\")  -KG YES (proceed by clicking \"NEXT\")  -JOHN       Repeat Volume Status and Tissue Perfusion Assessment Performed -- --             User Key  (r) = Recorded By, (t) = Taken By, (c) = Cosigned By    [address] Name Provider Type    Jhonatan Madera MD Resident    Margie Gonzalez MD Resident                Body mass index is 25 15 kg/m²    Wt Readings from Last 1 Encounters:   04/21/23 77 3 kg (170 lb 6 7 oz)        Ideal body weight: 66 2 kg (146 lb 0 4 oz)  Adjusted ideal body weight: 70 7 kg (155 lb 12 5 oz)  "

## 2023-04-23 NOTE — ASSESSMENT & PLAN NOTE
The patient has chronic anemia with a baseline of 10  Hb this morning is 6 9, will plan to transfuse 1 unit of pRBCs, consent signed    Hb: 8 4 --> 7 4 --> 7 5 --> 6 9  Patient receiving IV fluids and bolus

## 2023-04-23 NOTE — BRIEF OP NOTE (RAD/CATH)
INTERVENTIONAL RADIOLOGY PROCEDURE NOTE    Date: 4/23/2023    Procedure: Right tubo-ovarian abscess drain placement  Procedure Summary     Date:  Room / Location:     Anesthesia Start:  Anesthesia Stop:     Procedure:  Diagnosis:     Scheduled Providers:  Responsible Provider:     Anesthesia Type: Not recorded ASA Status: Not recorded          Preoperative diagnosis:   1  TOA (tubo-ovarian abscess)         Postoperative diagnosis: Same  Surgeon: Alayna Persaud MD     Assistant: None  No qualified resident was available  Blood loss: None    Specimens: 20 mL reddish brown fluid aspirate sent to lab  Findings: Successful right tubo-ovarian abscess drain placement  Complications: None immediate      Anesthesia: conscious sedation and local

## 2023-04-23 NOTE — SEPSIS NOTE
"Sepsis Note   Yanira Moore 38 y o  female MRN: [id number]  Unit/Bed#: E5 -01 Encounter: [id number]      Plan:  - Continue sepsis protocol  - Pressures improved s/p IV fluid bolus and resuscitation  - IR drainage of TOA completed c/w 20cc reddish brown fluid  - Follow-up fluid culture and gram stain results  - Continue IV antibiotic regimen  - ID consulted, appreciate assessment and recommendations     Initial Sepsis Screening     Row Name 04/23/23 0913 04/23/23 0909             Is the patient's history suggestive of a new or worsening infection? -- Yes (Proceed)  -JT       Suspected source of infection -- acute abdominal infection  -JT       Indicate SIRS criteria -- Hyperthemia > 38 3C (100 9F) OR Hypothermia <36C (96 8F); Tachycardia > 90 bpm  -JT       Are two or more of the above signs & symptoms of infection both present and new to the patient? -- Yes (Proceed)  -JT       Assess for evidence of organ dysfunction: Are any of the below criteria present within 6 hours of suspected infection and SIRS criteria that are NOT considered to be chronic conditions? -- SBP < 90  -JT       Date of presentation of septic shock -- --       Time of presentation of septic shock -- --       Fluid Resuscitation: 30 ml/kg IV fluid bolus will be given based on ideal body weight (use if BMI >30)  -JT --       Is the patient is persistently hypotensive in the hour after fluid bolus administration? If yes, patient meets criteria for vasopressor use  -- --       Sepsis Note: Click \"NEXT\" below (NOT \"close\") to generate sepsis note based on above information   -- --             User Key  (r) = Recorded By, (t) = Taken By, (c) = Cosigned By    [address] Name Provider Type    Denise Lantigua MD Physician                Default Flowsheet Data (last 720 hours)     Sepsis Reassess     Row Name 04/23/23 1509                   Repeat Volume Status and Tissue Perfusion Assessment Performed    Date of Reassessment: 04/23/23  -JOHN        " "Time of Reassessment: 1505  -JOHN        Sepsis Reassessment Note: Click \"NEXT\" below (NOT \"close\") to generate sepsis reassessment note  YES (proceed by clicking \"NEXT\")  -        Repeat Volume Status and Tissue Perfusion Assessment Performed --              User Key  (r) = Recorded By, (t) = Taken By, (c) = Cosigned By    [address] Name Provider Type    Danielle Ramírez MD Resident                Body mass index is 25 15 kg/m²    Wt Readings from Last 1 Encounters:   04/21/23 77 3 kg (170 lb 6 7 oz)        Ideal body weight: 66 2 kg (146 lb 0 4 oz)  Adjusted ideal body weight: 70 7 kg (155 lb 12 5 oz)  "

## 2023-04-23 NOTE — SEPSIS NOTE
"  Sepsis Note   Ryan Ramirez 38 y o  female MRN: [id number]  Unit/Bed#: E5 -01 Encounter: [id number]       Initial Sepsis Screening     Row Name 04/23/23 0909                Is the patient's history suggestive of a new or worsening infection? Yes (Proceed)  -JT        Suspected source of infection acute abdominal infection  -JT        Indicate SIRS criteria Hyperthemia > 38 3C (100 9F) OR Hypothermia <36C (96 8F); Tachycardia > 90 bpm  -JT        Are two or more of the above signs & symptoms of infection both present and new to the patient? Yes (Proceed)  -JT        Assess for evidence of organ dysfunction: Are any of the below criteria present within 6 hours of suspected infection and SIRS criteria that are NOT considered to be chronic conditions? SBP < 90  -JT        Date of presentation of septic shock --        Time of presentation of septic shock --        Fluid Resuscitation: --        Is the patient is persistently hypotensive in the hour after fluid bolus administration? If yes, patient meets criteria for vasopressor use  --        Sepsis Note: Click \"NEXT\" below (NOT \"close\") to generate sepsis note based on above information  --              User Key  (r) = Recorded By, (t) = Taken By, (c) = Cosigned By    [address] Name Provider Type    Gumaro Carvalho MD Physician                    Body mass index is 25 15 kg/m²  Wt Readings from Last 1 Encounters:   04/21/23 77 3 kg (170 lb 6 7 oz)        Ideal body weight: 66 2 kg (146 lb 0 4 oz)  Adjusted ideal body weight: 70 7 kg (155 lb 12 5 oz)     Plan: Will get sepsis labs and restart fluids  Patient is going to receive 1 unit of pRBCs     "

## 2023-04-23 NOTE — ASSESSMENT & PLAN NOTE
Antibiotics: IV Rocephin 1g q24h, IV Doxycyline 100mg q12h, IV Flagyl 500mg q12h  Pain: Tylenol/Toradol, Roxicodone 5/10mg, IV dilaudid 1mg q4h for breakthrough; Gabapentin 100mg TID for radicular pain  1x dose Zolpidem for rest and insomnia    Patient's pain has improved since admission  She has been able to eat for the first time in several days  However, her clinical status has deteriorated and she now meets sepsis criteria  Overnight she had two fevers of 102 9 and 102 6  She has been hypotensive with intermittent tachycardia   Given new onset sepsis, will plan for source control with IR drainage     - IR consult placed and case discussed with Dr Epperson Neither  - Patient to remain NPO for drainage today  - Sepsis protocol initiated, patient to receive fluid and weight based bolus  - Lactate pending   - Urine and blood cultures pending   - CXR pending   - Sepsis note documented   - Will continue IV antibiotics

## 2023-04-23 NOTE — PROGRESS NOTES
For questions/concerns on this patient, please reach out to the following:  JASBIR- OBGYKAYA Resident  Gynecology Progress note   Gracie Alvarenga 38 y o  female MRN: [id number]  Unit/Bed#: E5 -01 Encounter: [id number]    Assessment/Plan:    * TOA (tubo-ovarian abscess)  Assessment & Plan  Antibiotics: IV Rocephin 1g q24h, IV Doxycyline 100mg q12h, IV Flagyl 500mg q12h  Pain: Tylenol/Toradol, Roxicodone 5/10mg, IV dilaudid 1mg q4h for breakthrough; Gabapentin 100mg TID for radicular pain  1x dose Zolpidem for rest and insomnia    Patient's pain has improved since admission  She has been able to eat for the first time in several days  However, her clinical status has deteriorated and she now meets sepsis criteria  Overnight she had two fevers of 102 9 and 102 6  She has been hypotensive with intermittent tachycardia  Given new onset sepsis, will plan for source control with IR drainage     - IR consult placed and case discussed with Dr Stearns July  - Patient to remain NPO for drainage today  - Sepsis protocol initiated, patient to receive fluid and weight based bolus  - Lactate pending   - Urine and blood cultures pending   - CXR pending   - Sepsis note documented   - Will continue IV antibiotics     Sepsis [facility])  Assessment & Plan  Patient now meets criteria for sepsis with hypotension and fever and presumed source of TOA  Patient is receiving resuscitative fluids and parenteral antibiotics  IR will do drainage today  Sepsis protocol initiated  Acute on chronic anemia  Assessment & Plan  The patient has chronic anemia with a baseline of 10  Hb this morning is 6 9, will plan to transfuse 1 unit of pRBCs, consent signed  Hb: 8 4 --> 7 4 --> 7 5 --> 6 9  Patient receiving IV fluids and bolus       Subjective:    Gracei Alvaernga states that her overall pain is improved  She was able to rest overnight  She is also tolerating po intake, which she had not been for about a week prior to admission secondary to pain   She "endorses dizziness with ambulation  She denies fever, chills, nausea and vomiting  She denies chest pain, leg pain, shortness of breath  She has not had a bowel movement in several days  Objective:  BP (!) 88/54   Pulse 84   Temp 98 9 °F (37 2 °C)   Resp 17   Ht 5' 9 02\" (1 753 m)   Wt 77 3 kg (170 lb 6 7 oz)   SpO2 97%   BMI 25 15 kg/m²     I/O last 3 completed shifts: In: 1200 [I V :1000; IV Piggyback:200]  Out: 300 [Urine:300]  No intake/output data recorded  Lab Results   Component Value Date    WBC 7 24 04/23/2023    HGB 6 9 (LL) 04/23/2023    HCT 21 8 (L) 04/23/2023    MCV 79 (L) 04/23/2023     04/23/2023       Lab Results   Component Value Date    GLUCOSE 99 11/26/2014    CALCIUM 7 9 (L) 04/23/2023     11/26/2014    K 3 7 04/23/2023    CO2 23 04/23/2023     (H) 04/23/2023    BUN 6 04/23/2023    CREATININE 0 47 (L) 04/23/2023       Physical Exam  Constitutional:       General: She is not in acute distress  Appearance: Normal appearance  She is normal weight  She is not ill-appearing, toxic-appearing or diaphoretic  HENT:      Head: Normocephalic and atraumatic  Cardiovascular:      Rate and Rhythm: Normal rate and regular rhythm  Heart sounds: No murmur heard  Pulmonary:      Effort: Pulmonary effort is normal  No respiratory distress  Breath sounds: Normal breath sounds  No stridor  No wheezing  Abdominal:      General: Abdomen is flat  Bowel sounds are normal  There is no distension  Palpations: Abdomen is soft  Tenderness: There is abdominal tenderness  Comments: Significant TTP in the right lower quadrant   Diffuse mild tenderness   Non-tympanic, mild guarding on palpation   Normal bowel sounds    Skin:     General: Skin is warm and dry  Coloration: Skin is pale  Findings: No erythema  Neurological:      General: No focal deficit present  Mental Status: She is alert and oriented to person, place, and time     Psychiatric: " Mood and Affect: Mood normal          Behavior: Behavior normal          Thought Content:  Thought content normal          Judgment: Judgment normal          Vielka Barajas MD  4/23/2023  9:34 AM

## 2023-04-23 NOTE — ASSESSMENT & PLAN NOTE
Patient now meets criteria for sepsis with hypotension and fever and presumed source of TOA  Patient is receiving resuscitative fluids and parenteral antibiotics  IR will do drainage today  Sepsis protocol initiated

## 2023-04-23 NOTE — PLAN OF CARE
Problem: PAIN - ADULT  Goal: Verbalizes/displays adequate comfort level or baseline comfort level  Description: Interventions:  - Encourage patient to monitor pain and request assistance  - Assess pain using appropriate pain scale  - Administer analgesics based on type and severity of pain and evaluate response  - Implement non-pharmacological measures as appropriate and evaluate response  - Consider cultural and social influences on pain and pain management  - Notify physician/advanced practitioner if interventions unsuccessful or patient reports new pain  Outcome: Progressing     Problem: INFECTION - ADULT  Goal: Absence or prevention of progression during hospitalization  Description: INTERVENTIONS:  - Assess and monitor for signs and symptoms of infection  - Monitor lab/diagnostic results  - Monitor all insertion sites, i e  indwelling lines, tubes, and drains  - Monitor endotracheal if appropriate and nasal secretions for changes in amount and color  - Hunt appropriate cooling/warming therapies per order  - Administer medications as ordered  - Instruct and encourage patient and family to use good hand hygiene technique  - Identify and instruct in appropriate isolation precautions for identified infection/condition  Outcome: Progressing  Goal: Absence of fever/infection during neutropenic period  Description: INTERVENTIONS:  - Monitor WBC    Outcome: Progressing     Problem: SAFETY ADULT  Goal: Patient will remain free of falls  Description: INTERVENTIONS:  - Educate patient/family on patient safety including physical limitations  - Instruct patient to call for assistance with activity   - Consult OT/PT to assist with strengthening/mobility   - Keep Call bell within reach  - Keep bed low and locked with side rails adjusted as appropriate  - Keep care items and personal belongings within reach  - Initiate and maintain comfort rounds  - Make Fall Risk Sign visible to staff  - Apply yellow socks and bracelet for high fall risk patients  - Consider moving patient to room near nurses station  Outcome: Progressing  Goal: Maintain or return to baseline ADL function  Description: INTERVENTIONS:  -  Assess patient's ability to carry out ADLs; assess patient's baseline for ADL function and identify physical deficits which impact ability to perform ADLs (bathing, care of mouth/teeth, toileting, grooming, dressing, etc )  - Assess/evaluate cause of self-care deficits   - Assess range of motion  - Assess patient's mobility; develop plan if impaired  - Assess patient's need for assistive devices and provide as appropriate  - Encourage maximum independence but intervene and supervise when necessary  - Involve family in performance of ADLs  - Assess for home care needs following discharge   - Consider OT consult to assist with ADL evaluation and planning for discharge  - Provide patient education as appropriate  Outcome: Progressing  Goal: Maintains/Returns to pre admission functional level  Description: INTERVENTIONS:  - Perform BMAT or MOVE assessment daily    - Set and communicate daily mobility goal to care team and patient/family/caregiver     - Collaborate with rehabilitation services on mobility goals if consulted  - Out of bed for toileting  - Record patient progress and toleration of activity level   Outcome: Progressing     Problem: DISCHARGE PLANNING  Goal: Discharge to home or other facility with appropriate resources  Description: INTERVENTIONS:  - Identify barriers to discharge w/patient and caregiver  - Arrange for needed discharge resources and transportation as appropriate  - Identify discharge learning needs (meds, wound care, etc )  - Arrange for interpretive services to assist at discharge as needed  - Refer to Case Management Department for coordinating discharge planning if the patient needs post-hospital services based on physician/advanced practitioner order or complex needs related to functional status, cognitive ability, or social support system  Outcome: Progressing     Problem: Knowledge Deficit  Goal: Patient/family/caregiver demonstrates understanding of disease process, treatment plan, medications, and discharge instructions  Description: Complete learning assessment and assess knowledge base    Interventions:  - Provide teaching at level of understanding  - Provide teaching via preferred learning methods  Outcome: Progressing

## 2023-04-24 LAB
ABO GROUP BLD BPU: NORMAL
ALBUMIN SERPL BCP-MCNC: 2.9 G/DL (ref 3.5–5)
ALP SERPL-CCNC: 81 U/L (ref 34–104)
ALT SERPL W P-5'-P-CCNC: 21 U/L (ref 7–52)
ANION GAP SERPL CALCULATED.3IONS-SCNC: 6 MMOL/L (ref 4–13)
AST SERPL W P-5'-P-CCNC: 30 U/L (ref 13–39)
BACTERIA UR CULT: NORMAL
BASOPHILS # BLD AUTO: 0.02 THOUSANDS/ΜL (ref 0–0.1)
BASOPHILS NFR BLD AUTO: 0 % (ref 0–1)
BILIRUB SERPL-MCNC: 0.35 MG/DL (ref 0.2–1)
BPU ID: NORMAL
BUN SERPL-MCNC: 3 MG/DL (ref 5–25)
CALCIUM ALBUM COR SERPL-MCNC: 9 MG/DL (ref 8.3–10.1)
CALCIUM SERPL-MCNC: 8.1 MG/DL (ref 8.4–10.2)
CHLORIDE SERPL-SCNC: 110 MMOL/L (ref 96–108)
CO2 SERPL-SCNC: 22 MMOL/L (ref 21–32)
CREAT SERPL-MCNC: 0.38 MG/DL (ref 0.6–1.3)
CROSSMATCH: NORMAL
EOSINOPHIL # BLD AUTO: 0 THOUSAND/ΜL (ref 0–0.61)
EOSINOPHIL NFR BLD AUTO: 0 % (ref 0–6)
ERYTHROCYTE [DISTWIDTH] IN BLOOD BY AUTOMATED COUNT: 13.9 % (ref 11.6–15.1)
GFR SERPL CREATININE-BSD FRML MDRD: 132 ML/MIN/1.73SQ M
GLUCOSE SERPL-MCNC: 91 MG/DL (ref 65–140)
HCT VFR BLD AUTO: 25.7 % (ref 34.8–46.1)
HGB BLD-MCNC: 8.1 G/DL (ref 11.5–15.4)
IMM GRANULOCYTES # BLD AUTO: 0.04 THOUSAND/UL (ref 0–0.2)
IMM GRANULOCYTES NFR BLD AUTO: 1 % (ref 0–2)
LYMPHOCYTES # BLD AUTO: 0.65 THOUSANDS/ΜL (ref 0.6–4.47)
LYMPHOCYTES NFR BLD AUTO: 11 % (ref 14–44)
MAGNESIUM SERPL-MCNC: 1.6 MG/DL (ref 1.9–2.7)
MCH RBC QN AUTO: 25.6 PG (ref 26.8–34.3)
MCHC RBC AUTO-ENTMCNC: 31.5 G/DL (ref 31.4–37.4)
MCV RBC AUTO: 81 FL (ref 82–98)
MONOCYTES # BLD AUTO: 0.42 THOUSAND/ΜL (ref 0.17–1.22)
MONOCYTES NFR BLD AUTO: 7 % (ref 4–12)
NEUTROPHILS # BLD AUTO: 4.65 THOUSANDS/ΜL (ref 1.85–7.62)
NEUTS SEG NFR BLD AUTO: 81 % (ref 43–75)
NRBC BLD AUTO-RTO: 0 /100 WBCS
PLATELET # BLD AUTO: 242 THOUSANDS/UL (ref 149–390)
PMV BLD AUTO: 11.3 FL (ref 8.9–12.7)
POTASSIUM SERPL-SCNC: 3.5 MMOL/L (ref 3.5–5.3)
PROT SERPL-MCNC: 5.8 G/DL (ref 6.4–8.4)
RBC # BLD AUTO: 3.17 MILLION/UL (ref 3.81–5.12)
SODIUM SERPL-SCNC: 138 MMOL/L (ref 135–147)
UNIT DISPENSE STATUS: NORMAL
UNIT PRODUCT CODE: NORMAL
UNIT PRODUCT VOLUME: 350 ML
UNIT RH: NORMAL
WBC # BLD AUTO: 5.78 THOUSAND/UL (ref 4.31–10.16)

## 2023-04-24 RX ORDER — METRONIDAZOLE 500 MG/1
500 TABLET ORAL EVERY 8 HOURS SCHEDULED
Status: DISCONTINUED | OUTPATIENT
Start: 2023-04-24 | End: 2023-04-26 | Stop reason: HOSPADM

## 2023-04-24 RX ORDER — METRONIDAZOLE 500 MG/1
500 TABLET ORAL EVERY 12 HOURS SCHEDULED
Status: DISCONTINUED | OUTPATIENT
Start: 2023-04-24 | End: 2023-04-24

## 2023-04-24 RX ORDER — DOXYCYCLINE HYCLATE 100 MG/1
100 CAPSULE ORAL EVERY 12 HOURS SCHEDULED
Status: DISCONTINUED | OUTPATIENT
Start: 2023-04-24 | End: 2023-04-24

## 2023-04-24 RX ORDER — METHOCARBAMOL 500 MG/1
500 TABLET, FILM COATED ORAL EVERY 6 HOURS PRN
Status: DISCONTINUED | OUTPATIENT
Start: 2023-04-24 | End: 2023-04-25

## 2023-04-24 RX ORDER — MAGNESIUM SULFATE HEPTAHYDRATE 40 MG/ML
2 INJECTION, SOLUTION INTRAVENOUS ONCE
Status: COMPLETED | OUTPATIENT
Start: 2023-04-24 | End: 2023-04-24

## 2023-04-24 RX ADMIN — METRONIDAZOLE 500 MG: 500 INJECTION, SOLUTION INTRAVENOUS at 05:37

## 2023-04-24 RX ADMIN — DOCUSATE SODIUM 100 MG: 100 CAPSULE, LIQUID FILLED ORAL at 09:12

## 2023-04-24 RX ADMIN — CEFTRIAXONE SODIUM 1000 MG: 10 INJECTION, POWDER, FOR SOLUTION INTRAVENOUS at 17:31

## 2023-04-24 RX ADMIN — Medication 150 MG: at 09:12

## 2023-04-24 RX ADMIN — LEVETIRACETAM 1500 MG: 750 TABLET, FILM COATED ORAL at 21:38

## 2023-04-24 RX ADMIN — LEVETIRACETAM 1500 MG: 750 TABLET, FILM COATED ORAL at 09:11

## 2023-04-24 RX ADMIN — MAGNESIUM SULFATE HEPTAHYDRATE 2 G: 40 INJECTION, SOLUTION INTRAVENOUS at 10:39

## 2023-04-24 RX ADMIN — DOCUSATE SODIUM 100 MG: 100 CAPSULE, LIQUID FILLED ORAL at 17:32

## 2023-04-24 RX ADMIN — OXYCODONE HYDROCHLORIDE 10 MG: 10 TABLET ORAL at 05:35

## 2023-04-24 RX ADMIN — GABAPENTIN 100 MG: 100 CAPSULE ORAL at 09:12

## 2023-04-24 RX ADMIN — SODIUM CHLORIDE 125 ML/HR: 0.9 INJECTION, SOLUTION INTRAVENOUS at 21:44

## 2023-04-24 RX ADMIN — METRONIDAZOLE 500 MG: 500 TABLET ORAL at 16:11

## 2023-04-24 RX ADMIN — GABAPENTIN 100 MG: 100 CAPSULE ORAL at 21:38

## 2023-04-24 RX ADMIN — DOXYCYCLINE 100 MG: 100 INJECTION, POWDER, LYOPHILIZED, FOR SOLUTION INTRAVENOUS at 06:32

## 2023-04-24 RX ADMIN — METRONIDAZOLE 500 MG: 500 TABLET ORAL at 21:38

## 2023-04-24 RX ADMIN — GABAPENTIN 100 MG: 100 CAPSULE ORAL at 16:11

## 2023-04-24 NOTE — PROGRESS NOTES
"Gynecology Progress note   Jamaal Saul 38 y o  female MRN: [id number]  Unit/Bed#: E5 -01 Encounter: [id number]    Assessment/Plan:    Ms Bambi Wise is a 38 y o  Tenzin Furth Day: 4, admitted with TOA, and resolving sepsis    Sepsis [facility])  Assessment & Plan  Continue resuscitation via sepsis protocol    * TOA (tubo-ovarian abscess)  Assessment & Plan  · CT scan concerning for TOA presenting with \"Right adnexal inflammatory mass consistent with tubo-ovarian abscess with mild pyosalpinx  Small amount of free fluid in the pelvis  \"  · +straight leg test with pain emanating from right lower back to right quadriceps muscle  · +Carnett sign, -bed bump test again on 4/24  · Suspect musculoskeletal irritation due to positive Carnett's sign, Robaxin trial this AM  · Non-surgical abdomen  · Antibiotics: IV Rocephin 1g q24h, IV Doxycyline 100mg q12h, IV Flagyl 500mg q12h  · Pain: Tylenol/Toradol, Roxicodone 5/10mg, IV dilaudid 1mg q4h for breakthrough; Gabapentin 100mg TID for radicular pain  · 1x dose Zolpidem for rest and insomnia  · FEN: 125cc/h NS, replete PRN, advance diet this AM  · Awaiting bowel movement  · DVT ppx: SCDs     Dispo: Continue inpatient management, plan to have ID come see her today      Subjective:    Jamaal Saul has been sleeping well since her Dilaudid dose  She has not had a bowel movement yet  She has been n p o  in case of need for urgent surgery and she is feeling hungry this morning she is voiding without pain  She again has a positive Carnett's sign on the right side      Objective:  /64   Pulse 84   Temp 99 5 °F (37 5 °C)   Resp 17   Ht 5' 9 02\" (1 753 m)   Wt 77 3 kg (170 lb 6 7 oz)   SpO2 96%   BMI 25 15 kg/m²     I/O last 3 completed shifts:   In: 350 [Blood:350]  Out: 300 [Urine:300]  I/O this shift:  In: -   Out: 20 [Drains:20]    Lab Results   Component Value Date    WBC 4 72 04/23/2023    HGB 7 9 (L) 04/23/2023    HCT 24 9 (L) 04/23/2023    MCV 83 04/23/2023    PLT " 193 04/23/2023       Lab Results   Component Value Date    GLUCOSE 99 11/26/2014    CALCIUM 7 9 (L) 04/23/2023     11/26/2014    K 3 7 04/23/2023    CO2 23 04/23/2023     (H) 04/23/2023    BUN 6 04/23/2023    CREATININE 0 47 (L) 04/23/2023           Physical Exam  Constitutional:       Appearance: She is normal weight  She is not toxic-appearing  HENT:      Nose: Nose normal       Mouth/Throat:      Mouth: Mucous membranes are moist    Cardiovascular:      Rate and Rhythm: Normal rate  Pulmonary:      Effort: Pulmonary effort is normal    Abdominal:      General: Abdomen is flat  Palpations: Abdomen is soft  There is no mass  Tenderness: There is abdominal tenderness  There is no guarding or rebound  Comments: Pain over lower right quadrant near drain site  Minimal output in drain   Musculoskeletal:         General: Normal range of motion  Skin:     General: Skin is warm  Neurological:      Mental Status: She is alert     Psychiatric:         Behavior: Behavior normal            Sindy Soulier, DO  4/24/2023  6:44 AM

## 2023-04-24 NOTE — SEPSIS NOTE
"  Sepsis Note   Roc Guy 38 y o  female MRN: [id number]  Unit/Bed#: E5 -01 Encounter: [id number]  Plan:   - Continue sepsis protocol  - Vital signs stable and within normal limits at this time   - now s/p IR drainage of TOA completed c/w 20cc reddish brown fluid  - Follow-up fluid culture and gram stain results  - Continue IV ceftriaxone, doxycyline, flagyl   - ID consulted and plan to see 4/24     Initial Sepsis Screening     Row Name 04/23/23 0913 04/23/23 0909             Is the patient's history suggestive of a new or worsening infection? -- Yes (Proceed)  -JT       Suspected source of infection -- acute abdominal infection  -JT       Indicate SIRS criteria -- Hyperthemia > 38 3C (100 9F) OR Hypothermia <36C (96 8F); Tachycardia > 90 bpm  -JT       Are two or more of the above signs & symptoms of infection both present and new to the patient? -- Yes (Proceed)  -JT       Assess for evidence of organ dysfunction: Are any of the below criteria present within 6 hours of suspected infection and SIRS criteria that are NOT considered to be chronic conditions? -- SBP < 90  -JT       Date of presentation of septic shock -- --       Time of presentation of septic shock -- --       Fluid Resuscitation: 30 ml/kg IV fluid bolus will be given based on ideal body weight (use if BMI >30)  -JT --       Is the patient is persistently hypotensive in the hour after fluid bolus administration? If yes, patient meets criteria for vasopressor use  -- --       Sepsis Note: Click \"NEXT\" below (NOT \"close\") to generate sepsis note based on above information   -- --             User Key  (r) = Recorded By, (t) = Taken By, (c) = Cosigned By    [address] Name Provider Type    Zeeshan Marshall MD Physician                Default Flowsheet Data (last 720 hours)     Sepsis Reassess     Row Name 04/24/23 0016 04/23/23 1849 04/23/23 1505             Repeat Volume Status and Tissue Perfusion Assessment Performed    Date of Reassessment: " "23  -KG 23  -KG 23  -JOHN      Time of Reassessment: 0016  -KG 1849  -KG 1505  -JOHN      Sepsis Reassessment Note: Click \"NEXT\" below (NOT \"close\") to generate sepsis reassessment note  YES (proceed by clicking \"NEXT\")  -KG YES (proceed by clicking \"NEXT\")  -KG YES (proceed by clicking \"NEXT\")  -JOHN      Repeat Volume Status and Tissue Perfusion Assessment Performed -- -- --            User Key  (r) = Recorded By, (t) = Taken By, (c) = Cosigned By    [address] Name Provider Type    Alanna Crockett MD Resident    Lawyer Violette MD Resident                Body mass index is 25 15 kg/m²    Wt Readings from Last 1 Encounters:   23 77 3 kg (170 lb 6 7 oz)        Ideal body weight: 66 2 kg (146 lb 0 4 oz)  Adjusted ideal body weight: 70 7 kg (155 lb 12 5 oz)  "

## 2023-04-24 NOTE — UTILIZATION REVIEW
Notification of Unplanned, Urgent, or   Emergency Inpatient Admission   [address] [address] [address] [facility]  Tax ID: [id number]  NPI: [id number]  Place of Service: [facility]  [address]  Admission Level of Care: Inpatient  Place of Service Code: 24     Attending Physician Information  Attending Name and NPI#: Ollie Moreira [[id number]]  Phone: [phone]     Admission Information  Inpatient Admission Date/Time: 4/23/23  7:35 AM  Discharge Date/Time: No discharge date for patient encounter  Admitting Diagnosis Code/Description:  Tubo-ovarian abscess [N70 [id number]]     Utilization Review Contact  Tomas Francois Utilization   Phone: [phone]  Fax: [phone]  Email: [email or web address]  Contact for approvals/pending authorizations, clinical reviews, and discharge  Physician Advisory Services Contact  Medical Necessity Denial & [employer]  Phone: [phone]  Fax: [phone]  Email: [email or web address]

## 2023-04-24 NOTE — CONSULTS
Consultation - [facility]ious [facility]   Tula Mcburney 38 y o  female MRN: [id number]  Unit/Bed#: E5 -01 Encounter: [id number]      IMPRESSION & RECOMMENDATIONS:   1  Severe sepsis, evolving from admission, e/b fever, tachycardia, hypotension  Suspect this is due to #2  No other source appreciated  Blood cultures negative  Urine culture with mixed contaminants  Tmax 102 7*F  WBC normal  She is now hemodynamically stable    -continue IV antibiotics as below   -follow-up blood cultures   -follow up IR aspirate culture   -monitor CBCD, temperature and hemodynamics  -supportive care per primary team      2  Right tubo-ovarian abscess  Pt presented with R sided abdominopelvic pain  CT showed R adnexal mass consistent with TOA with mild pyosalpinx  S/p IR aspiration 4/23 and drain remains in place  Culture so far with 3+ polys and 2+ GNR  Low risk for STI  Abdominal pain slowly improving    -continue IV ceftriaxone  -continue metronidazole and doxycycline but change to PO   -follow up aspirate culture and de-escalate abx as able   -serial abdominal exams   -monitor drain output; care per IR   -close GYN follow up      3  Chronic pelvic pain due to endometriosis and suspected adenomyosis  Planned for supracervical TLH, RSO on 6/12/23    -close follow up with GYN outpatient      4  Hx of Spinocerebellar degeneration with migraine headaches and known seizure disorder  Continue home AEDs  -outpatient follow up with neurology     Antibiotics:  IV doxy, flagyl, ceftriaxone D4    I have discussed the above management plan in detail with patient  I have discussed the above management plan in detail with patient's RN, and the primary service, OBGYN  ID consult service will continue to follow      HISTORY OF PRESENT ILLNESS:  Reason for Consult: TOA, sepsis     HPI: Tula Mcburney is a 38y o  year old female with spinocerebellar degeneration, migraines, seizure disorder, chronic pelvic pain due to endometriosis and suspected adenomyosis who was admitted with right sided lower abdominal pain  Patient reports acute on chronic pain over the last 1 week  No associated fevers or chills  No dysuria, urgency, frequency or blood in urine  No vaginal bleeding or vaginal pain  She was seen at OSH last week with similar presentation and d/c home with pain medication  Despite medical management, she presented back to ED with persistent pain  During my visit with patient her  was present and able to offer some history and translation to patient  They have been  for approx 11 years  They are sexually active and deny any new partners  No recent travel  No recent abx use  No prior hx of HIV or known STI  She is scheduled for supracervical TLH, RSO on 23  Since admission patient has spiked high fevers  She was hypotensive breifly but BP fortunately responded to IVF resuscitation  Blood cx are so far negative  REVIEW OF SYSTEMS:  A complete review of systems is negative other than that noted in the HPI  PAST MEDICAL HISTORY:  Past Medical History:   Diagnosis Date   • B12 deficiency    • Chronic migraine without aura    • Dermatographism    • Endometriosis    • Iron deficiency anemia    • Lyme neuropathy    • Seizure disorder ([address] )    • 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HCC)    • Visual disturbance     Diplopia, nystagmus, decreased visual acuity   • Vitamin D deficiency      Past Surgical History:   Procedure Laterality Date   •  SECTION     • ESOPHAGOGASTRODUODENOSCOPY N/A 3/8/2019    Procedure: ESOPHAGOGASTRODUODENOSCOPY (EGD); Surgeon: Addy Reese MD;  Location: [facility] GI [facility];   Service: Gastroenterology   • IR DRAINAGE TUBE PLACEMENT  2023   • OOPHORECTOMY      unilateral --  last assessed 17   • TUBAL LIGATION      last assessed 17       FAMILY HISTORY:  Non-contributory    SOCIAL HISTORY:  Social History   Social History     Substance and Sexual Activity   Alcohol Use No     Social History     Substance and Sexual Activity   Drug Use No     Social History     Tobacco Use   Smoking Status Never   Smokeless Tobacco Never       ALLERGIES:  Allergies   Allergen Reactions   • Morphine Itching     Pt denies breathing issues   • Dye Fdc Red  [Red Dye - Food Allergy]        MEDICATIONS:  All current active medications have been reviewed  PHYSICAL EXAM:  Temp:  [97 9 °F (36 6 °C)-102 7 °F (39 3 °C)] 98 8 °F (37 1 °C)  HR:  [] 86  Resp:  [16-20] 18  BP: ()/(53-77) 105/68  SpO2:  [94 %-100 %] 97 %  Temp (24hrs), Av 1 °F (37 3 °C), Min:97 9 °F (36 6 °C), Max:102 7 °F (39 3 °C)  Current: Temperature: 98 8 °F (37 1 °C)    Intake/Output Summary (Last 24 hours) at 2023 0848  Last data filed at 2023 2785  Gross per 24 hour   Intake 350 ml   Output 25 ml   Net 325 ml       General Appearance:  Appearing acutely ill, nontoxic, in mild distress due to pain/discomfort    Head:  Normocephalic, without obvious abnormality, atraumatic   Eyes:  Conjunctiva pink and sclera anicteric, both eyes   Nose: Nares normal, mucosa normal, no drainage   Throat: Oropharynx dry without lesions   Neck: Supple, symmetrical, no adenopathy, no tenderness/mass/nodules   Back:   Symmetric, no curvature, ROM normal    Lungs:   Clear to auscultation bilaterally, respirations unlabored   Chest Wall:  No tenderness or deformity   Heart:  RRR; no murmur, rub or gallop   Abdomen:   Soft, though diffusely tender right hemiabdomen,  non-distended, positive bowel sounds  RLQ JOHN drain in place with scant SS output  Skin: No rashes or lesions  No draining wounds noted  Lymph nodes: Cervical, supraclavicular nodes normal   : No CVA or suprapubic tenderness     Neurologic: Alert and oriented times 3, extremity strength 5/5 and symmetric       LABS, IMAGING, & OTHER STUDIES:  Extensive review of the medical records in epic including review of the notes, radiographs, and laboratory results were reviewed personally as below  Lab Results:  I have personally reviewed pertinent labs  Results from last 7 days   Lab Units 04/24/23  0640 04/23/23  1651 04/23/23  0522   WBC Thousand/uL 5 78 4 72 7 24   HEMOGLOBIN g/dL 8 1* 7 9* 6 9*   PLATELETS Thousands/uL 242 193 220     Results from last 7 days   Lab Units 04/24/23  0640 04/23/23  0522 04/22/23  2040   SODIUM mmol/L 138 138 136   POTASSIUM mmol/L 3 5 3 7 3 2*   CHLORIDE mmol/L 110* 109* 106   CO2 mmol/L 22 23 24   BUN mg/dL 3* 6 7   CREATININE mg/dL 0 38* 0 47* 0 57*   EGFR ml/min/1 73sq m 132 123 116   CALCIUM mg/dL 8 1* 7 9* 8 5   AST U/L 30 11* 9*   ALT U/L 21 12 13   ALK PHOS U/L 81 56 60     Results from last 7 days   Lab Units 04/23/23  1459 04/23/23  0133 04/22/23 2039   BLOOD CULTURE   --   --  No Growth at 24 hrs  No Growth at 24 hrs  GRAM STAIN RESULT  3+ Polys*  2+ Gram negative rods*  --   --    URINE CULTURE   --  10,000-19,000 cfu/ml  --                        Imaging Studies:   I have personally reviewed pertinent imaging study reports and images in PACS, including CT A/P  Other Studies:   I have personally reviewed pertinent report including blood cx, urine cx, IR aspirate cx  I have spent a total time of 80 minutes on 04/24/23 in caring for this patient including Patient and family education, Documenting in the medical record, Reviewing / ordering tests, medicine, procedures  , Obtaining or reviewing history   and Communicating with other healthcare professionals

## 2023-04-24 NOTE — SEPSIS NOTE
"  Sepsis Note   Jaydon Metzger 38 y o  female MRN: [id number]  Unit/Bed#: E5 -01 Encounter: [id number]    - Continue sepsis protocol  - Vital signs stable and within normal limits at this time, T last 4/23 at [address] s/p IR drainage of TOA completed c/w 20cc reddish brown fluid  - Follow-up fluid culture and gram stain results  - Continue IV ceftriaxone, doxycyline, flagyl   - ID consulted and plan to see 4/24   Initial Sepsis Screening     Row Name 04/23/23 0913 04/23/23 0909             Is the patient's history suggestive of a new or worsening infection? -- Yes (Proceed)  -JT       Suspected source of infection -- acute abdominal infection  -JT       Indicate SIRS criteria -- Hyperthemia > 38 3C (100 9F) OR Hypothermia <36C (96 8F); Tachycardia > 90 bpm  -JT       Are two or more of the above signs & symptoms of infection both present and new to the patient? -- Yes (Proceed)  -JT       Assess for evidence of organ dysfunction: Are any of the below criteria present within 6 hours of suspected infection and SIRS criteria that are NOT considered to be chronic conditions? -- SBP < 90  -JT       Date of presentation of septic shock -- --       Time of presentation of septic shock -- --       Fluid Resuscitation: 30 ml/kg IV fluid bolus will be given based on ideal body weight (use if BMI >30)  -JT --       Is the patient is persistently hypotensive in the hour after fluid bolus administration? If yes, patient meets criteria for vasopressor use  -- --       Sepsis Note: Click \"NEXT\" below (NOT \"close\") to generate sepsis note based on above information   -- --             User Key  (r) = Recorded By, (t) = Taken By, (c) = Cosigned By    [address] Name Provider Type    Susan Streeter MD Physician                Default Flowsheet Data (last 720 hours)     Sepsis Reassess     Row Name 04/24/23 0347 04/24/23 0016 04/23/23 1849 04/23/23 1505          Repeat Volume Status and Tissue Perfusion Assessment Performed " "   Date of Reassessment: 04/24/23  -KG 04/24/23  -KG 04/23/23  -KG 04/23/23  -JOHN     Time of Reassessment: 0347  -KG 0016  -KG 1849  -KG 1505  -JOHN     Sepsis Reassessment Note: Click \"NEXT\" below (NOT \"close\") to generate sepsis reassessment note  YES (proceed by clicking \"NEXT\")  -KG YES (proceed by clicking \"NEXT\")  -KG YES (proceed by clicking \"NEXT\")  -KG YES (proceed by clicking \"NEXT\")  -JOHN     Repeat Volume Status and Tissue Perfusion Assessment Performed -- -- -- --           User Key  (r) = Recorded By, (t) = Taken By, (c) = Cosigned By    [address] Name Provider Type    Baltazar Bradley MD Resident    Oli Garcia MD Resident                Body mass index is 25 15 kg/m²    Wt Readings from Last 1 Encounters:   04/21/23 77 3 kg (170 lb 6 7 oz)        Ideal body weight: 66 2 kg (146 lb 0 4 oz)  Adjusted ideal body weight: 70 7 kg (155 lb 12 5 oz)  "

## 2023-04-24 NOTE — UTILIZATION REVIEW
Continued Stay Review    Date: 04/24/23                          Current Patient Class: IP  Current Level of Care: Med/Surg    HPI:42 y o  female initially admitted on 4/21 as OBS, changed to IP on 4/23 for tubo-ovarian abscess  Assessment/Plan: Report not BM yet, but has been NPO in case of need for urgent surgery  On exam, positive Carnett's sign on R side; RLQ pain, minimal output in drain  Plan: continue IV ABX, trial robaxin, analgesics, IVF, Trend labs, replete electrolytes as needed  Advance diet this morning  Infectious Disease Consult: Pt w/ sepsis evolving from admission  Follow cultures, continue IV ABX  Trend labs, replete electrolytes as needed      Vital Signs:   Date/Time Temp Pulse Resp BP MAP (mmHg) SpO2 Calculated FIO2 (%) - Nasal Cannula Nasal Cannula O2 Flow Rate (L/min) O2 Device   04/24/23 08:36:43 98 8 °F (37 1 °C) 86 18 105/68 80 97 % -- -- None (Room air)   04/24/23 07:20:46 99 2 °F (37 3 °C) 83 18 104/67 79 96 % -- -- None (Room air)   04/24/23 06:42:14 99 5 °F (37 5 °C) 84 17 104/64 77 96 % -- -- None (Room air)   04/24/23 05:55:44 99 7 °F (37 6 °C) -- 18 107/66 80 -- -- -- --   04/24/23 04:57:25 99 7 °F (37 6 °C) -- 19 109/77 88 -- -- -- --   04/24/23 03:50:29 98 7 °F (37 1 °C) 82 19 104/68 80 -- -- -- --   04/24/23 02:45:54 98 7 °F (37 1 °C) 82 18 105/64 78 97 % -- -- --   04/23/23 23:21:46 98 3 °F (36 8 °C) 80 18 92/55 67 96 % -- -- None (Room air)   04/23/23 19:29:54 102 7 °F (39 3 °C)   Abnormal  100 20 107/63 78 96 % -- -- None (Room air)   04/23/23 15:27:13 98 8 °F (37 1 °C) 88 17 110/68 82 94 % -- -- None (Room air)   04/23/23 1454 -- 90 16 103/66 -- 100 % -- -- None (Room air)   04/23/23 1449 -- 88 17 100/59 -- 100 % -- -- Nasal cannula   04/23/23 1444 -- 87 16 100/58 -- 100 % -- -- Nasal cannula   04/23/23 1443 -- 88 16 105/53 -- 100 % 32 3 L/min Nasal cannula   04/23/23 1438 -- 88 16 128/60 -- 100 % 32 3 L/min Nasal cannula   04/23/23 1355 -- 87 -- -- -- -- -- -- -- 04/23/23 13:51:39 97 9 °F (36 6 °C) -- 16 105/62 76 -- -- -- --   04/23/23 11:05:29 98 4 °F (36 9 °C) 84 16 94/62 73 97 % -- -- --   04/23/23 10:45:30 98 7 °F (37 1 °C) 86 16 96/61 73 98 % -- -- --   04/23/23 1045 98 7 °F (37 1 °C) 86 18 96/61 -- -- -- -- --   04/23/23 09:00:05 98 9 °F (37 2 °C) 84 -- 88/54 Abnormal  65 97 % -- -- --   04/23/23 08:59:16 98 9 °F (37 2 °C) 86 17 88/54 Abnormal  65 97 % -- -- None (Room air)   04/23/23 08:58:11 98 9 °F (37 2 °C) 85 17 89/53 Abnormal  65 97 % -- -- None (Room air)       Pertinent Labs/Diagnostic Results:   4/23 - CXR  No acute cardiopulmonary disease        Results from last 7 days   Lab Units 04/23/23  0809   SARS-COV-2  Negative     Results from last 7 days   Lab Units 04/24/23  0640 04/23/23  1651 04/23/23  0522 04/22/23 2040 04/22/23  0450   WBC Thousand/uL 5 78 4 72 7 24 6 10 6 91   HEMOGLOBIN g/dL 8 1* 7 9* 6 9* 7 5* 7 4*   HEMATOCRIT % 25 7* 24 9* 21 8* 24 1* 24 8*   PLATELETS Thousands/uL 242 193 220 215 207   NEUTROS ABS Thousands/µL 4 65 3 72 6 33 5 40  --          Results from last 7 days   Lab Units 04/24/23  0640 04/23/23  0522 04/22/23 2040 04/22/23  0450 04/21/23  1445   SODIUM mmol/L 138 138 136 140 140   POTASSIUM mmol/L 3 5 3 7 3 2* 3 7 3 3*   CHLORIDE mmol/L 110* 109* 106 110* 106   CO2 mmol/L 22 23 24 24 26   ANION GAP mmol/L 6 6 6 6 8   BUN mg/dL 3* 6 7 7 5   CREATININE mg/dL 0 38* 0 47* 0 57* 0 45* 0 56*   EGFR ml/min/1 73sq m 132 123 116 125 117   CALCIUM mg/dL 8 1* 7 9* 8 5 8 4 9 0   MAGNESIUM mg/dL 1 6*  --   --  1 8*  --      Results from last 7 days   Lab Units 04/24/23  0640 04/23/23  0522 04/22/23  2040 04/22/23  0450 04/21/23  1445   AST U/L 30 11* 9* 10* 14   ALT U/L 21 12 13 16 20   ALK PHOS U/L 81 56 60 57 58   TOTAL PROTEIN g/dL 5 8* 5 6* 6 3* 6 0* 6 4   ALBUMIN g/dL 2 9* 2 8* 3 1* 3 1* 3 6   TOTAL BILIRUBIN mg/dL 0 35 0 34 0 30 0 32 0 44         Results from last 7 days   Lab Units 04/24/23  0640 04/23/23  0522 04/22/23 2040 04/22/23  0450 04/21/23  1445   GLUCOSE RANDOM mg/dL 91 108 121 83 97     Results from last 7 days   Lab Units 04/23/23  1135   LACTIC ACID mmol/L 1 0       Results from last 7 days   Lab Units 04/21/23  1501   CLARITY UA  Clear   COLOR UA  Tiny*   SPEC GRAV UA  1 010   PH UA  8 0   GLUCOSE UA mg/dl Negative   KETONES UA mg/dl 50 (2+)*   BLOOD UA  25 0*   PROTEIN UA mg/dl Negative   NITRITE UA  Negative   BILIRUBIN UA  Negative   UROBILINOGEN UA mg/dL 4 0*   LEUKOCYTES UA  25 0*   WBC UA /hpf 1-2   RBC UA /hpf 1-2   BACTERIA UA /hpf Occasional   EPITHELIAL CELLS WET PREP /hpf Occasional     Results from last 7 days   Lab Units 04/23/23  0809   INFLUENZA A PCR  Negative   INFLUENZA B PCR  Negative   RSV PCR  Negative           Results from last 7 days   Lab Units 04/23/23  1459 04/23/23  0133 04/22/23 2039   BLOOD CULTURE   --   --  No Growth at 24 hrs  No Growth at 24 hrs  GRAM STAIN RESULT  3+ Polys*  2+ Gram negative rods*  --   --    URINE CULTURE   --  10,000-19,000 cfu/ml  --          Medications:   Scheduled Medications:  cefTRIAXone, 1,000 mg, Intravenous, Q24H  docusate sodium, 100 mg, Oral, BID  doxycycline, 100 mg, Intravenous, Q12H  ferrous sulfate, 150 mg, Oral, Daily  gabapentin, 100 mg, Oral, TID  levETIRAcetam, 1,500 mg, Oral, BID  metroNIDAZOLE, 500 mg, Intravenous, Q12H      Continuous IV Infusions:  sodium chloride, 125 mL/hr, Intravenous, Continuous      PRN Meds:  acetaminophen, 975 mg, Oral, Q6H PRN; 4/23 x1  HYDROmorphone, 1 mg, Intravenous, Q4H PRN; 4/23 x1  ibuprofen, 600 mg, Oral, Q6H PRN  methocarbamol, 500 mg, Oral, Q6H PRN  ondansetron, 4 mg, Intravenous, Q6H PRN  oxyCODONE, 10 mg, Oral, Q4H PRN; 4/24 x1  oxyCODONE, 5 mg, Oral, Q4H PRN          Network Utilization Review Department  ATTENTION: Please call with any questions or concerns to [phone] and carefully listen to the prompts so that you are directed to the right person   All voicemails are confidential   Sarah Jay all requests for admission clinical reviews, approved or denied determinations and any other requests to dedicated fax number below belonging to the campus where the patient is receiving treatment   List of dedicated fax numbers for the Facilities:  [address] [place] [address] DENIALS (Administrative/Medical Necessity) [phone]   [address] (Maternity/NICU/Pediatrics) [phone]   [address] [phone]   [facility] 77 [phone]   [address] 28 [phone]   15[address] [facility] 134 815 [facility] [phone]

## 2023-04-25 LAB
ALBUMIN SERPL BCP-MCNC: 2.9 G/DL (ref 3.5–5)
ALP SERPL-CCNC: 74 U/L (ref 34–104)
ALT SERPL W P-5'-P-CCNC: 16 U/L (ref 7–52)
ANION GAP SERPL CALCULATED.3IONS-SCNC: 5 MMOL/L (ref 4–13)
AST SERPL W P-5'-P-CCNC: 13 U/L (ref 13–39)
BASOPHILS # BLD AUTO: 0.01 THOUSANDS/ΜL (ref 0–0.1)
BASOPHILS NFR BLD AUTO: 0 % (ref 0–1)
BILIRUB SERPL-MCNC: 0.28 MG/DL (ref 0.2–1)
BUN SERPL-MCNC: 4 MG/DL (ref 5–25)
CALCIUM ALBUM COR SERPL-MCNC: 9.1 MG/DL (ref 8.3–10.1)
CALCIUM SERPL-MCNC: 8.2 MG/DL (ref 8.4–10.2)
CHLORIDE SERPL-SCNC: 112 MMOL/L (ref 96–108)
CO2 SERPL-SCNC: 23 MMOL/L (ref 21–32)
CREAT SERPL-MCNC: 0.41 MG/DL (ref 0.6–1.3)
EOSINOPHIL # BLD AUTO: 0.02 THOUSAND/ΜL (ref 0–0.61)
EOSINOPHIL NFR BLD AUTO: 1 % (ref 0–6)
ERYTHROCYTE [DISTWIDTH] IN BLOOD BY AUTOMATED COUNT: 14.3 % (ref 11.6–15.1)
GFR SERPL CREATININE-BSD FRML MDRD: 129 ML/MIN/1.73SQ M
GLUCOSE SERPL-MCNC: 94 MG/DL (ref 65–140)
HCT VFR BLD AUTO: 25.5 % (ref 34.8–46.1)
HGB BLD-MCNC: 8.1 G/DL (ref 11.5–15.4)
IMM GRANULOCYTES # BLD AUTO: 0.02 THOUSAND/UL (ref 0–0.2)
IMM GRANULOCYTES NFR BLD AUTO: 1 % (ref 0–2)
LYMPHOCYTES # BLD AUTO: 0.74 THOUSANDS/ΜL (ref 0.6–4.47)
LYMPHOCYTES NFR BLD AUTO: 21 % (ref 14–44)
MAGNESIUM SERPL-MCNC: 1.8 MG/DL (ref 1.9–2.7)
MCH RBC QN AUTO: 25.9 PG (ref 26.8–34.3)
MCHC RBC AUTO-ENTMCNC: 31.8 G/DL (ref 31.4–37.4)
MCV RBC AUTO: 82 FL (ref 82–98)
MONOCYTES # BLD AUTO: 0.39 THOUSAND/ΜL (ref 0.17–1.22)
MONOCYTES NFR BLD AUTO: 11 % (ref 4–12)
NEUTROPHILS # BLD AUTO: 2.42 THOUSANDS/ΜL (ref 1.85–7.62)
NEUTS SEG NFR BLD AUTO: 66 % (ref 43–75)
NRBC BLD AUTO-RTO: 0 /100 WBCS
PLATELET # BLD AUTO: 237 THOUSANDS/UL (ref 149–390)
PMV BLD AUTO: 11.4 FL (ref 8.9–12.7)
POTASSIUM SERPL-SCNC: 3.4 MMOL/L (ref 3.5–5.3)
PROT SERPL-MCNC: 5.7 G/DL (ref 6.4–8.4)
RBC # BLD AUTO: 3.13 MILLION/UL (ref 3.81–5.12)
SODIUM SERPL-SCNC: 140 MMOL/L (ref 135–147)
WBC # BLD AUTO: 3.6 THOUSAND/UL (ref 4.31–10.16)

## 2023-04-25 RX ORDER — METHOCARBAMOL 500 MG/1
500 TABLET, FILM COATED ORAL 2 TIMES DAILY
Status: DISCONTINUED | OUTPATIENT
Start: 2023-04-25 | End: 2023-04-26 | Stop reason: HOSPADM

## 2023-04-25 RX ORDER — LANOLIN ALCOHOL/MO/W.PET/CERES
400 CREAM (GRAM) TOPICAL 2 TIMES DAILY
Status: DISCONTINUED | OUTPATIENT
Start: 2023-04-25 | End: 2023-04-26 | Stop reason: HOSPADM

## 2023-04-25 RX ORDER — POTASSIUM CHLORIDE 20 MEQ/1
40 TABLET, EXTENDED RELEASE ORAL ONCE
Status: COMPLETED | OUTPATIENT
Start: 2023-04-25 | End: 2023-04-25

## 2023-04-25 RX ADMIN — METRONIDAZOLE 500 MG: 500 TABLET ORAL at 23:03

## 2023-04-25 RX ADMIN — LEVETIRACETAM 1500 MG: 750 TABLET, FILM COATED ORAL at 09:28

## 2023-04-25 RX ADMIN — SODIUM CHLORIDE 125 ML/HR: 0.9 INJECTION, SOLUTION INTRAVENOUS at 23:04

## 2023-04-25 RX ADMIN — POTASSIUM CHLORIDE 40 MEQ: 1500 TABLET, EXTENDED RELEASE ORAL at 11:20

## 2023-04-25 RX ADMIN — SODIUM CHLORIDE 125 ML/HR: 0.9 INJECTION, SOLUTION INTRAVENOUS at 05:44

## 2023-04-25 RX ADMIN — MAGNESIUM OXIDE TAB 400 MG (241.3 MG ELEMENTAL MG) 400 MG: 400 (241.3 MG) TAB at 17:21

## 2023-04-25 RX ADMIN — METRONIDAZOLE 500 MG: 500 TABLET ORAL at 13:41

## 2023-04-25 RX ADMIN — LEVETIRACETAM 1500 MG: 750 TABLET, FILM COATED ORAL at 20:25

## 2023-04-25 RX ADMIN — GABAPENTIN 100 MG: 100 CAPSULE ORAL at 15:06

## 2023-04-25 RX ADMIN — DOCUSATE SODIUM 100 MG: 100 CAPSULE, LIQUID FILLED ORAL at 17:21

## 2023-04-25 RX ADMIN — Medication 150 MG: at 09:31

## 2023-04-25 RX ADMIN — CEFTRIAXONE SODIUM 1000 MG: 10 INJECTION, POWDER, FOR SOLUTION INTRAVENOUS at 16:02

## 2023-04-25 RX ADMIN — SODIUM CHLORIDE 125 ML/HR: 0.9 INJECTION, SOLUTION INTRAVENOUS at 13:42

## 2023-04-25 RX ADMIN — MAGNESIUM OXIDE TAB 400 MG (241.3 MG ELEMENTAL MG) 400 MG: 400 (241.3 MG) TAB at 11:20

## 2023-04-25 RX ADMIN — METHOCARBAMOL 500 MG: 500 TABLET ORAL at 17:21

## 2023-04-25 RX ADMIN — DOCUSATE SODIUM 100 MG: 100 CAPSULE, LIQUID FILLED ORAL at 09:28

## 2023-04-25 RX ADMIN — METHOCARBAMOL 500 MG: 500 TABLET ORAL at 09:28

## 2023-04-25 RX ADMIN — GABAPENTIN 100 MG: 100 CAPSULE ORAL at 20:25

## 2023-04-25 RX ADMIN — GABAPENTIN 100 MG: 100 CAPSULE ORAL at 09:28

## 2023-04-25 RX ADMIN — METRONIDAZOLE 500 MG: 500 TABLET ORAL at 05:46

## 2023-04-25 NOTE — PROGRESS NOTES
"Gynecology Progress note   Esme Burnett 38 y o  female MRN: [id number]  Unit/Bed#: E5 -01 Encounter: [id number]    Assessment/Plan:    Ms Suresh Gabriel is a 38 y o  [address] Day: 5, admitted with Right sided TOA and sepsis, improving  Sepsis [facility])  Assessment & Plan  Continue resuscitation via sepsis protocol    * TOA (tubo-ovarian abscess)  Assessment & Plan  · CT scan concerning for TOA presenting with \"Right adnexal inflammatory mass consistent with tubo-ovarian abscess with mild pyosalpinx  Small amount of free fluid in the pelvis  \"  · Negative GC/chlamydia on 4/19/2023 at [facility]  · +Carnett sign, -bed bump test again on 4/24  · Suspect musculoskeletal irritation due to positive Carnett's sign, Robaxin ordered  · Non-surgical abdomen  · Antibiotics:  IV Rocephin 1g q24h, oral Flagyl 500mg q12h  · Consider transition to oral Keflex today as she has been afebrile for 48 hours  · IR drain fluid growing polys and gram negative rods  · Pain: Tylenol/Toradol, Oxy, Robaxin  · Diet: regular  · DVT ppx: SCDs           Subjective:    Esme Burnett has no current complaints  Night events included throat pain around 4 AM which had resolved by the time the overnight resident evaluated  Spencer Holt was too sleepy to talk to me more about at this morning, but her nurse and  stated that her throat did not hurt, it just felt scratchy, and she does not desire any cough drops or any further work-up  She is not feeling very hungry, but is tolerating p o , denies nausea/vomiting  She has not yet had a bowel movement  Magnesium was repleted yesterday, follow-up labs which are pending at time of dictation    Objective:  /64   Pulse 90   Temp 99 2 °F (37 3 °C) (Temporal)   Resp 18   Ht 5' 9 02\" (1 753 m)   Wt 77 3 kg (170 lb 6 7 oz)   SpO2 97%   BMI 25 15 kg/m²     I/O last 3 completed shifts:   In: 350 [Blood:350]  Out: 25 [Drains:25]  I/O this shift:  In: 2000 [I V :2000]  Out: 5 [Drains:5]    Lab Results " Component Value Date    WBC 5 78 04/24/2023    HGB 8 1 (L) 04/24/2023    HCT 25 7 (L) 04/24/2023    MCV 81 (L) 04/24/2023     04/24/2023       Lab Results   Component Value Date    GLUCOSE 99 11/26/2014    CALCIUM 8 1 (L) 04/24/2023     11/26/2014    K 3 5 04/24/2023    CO2 22 04/24/2023     (H) 04/24/2023    BUN 3 (L) 04/24/2023    CREATININE 0 38 (L) 04/24/2023           Physical Exam  Constitutional:       Appearance: She is normal weight  Cardiovascular:      Rate and Rhythm: Normal rate  Pulmonary:      Effort: No respiratory distress  Abdominal:      General: Abdomen is flat  Palpations: Abdomen is soft  Comments: Mild tenderness to palpation over right lower quadrant, JOHN drain with 5 cc of sanguinous fluid   Skin:     General: Skin is warm and dry  Neurological:      General: No focal deficit present  Mental Status: She is alert     Psychiatric:         Mood and Affect: Mood normal            Chase Mceke DO  4/25/2023  6:44 AM

## 2023-04-25 NOTE — PROGRESS NOTES
Progress Note - [facility] Infectious Disease   Erwin Shanks 38 y o  female MRN: [id number]  Unit/Bed#: E5 -01 Encounter: [id number]      IMPRESSION & RECOMMENDATIONS:   1  Severe sepsis, evolving from admission, e/b fever, tachycardia, hypotension  Suspect this is due to #2  No other source appreciated  Blood cultures negative  Urine culture with mixed contaminants  Fevers resolved  WBC normal  She is now hemodynamically stable    -continue IV antibiotics as below   -follow-up blood cultures   -follow up IR aspirate culture   -monitor CBCD, temperature and hemodynamics  -supportive care per primary team      2  Right tubo-ovarian abscess  Pt presented with R sided abdominopelvic pain  CT showed R adnexal mass consistent with TOA with mild pyosalpinx  S/p IR aspiration 4/23 and drain remains in place  Culture so far with 3+ polys and 2+ GNR  Low risk for STI  Pain slowly improving    -continue IV ceftriaxone and PO metronidazole   -follow up aspirate culture and de-escalate abx as able   -serial abdominal exams   -monitor drain output; care per IR   -close GYN follow up      3  Chronic pelvic pain due to endometriosis and suspected adenomyosis  Planned for supracervical TLH, RSO on 6/12/23    -close follow up with GYN outpatient      4  Hx of Spinocerebellar degeneration with migraine headaches and known seizure disorder  Continue home AEDs  -outpatient follow up with neurology     Antibiotics:  D5 IV ceftriaxone, flagyl     I have discussed the above management plan in detail with patient and  at bedside  I have discussed the above management plan in detail with patient's RN, and the primary service, GYN      24 Hour events:  Yesterday and overnight notes reviewed  Now remains afebrile  Subjective:  Patient has no fever, chills, sweats; no nausea, vomiting, diarrhea; no cough, shortness of breath; continues to have RLQ abdominal pain  Tolerating the abx       Objective:  Vitals:  Temp:  [98 6 °F (37 °C)-99 6 °F (37 6 °C)] 98 6 °F (37 °C)  HR:  [86-97] 87  Resp:  [16-20] 16  BP: (102-118)/(58-72) 106/58  SpO2:  [97 %-98 %] 97 %  Temp (24hrs), Av °F (37 2 °C), Min:98 6 °F (37 °C), Max:99 6 °F (37 6 °C)  Current: Temperature: 98 6 °F (37 °C)    PHYSICAL EXAM:  General Appearance:  Appearing well, nontoxic, and in no distress; sleeping though easily rouses    HEENT: Normocephalic, without obvious abnormality, atraumatic  Conjunctiva pink and sclera anicteric  Oropharynx moist without lesions  Lungs:   Clear to auscultation bilaterally, respirations unlabored   Heart:  RRR; no murmur, rub or gallop   Abdomen:   Soft, +tender RLQ, non-distended, positive bowel sounds  JOHN drain in place with scant SS output    Extremities: No cyanosis, clubbing or edema   Skin: No rashes or lesions  No draining wounds noted  Peripheral IV intact without evidence of erythema, warmth, or exudate  LABS, IMAGING, & OTHER STUDIES:  Extensive review of the medical records in epic including review of the notes, radiographs, and laboratory results were reviewed personally as below  Lab Results:  I have personally reviewed pertinent labs  Results from last 7 days   Lab Units 23  0628 23  0640 23  1651   WBC Thousand/uL 3 60* 5 78 4 72   HEMOGLOBIN g/dL 8 1* 8 1* 7 9*   PLATELETS Thousands/uL 237 242 193     Results from last 7 days   Lab Units 23  0628 23  0640 23  0522   SODIUM mmol/L 140 138 138   POTASSIUM mmol/L 3 4* 3 5 3 7   CHLORIDE mmol/L 112* 110* 109*   CO2 mmol/L 23 22 23   BUN mg/dL 4* 3* 6   CREATININE mg/dL 0 41* 0 38* 0 47*   EGFR ml/min/1 73sq m 129 132 123   CALCIUM mg/dL 8 2* 8 1* 7 9*   AST U/L 13 30 11*   ALT U/L 16 21 12   ALK PHOS U/L 74 81 56     Results from last 7 days   Lab Units 23  1459 23  0133 23   BLOOD CULTURE   --   --  No Growth at 48 hrs  No Growth at 48 hrs     GRAM STAIN RESULT  3+ Polys*  2+ Gram negative rods*  -- --    URINE CULTURE   --  10,000-19,000 cfu/ml  --                        Imaging Studies:   I have personally reviewed pertinent imaging study reports and images in PACS  Other Studies:   I have personally reviewed pertinent reports incluidng blood cx, body fluid culture

## 2023-04-25 NOTE — PROGRESS NOTES
Bedside to assess patient secondary to throat pain  Patient reports that throat pain is now resolved at this time  She denies any need for analgesics    She reports that she is sleepy and wishes to go back to bed      Vitals:    04/25/23 0414   BP: 109/64   Pulse: 90   Resp: 18   Temp: 99 2 °F (37 3 °C)   SpO2: 97%       Dianne Huntley MD  Obstetrics & Gynecology PGY-2  4/25/2023  4:23 AM

## 2023-04-25 NOTE — PLAN OF CARE
Problem: PAIN - ADULT  Goal: Verbalizes/displays adequate comfort level or baseline comfort level  Description: Interventions:  - Encourage patient to monitor pain and request assistance  - Assess pain using appropriate pain scale  - Administer analgesics based on type and severity of pain and evaluate response  - Implement non-pharmacological measures as appropriate and evaluate response  - Consider cultural and social influences on pain and pain management  - Notify physician/advanced practitioner if interventions unsuccessful or patient reports new pain  Outcome: Progressing     Problem: INFECTION - ADULT  Goal: Absence or prevention of progression during hospitalization  Description: INTERVENTIONS:  - Assess and monitor for signs and symptoms of infection  - Monitor lab/diagnostic results  - Monitor all insertion sites, i e  indwelling lines, tubes, and drains  - Monitor endotracheal if appropriate and nasal secretions for changes in amount and color  - [place] appropriate cooling/warming therapies per order  - Administer medications as ordered  - Instruct and encourage patient and family to use good hand hygiene technique  - Identify and instruct in appropriate isolation precautions for identified infection/condition  Outcome: Progressing  Goal: Absence of fever/infection during neutropenic period  Description: INTERVENTIONS:  - Monitor WBC    Outcome: Progressing     Problem: SAFETY ADULT  Goal: Patient will remain free of falls  Description: INTERVENTIONS:  - Educate patient/family on patient safety including physical limitations  - Instruct patient to call for assistance with activity   - Consult OT/PT to assist with strengthening/mobility   - Keep Call bell within reach  - Keep bed low and locked with side rails adjusted as appropriate  - Keep care items and personal belongings within reach  - Initiate and maintain comfort rounds  - Make Fall Risk Sign visible to staff  - Apply yellow socks and bracelet for high fall risk patients  - Consider moving patient to room near nurses station  Outcome: Progressing  Goal: Maintain or return to baseline ADL function  Description: INTERVENTIONS:  -  Assess patient's ability to carry out ADLs; assess patient's baseline for ADL function and identify physical deficits which impact ability to perform ADLs (bathing, care of mouth/teeth, toileting, grooming, dressing, etc )  - Assess/evaluate cause of self-care deficits   - Assess range of motion  - Assess patient's mobility; develop plan if impaired  - Assess patient's need for assistive devices and provide as appropriate  - Encourage maximum independence but intervene and supervise when necessary  - Involve family in performance of ADLs  - Assess for home care needs following discharge   - Consider OT consult to assist with ADL evaluation and planning for discharge  - Provide patient education as appropriate  Outcome: Progressing  Goal: Maintains/Returns to pre admission functional level  Description: INTERVENTIONS:  - Perform BMAT or MOVE assessment daily    - Set and communicate daily mobility goal to care team and patient/family/caregiver     - Collaborate with rehabilitation services on mobility goals if consulted  - Out of bed for toileting  - Record patient progress and toleration of activity level   Outcome: Progressing     Problem: DISCHARGE PLANNING  Goal: Discharge to home or other facility with appropriate resources  Description: INTERVENTIONS:  - Identify barriers to discharge w/patient and caregiver  - Arrange for needed discharge resources and transportation as appropriate  - Identify discharge learning needs (meds, wound care, etc )  - Arrange for interpretive services to assist at discharge as needed  - Refer to Case Management Department for coordinating discharge planning if the patient needs post-hospital services based on physician/advanced practitioner order or complex needs related to functional status, cognitive ability, or social support system  Outcome: Progressing     Problem: Knowledge Deficit  Goal: Patient/family/caregiver demonstrates understanding of disease process, treatment plan, medications, and discharge instructions  Description: Complete learning assessment and assess knowledge base  Interventions:  - Provide teaching at level of understanding  - Provide teaching via preferred learning methods  Outcome: Progressing     Problem: MOBILITY - ADULT  Goal: Maintain or return to baseline ADL function  Description: INTERVENTIONS:  -  Assess patient's ability to carry out ADLs; assess patient's baseline for ADL function and identify physical deficits which impact ability to perform ADLs (bathing, care of mouth/teeth, toileting, grooming, dressing, etc )  - Assess/evaluate cause of self-care deficits   - Assess range of motion  - Assess patient's mobility; develop plan if impaired  - Assess patient's need for assistive devices and provide as appropriate  - Encourage maximum independence but intervene and supervise when necessary  - Involve family in performance of ADLs  - Assess for home care needs following discharge   - Consider OT consult to assist with ADL evaluation and planning for discharge  - Provide patient education as appropriate  Outcome: Progressing  Goal: Maintains/Returns to pre admission functional level  Description: INTERVENTIONS:  - Perform BMAT or MOVE assessment daily    - Set and communicate daily mobility goal to care team and patient/family/caregiver     - Collaborate with rehabilitation services on mobility goals if consulted  - Out of bed for toileting  - Record patient progress and toleration of activity level   Outcome: Progressing

## 2023-04-25 NOTE — PLAN OF CARE
Problem: PAIN - ADULT  Goal: Verbalizes/displays adequate comfort level or baseline comfort level  Description: Interventions:  - Encourage patient to monitor pain and request assistance  - Assess pain using appropriate pain scale  - Administer analgesics based on type and severity of pain and evaluate response  - Implement non-pharmacological measures as appropriate and evaluate response  - Consider cultural and social influences on pain and pain management  - Notify physician/advanced practitioner if interventions unsuccessful or patient reports new pain  Outcome: Progressing     Problem: INFECTION - ADULT  Goal: Absence or prevention of progression during hospitalization  Description: INTERVENTIONS:  - Assess and monitor for signs and symptoms of infection  - Monitor lab/diagnostic results  - Monitor all insertion sites, i e  indwelling lines, tubes, and drains  - Monitor endotracheal if appropriate and nasal secretions for changes in amount and color  - [place] appropriate cooling/warming therapies per order  - Administer medications as ordered  - Instruct and encourage patient and family to use good hand hygiene technique  - Identify and instruct in appropriate isolation precautions for identified infection/condition  Outcome: Progressing     Problem: SAFETY ADULT  Goal: Patient will remain free of falls  Description: INTERVENTIONS:  - Educate patient/family on patient safety including physical limitations  - Instruct patient to call for assistance with activity   - Consult OT/PT to assist with strengthening/mobility   - Keep Call bell within reach  - Keep bed low and locked with side rails adjusted as appropriate  - Keep care items and personal belongings within reach  - Initiate and maintain comfort rounds  - Make Fall Risk Sign visible to staff  - Apply yellow socks and bracelet for high fall risk patients  - Consider moving patient to room near nurses station  Outcome: Progressing  Goal: Maintain or return to baseline ADL function  Description: INTERVENTIONS:  -  Assess patient's ability to carry out ADLs; assess patient's baseline for ADL function and identify physical deficits which impact ability to perform ADLs (bathing, care of mouth/teeth, toileting, grooming, dressing, etc )  - Assess/evaluate cause of self-care deficits   - Assess range of motion  - Assess patient's mobility; develop plan if impaired  - Assess patient's need for assistive devices and provide as appropriate  - Encourage maximum independence but intervene and supervise when necessary  - Involve family in performance of ADLs  - Assess for home care needs following discharge   - Consider OT consult to assist with ADL evaluation and planning for discharge  - Provide patient education as appropriate  Outcome: Progressing     Problem: DISCHARGE PLANNING  Goal: Discharge to home or other facility with appropriate resources  Description: INTERVENTIONS:  - Identify barriers to discharge w/patient and caregiver  - Arrange for needed discharge resources and transportation as appropriate  - Identify discharge learning needs (meds, wound care, etc )  - Arrange for interpretive services to assist at discharge as needed  - Refer to Case Management Department for coordinating discharge planning if the patient needs post-hospital services based on physician/advanced practitioner order or complex needs related to functional status, cognitive ability, or social support system  Outcome: Progressing     Problem: Knowledge Deficit  Goal: Patient/family/caregiver demonstrates understanding of disease process, treatment plan, medications, and discharge instructions  Description: Complete learning assessment and assess knowledge base    Interventions:  - Provide teaching at level of understanding  - Provide teaching via preferred learning methods  Outcome: Progressing     Problem: MOBILITY - ADULT  Goal: Maintain or return to baseline ADL function  Description: INTERVENTIONS:  -  Assess patient's ability to carry out ADLs; assess patient's baseline for ADL function and identify physical deficits which impact ability to perform ADLs (bathing, care of mouth/teeth, toileting, grooming, dressing, etc )  - Assess/evaluate cause of self-care deficits   - Assess range of motion  - Assess patient's mobility; develop plan if impaired  - Assess patient's need for assistive devices and provide as appropriate  - Encourage maximum independence but intervene and supervise when necessary  - Involve family in performance of ADLs  - Assess for home care needs following discharge   - Consider OT consult to assist with ADL evaluation and planning for discharge  - Provide patient education as appropriate  Outcome: Progressing

## 2023-04-26 VITALS
TEMPERATURE: 98.7 F | RESPIRATION RATE: 18 BRPM | WEIGHT: 170.42 LBS | HEART RATE: 74 BPM | DIASTOLIC BLOOD PRESSURE: 62 MMHG | BODY MASS INDEX: 25.24 KG/M2 | OXYGEN SATURATION: 98 % | SYSTOLIC BLOOD PRESSURE: 101 MMHG | HEIGHT: 69 IN

## 2023-04-26 LAB
ALBUMIN SERPL BCP-MCNC: 3 G/DL (ref 3.5–5)
ALP SERPL-CCNC: 66 U/L (ref 34–104)
ALT SERPL W P-5'-P-CCNC: 12 U/L (ref 7–52)
ANION GAP SERPL CALCULATED.3IONS-SCNC: 6 MMOL/L (ref 4–13)
AST SERPL W P-5'-P-CCNC: 11 U/L (ref 13–39)
BACTERIA SPEC BFLD CULT: ABNORMAL
BASOPHILS # BLD AUTO: 0.01 THOUSANDS/ΜL (ref 0–0.1)
BASOPHILS NFR BLD AUTO: 0 % (ref 0–1)
BILIRUB SERPL-MCNC: 0.27 MG/DL (ref 0.2–1)
BUN SERPL-MCNC: 4 MG/DL (ref 5–25)
CALCIUM ALBUM COR SERPL-MCNC: 9.3 MG/DL (ref 8.3–10.1)
CALCIUM SERPL-MCNC: 8.5 MG/DL (ref 8.4–10.2)
CHLORIDE SERPL-SCNC: 111 MMOL/L (ref 96–108)
CO2 SERPL-SCNC: 22 MMOL/L (ref 21–32)
CREAT SERPL-MCNC: 0.37 MG/DL (ref 0.6–1.3)
EOSINOPHIL # BLD AUTO: 0.02 THOUSAND/ΜL (ref 0–0.61)
EOSINOPHIL NFR BLD AUTO: 1 % (ref 0–6)
ERYTHROCYTE [DISTWIDTH] IN BLOOD BY AUTOMATED COUNT: 14.4 % (ref 11.6–15.1)
GFR SERPL CREATININE-BSD FRML MDRD: 134 ML/MIN/1.73SQ M
GLUCOSE SERPL-MCNC: 96 MG/DL (ref 65–140)
GRAM STN SPEC: ABNORMAL
GRAM STN SPEC: ABNORMAL
HCT VFR BLD AUTO: 26.7 % (ref 34.8–46.1)
HGB BLD-MCNC: 8.4 G/DL (ref 11.5–15.4)
IMM GRANULOCYTES # BLD AUTO: 0.02 THOUSAND/UL (ref 0–0.2)
IMM GRANULOCYTES NFR BLD AUTO: 1 % (ref 0–2)
LYMPHOCYTES # BLD AUTO: 0.83 THOUSANDS/ΜL (ref 0.6–4.47)
LYMPHOCYTES NFR BLD AUTO: 19 % (ref 14–44)
MAGNESIUM SERPL-MCNC: 1.8 MG/DL (ref 1.9–2.7)
MCH RBC QN AUTO: 25.3 PG (ref 26.8–34.3)
MCHC RBC AUTO-ENTMCNC: 31.5 G/DL (ref 31.4–37.4)
MCV RBC AUTO: 80 FL (ref 82–98)
MONOCYTES # BLD AUTO: 0.32 THOUSAND/ΜL (ref 0.17–1.22)
MONOCYTES NFR BLD AUTO: 7 % (ref 4–12)
NEUTROPHILS # BLD AUTO: 3.16 THOUSANDS/ΜL (ref 1.85–7.62)
NEUTS SEG NFR BLD AUTO: 72 % (ref 43–75)
NRBC BLD AUTO-RTO: 0 /100 WBCS
PLATELET # BLD AUTO: 247 THOUSANDS/UL (ref 149–390)
PMV BLD AUTO: 10.5 FL (ref 8.9–12.7)
POTASSIUM SERPL-SCNC: 3.5 MMOL/L (ref 3.5–5.3)
PROT SERPL-MCNC: 5.9 G/DL (ref 6.4–8.4)
RBC # BLD AUTO: 3.32 MILLION/UL (ref 3.81–5.12)
SODIUM SERPL-SCNC: 139 MMOL/L (ref 135–147)
WBC # BLD AUTO: 4.36 THOUSAND/UL (ref 4.31–10.16)

## 2023-04-26 RX ORDER — OXYCODONE HYDROCHLORIDE 5 MG/1
5 TABLET ORAL EVERY 4 HOURS PRN
Status: DISCONTINUED | OUTPATIENT
Start: 2023-04-26 | End: 2023-04-26 | Stop reason: HOSPADM

## 2023-04-26 RX ORDER — AMOXICILLIN 500 MG/1
500 TABLET, FILM COATED ORAL EVERY 8 HOURS
Qty: 30 TABLET | Refills: 0 | Status: SHIPPED | OUTPATIENT
Start: 2023-04-26 | End: 2023-05-06

## 2023-04-26 RX ADMIN — Medication 150 MG: at 08:36

## 2023-04-26 RX ADMIN — LEVETIRACETAM 1500 MG: 750 TABLET, FILM COATED ORAL at 08:36

## 2023-04-26 RX ADMIN — METHOCARBAMOL 500 MG: 500 TABLET ORAL at 08:36

## 2023-04-26 RX ADMIN — DOCUSATE SODIUM 100 MG: 100 CAPSULE, LIQUID FILLED ORAL at 08:36

## 2023-04-26 RX ADMIN — MAGNESIUM OXIDE TAB 400 MG (241.3 MG ELEMENTAL MG) 400 MG: 400 (241.3 MG) TAB at 08:36

## 2023-04-26 RX ADMIN — GABAPENTIN 100 MG: 100 CAPSULE ORAL at 08:36

## 2023-04-26 RX ADMIN — METRONIDAZOLE 500 MG: 500 TABLET ORAL at 06:42

## 2023-04-26 NOTE — DISCHARGE SUMMARY
Discharge Summary   Jay Ruiz MRN: [id number]  Unit/Bed#: E5 -01 Encounter: [id number]      Admission Date: 4/21/2023     Discharge Date: 04/26/23    Attending: Idalmis Georges DO    Principal Diagnosis: Tubo-ovarian abscess [[id number]], chronic pelvic pain    Procedures: IR drain placement    Hospital course: Patient was admitted for tubo-ovarian abscess given her dilated fallopian tube with multiloculation and ring enhancement  Patient was started on IV antibiotics and had a drain placed by IR  He met criteria for sepsis  She started to clinically improve and her IV antibiotics were shifted to oral once based on cultures from the drain  She was discharged on p o  antibiotics and will have an outpatient hysterectomy with bilateral salpingectomy and possible oophorectomy done after her body recovers    Lab Results:   Lab Results   Component Value Date    WBC 4 36 04/26/2023    HGB 8 4 (L) 04/26/2023    HCT 26 7 (L) 04/26/2023    MCV 80 (L) 04/26/2023     04/26/2023     Lab Results   Component Value Date    GLUCOSE 99 11/26/2014    CALCIUM 8 5 04/26/2023     11/26/2014    K 3 5 04/26/2023    CO2 22 04/26/2023     (H) 04/26/2023    BUN 4 (L) 04/26/2023    CREATININE 0 37 (L) 04/26/2023     Lab Results   Component Value Date/Time    POCGLU 96 01/23/2023 08:38 AM     No results found for: PTT  No results found for: INR, PROTIME    Complications: none apparent     Condition at discharge: fair     Discharge instructions/Information to patient and family:   See After Visit Summary for information provided to patient and family  Provisions for Follow-Up Care:  See After Visit Summary for information related to follow-up care and any pertinent home health orders  Disposition: See After Visit Summary for discharge disposition information  Planned Readmission: Yes, for surgery    Discharge Medications:   For a complete list of the patient's medications, please refer to her med Mukesh Hagen , [facility] Gynecology PGY-3  4/26/2023  3:57 PM

## 2023-04-26 NOTE — PROGRESS NOTES
"Progress Note- OB/GYN   Ozella Rounds 38 y o  female MRN: [id number]  Unit/Bed#: E5 -01 Encounter: [id number]    Assessment:   38 y o  [facility] admitted with Right sided TOA and sepsis, improving    Plan:   Sepsis [facility])  Assessment & Plan  Continue resuscitation via sepsis protocol    * TOA (tubo-ovarian abscess)  Assessment & Plan  · CT scan concerning for TOA presenting with \"Right adnexal inflammatory mass consistent with tubo-ovarian abscess with mild pyosalpinx  Small amount of free fluid in the pelvis  \"  · Negative GC/chlamydia on 4/19/2023 at [facility]  · +Carnett sign, -bed bump test again on 4/24  · Suspect musculoskeletal irritation due to positive Carnett's sign, Robaxin ordered  · Non-surgical abdomen  · Antibiotics:  IV Rocephin 1g q24h, oral Flagyl 500mg q12h  · IR drain fluid growing E coli, sensitivities pending  · Pain: Tylenol/Toradol, Oxy, Robaxin  · Diet: regular  · DVT ppx: SCDs      HPI:  Patient sleeping comfortably this morning, has no complaints  Denies fevers or chills  Pain is unchanged      Active Problems:  Patient Active Problem List   Diagnosis   • Chronic migraine without aura without status migrainosus, not intractable   • 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 (HCC)   • B12 deficiency   • Dermatographism   • Iron deficiency anemia   • Lyme neuropathy   • Nystagmus   • Urticaria, chronic   • Vitamin D deficiency   • Hypotension   • Seizure (HCC)   • Gastroesophageal reflux disease without esophagitis   • Gait instability   • Cerebral atrophy (HCC)   • Ataxia   • Noncompliance with medications   • 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[address] )   • Prediabetes   • Annual physical exam   • Acute on chronic anemia   • Menorrhagia with irregular cycle   • Weight loss   • Transient diplopia   • Precordial pain   • Elevated lactic acid level   • Generalized abdominal pain   • Migraines   • 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HCC)   • Seizure disorder (HCC)   • Pancreatic abnormality   • Endometriosis   • TOA (tubo-ovarian " abscess)   • Sepsis ([facility] )       PMH:  Past Medical History:   Diagnosis Date   • B12 deficiency    • Chronic migraine without aura    • Dermatographism    • Endometriosis    • Iron deficiency anemia    • Lyme neuropathy    • Seizure disorder ([facility] [address] )    • 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HCC)    • Visual disturbance     Diplopia, nystagmus, decreased visual acuity   • Vitamin D deficiency        PSH:  Past Surgical History:   Procedure Laterality Date   •  SECTION     • ESOPHAGOGASTRODUODENOSCOPY N/A 3/8/2019    Procedure: ESOPHAGOGASTRODUODENOSCOPY (EGD); Surgeon: Sergei Ashley MD;  Location: [facility] GI LAB; Service: Gastroenterology   • IR DRAINAGE TUBE PLACEMENT  2023   • OOPHORECTOMY      unilateral --  last assessed 17   • TUBAL LIGATION      last assessed 17       OB History  OB History    Para Term  AB Living   3 3       3   SAB IAB Ectopic Multiple Live Births                  # Outcome Date GA Lbr Karthikeyan/2nd Weight Sex Delivery Anes PTL Lv   3 Para            2 Para            1 Para                Meds:  No current facility-administered medications on file prior to encounter  Current Outpatient Medications on File Prior to Encounter   Medication Sig Dispense Refill   • cyanocobalamin (VITAMIN B-12) 1000 MCG tablet Take 1 tablet (1,000 mcg total) by mouth daily 90 tablet 0   • Erenumab-aooe (Aimovig) 70 MG/ML SOAJ Inject 70 mg under the skin every 28 days     • ferrous sulfate 324 (65 Fe) mg Take 1 tablet (324 mg total) by mouth daily 90 tablet 0   • levETIRAcetam (KEPPRA) 750 mg tablet Take 2 tablets (1,500 mg total) by mouth 2 (two) times a day     • omeprazole (PriLOSEC) 20 mg delayed release capsule Take 1 capsule (20 mg total) by mouth daily 90 capsule 1   • ondansetron (ZOFRAN-ODT) 4 mg disintegrating tablet Take 1 tablet (4 mg total) by mouth every 6 (six) hours as needed for nausea 20 tablet 0       Allergies:   Allergies   Allergen Reactions   • Morphine Itching "    Pt denies breathing issues   • Dye Fdc Red  [Red Dye - Food Allergy]        Physical Exam:  /62 (BP Location: Left arm)   Pulse 70   Temp 98 1 °F (36 7 °C) (Oral)   Resp 18   Ht 5' 9 02\" (1 753 m)   Wt 77 3 kg (170 lb 6 7 oz)   SpO2 95%   BMI 25 15 kg/m²     Physical Exam  Constitutional:       General: She is not in acute distress  Appearance: Normal appearance  She is not ill-appearing  HENT:      Head: Normocephalic and atraumatic  Cardiovascular:      Rate and Rhythm: Normal rate  Pulmonary:      Effort: Pulmonary effort is normal  No respiratory distress  Abdominal:      Palpations: Abdomen is soft  Tenderness: There is abdominal tenderness  There is no guarding or rebound  Comments: Moderate tenderness to palpation in RLQ   Skin:     General: Skin is warm and dry             "

## 2023-04-26 NOTE — PROGRESS NOTES
Progress Note - [facility] Infectious Disease   [facility] 38 y o  female MRN: [id number]  Unit/Bed#: E5 -01 Encounter: [id number]      IMPRESSION & RECOMMENDATIONS:   1  Severe sepsis, evolving from admission, e/b fever, tachycardia, hypotension  Suspect this is due to #2  No other source appreciated  Blood cultures negative  Urine culture with mixed contaminants  Fevers resolved  WBC normal  She is hemodynamically stable    -continue IV antibiotics as below   -follow-up blood cultures   -monitor CBCD, temperature and hemodynamics  -supportive care per primary team      2  Right tubo-ovarian abscess  Pt presented with R sided abdominopelvic pain  CT showed R adnexal mass consistent with TOA with mild pyosalpinx  S/p IR aspiration 4/23/23 and drain remains in place  Culture isolated broadly susceptible E  Coli  Low risk for STI  Pain slowly improving    -can transition to PO amoxicillin 500mg q8h for 14 day course through 5/6/23  -serial abdominal exams   -monitor drain output; care per IR   -close GYN follow up      3  Chronic pelvic pain due to endometriosis and suspected adenomyosis  Planned for supracervical TLH, RSO on 6/12/23    -close follow up with GYN outpatient      4  Hx of Spinocerebellar degeneration with migraine headaches and known seizure disorder  Continue home AEDs     -outpatient follow up with neurology     Antibiotics:  D6 IV ceftriax, flagyl     I have discussed the above management plan in detail with patient  I have discussed the above management plan in detail with patient's RN, and the primary service, GYN resident  24 Hour events:  Yesterday and overnight notes reviewed  Blood cx remain neg  Culture isolated broadly susceptible e coli  Subjective:  Patient has no fever, chills, sweats; no nausea, vomiting, diarrhea; no cough, shortness of breath; continues to have RLQ pain  Drain in place  Tolerating the abx       Objective:  Vitals:  Temp:  [98 1 °F (36 7 °C)-99 2 °F (37 3 °C)] 98 7 °F (37 1 °C)  HR:  [70-77] 74  Resp:  [18-20] 18  BP: ()/(60-73) 101/62  SpO2:  [94 %-98 %] 98 %  Temp (24hrs), Av 6 °F (37 °C), Min:98 1 °F (36 7 °C), Max:99 2 °F (37 3 °C)  Current: Temperature: 98 7 °F (37 1 °C)    PHYSICAL EXAM:  General Appearance:  Appearing well, nontoxic, and in no distress; sleeping though easily rouses    HEENT: Normocephalic, without obvious abnormality, atraumatic  Conjunctiva pink and sclera anicteric  Oropharynx moist without lesions  Lungs:   Clear to auscultation bilaterally, respirations unlabored   Heart:  RRR; no murmur, rub or gallop   Abdomen:   Soft, +tender RLQ, non-distended, positive bowel sounds  JOHN drain in place RLQ with scant SS output  Extremities: No cyanosis, clubbing or edema   Skin: No rashes or lesions  No draining wounds noted  Peripheral IV intact without evidence of erythema, warmth, or exudate  LABS, IMAGING, & OTHER STUDIES:  Extensive review of the medical records in epic including review of the notes, radiographs, and laboratory results were reviewed personally as below  Lab Results:  I have personally reviewed pertinent labs  Results from last 7 days   Lab Units 23  0623  0628 23  0640   WBC Thousand/uL 4 36 3 60* 5 78   HEMOGLOBIN g/dL 8 4* 8 1* 8 1*   PLATELETS Thousands/uL 247 237 242     Results from last 7 days   Lab Units 23  0623 23  0628 23  0640   SODIUM mmol/L 139 140 138   POTASSIUM mmol/L 3 5 3 4* 3 5   CHLORIDE mmol/L 111* 112* 110*   CO2 mmol/L 22 23 22   BUN mg/dL 4* 4* 3*   CREATININE mg/dL 0 37* 0 41* 0 38*   EGFR ml/min/1 73sq m 134 129 132   CALCIUM mg/dL 8 5 8 2* 8 1*   AST U/L 11* 13 30   ALT U/L 12 16 21   ALK PHOS U/L 66 74 81     Results from last 7 days   Lab Units 23  1459 23  0133 23   BLOOD CULTURE   --   --  No Growth at 72 hrs  No Growth at 72 hrs     GRAM STAIN RESULT  3+ Polys*  2+ Gram negative rods*  --   --    URINE CULTURE   --  10,000-19,000 cfu/ml  --    BODY FLUID CULTURE, STERILE  4+ Growth of Escherichia coli*  --   --                        Imaging Studies:   I have personally reviewed pertinent imaging study reports and images in PACS  Other Studies:   I have personally reviewed pertinent reports

## 2023-04-26 NOTE — PLAN OF CARE
Problem: PAIN - ADULT  Goal: Verbalizes/displays adequate comfort level or baseline comfort level  Description: Interventions:  - Encourage patient to monitor pain and request assistance  - Assess pain using appropriate pain scale  - Administer analgesics based on type and severity of pain and evaluate response  - Implement non-pharmacological measures as appropriate and evaluate response  - Consider cultural and social influences on pain and pain management  - Notify physician/advanced practitioner if interventions unsuccessful or patient reports new pain  Outcome: Progressing     Problem: INFECTION - ADULT  Goal: Absence or prevention of progression during hospitalization  Description: INTERVENTIONS:  - Assess and monitor for signs and symptoms of infection  - Monitor lab/diagnostic results  - Monitor all insertion sites, i e  indwelling lines, tubes, and drains  - Monitor endotracheal if appropriate and nasal secretions for changes in amount and color  - [place] appropriate cooling/warming therapies per order  - Administer medications as ordered  - Instruct and encourage patient and family to use good hand hygiene technique  - Identify and instruct in appropriate isolation precautions for identified infection/condition  Outcome: Progressing  Goal: Absence of fever/infection during neutropenic period  Description: INTERVENTIONS:  - Monitor WBC    Outcome: Progressing     Problem: SAFETY ADULT  Goal: Patient will remain free of falls  Description: INTERVENTIONS:  - Educate patient/family on patient safety including physical limitations  - Instruct patient to call for assistance with activity   - Consult OT/PT to assist with strengthening/mobility   - Keep Call bell within reach  - Keep bed low and locked with side rails adjusted as appropriate  - Keep care items and personal belongings within reach  - Initiate and maintain comfort rounds  - Make Fall Risk Sign visible to staff  - Apply yellow socks and bracelet for high fall risk patients  - Consider moving patient to room near nurses station  Outcome: Progressing  Goal: Maintain or return to baseline ADL function  Description: INTERVENTIONS:  -  Assess patient's ability to carry out ADLs; assess patient's baseline for ADL function and identify physical deficits which impact ability to perform ADLs (bathing, care of mouth/teeth, toileting, grooming, dressing, etc )  - Assess/evaluate cause of self-care deficits   - Assess range of motion  - Assess patient's mobility; develop plan if impaired  - Assess patient's need for assistive devices and provide as appropriate  - Encourage maximum independence but intervene and supervise when necessary  - Involve family in performance of ADLs  - Assess for home care needs following discharge   - Consider OT consult to assist with ADL evaluation and planning for discharge  - Provide patient education as appropriate  Outcome: Progressing  Goal: Maintains/Returns to pre admission functional level  Description: INTERVENTIONS:  - Perform BMAT or MOVE assessment daily    - Set and communicate daily mobility goal to care team and patient/family/caregiver     - Collaborate with rehabilitation services on mobility goals if consulted  - Out of bed for toileting  - Record patient progress and toleration of activity level   Outcome: Progressing     Problem: DISCHARGE PLANNING  Goal: Discharge to home or other facility with appropriate resources  Description: INTERVENTIONS:  - Identify barriers to discharge w/patient and caregiver  - Arrange for needed discharge resources and transportation as appropriate  - Identify discharge learning needs (meds, wound care, etc )  - Arrange for interpretive services to assist at discharge as needed  - Refer to Case Management Department for coordinating discharge planning if the patient needs post-hospital services based on physician/advanced practitioner order or complex needs related to functional status, cognitive ability, or social support system  Outcome: Progressing     Problem: Knowledge Deficit  Goal: Patient/family/caregiver demonstrates understanding of disease process, treatment plan, medications, and discharge instructions  Description: Complete learning assessment and assess knowledge base  Interventions:  - Provide teaching at level of understanding  - Provide teaching via preferred learning methods  Outcome: Progressing     Problem: MOBILITY - ADULT  Goal: Maintain or return to baseline ADL function  Description: INTERVENTIONS:  -  Assess patient's ability to carry out ADLs; assess patient's baseline for ADL function and identify physical deficits which impact ability to perform ADLs (bathing, care of mouth/teeth, toileting, grooming, dressing, etc )  - Assess/evaluate cause of self-care deficits   - Assess range of motion  - Assess patient's mobility; develop plan if impaired  - Assess patient's need for assistive devices and provide as appropriate  - Encourage maximum independence but intervene and supervise when necessary  - Involve family in performance of ADLs  - Assess for home care needs following discharge   - Consider OT consult to assist with ADL evaluation and planning for discharge  - Provide patient education as appropriate  Outcome: Progressing  Goal: Maintains/Returns to pre admission functional level  Description: INTERVENTIONS:  - Perform BMAT or MOVE assessment daily    - Set and communicate daily mobility goal to care team and patient/family/caregiver     - Collaborate with rehabilitation services on mobility goals if consulted  - Out of bed for toileting  - Record patient progress and toleration of activity level   Outcome: Progressing

## 2023-04-26 NOTE — NURSING NOTE
Patient discharged home  Discharge instructions reviewed, tube flush demonstrated and questions answered

## 2023-04-26 NOTE — PLAN OF CARE
Problem: PAIN - ADULT  Goal: Verbalizes/displays adequate comfort level or baseline comfort level  Description: Interventions:  - Encourage patient to monitor pain and request assistance  - Assess pain using appropriate pain scale  - Administer analgesics based on type and severity of pain and evaluate response  - Implement non-pharmacological measures as appropriate and evaluate response  - Consider cultural and social influences on pain and pain management  - Notify physician/advanced practitioner if interventions unsuccessful or patient reports new pain  Outcome: Progressing     Problem: INFECTION - ADULT  Goal: Absence or prevention of progression during hospitalization  Description: INTERVENTIONS:  - Assess and monitor for signs and symptoms of infection  - Monitor lab/diagnostic results  - Monitor all insertion sites, i e  indwelling lines, tubes, and drains  - Monitor endotracheal if appropriate and nasal secretions for changes in amount and color  - [place] appropriate cooling/warming therapies per order  - Administer medications as ordered  - Instruct and encourage patient and family to use good hand hygiene technique  - Identify and instruct in appropriate isolation precautions for identified infection/condition  Outcome: Progressing  Goal: Absence of fever/infection during neutropenic period  Description: INTERVENTIONS:  - Monitor WBC    Outcome: Progressing     Problem: SAFETY ADULT  Goal: Patient will remain free of falls  Description: INTERVENTIONS:  - Educate patient/family on patient safety including physical limitations  - Instruct patient to call for assistance with activity   - Consult OT/PT to assist with strengthening/mobility   - Keep Call bell within reach  - Keep bed low and locked with side rails adjusted as appropriate  - Keep care items and personal belongings within reach  - Initiate and maintain comfort rounds  - Make Fall Risk Sign visible to staff  - Apply yellow socks and bracelet for high fall risk patients  - Consider moving patient to room near nurses station  Outcome: Progressing  Goal: Maintain or return to baseline ADL function  Description: INTERVENTIONS:  -  Assess patient's ability to carry out ADLs; assess patient's baseline for ADL function and identify physical deficits which impact ability to perform ADLs (bathing, care of mouth/teeth, toileting, grooming, dressing, etc )  - Assess/evaluate cause of self-care deficits   - Assess range of motion  - Assess patient's mobility; develop plan if impaired  - Assess patient's need for assistive devices and provide as appropriate  - Encourage maximum independence but intervene and supervise when necessary  - Involve family in performance of ADLs  - Assess for home care needs following discharge   - Consider OT consult to assist with ADL evaluation and planning for discharge  - Provide patient education as appropriate  Outcome: Progressing  Goal: Maintains/Returns to pre admission functional level  Description: INTERVENTIONS:  - Perform BMAT or MOVE assessment daily    - Set and communicate daily mobility goal to care team and patient/family/caregiver     - Collaborate with rehabilitation services on mobility goals if consulted  - Out of bed for toileting  - Record patient progress and toleration of activity level   Outcome: Progressing     Problem: DISCHARGE PLANNING  Goal: Discharge to home or other facility with appropriate resources  Description: INTERVENTIONS:  - Identify barriers to discharge w/patient and caregiver  - Arrange for needed discharge resources and transportation as appropriate  - Identify discharge learning needs (meds, wound care, etc )  - Arrange for interpretive services to assist at discharge as needed  - Refer to Case Management Department for coordinating discharge planning if the patient needs post-hospital services based on physician/advanced practitioner order or complex needs related to functional status, cognitive ability, or social support system  Outcome: Progressing     Problem: Knowledge Deficit  Goal: Patient/family/caregiver demonstrates understanding of disease process, treatment plan, medications, and discharge instructions  Description: Complete learning assessment and assess knowledge base  Interventions:  - Provide teaching at level of understanding  - Provide teaching via preferred learning methods  Outcome: Progressing     Problem: MOBILITY - ADULT  Goal: Maintain or return to baseline ADL function  Description: INTERVENTIONS:  -  Assess patient's ability to carry out ADLs; assess patient's baseline for ADL function and identify physical deficits which impact ability to perform ADLs (bathing, care of mouth/teeth, toileting, grooming, dressing, etc )  - Assess/evaluate cause of self-care deficits   - Assess range of motion  - Assess patient's mobility; develop plan if impaired  - Assess patient's need for assistive devices and provide as appropriate  - Encourage maximum independence but intervene and supervise when necessary  - Involve family in performance of ADLs  - Assess for home care needs following discharge   - Consider OT consult to assist with ADL evaluation and planning for discharge  - Provide patient education as appropriate  Outcome: Progressing  Goal: Maintains/Returns to pre admission functional level  Description: INTERVENTIONS:  - Perform BMAT or MOVE assessment daily    - Set and communicate daily mobility goal to care team and patient/family/caregiver     - Collaborate with rehabilitation services on mobility goals if consulted  - Out of bed for toileting  - Record patient progress and toleration of activity level   Outcome: Progressing

## 2023-04-26 NOTE — DISCHARGE INSTRUCTIONS
"     TUBE CARE INSTRUCTIONS    Care after your procedure: follow up in 2 week as an Out Patient- our schedulers will reach out to schedule and appt  Resume your normal diet  Small sips of flat soda will help with nausea  1  The properly functioning catheter should be forward flushed once (1x) daily with 10ml of normal saline using clean technique  You will be given a prescription for flushes  To flush the tube, clean both connections with alcohol swab  Twist off the drainage bag/ bulb  tubing and twist the saline syringe into the drainage tube and flush  Remove the syringe and twist the drainage bag / bulb tubing tubing back on     2  The drainage bag/bulb may be emptied as necessary  Keep a record of the amount of fluid you drain from your tube  This should be done with clean technique as well  3  A fresh dressing should be applied daily over the tube insertion site  4  As the tube is secured to the skin with only a suture,try not to pull on your tube  Tub baths are not permitted  Showers are permitted if the patient's skin entry site is prevented from getting wet  Similarly, washcloth \"baths\" are acceptable  Contact Interventional Radiology at [phone] [place] PATIENTS: Contact Interventional Radiology at [phone]) Misael Baker PATIENTS: Contact Interventional Radiology at [phone]) if:    1  Leakage or large amounts of liquid around the catheter  2  Fever of 101 degrees lasting several hours without other obvious cause (such as sore throat, flu, etc)  3  Persistent nausea or vomiting  4  Diminished drainage, which may be associated with pressure or pain  Or when the     drainage from your tube is less than 10mls for 48 hours  5  Catheter pulled back or falls out  The following pharmacies carry the flush syringes         [facility]                     [facility]                             [facility]  [address] [place] [address] " [place]                         [phone]  [address]  Phone [phone]            Phone [phone] [facility]                                [phone] [address] [facility] [address] [place]WELL                      [address] [place]  Phone [phone]            Phone [phone]                      [facility]                                                                                                          [phone]  [facility]  [facility] [address]    [address]  Phone [phone]

## 2023-04-27 ENCOUNTER — TRANSITIONAL CARE MANAGEMENT (OUTPATIENT)
Dept: FAMILY MEDICINE CLINIC | Facility: CLINIC | Age: 43
End: 2023-04-27

## 2023-04-27 DIAGNOSIS — Z71.89 COORDINATION OF COMPLEX CARE: Primary | ICD-10-CM

## 2023-04-28 ENCOUNTER — PATIENT OUTREACH (OUTPATIENT)
Dept: FAMILY MEDICINE CLINIC | Facility: CLINIC | Age: 43
End: 2023-04-28

## 2023-04-28 LAB
BACTERIA BLD CULT: NORMAL
BACTERIA BLD CULT: NORMAL

## 2023-05-01 ENCOUNTER — PATIENT OUTREACH (OUTPATIENT)
Dept: FAMILY MEDICINE CLINIC | Facility: CLINIC | Age: 43
End: 2023-05-01

## 2023-05-01 NOTE — LETTER
Fecha: 05/01/23    Estimado/a Reny Handtorrie Parth Zarate es Dept: [phone]  [facility] un enfermero registrado administrador de atención de [facility] [address] [place] [id number]  No pude comunicarme con usted y me gustaría programar un horario para que hablemos por teléfono o personalmente  Me dedico a ayudar a los pacientes que tienen afecciones médicas complejas a obtener la atención que necesitan  Zavalla comprende a pacientes que pueden estar en el hospital o en berny spenser de emergencias  Adjunto información para usted  Si tiene preguntas, no dude en llamarme  Espero slaughter llamado  Atentamente    Carlo Soriano RN                 [phone]  Ingrid Albina atención ambulatoria      Copia:  Marisol Mercado y dirección del médico de [facility])

## 2023-05-05 ENCOUNTER — OFFICE VISIT (OUTPATIENT)
Dept: GYNECOLOGY | Facility: CLINIC | Age: 43
End: 2023-05-05

## 2023-05-05 ENCOUNTER — DOCUMENTATION (OUTPATIENT)
Dept: GYNECOLOGY | Facility: CLINIC | Age: 43
End: 2023-05-05

## 2023-05-05 VITALS
SYSTOLIC BLOOD PRESSURE: 110 MMHG | DIASTOLIC BLOOD PRESSURE: 72 MMHG | HEIGHT: 69 IN | HEART RATE: 90 BPM | BODY MASS INDEX: 25.18 KG/M2 | WEIGHT: 170 LBS

## 2023-05-05 DIAGNOSIS — N94.6 DYSMENORRHEA: Primary | ICD-10-CM

## 2023-05-05 DIAGNOSIS — N70.93 RIGHT TUBO-OVARIAN ABSCESS: ICD-10-CM

## 2023-05-05 DIAGNOSIS — N92.0 MENORRHAGIA WITH REGULAR CYCLE: ICD-10-CM

## 2023-05-05 DIAGNOSIS — N83.201 CYST OF RIGHT OVARY: ICD-10-CM

## 2023-05-05 DIAGNOSIS — N80.03 ADENOMYOSIS: ICD-10-CM

## 2023-05-05 NOTE — PROGRESS NOTES
Assessment/Plan:    Plan is to proceed with an [address] [place] [address] BS possible TL BS with possible right oophorectomy  The risks of the surgery were discussed including but not limited to infection, hemorrhage, bowel bladder or vascular or ureteral injury, possible necessity for laparotomy     Diagnoses and all orders for this visit:    Dysmenorrhea    Menorrhagia with regular cycle    Adenomyosis    Cyst of right ovary    Right tubo-ovarian abscess; will schedule for colonoscopy prior to hysterectomy        Subjective:      Patient ID: Jaime Veloz is a 38 y o  female  HPI  G 3 P 3, seen in the office 1/3/23,   X2, C section with tubal  X 1,  referred to office by Paulo Stanton, secondary to adenomyosis   Patient states that for years she has been experiencing menorrhagia and worsening dysmenorrhea minimally responsive to nonsteroidals      Pelvic ultrasound:     PELVIC ULTRASOUND, COMPLETE     INDICATION:  The patient is 38years old   N92 0: Excessive and frequent menstruation with regular cycle      COMPARISON: Pelvic ultrasound 4/3/2014      TECHNIQUE:   Transabdominal pelvic ultrasound was performed in sagittal and transverse planes with a curvilinear transducer   Additional transvaginal imaging was performed to better evaluate the endometrium and ovaries   Imaging included volumetric   sweeps as well as traditional still imaging technique      FINDINGS:     UTERUS:  The uterus is anteverted in position, measuring 11 4 x 6 1 x 7 9 cm  The uterus has a bulbous contour and coarsened heterogeneous echotexture with small myometrial cysts, most consistent with the appearance of diffuse adenomyosis  The cervix appears within normal limits      ENDOMETRIUM:    The endometrial echo complex has an AP caliber of 7 0 mm    Its appearance is within normal limits for age and cycle and shows no filling defects      OVARIES/ADNEXA:  Right ovary:  3 8 x 2 6 x 2 3 cm  12 2 mL  Unilocular cystic ovarian lesion with low-level internal echoes and no internal Doppler flow is noted measuring 2 9 x 1 9 x 2 0, compatible with endometrioma  Doppler flow within normal limits      Left ovary:  Surgically absent  No suspicious adnexal mass or loculated collections  There is no free fluid      IMPRESSION:     1   Bulbous uterus with findings consistent with diffuse adenomyosis  2   Cystic left ovarian lesion measuring up to 2 9 cm low-level internal echoes  Based on the ACR O-RADS system, this is an endometrioma, O-RADS category 2 (almost certain benign with <1% risk of malignancy ) The management recommendation is optional   initial follow-up pelvic ultrasound in 8 to 12 weeks, with consideration of annual follow-up ultrasound if not removed surgically   Reference: Radiology 2020; [id number]  3   Left oophorectomy  At that visit we discussed my suspicion for adenomyosis  We did discuss treatment options including medical management versus surgical management  The patient had opted to proceed with a hysterectomy  We discussed LS BS versus TLH BS  She had opted to proceed with an [facility] BS  She also will be scheduled for a possible right oophorectomy  Since that visit in the office on January 3 she presented to the emergency room on April 21  She was diagnosed with a right tubo-ovarian abscess  She is presently on oral antibiotics and has a drain placed by interventional radiology  She is scheduled for removal of the drain on May 8  The following portions of the patient's history were reviewed and updated as appropriate:   She  has a past medical history of B12 deficiency, Chronic migraine without aura, Dermatographism, Endometriosis, Iron deficiency anemia, Lyme neuropathy, Seizure disorder ([address] ), 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[address] ), Visual disturbance, and Vitamin D deficiency    She   Patient Active Problem List    Diagnosis Date Noted    Sepsis ([address] ) 04/23/2023    Endometriosis 04/21/2023    TOA (tubo-ovarian abscess) 2023    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[facility] ) 2023    Seizure disorder ([facility] ) 2023    Pancreatic abnormality 2023    Precordial pain 2022    Elevated lactic acid level 2021    Generalized abdominal pain 2021    Migraines 2021    Weight loss 2021    Transient diplopia 2021    Menorrhagia with irregular cycle 2021    Acute on chronic anemia 2021    Annual physical exam 2020    Prediabetes 2020    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    Ataxia 2020    Noncompliance with medications 2020    Cerebral atrophy ([facility] ) 04/10/2020    Gait instability 2019    Gastroesophageal reflux disease without esophagitis 2019    Seizure ([facility] )     Hypotension 2018    Chronic migraine without aura without status migrainosus, not intractable 2018    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 ([facility] ) 2018    Dermatographism 2017    Urticaria, chronic 2017    Nystagmus 2017    B12 deficiency 2017    Iron deficiency anemia 2017    Lyme neuropathy 2017    Vitamin D deficiency 2017     She  has a past surgical history that includes  section; Oophorectomy; Tubal ligation; Esophagogastroduodenoscopy (N/A, 3/8/2019); and IR drainage tube placement (2023)  Her family history includes Alcohol abuse in her father; HIV in her maternal grandmother; Other in her mother and sister; Seizures in her mother  She  reports that she has never smoked  She has never used smokeless tobacco  She reports that she does not drink alcohol and does not use drugs    Current Outpatient Medications   Medication Sig Dispense Refill    acetaminophen (TYLENOL) 325 mg tablet Take 3 tablets (975 mg total) by mouth every 6 (six) hours as needed for mild pain 60 tablet 0    amoxicillin (AMOXIL) 500 MG tablet Take 1 tablet (500 mg total) by mouth every 8 (eight) hours for 10 days 30 tablet 0    cyanocobalamin (VITAMIN B-12) 1000 MCG tablet Take 1 tablet (1,000 mcg total) by mouth daily 90 tablet 0    doxycycline monohydrate (MONODOX) 100 mg capsule Take 1 capsule (100 mg total) by mouth 2 (two) times a day for 14 days 28 capsule 0    Erenumab-aooe (Aimovig) 70 MG/ML SOAJ Inject 70 mg under the skin every 28 days      ferrous sulfate 324 (65 Fe) mg Take 1 tablet (324 mg total) by mouth daily 90 tablet 0    levETIRAcetam (KEPPRA) 750 mg tablet Take 2 tablets (1,500 mg total) by mouth 2 (two) times a day      metroNIDAZOLE (FLAGYL) 500 mg tablet Take 1 tablet (500 mg total) by mouth every 12 (twelve) hours for 14 days 28 tablet 0    omeprazole (PriLOSEC) 20 mg delayed release capsule Take 1 capsule (20 mg total) by mouth daily 90 capsule 1    ondansetron (ZOFRAN-ODT) 4 mg disintegrating tablet Take 1 tablet (4 mg total) by mouth every 6 (six) hours as needed for nausea 20 tablet 0     No current facility-administered medications for this visit       Current Outpatient Medications on File Prior to Visit   Medication Sig    acetaminophen (TYLENOL) 325 mg tablet Take 3 tablets (975 mg total) by mouth every 6 (six) hours as needed for mild pain    amoxicillin (AMOXIL) 500 MG tablet Take 1 tablet (500 mg total) by mouth every 8 (eight) hours for 10 days    cyanocobalamin (VITAMIN B-12) 1000 MCG tablet Take 1 tablet (1,000 mcg total) by mouth daily    doxycycline monohydrate (MONODOX) 100 mg capsule Take 1 capsule (100 mg total) by mouth 2 (two) times a day for 14 days    Erenumab-aooe (Aimovig) 70 MG/ML SOAJ Inject 70 mg under the skin every 28 days    ferrous sulfate 324 (65 Fe) mg Take 1 tablet (324 mg total) by mouth daily    levETIRAcetam (KEPPRA) 750 mg tablet Take 2 tablets (1,500 mg total) by mouth 2 (two) times a day    metroNIDAZOLE (FLAGYL) 500 mg tablet Take 1 tablet (500 mg total) by mouth every 12 (twelve) hours for 14 days    omeprazole (PriLOSEC) 20 mg delayed release capsule Take 1 "capsule (20 mg total) by mouth daily    ondansetron (ZOFRAN-ODT) 4 mg disintegrating tablet Take 1 tablet (4 mg total) by mouth every 6 (six) hours as needed for nausea     No current facility-administered medications on file prior to visit  She is allergic to morphine and dye fdc red  [red dye - food allergy]       Review of Systems   Constitutional: Negative  Gastrointestinal: Positive for abdominal pain  Genitourinary: Positive for menstrual problem and pelvic pain  Objective:      /72   Pulse 90   Ht 5' 9\" (1 753 m)   Wt 77 1 kg (170 lb)   BMI 25 10 kg/m²          Physical Exam  Vitals reviewed  Cardiovascular:      Rate and Rhythm: Normal rate and regular rhythm  Pulses: Normal pulses  Heart sounds: Normal heart sounds  Pulmonary:      Effort: Pulmonary effort is normal  No respiratory distress  Breath sounds: Normal breath sounds  Abdominal:      General: There is no distension  Palpations: Abdomen is soft  There is no mass  Tenderness: There is abdominal tenderness  There is no guarding or rebound  Hernia: No hernia is present  There is no hernia in the left inguinal area or right inguinal area  Genitourinary:     General: Normal vulva  Labia:         Right: No rash, tenderness or lesion  Left: No rash, tenderness or lesion  Vagina: Normal       Cervix: Normal       Uterus: Normal        Adnexa:         Right: Tenderness present  Left: No mass, tenderness or fullness  Musculoskeletal:      Cervical back: Normal range of motion and neck supple  No tenderness  Lymphadenopathy:      Cervical: No cervical adenopathy  Lower Body: No right inguinal adenopathy  No left inguinal adenopathy  Neurological:      Mental Status: She is alert           "

## 2023-05-05 NOTE — PROGRESS NOTES
Called gastro specialist called she has appt on 5/17 for consult in [address] open pt's partner agreed since this is quickest    Told the office she needs colonoscopy ASAP before 5/23   Or after 6/7   Her surgery is scheduled 6/12

## 2023-05-08 ENCOUNTER — HOSPITAL ENCOUNTER (OUTPATIENT)
Dept: RADIOLOGY | Facility: HOSPITAL | Age: 43
Discharge: HOME | End: 2023-05-08
Attending: RADIOLOGY | Admitting: RADIOLOGY

## 2023-05-08 DIAGNOSIS — N70.93 TOA (TUBO-OVARIAN ABSCESS): ICD-10-CM

## 2023-05-08 RX ADMIN — IOHEXOL 3 ML: 300 INJECTION, SOLUTION INTRAVENOUS at 14:01

## 2023-05-08 NOTE — DISCHARGE INSTRUCTIONS
Drainage Tube Removal    Your drainage tube was removed today  What you need know at home:   Keep a clean dry dressing at the tube site until the small opening closes  It will take twenty four to forty eight hours  Keep the site dry until it heals  A small amount of drainage on your dressing is normal  Resume your normal diet  Small sips of flat soda will help with any nausea  Contact Interventional Radiology for any of the following: You have pain, fever greater than 101, shaking chills  If you have increased redness or swelling at the site  I the drainage from your site does not stop  If the site drains pus or has a bad odor       Contact Interventional Radiology at [phone]   Patrick PATIENTS: Contact Interventional Radiology at [phone]) Caroline Graves PATIENTS: Contact Interventional Radiology at [phone]) if:

## 2023-05-08 NOTE — BRIEF OP NOTE (RAD/CATH)
INTERVENTIONAL RADIOLOGY PROCEDURE NOTE    Date: 5/8/2023    Procedure:   Procedure Summary     Date: 05/08/23 Room / Location: [address] [place] [address] Interventional Radiology    Anesthesia Start:  Anesthesia Stop:     Procedure: IR DRAINAGE TUBE CHECK/CHANGE/REPOSITION/REINSERTION/UPSIZE Diagnosis:       TOA (tubo-ovarian abscess)      (2 week tubo-ovarian abscess drain check)    Scheduled Providers: Casper King MD Responsible Provider:     Anesthesia Type: Not recorded ASA Status: Not recorded          Preoperative diagnosis:   1  TOA (tubo-ovarian abscess)         Postoperative diagnosis: Same  Surgeon: Casper King MD     Assistant: None  No qualified resident was available  Blood loss: None    Specimens: None     Findings: No significant residual cavity, drainage catheter removed  Complications: None immediate      Anesthesia: none

## 2023-05-09 ENCOUNTER — PATIENT OUTREACH (OUTPATIENT)
Dept: FAMILY MEDICINE CLINIC | Facility: CLINIC | Age: 43
End: 2023-05-09

## 2023-05-09 NOTE — PROGRESS NOTES
Chart review- no response to phone calls or unable to reach letter will close from care management at this time

## 2023-05-11 ENCOUNTER — OFFICE VISIT (OUTPATIENT)
Dept: FAMILY MEDICINE CLINIC | Facility: CLINIC | Age: 43
End: 2023-05-11

## 2023-05-11 VITALS
OXYGEN SATURATION: 98 % | BODY MASS INDEX: 22.99 KG/M2 | DIASTOLIC BLOOD PRESSURE: 60 MMHG | HEIGHT: 69 IN | TEMPERATURE: 98.2 F | WEIGHT: 155.2 LBS | RESPIRATION RATE: 16 BRPM | SYSTOLIC BLOOD PRESSURE: 108 MMHG | HEART RATE: 78 BPM

## 2023-05-11 DIAGNOSIS — Z09 HOSPITAL DISCHARGE FOLLOW-UP: Primary | ICD-10-CM

## 2023-05-11 RX ORDER — TRANEXAMIC ACID 650 MG/1
TABLET ORAL
COMMUNITY
Start: 2023-04-16

## 2023-05-11 RX ORDER — LAMOTRIGINE 100 MG/1
100 TABLET ORAL 2 TIMES DAILY
COMMUNITY
Start: 2023-02-20 | End: 2024-02-20

## 2023-05-11 RX ORDER — PSEUDOEPHED/ACETAMINOPH/DIPHEN 30MG-500MG
TABLET ORAL
COMMUNITY
Start: 2023-04-20

## 2023-05-11 NOTE — PROGRESS NOTES
Assessment/Plan:    Hospital discharge follow-up  Hospital course reviewed  Meds reconciled  No issues today  Diagnoses and all orders for this visit:    Hospital discharge follow-up    Other orders  -     Acetaminophen Extra Strength 500 MG tablet  -     lamoTRIgine (LaMICtal) 100 mg tablet; Take 100 mg by mouth 2 (two) times a day (Patient not taking: Reported on 5/11/2023)  -     Tranexamic Acid 650 MG TABS; TAKE 2 TABLETS (1,300 MG TOTAL) BY MOUTH 3 (THREE) TIMES A DAY FOR 5 DAYS (Patient not taking: Reported on 5/11/2023)        Subjective: tcm     Patient ID: Randall Castro is a 38 y o  female  HPI  Cecelia Deleon was hospitalized from 4/21-4/26 for a tubo-ovarian abscess  She presented with RLQ pain x1w  She was transferred from [address] [place] [address] to [facility]  She med sepsis criteria  She was given IV abx and had a drain placed by IR  She was discharge on doxy, amoxicillin and metronidazole and has a schedule hysterectomy with bp salpingectomy w possible oophorectomy in June  The culture was pos for pansensitive ecoli  Blood cultures neg  She denies pain today  She completed the abx wo issue  The following portions of the patient's history were reviewed and updated as appropriate: allergies, current medications, past family history, past medical history, past social history, past surgical history and problem list     Review of Systems   Constitutional: Negative for fever and unexpected weight change  HENT: Negative for ear pain, sore throat and trouble swallowing  Eyes: Negative for pain and visual disturbance  Respiratory: Negative for cough, chest tightness, shortness of breath and wheezing  Cardiovascular: Negative for chest pain  Gastrointestinal: Negative for abdominal distention, abdominal pain, blood in stool, constipation, diarrhea, nausea and vomiting  Endocrine: Negative for polydipsia and polyuria  Genitourinary: Negative for dysuria and hematuria     Musculoskeletal: "Negative for back pain and myalgias  Skin: Negative for rash  Neurological: Negative for syncope and headaches  Psychiatric/Behavioral: Negative for suicidal ideas  PHQ-2/9 Depression Screening    Little interest or pleasure in doing things: 0 - not at all  Feeling down, depressed, or hopeless: 0 - not at all  PHQ-2 Score: 0  PHQ-2 Interpretation: Negative depression screen       TCM Call     Date and time call was made  4/27/2023 10:30 AM    Hospital care reviewed  Records reviewed    Patient [place] [facility] [place]  [facility]    Date of Admission  04/21/23    Date of discharge  04/26/23    Diagnosis  TOA (tubo-ovarian abscess    Disposition  Home    Current Symptoms  Lower abdominal pain    Lower abdominal pain severity  Mild    Lower abdominal pain onset  Gradual      TCM Call     Post hospital issues  None    Scheduled for follow up? Yes    Did you obtain your prescribed medications  Yes    Do you need help managing your prescriptions or medications  No    Is transportation to your appointment needed  No    I have advised the patient to call PCP with any new or worsening symptoms  Maryann Stover MA    Living Arrangements  Spouse or Significiant other    Are you recieving any outpatient services  No    Are you recieving home care services  No    Are you using any community resources  No    Current waiver services  No    Have you fallen in the last 12 months  No    Interperter language line needed  Yes    Counseling  Family    Counseling topics  Activities of daily living            Objective:      /60 (BP Location: Left arm, Patient Position: Sitting, Cuff Size: Adult)   Pulse 78   Temp 98 2 °F (36 8 °C) (Tympanic)   Resp 16   Ht 5' 9\" (1 753 m)   Wt 70 4 kg (155 lb 3 2 oz)   SpO2 98%   BMI 22 92 kg/m²          Physical Exam  Constitutional:       Appearance: She is well-developed  HENT:      Head: Normocephalic and atraumatic        Right Ear: External ear normal       Left " Ear: External ear normal       Mouth/Throat:      Pharynx: No oropharyngeal exudate  Eyes:      General: No scleral icterus  Conjunctiva/sclera: Conjunctivae normal       Pupils: Pupils are equal, round, and reactive to light  Cardiovascular:      Rate and Rhythm: Normal rate and regular rhythm  Heart sounds: No murmur heard  No friction rub  No gallop  Pulmonary:      Effort: Pulmonary effort is normal  No respiratory distress  Breath sounds: Normal breath sounds  No wheezing or rales  Abdominal:      General: Bowel sounds are normal  There is no distension  Palpations: Abdomen is soft  There is no mass  Tenderness: There is no abdominal tenderness  There is no rebound  Musculoskeletal:         General: Normal range of motion  Cervical back: Normal range of motion and neck supple  Skin:     General: Skin is warm and dry  Neurological:      Mental Status: She is alert and oriented to person, place, and time  Mental status is at baseline

## 2023-05-17 ENCOUNTER — TELEPHONE (OUTPATIENT)
Dept: GYNECOLOGY | Facility: CLINIC | Age: 43
End: 2023-05-17

## 2023-05-17 ENCOUNTER — OFFICE VISIT (OUTPATIENT)
Dept: GASTROENTEROLOGY | Facility: CLINIC | Age: 43
End: 2023-05-17

## 2023-05-17 VITALS
BODY MASS INDEX: 23.7 KG/M2 | HEART RATE: 68 BPM | WEIGHT: 160 LBS | OXYGEN SATURATION: 98 % | DIASTOLIC BLOOD PRESSURE: 68 MMHG | HEIGHT: 69 IN | SYSTOLIC BLOOD PRESSURE: 110 MMHG

## 2023-05-17 DIAGNOSIS — N73.9 PELVIC ABSCESS IN FEMALE: ICD-10-CM

## 2023-05-17 DIAGNOSIS — D50.9 IRON DEFICIENCY ANEMIA, UNSPECIFIED IRON DEFICIENCY ANEMIA TYPE: Primary | ICD-10-CM

## 2023-05-17 DIAGNOSIS — K21.9 GASTROESOPHAGEAL REFLUX DISEASE WITHOUT ESOPHAGITIS: ICD-10-CM

## 2023-05-17 NOTE — TELEPHONE ENCOUNTER
Becky Hill called [facility] wants to go to [facility] so Becky Hill is rescheduling her surgery and was rescheduled for 9/26

## 2023-05-17 NOTE — PROGRESS NOTES
[facility] - Outpatient Consultation  Flora Barahona 38 y o  female MRN: [id number]  Encounter: [id number]          ASSESSMENT AND PLAN:      1  Iron deficiency anemia  2  Pelvic abscess in female  3  Gastroesophageal reflux disease    Patient was referred for colonoscopy prior to her scheduled hysterectomy  Patient has a long history of an iron deficiency anemia and menorrhagia  She was recently treated for a tubo-ovarian abscess last month treated with antibiotics and drain placement by IR  She also has a long history of GERD and takes Omeprazole daily  Will plan for EGD and colonoscopy to investigate   ______________________________________________________________________    HPI:  Patient is a pleasant 38year old female with a PMH of an iron deficiency anemia, menorrhagia, and recent tubo-ovarian abscess s/p antibiotic treatment and drain placement  She is scheduled for a hysterectomy and was referred for a colonoscopy prior to the hysterectomy  She denies any rectal bleeding or melena  No abdominal pain  No problems with her bowel movements  No family history of colon cancer  She has a long history of GERD and takes Omeprazole  No prior EGD or colonoscopy  She reports she is going to [facility] and will be back in the [address] [place] [address]  REVIEW OF SYSTEMS:    CONSTITUTIONAL: Denies any fever, chills, rigors, and weight loss  HEENT: No earache or tinnitus  Denies hearing loss or visual disturbances  CARDIOVASCULAR: No chest pain or palpitations  RESPIRATORY: Denies any cough, hemoptysis, shortness of breath or dyspnea on exertion  GASTROINTESTINAL: As noted in the History of Present Illness  GENITOURINARY: No problems with urination  Denies any hematuria or dysuria  NEUROLOGIC: No dizziness or vertigo, denies headaches  MUSCULOSKELETAL: Denies any muscle or joint pain  SKIN: Denies skin rashes or itching     ENDOCRINE: Denies excessive thirst  Denies intolerance to heat or cold  PSYCHOSOCIAL: Denies depression or anxiety  Denies any recent memory loss  Historical Information   Past Medical History:   Diagnosis Date   • B12 deficiency    • Chronic migraine without aura    • Dermatographism    • Endometriosis    • Iron deficiency anemia    • Lyme neuropathy    • Seizure disorder (HCC)    • 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HCC)    • Visual disturbance     Diplopia, nystagmus, decreased visual acuity   • Vitamin D deficiency      Past Surgical History:   Procedure Laterality Date   •  SECTION     • ESOPHAGOGASTRODUODENOSCOPY N/A 3/8/2019    Procedure: ESOPHAGOGASTRODUODENOSCOPY (EGD); Surgeon: Simon Bartholomew MD;  [facility];   Service: Gastroenterology   • IR DRAINAGE TUBE CHECK/CHANGE/REPOSITION/REINSERTION/UPSIZE  2023   • IR DRAINAGE TUBE PLACEMENT  2023   • OOPHORECTOMY      unilateral --  last assessed 17   • TUBAL LIGATION      last assessed 17     Social History   Social History     Substance and Sexual Activity   Alcohol Use No     Social History     Substance and Sexual Activity   Drug Use No     Social History     Tobacco Use   Smoking Status Never   Smokeless Tobacco Never     Family History   Problem Relation Age of Onset   • Other Mother         AIDS, Brain Tumor balance disorder   • Seizures Mother    • Alcohol abuse Father    • Other Sister         Balance disorder   • HIV Maternal Grandmother        Meds/Allergies       Current Outpatient Medications:   •  acetaminophen (TYLENOL) 325 mg tablet  •  Acetaminophen Extra Strength 500 MG tablet  •  cyanocobalamin (VITAMIN B-12) 1000 MCG tablet  •  Erenumab-aooe (Aimovig) 70 MG/ML SOAJ  •  ferrous sulfate 324 (65 Fe) mg  •  levETIRAcetam (KEPPRA) 750 mg tablet  •  omeprazole (PriLOSEC) 20 mg delayed release capsule  •  ondansetron (ZOFRAN-ODT) 4 mg disintegrating tablet  •  lamoTRIgine (LaMICtal) 100 mg tablet  •  Tranexamic Acid 650 MG TABS    Allergies   Allergen Reactions "  • Morphine Itching     Pt denies breathing issues   • Dye Fdc Red  [Red Dye - Food Allergy]            Objective     Blood pressure 110/68, pulse 68, height 5' 9\" (1 753 m), weight 72 6 kg (160 lb), SpO2 98 %  Body mass index is 23 63 kg/m²  PHYSICAL EXAM:      General Appearance:   Alert, cooperative, no distress   HEENT:   Normocephalic, atraumatic, anicteric    Neck:  Supple, symmetrical, trachea midline   Lungs:   Clear to auscultation bilaterally; no rales, rhonchi or wheezing; respirations unlabored    Heart[de-identified]   Regular rate and rhythm; no murmur, rub, or gallop  Abdomen:   Soft, non-tender, non-distended; normal bowel sounds; no masses, no organomegaly    Genitalia:   Deferred    Rectal:   Deferred    Extremities:  No cyanosis, clubbing or edema    Pulses:  2+ and symmetric    Skin:  No jaundice, rashes, or lesions    Lymph nodes:  No palpable cervical lymphadenopathy        Lab Results:   No visits with results within 1 Day(s) from this visit  Latest known visit with results is:   No results displayed because visit has over 200 results  Radiology Results:   XR chest portable    Result Date: 4/24/2023  Narrative: CHEST INDICATION:   fever, sepsis protocol  COMPARISON:  None EXAM PERFORMED/VIEWS:  XR CHEST PORTABLE FINDINGS: Cardiomediastinal silhouette appears unremarkable  The lungs are clear  No pneumothorax or pleural effusion  Osseous structures appear within normal limits for patient age  Impression: No acute cardiopulmonary disease   Workstation performed: [id number]       "

## 2023-06-22 PROBLEM — A41.9 SEPSIS (HCC): Status: RESOLVED | Noted: 2023-04-23 | Resolved: 2023-06-22

## 2023-07-05 ENCOUNTER — OFFICE VISIT (OUTPATIENT)
Dept: FAMILY MEDICINE CLINIC | Facility: CLINIC | Age: 43
End: 2023-07-05
Payer: COMMERCIAL

## 2023-07-05 VITALS
BODY MASS INDEX: 25.18 KG/M2 | HEART RATE: 103 BPM | SYSTOLIC BLOOD PRESSURE: 110 MMHG | RESPIRATION RATE: 16 BRPM | OXYGEN SATURATION: 99 % | TEMPERATURE: 97.9 F | HEIGHT: 69 IN | DIASTOLIC BLOOD PRESSURE: 66 MMHG | WEIGHT: 170 LBS

## 2023-07-05 DIAGNOSIS — A04.8 H. PYLORI INFECTION: ICD-10-CM

## 2023-07-05 DIAGNOSIS — D23.30 CYST, DERMOID, FACE: Primary | ICD-10-CM

## 2023-07-05 PROCEDURE — 99213 OFFICE O/P EST LOW 20 MIN: CPT | Performed by: FAMILY MEDICINE

## 2023-07-05 RX ORDER — OMEPRAZOLE 20 MG/1
20 CAPSULE, DELAYED RELEASE ORAL DAILY
Qty: 90 CAPSULE | Refills: 1 | Status: SHIPPED | OUTPATIENT
Start: 2023-07-05

## 2023-07-05 NOTE — PROGRESS NOTES
Assessment/Plan:    No problem-specific Assessment & Plan notes found for this encounter. Diagnoses and all orders for this visit:    Cyst, dermoid, face  -     Ambulatory Referral to Dermatology; Future    H. pylori infection  -     omeprazole (PriLOSEC) 20 mg delayed release capsule; Take 1 capsule (20 mg total) by mouth daily          Subjective: Lump on eyebrow     Patient ID: Shawnee Pelletier is a 38 y.o. female. HPI  Here for a mass of the right eyebrow that started about 4 months ago and has been growing. Denies any injury, swelling, pain or drainage of the area. The following portions of the patient's history were reviewed and updated as appropriate: allergies, current medications, past family history, past medical history, past social history, past surgical history and problem list.    Review of Systems   Constitutional: Negative for fever and unexpected weight change. HENT: Negative for ear pain, sore throat and trouble swallowing. Eyes: Negative for pain and visual disturbance. Respiratory: Negative for cough, chest tightness, shortness of breath and wheezing. Cardiovascular: Negative for chest pain. Gastrointestinal: Negative for abdominal distention, abdominal pain, blood in stool, constipation, diarrhea, nausea and vomiting. Endocrine: Negative for polydipsia and polyuria. Genitourinary: Negative for dysuria and hematuria. Musculoskeletal: Negative for back pain and myalgias. Skin: Negative for rash. Neurological: Negative for syncope and headaches. Psychiatric/Behavioral: Negative for suicidal ideas.          PHQ-2/9 Depression Screening         [unfilled]    Objective:      /66 (BP Location: Left arm, Patient Position: Sitting, Cuff Size: Standard)   Pulse 103   Temp 97.9 °F (36.6 °C) (Tympanic)   Resp 16   Ht 5' 9" (1.753 m)   Wt 77.1 kg (170 lb)   SpO2 99%   BMI 25.10 kg/m²          Physical Exam  Constitutional:       Appearance: She is well-developed. HENT:      Head: Normocephalic and atraumatic. Comments: Small, circular movable mass left eyebrow  About 4mm     Right Ear: External ear normal.      Left Ear: External ear normal.   Eyes:      General: No scleral icterus. Conjunctiva/sclera: Conjunctivae normal.   Pulmonary:      Effort: Pulmonary effort is normal. No respiratory distress. Musculoskeletal:      Cervical back: Normal range of motion. Neurological:      Mental Status: She is alert and oriented to person, place, and time.

## 2023-08-23 PROCEDURE — NC001 PR NO CHARGE: Performed by: OBSTETRICS & GYNECOLOGY

## 2023-08-23 NOTE — H&P
Plan is to proceed with an [facility] BS possible TLH BS with possible right oophorectomy. The risks of the surgery were discussed including but not limited to infection, hemorrhage, bowel bladder or vascular or ureteral injury, possible necessity for laparotomy      Diagnoses and all orders for this visit:     Dysmenorrhea     Menorrhagia with regular cycle     Adenomyosis     Cyst of right ovary     Right tubo-ovarian abscess; will schedule for colonoscopy prior to hysterectomy         Subjective:       Patient ID: [place] Woody is a 38 y.o. female.     HPI  G 3 P 3, seen in the office 1/3/23,   X2, C section with tubal  X 1,  referred to office by Haleigh Tamez, secondary to adenomyosis.  Patient states that for years she has been experiencing menorrhagia and worsening dysmenorrhea minimally responsive to nonsteroidals.     Pelvic ultrasound:     PELVIC ULTRASOUND, COMPLETE     INDICATION:  The patient is 38years old.  N92.0: Excessive and frequent menstruation with regular cycle.     COMPARISON: Pelvic ultrasound 4/3/2014.     TECHNIQUE:   Transabdominal pelvic ultrasound was performed in sagittal and transverse planes with a curvilinear transducer.  Additional transvaginal imaging was performed to better evaluate the endometrium and ovaries.  Imaging included volumetric   sweeps as well as traditional still imaging technique.     FINDINGS:     UTERUS:  The uterus is anteverted in position, measuring 11.4 x 6.1 x 7.9 cm. The uterus has a bulbous contour and coarsened heterogeneous echotexture with small myometrial cysts, most consistent with the appearance of diffuse adenomyosis. The cervix appears within normal limits.     ENDOMETRIUM:    The endometrial echo complex has an AP caliber of 7.0 mm.   Its appearance is within normal limits for age and cycle and shows no filling defects.     OVARIES/ADNEXA:  Right ovary:  3.8 x 2.6 x 2.3 cm. 12.2 mL  Unilocular cystic ovarian lesion with low-level internal echoes and no internal Doppler flow is noted measuring 2.9 x 1.9 x 2.0, compatible with endometrioma. Doppler flow within normal limits.     Left ovary:  Surgically absent. No suspicious adnexal mass or loculated collections. There is no free fluid.     IMPRESSION:     1.  Bulbous uterus with findings consistent with diffuse adenomyosis. 2.  Cystic left ovarian lesion measuring up to 2.9 cm low-level internal echoes. Based on the ACR O-RADS system, this is an endometrioma, O-RADS category 2 (almost certain benign with <7% risk of malignancy.) The management recommendation is optional   initial follow-up pelvic ultrasound in 8 to 12 weeks, with consideration of annual follow-up ultrasound if not removed surgically.  Reference: Radiology 2020; [id number]. 3.  Left oophorectomy.     At that visit we discussed my suspicion for adenomyosis. We did discuss treatment options including medical management versus surgical management. The patient had opted to proceed with a hysterectomy. We discussed [facility] BS versus [facility] BS. She had opted to proceed with an [facility] BS. She also will be scheduled for a possible right oophorectomy. Since that visit in the office on January 3 she presented to the emergency room on April 21. She was diagnosed with a right tubo-ovarian abscess. She is presently on oral antibiotics and has a drain placed by interventional radiology. She is scheduled for removal of the drain on May 8.              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   • Ataxia 2020   • Noncompliance with medications 2020   • Cerebral atrophy ([address]) 04/10/2020   • Gait instability 2019   • Gastroesophageal reflux disease without esophagitis 2019   • Seizure ([address])     • Hypotension 2018   • Chronic migraine without aura without status migrainosus, not intractable 2018   • 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 ([address]) 2018   • Dermatographism 2017   • Urticaria, chronic 2017   • Nystagmus 2017   • B12 deficiency 2017   • Iron deficiency anemia 2017   • Lyme neuropathy 2017   • Vitamin D deficiency 2017      She  has a past surgical history that includes  section; Oophorectomy; Tubal ligation; Esophagogastroduodenoscopy (N/A, 3/8/2019); and IR drainage tube placement (2023). Her family history includes Alcohol abuse in her father; HIV in her maternal grandmother; Other in her mother and sister; Seizures in her mother. She  reports that she has never smoked. She has never used smokeless tobacco. She reports that she does not drink alcohol and does not use drugs.          Current Outpatient Medications   Medication Sig Dispense Refill   • acetaminophen (TYLENOL) 325 mg tablet Take 3 tablets (975 mg total) by mouth every 6 (six) hours as needed for mild pain 60 tablet 0   • amoxicillin (AMOXIL) 500 MG tablet Take 1 tablet (500 mg total) by mouth every 8 (eight) hours for 10 days 30 tablet 0   • cyanocobalamin (VITAMIN B-12) 1000 MCG tablet Take 1 tablet (1,000 mcg total) by mouth daily 90 tablet 0   • doxycycline monohydrate (MONODOX) 100 mg capsule Take 1 capsule (100 mg total) by mouth 2 (two) times a day for 14 days 28 capsule 0   • Erenumab-aooe (Aimovig) 70 MG/ML SOAJ Inject 70 mg under the skin every 28 days       • ferrous sulfate 324 (65 Fe) mg Take 1 tablet (324 mg total) by mouth daily 90 tablet 0   • levETIRAcetam (KEPPRA) 750 mg tablet Take 2 tablets (1,500 mg total) by mouth 2 (two) times a day       • metroNIDAZOLE (FLAGYL) 500 mg tablet Take 1 tablet (500 mg total) by mouth every 12 (twelve) hours for 14 days 28 tablet 0   • omeprazole (PriLOSEC) 20 mg delayed release capsule Take 1 capsule (20 mg total) by mouth daily 90 capsule 1   • ondansetron (ZOFRAN-ODT) 4 mg disintegrating tablet Take 1 tablet (4 mg total) by mouth every 6 (six) hours as needed for nausea 20 tablet 0      No current facility-administered medications for this visit.           Current Outpatient Medications on File Prior to Visit   Medication Sig   • acetaminophen (TYLENOL) 325 mg tablet Take 3 tablets (975 mg total) by mouth every 6 (six) hours as needed for mild pain   • amoxicillin (AMOXIL) 500 MG tablet Take 1 tablet (500 mg total) by mouth every 8 (eight) hours for 10 days   • cyanocobalamin (VITAMIN B-12) 1000 MCG tablet Take 1 tablet (1,000 mcg total) by mouth daily   • doxycycline monohydrate (MONODOX) 100 mg capsule Take 1 capsule (100 mg total) by mouth 2 (two) times a day for 14 days   • Erenumab-aooe (Aimovig) 70 MG/ML SOAJ Inject 70 mg under the skin every 28 days   • ferrous sulfate 324 (65 Fe) mg Take 1 tablet (324 mg total) by mouth daily   • levETIRAcetam (KEPPRA) 750 mg tablet Take 2 tablets (1,500 mg total) by mouth 2 (two) times a day   • metroNIDAZOLE (FLAGYL) 500 mg tablet Take 1 tablet (500 mg total) by mouth every 12 (twelve) hours for 14 days   • omeprazole (PriLOSEC) 20 mg delayed release capsule Take 1 capsule (20 mg total) by mouth daily   • ondansetron (ZOFRAN-ODT) 4 mg disintegrating tablet Take 1 tablet (4 mg total) by mouth every 6 (six) hours as needed for nausea      No current facility-administered medications on file prior to visit.      She is allergic to morphine and dye fdc red  [red dye - food allergy]. .     Review of Systems   Constitutional: Negative. Gastrointestinal: Positive for abdominal pain. Genitourinary: Positive for menstrual problem and pelvic pain.          Objective:        /72   Pulse 90   Ht 5' 9" (1.753 m)   Wt 77.1 kg (170 lb)   BMI 25.10 kg/m²             Physical Exam  Vitals reviewed. Cardiovascular:      Rate and Rhythm: Normal rate and regular rhythm. Pulses: Normal pulses. Heart sounds: Normal heart sounds. Pulmonary:      Effort: Pulmonary effort is normal. No respiratory distress. Breath sounds: Normal breath sounds. Abdominal:      General: There is no distension. Palpations: Abdomen is soft. There is no mass. Tenderness: There is abdominal tenderness. There is no guarding or rebound. Hernia: No hernia is present. There is no hernia in the left inguinal area or right inguinal area. Genitourinary:     General: Normal vulva. Labia:         Right: No rash, tenderness or lesion. Left: No rash, tenderness or lesion. Vagina: Normal.      Cervix: Normal.      Uterus: Normal.       Adnexa:         Right: Tenderness present. Left: No mass, tenderness or fullness. Musculoskeletal:      Cervical back: Normal range of motion and neck supple. No tenderness. Lymphadenopathy:      Cervical: No cervical adenopathy. Lower Body: No right inguinal adenopathy. No left inguinal adenopathy. Neurological:      Mental Status: She is alert.

## 2023-09-14 ENCOUNTER — APPOINTMENT (OUTPATIENT)
Dept: LAB | Facility: CLINIC | Age: 43
End: 2023-09-14
Payer: COMMERCIAL

## 2023-09-14 ENCOUNTER — ANESTHESIA EVENT (OUTPATIENT)
Dept: PERIOP | Facility: HOSPITAL | Age: 43
End: 2023-09-14
Payer: COMMERCIAL

## 2023-09-14 DIAGNOSIS — Z01.812 PRE-OPERATIVE LABORATORY EXAMINATION: ICD-10-CM

## 2023-09-14 DIAGNOSIS — N92.0 MENORRHAGIA WITH REGULAR CYCLE: ICD-10-CM

## 2023-09-14 DIAGNOSIS — N94.6 DYSMENORRHEA: ICD-10-CM

## 2023-09-14 DIAGNOSIS — N80.03 ADENOMYOSIS OF UTERUS: ICD-10-CM

## 2023-09-14 DIAGNOSIS — Z01.818 PREOP EXAMINATION: ICD-10-CM

## 2023-09-14 LAB
EST. AVERAGE GLUCOSE BLD GHB EST-MCNC: 105 MG/DL
HBA1C MFR BLD: 5.3 %

## 2023-09-14 PROCEDURE — 86900 BLOOD TYPING SEROLOGIC ABO: CPT | Performed by: OBSTETRICS & GYNECOLOGY

## 2023-09-14 PROCEDURE — 83036 HEMOGLOBIN GLYCOSYLATED A1C: CPT

## 2023-09-14 PROCEDURE — 86850 RBC ANTIBODY SCREEN: CPT | Performed by: OBSTETRICS & GYNECOLOGY

## 2023-09-14 PROCEDURE — 86901 BLOOD TYPING SEROLOGIC RH(D): CPT | Performed by: OBSTETRICS & GYNECOLOGY

## 2023-09-14 PROCEDURE — 36415 COLL VENOUS BLD VENIPUNCTURE: CPT

## 2023-09-14 NOTE — PRE-PROCEDURE INSTRUCTIONS
Pre-Surgery Instructions:   Medication Instructions   • cyanocobalamin (VITAMIN B-12) 1000 MCG tablet Hold day of surgery. • ferrous sulfate 324 (65 Fe) mg Hold day of surgery. • lamoTRIgine (LaMICtal) 100 mg tablet Pt reports she is not taking this   • levETIRAcetam (KEPPRA) 750 mg tablet Take day of surgery. • omeprazole (PriLOSEC) 20 mg delayed release capsule Take day of surgery.  #[id number]    Medication instructions for day surgery reviewed. Please use only a sip of water to take your instructed medications. Avoid all over the counter vitamins, supplements and NSAIDS for one week prior to surgery per anesthesia guidelines. Tylenol is ok to take as needed. You will receive a call one business day prior to surgery with an arrival time and hospital directions. If your surgery is scheduled on a Monday, the hospital will be calling you on the Friday prior to your surgery. If you have not heard from anyone by 8pm, please call the hospital supervisor through the hospital  at [phone]. [facility] [phone]). Do not eat or drink anything after midnight the night before your surgery, including candy, mints, lifesavers, or chewing gum. Do not drink alcohol 24hrs before your surgery. Try not to smoke at least 24hrs before your surgery. Follow the pre surgery showering instructions as listed in the [facility] Surgical Experience Booklet” or otherwise provided by your surgeon's office. Do not shave the surgical area 24 hours before surgery. Do not apply any lotions, creams, including makeup, cologne, deodorant, or perfumes after showering on the day of your surgery. No contact lenses, eye make-up, or artificial eyelashes. Remove nail polish, including gel polish, and any artificial, gel, or acrylic nails if possible. Remove all jewelry including rings and body piercing jewelry. Wear causal clothing that is easy to take on and off. Consider your type of surgery.     Keep any valuables, jewelry, piercings at home. Please bring any specially ordered equipment (sling, braces) if indicated. Arrange for a responsible person to drive you to and from the hospital on the day of your surgery. Visitor Guidelines discussed. Call the surgeon's office with any new illnesses, exposures, or additional questions prior to surgery. Please reference your [facility] Surgical Experience Booklet” for additional information to prepare for your upcoming surgery.

## 2023-09-15 ENCOUNTER — OFFICE VISIT (OUTPATIENT)
Age: 43
End: 2023-09-15
Payer: COMMERCIAL

## 2023-09-15 ENCOUNTER — LAB REQUISITION (OUTPATIENT)
Dept: LAB | Facility: HOSPITAL | Age: 43
End: 2023-09-15
Payer: COMMERCIAL

## 2023-09-15 VITALS
OXYGEN SATURATION: 97 % | BODY MASS INDEX: 26.96 KG/M2 | DIASTOLIC BLOOD PRESSURE: 70 MMHG | SYSTOLIC BLOOD PRESSURE: 118 MMHG | HEIGHT: 69 IN | HEART RATE: 72 BPM | WEIGHT: 182 LBS

## 2023-09-15 DIAGNOSIS — N73.9 PELVIC ABSCESS IN FEMALE: ICD-10-CM

## 2023-09-15 DIAGNOSIS — K21.9 GASTROESOPHAGEAL REFLUX DISEASE, UNSPECIFIED WHETHER ESOPHAGITIS PRESENT: Primary | ICD-10-CM

## 2023-09-15 DIAGNOSIS — Z01.818 ENCOUNTER FOR OTHER PREPROCEDURAL EXAMINATION: ICD-10-CM

## 2023-09-15 DIAGNOSIS — D50.9 IRON DEFICIENCY ANEMIA, UNSPECIFIED IRON DEFICIENCY ANEMIA TYPE: ICD-10-CM

## 2023-09-15 LAB
ABO GROUP BLD: NORMAL
BLD GP AB SCN SERPL QL: NEGATIVE
RH BLD: POSITIVE
SPECIMEN EXPIRATION DATE: NORMAL

## 2023-09-15 PROCEDURE — 99214 OFFICE O/P EST MOD 30 MIN: CPT | Performed by: PHYSICIAN ASSISTANT

## 2023-09-15 NOTE — H&P (VIEW-ONLY)
[facility] - Outpatient Follow-up Note  John Monte 41 y.o. female MRN: [id number]  Encounter: [id number]          ASSESSMENT AND PLAN:      1. Iron deficiency anemia  2. Pelvic abscess in female  3. Gastroesophageal reflux disease    Patient was referred for colonoscopy prior to her scheduled hysterectomy. Patient has a long history of an iron deficiency anemia and menorrhagia. She was recently treated for a tubo-ovarian abscess in the spring treated with antibiotics and drain placement by IR. She also has a long history of GERD and takes Omeprazole daily.     Will plan for EGD and colonoscopy to investigate.    ______________________________________________________________________    SUBJECTIVE:  Patient is a pleasant 38year old female with a PMH of an iron deficiency anemia, menorrhagia, and recent tubo-ovarian abscess s/p antibiotic treatment and drain placement in the spring. She is scheduled for a hysterectomy and was referred for a colonoscopy prior to the hysterectomy. She was seen in May and recommended to have the EGD and colonoscopy but did not have it done as she was going to [place].  She is here to schedule those procedures now. She denies any rectal bleeding or melena. No abdominal pain. No problems with her bowel movements. No family history of colon cancer. She has a long history of GERD and takes Omeprazole. No prior EGD or colonoscopy. REVIEW OF SYSTEMS IS OTHERWISE NEGATIVE.       Historical Information   Past Medical History:   Diagnosis Date   • Anemia    • B12 deficiency    • Chronic migraine without aura    • Dermatographism    • Endometriosis    • GERD (gastroesophageal reflux disease)    • History of blood transfusion    • Iron deficiency anemia    • Lyme neuropathy    • Seizure disorder (HCC)    • Seizures (HCC)    • 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HCC)    • Visual disturbance     Diplopia, nystagmus, decreased visual acuity   • Vitamin D deficiency      Past Surgical History:   Procedure Laterality Date   •  SECTION     • ESOPHAGOGASTRODUODENOSCOPY N/A 3/8/2019    Procedure: ESOPHAGOGASTRODUODENOSCOPY (EGD); Surgeon: Rodrigo Lynn MD;  Location[facility]; Service: Gastroenterology   • IR DRAINAGE TUBE CHECK/CHANGE/REPOSITION/REINSERTION/UPSIZE  2023   • IR DRAINAGE TUBE PLACEMENT  2023   • OOPHORECTOMY      unilateral --  last assessed 17   • TUBAL LIGATION      last assessed 17     Social History   Social History     Substance and Sexual Activity   Alcohol Use No     Social History     Substance and Sexual Activity   Drug Use No     Social History     Tobacco Use   Smoking Status Never   Smokeless Tobacco Never     Family History   Problem Relation Age of Onset   • Other Mother         AIDS, Brain Tumor balance disorder   • Seizures Mother    • Alcohol abuse Father    • Other Sister         Balance disorder   • HIV Maternal Grandmother        Meds/Allergies       Current Outpatient Medications:   •  cyanocobalamin (VITAMIN B-12) 1000 MCG tablet  •  Erenumab-aooe (Aimovig) 70 MG/ML SOAJ  •  ferrous sulfate 324 (65 Fe) mg  •  levETIRAcetam (KEPPRA) 750 mg tablet  •  omeprazole (PriLOSEC) 20 mg delayed release capsule  •  acetaminophen (TYLENOL) 325 mg tablet  •  Acetaminophen Extra Strength 500 MG tablet  •  lamoTRIgine (LaMICtal) 100 mg tablet  •  ondansetron (ZOFRAN-ODT) 4 mg disintegrating tablet  •  Tranexamic Acid 650 MG TABS    Allergies   Allergen Reactions   • Morphine Anaphylaxis and Itching           Objective     Blood pressure 118/70, pulse 72, height 5' 9" (1.753 m), weight 82.6 kg (182 lb), SpO2 97 %. Body mass index is 26.88 kg/m².       PHYSICAL EXAM:      General Appearance:   Alert, cooperative, no distress   HEENT:   Normocephalic, atraumatic, anicteric     Neck:  Supple, symmetrical, trachea midline   Lungs:   Clear to auscultation bilaterally; no rales, rhonchi or wheezing; respirations unlabored    Heart[de-identified]   Regular rate and rhythm; no murmur, rub, or gallop. Abdomen:   Soft, non-tender, non-distended; normal bowel sounds; no masses, no organomegaly    Genitalia:   Deferred    Rectal:   Deferred    Extremities:  No cyanosis, clubbing or edema    Pulses:  2+ and symmetric    Skin:  No jaundice, rashes, or lesions    Lymph nodes:  No palpable cervical lymphadenopathy        Lab Results:   No visits with results within 1 Day(s) from this visit. Latest known visit with results is:   Appointment on 09/14/2023   Component Date Value   • Hemoglobin A1C 09/14/2023 5.3    • EAG 09/14/2023 105          Radiology Results:   No results found.

## 2023-09-15 NOTE — PROGRESS NOTES
[facility] - Outpatient Follow-up Note  Christopher Resendiz 41 y.o. female MRN: [id number]  Encounter: [id number]          ASSESSMENT AND PLAN:      1. Iron deficiency anemia  2. Pelvic abscess in female  3. Gastroesophageal reflux disease    Patient was referred for colonoscopy prior to her scheduled hysterectomy. Patient has a long history of an iron deficiency anemia and menorrhagia. She was recently treated for a tubo-ovarian abscess in the spring treated with antibiotics and drain placement by IR. She also has a long history of GERD and takes Omeprazole daily.     Will plan for EGD and colonoscopy to investigate.    ______________________________________________________________________    SUBJECTIVE:  Patient is a pleasant 38year old female with a PMH of an iron deficiency anemia, menorrhagia, and recent tubo-ovarian abscess s/p antibiotic treatment and drain placement in the spring. She is scheduled for a hysterectomy and was referred for a colonoscopy prior to the hysterectomy. She was seen in May and recommended to have the EGD and colonoscopy but did not have it done as she was going to [place].  She is here to schedule those procedures now. She denies any rectal bleeding or melena. No abdominal pain. No problems with her bowel movements. No family history of colon cancer. She has a long history of GERD and takes Omeprazole. No prior EGD or colonoscopy. REVIEW OF SYSTEMS IS OTHERWISE NEGATIVE.       Historical Information   Past Medical History:   Diagnosis Date   • Anemia    • B12 deficiency    • Chronic migraine without aura    • Dermatographism    • Endometriosis    • GERD (gastroesophageal reflux disease)    • History of blood transfusion    • Iron deficiency anemia    • Lyme neuropathy    • Seizure disorder (HCC)    • Seizures (HCC)    • 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HCC)    • Visual disturbance     Diplopia, nystagmus, decreased visual acuity   • Vitamin D deficiency      Past Surgical History:   Procedure Laterality Date   •  SECTION     • ESOPHAGOGASTRODUODENOSCOPY N/A 3/8/2019    Procedure: ESOPHAGOGASTRODUODENOSCOPY (EGD); Surgeon: Tiffany Fernandez MD;  Location: [facility]; Service: Gastroenterology   • IR DRAINAGE TUBE CHECK/CHANGE/REPOSITION/REINSERTION/UPSIZE  2023   • IR DRAINAGE TUBE PLACEMENT  2023   • OOPHORECTOMY      unilateral --  last assessed 17   • TUBAL LIGATION      last assessed 17     Social History   Social History     Substance and Sexual Activity   Alcohol Use No     Social History     Substance and Sexual Activity   Drug Use No     Social History     Tobacco Use   Smoking Status Never   Smokeless Tobacco Never     Family History   Problem Relation Age of Onset   • Other Mother         AIDS, Brain Tumor balance disorder   • Seizures Mother    • Alcohol abuse Father    • Other Sister         Balance disorder   • HIV Maternal Grandmother        Meds/Allergies       Current Outpatient Medications:   •  cyanocobalamin (VITAMIN B-12) 1000 MCG tablet  •  Erenumab-aooe (Aimovig) 70 MG/ML SOAJ  •  ferrous sulfate 324 (65 Fe) mg  •  levETIRAcetam (KEPPRA) 750 mg tablet  •  omeprazole (PriLOSEC) 20 mg delayed release capsule  •  acetaminophen (TYLENOL) 325 mg tablet  •  Acetaminophen Extra Strength 500 MG tablet  •  lamoTRIgine (LaMICtal) 100 mg tablet  •  ondansetron (ZOFRAN-ODT) 4 mg disintegrating tablet  •  Tranexamic Acid 650 MG TABS    Allergies   Allergen Reactions   • Morphine Anaphylaxis and Itching           Objective     Blood pressure 118/70, pulse 72, height 5' 9" (1.753 m), weight 82.6 kg (182 lb), SpO2 97 %. Body mass index is 26.88 kg/m².       PHYSICAL EXAM:      General Appearance:   Alert, cooperative, no distress   HEENT:   Normocephalic, atraumatic, anicteric     Neck:  Supple, symmetrical, trachea midline   Lungs:   Clear to auscultation bilaterally; no rales, rhonchi or wheezing; respirations unlabored    Heart[de-identified]   Regular rate and rhythm; no murmur, rub, or gallop. Abdomen:   Soft, non-tender, non-distended; normal bowel sounds; no masses, no organomegaly    Genitalia:   Deferred    Rectal:   Deferred    Extremities:  No cyanosis, clubbing or edema    Pulses:  2+ and symmetric    Skin:  No jaundice, rashes, or lesions    Lymph nodes:  No palpable cervical lymphadenopathy        Lab Results:   No visits with results within 1 Day(s) from this visit. Latest known visit with results is:   Appointment on 09/14/2023   Component Date Value   • Hemoglobin A1C 09/14/2023 5.3    • EAG 09/14/2023 105          Radiology Results:   No results found.

## 2023-09-15 NOTE — PATIENT INSTRUCTIONS
Scheduled date of EGD/colonoscopy (as of today): 9/22/23  Physician performing EGD/colonoscopy: Yasmeen  Location of EGD/colonoscopy: [facility]  Desired bowel prep reviewed with patient: Miralax  Instructions reviewed with patient by: Emily FISHER  Clearances:

## 2023-09-22 ENCOUNTER — ANESTHESIA (OUTPATIENT)
Dept: GASTROENTEROLOGY | Facility: HOSPITAL | Age: 43
End: 2023-09-22

## 2023-09-22 ENCOUNTER — ANESTHESIA EVENT (OUTPATIENT)
Dept: GASTROENTEROLOGY | Facility: HOSPITAL | Age: 43
End: 2023-09-22

## 2023-09-22 ENCOUNTER — HOSPITAL ENCOUNTER (OUTPATIENT)
Dept: GASTROENTEROLOGY | Facility: HOSPITAL | Age: 43
Setting detail: OUTPATIENT SURGERY
End: 2023-09-22
Payer: COMMERCIAL

## 2023-09-22 VITALS
HEIGHT: 69 IN | OXYGEN SATURATION: 98 % | TEMPERATURE: 97.3 F | DIASTOLIC BLOOD PRESSURE: 68 MMHG | WEIGHT: 181.66 LBS | HEART RATE: 75 BPM | SYSTOLIC BLOOD PRESSURE: 114 MMHG | BODY MASS INDEX: 26.91 KG/M2 | RESPIRATION RATE: 18 BRPM

## 2023-09-22 DIAGNOSIS — D50.9 IRON DEFICIENCY ANEMIA, UNSPECIFIED IRON DEFICIENCY ANEMIA TYPE: ICD-10-CM

## 2023-09-22 DIAGNOSIS — A04.8 H. PYLORI INFECTION: ICD-10-CM

## 2023-09-22 DIAGNOSIS — K21.9 GASTROESOPHAGEAL REFLUX DISEASE, UNSPECIFIED WHETHER ESOPHAGITIS PRESENT: ICD-10-CM

## 2023-09-22 LAB
EXT PREGNANCY TEST URINE: NEGATIVE
EXT. CONTROL: NORMAL

## 2023-09-22 PROCEDURE — 88305 TISSUE EXAM BY PATHOLOGIST: CPT | Performed by: PATHOLOGY

## 2023-09-22 PROCEDURE — 81025 URINE PREGNANCY TEST: CPT | Performed by: ANESTHESIOLOGY

## 2023-09-22 RX ORDER — ONDANSETRON 2 MG/ML
4 INJECTION INTRAMUSCULAR; INTRAVENOUS ONCE AS NEEDED
Status: CANCELLED | OUTPATIENT
Start: 2023-09-22

## 2023-09-22 RX ORDER — SODIUM CHLORIDE, SODIUM LACTATE, POTASSIUM CHLORIDE, CALCIUM CHLORIDE 600; 310; 30; 20 MG/100ML; MG/100ML; MG/100ML; MG/100ML
INJECTION, SOLUTION INTRAVENOUS CONTINUOUS PRN
Status: DISCONTINUED | OUTPATIENT
Start: 2023-09-22 | End: 2023-09-22

## 2023-09-22 RX ORDER — PROPOFOL 10 MG/ML
INJECTION, EMULSION INTRAVENOUS AS NEEDED
Status: DISCONTINUED | OUTPATIENT
Start: 2023-09-22 | End: 2023-09-22

## 2023-09-22 RX ORDER — LIDOCAINE HYDROCHLORIDE 20 MG/ML
INJECTION, SOLUTION EPIDURAL; INFILTRATION; INTRACAUDAL; PERINEURAL AS NEEDED
Status: DISCONTINUED | OUTPATIENT
Start: 2023-09-22 | End: 2023-09-22

## 2023-09-22 RX ADMIN — PROPOFOL 50 MG: 10 INJECTION, EMULSION INTRAVENOUS at 11:11

## 2023-09-22 RX ADMIN — PROPOFOL 80 MG: 10 INJECTION, EMULSION INTRAVENOUS at 11:07

## 2023-09-22 RX ADMIN — LIDOCAINE HYDROCHLORIDE 100 MG: 20 INJECTION, SOLUTION EPIDURAL; INFILTRATION; INTRACAUDAL; PERINEURAL at 11:04

## 2023-09-22 RX ADMIN — SODIUM CHLORIDE, SODIUM LACTATE, POTASSIUM CHLORIDE, AND CALCIUM CHLORIDE: .6; .31; .03; .02 INJECTION, SOLUTION INTRAVENOUS at 11:03

## 2023-09-22 RX ADMIN — PROPOFOL 100 MG: 10 INJECTION, EMULSION INTRAVENOUS at 11:04

## 2023-09-22 RX ADMIN — PROPOFOL 50 MG: 10 INJECTION, EMULSION INTRAVENOUS at 11:16

## 2023-09-22 NOTE — INTERVAL H&P NOTE
H&P reviewed. After examining the patient I find no changes in the patients condition since the H&P had been written.     Vitals:    09/22/23 1003   BP: 119/70   Pulse: 71   Resp: 12   Temp: (!) 97 °F (36.1 °C)   SpO2: 99%

## 2023-09-22 NOTE — ANESTHESIA PREPROCEDURE EVALUATION
Procedure:  COLONOSCOPY  EGD    Relevant Problems   CARDIO   (+) Chronic migraine without aura without status migrainosus, not intractable   (+) Migraines   (+) Precordial pain      GI/HEPATIC   (+) Gastroesophageal reflux disease without esophagitis   (+) Pancreatic abnormality      HEMATOLOGY   (+) Acute on chronic anemia   (+) Iron deficiency anemia      NEURO/PSYCH   (+) Chronic migraine without aura without status migrainosus, not intractable   (+) Migraines   (+) Seizure (HCC)   (+) Seizure disorder (HCC)   (+) 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HCC)   (+) 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HCC)   (+) Transient diplopia        Physical Exam    Airway    Mallampati score: II  TM Distance: >3 FB  Neck ROM: full     Dental   No notable dental hx     Cardiovascular      Pulmonary      Other Findings        Anesthesia Plan  ASA Score- 3     Anesthesia Type- IV sedation with anesthesia with ASA Monitors. Additional Monitors:   Airway Plan:           Plan Factors-Exercise tolerance (METS): >4 METS. Chart reviewed. Patient summary reviewed. Induction- intravenous. Postoperative Plan-     Informed Consent- Anesthetic plan and risks discussed with patient. I personally reviewed this patient with the CRNA. Discussed and agreed on the Anesthesia Plan with the CRNA. Stephon Cornell

## 2023-09-22 NOTE — ANESTHESIA POSTPROCEDURE EVALUATION
Post-Op Assessment Note    CV Status:  Stable    Pain management: adequate     Mental Status:  Sleepy   Hydration Status:  Euvolemic   PONV Controlled:  Controlled   Airway Patency:  Patent      Post Op Vitals Reviewed: Yes      Staff: CRNA         No notable events documented.     BP   87/52   Temp  36   Pulse  76   Resp   16   SpO2   100

## 2023-09-26 ENCOUNTER — HOSPITAL ENCOUNTER (OUTPATIENT)
Facility: HOSPITAL | Age: 43
Setting detail: OUTPATIENT SURGERY
Discharge: HOME | End: 2023-09-27
Attending: OBSTETRICS & GYNECOLOGY | Admitting: OBSTETRICS & GYNECOLOGY
Payer: COMMERCIAL

## 2023-09-26 ENCOUNTER — ANESTHESIA (OUTPATIENT)
Dept: PERIOP | Facility: HOSPITAL | Age: 43
End: 2023-09-26
Payer: COMMERCIAL

## 2023-09-26 DIAGNOSIS — G89.18 POSTOPERATIVE PAIN: Primary | ICD-10-CM

## 2023-09-26 DIAGNOSIS — N80.03 ADENOMYOSIS: ICD-10-CM

## 2023-09-26 PROBLEM — Z90.711 S/P LAPAROSCOPIC SUPRACERVICAL HYSTERECTOMY: Status: ACTIVE | Noted: 2023-09-26

## 2023-09-26 LAB
EXT PREGNANCY TEST URINE: NEGATIVE
EXT PREGNANCY TEST URINE: NEGATIVE
EXT. CONTROL: NORMAL
EXT. CONTROL: NORMAL

## 2023-09-26 PROCEDURE — 88305 TISSUE EXAM BY PATHOLOGIST: CPT | Performed by: PATHOLOGY

## 2023-09-26 PROCEDURE — 58662 LAPAROSCOPY EXCISE LESIONS: CPT | Performed by: OBSTETRICS & GYNECOLOGY

## 2023-09-26 PROCEDURE — 81025 URINE PREGNANCY TEST: CPT | Performed by: OBSTETRICS & GYNECOLOGY

## 2023-09-26 PROCEDURE — 88307 TISSUE EXAM BY PATHOLOGIST: CPT | Performed by: PATHOLOGY

## 2023-09-26 PROCEDURE — 58542 LSH W/T/O UT 250 G OR LESS: CPT | Performed by: OBSTETRICS & GYNECOLOGY

## 2023-09-26 PROCEDURE — 81025 URINE PREGNANCY TEST: CPT | Performed by: ANESTHESIOLOGY

## 2023-09-26 PROCEDURE — C1782 MORCELLATOR: HCPCS | Performed by: OBSTETRICS & GYNECOLOGY

## 2023-09-26 RX ORDER — ONDANSETRON 2 MG/ML
INJECTION INTRAMUSCULAR; INTRAVENOUS AS NEEDED
Status: DISCONTINUED | OUTPATIENT
Start: 2023-09-26 | End: 2023-09-26

## 2023-09-26 RX ORDER — SODIUM CHLORIDE 9 MG/ML
INJECTION, SOLUTION INTRAVENOUS CONTINUOUS PRN
Status: DISCONTINUED | OUTPATIENT
Start: 2023-09-26 | End: 2023-09-26

## 2023-09-26 RX ORDER — DEXAMETHASONE SODIUM PHOSPHATE 10 MG/ML
INJECTION, SOLUTION INTRAMUSCULAR; INTRAVENOUS AS NEEDED
Status: DISCONTINUED | OUTPATIENT
Start: 2023-09-26 | End: 2023-09-26

## 2023-09-26 RX ORDER — FENTANYL CITRATE 50 UG/ML
INJECTION, SOLUTION INTRAMUSCULAR; INTRAVENOUS AS NEEDED
Status: DISCONTINUED | OUTPATIENT
Start: 2023-09-26 | End: 2023-09-26

## 2023-09-26 RX ORDER — HYDROMORPHONE HCL/PF 1 MG/ML
SYRINGE (ML) INJECTION AS NEEDED
Status: DISCONTINUED | OUTPATIENT
Start: 2023-09-26 | End: 2023-09-26

## 2023-09-26 RX ORDER — VECURONIUM BROMIDE 1 MG/ML
INJECTION, POWDER, LYOPHILIZED, FOR SOLUTION INTRAVENOUS AS NEEDED
Status: DISCONTINUED | OUTPATIENT
Start: 2023-09-26 | End: 2023-09-26

## 2023-09-26 RX ORDER — SODIUM CHLORIDE 9 MG/ML
125 INJECTION, SOLUTION INTRAVENOUS CONTINUOUS
Status: DISCONTINUED | OUTPATIENT
Start: 2023-09-26 | End: 2023-09-27 | Stop reason: HOSPADM

## 2023-09-26 RX ORDER — OXYCODONE HYDROCHLORIDE 5 MG/1
5 TABLET ORAL EVERY 4 HOURS PRN
Status: DISCONTINUED | OUTPATIENT
Start: 2023-09-26 | End: 2023-09-27 | Stop reason: HOSPADM

## 2023-09-26 RX ORDER — FENTANYL CITRATE/PF 50 MCG/ML
50 SYRINGE (ML) INJECTION
Status: DISCONTINUED | OUTPATIENT
Start: 2023-09-26 | End: 2023-09-26 | Stop reason: HOSPADM

## 2023-09-26 RX ORDER — CEFAZOLIN SODIUM 1 G/50ML
1000 SOLUTION INTRAVENOUS ONCE
Status: COMPLETED | OUTPATIENT
Start: 2023-09-26 | End: 2023-09-26

## 2023-09-26 RX ORDER — MIDAZOLAM HYDROCHLORIDE 2 MG/2ML
INJECTION, SOLUTION INTRAMUSCULAR; INTRAVENOUS AS NEEDED
Status: DISCONTINUED | OUTPATIENT
Start: 2023-09-26 | End: 2023-09-26

## 2023-09-26 RX ORDER — PROPOFOL 10 MG/ML
INJECTION, EMULSION INTRAVENOUS AS NEEDED
Status: DISCONTINUED | OUTPATIENT
Start: 2023-09-26 | End: 2023-09-26

## 2023-09-26 RX ORDER — LIDOCAINE HCL/PF 100 MG/5ML
SYRINGE (ML) INJECTION AS NEEDED
Status: DISCONTINUED | OUTPATIENT
Start: 2023-09-26 | End: 2023-09-26

## 2023-09-26 RX ORDER — HYDROMORPHONE HCL/PF 1 MG/ML
0.5 SYRINGE (ML) INJECTION
Status: DISCONTINUED | OUTPATIENT
Start: 2023-09-26 | End: 2023-09-26 | Stop reason: HOSPADM

## 2023-09-26 RX ORDER — ACETAMINOPHEN 325 MG/1
650 TABLET ORAL EVERY 6 HOURS SCHEDULED
Status: DISCONTINUED | OUTPATIENT
Start: 2023-09-26 | End: 2023-09-27 | Stop reason: HOSPADM

## 2023-09-26 RX ORDER — ONDANSETRON 2 MG/ML
4 INJECTION INTRAMUSCULAR; INTRAVENOUS EVERY 6 HOURS PRN
Status: DISCONTINUED | OUTPATIENT
Start: 2023-09-26 | End: 2023-09-27 | Stop reason: HOSPADM

## 2023-09-26 RX ORDER — MAGNESIUM HYDROXIDE 1200 MG/15ML
LIQUID ORAL AS NEEDED
Status: DISCONTINUED | OUTPATIENT
Start: 2023-09-26 | End: 2023-09-26 | Stop reason: HOSPADM

## 2023-09-26 RX ORDER — LEVETIRACETAM 750 MG/1
1500 TABLET ORAL 2 TIMES DAILY
Status: DISCONTINUED | OUTPATIENT
Start: 2023-09-27 | End: 2023-09-27 | Stop reason: HOSPADM

## 2023-09-26 RX ORDER — PROPOFOL 10 MG/ML
INJECTION, EMULSION INTRAVENOUS CONTINUOUS PRN
Status: DISCONTINUED | OUTPATIENT
Start: 2023-09-26 | End: 2023-09-26

## 2023-09-26 RX ORDER — IBUPROFEN 600 MG/1
600 TABLET ORAL EVERY 6 HOURS SCHEDULED
Status: DISCONTINUED | OUTPATIENT
Start: 2023-09-26 | End: 2023-09-27 | Stop reason: HOSPADM

## 2023-09-26 RX ORDER — ALBUMIN, HUMAN INJ 5% 5 %
SOLUTION INTRAVENOUS CONTINUOUS PRN
Status: DISCONTINUED | OUTPATIENT
Start: 2023-09-26 | End: 2023-09-26

## 2023-09-26 RX ORDER — ONDANSETRON 2 MG/ML
4 INJECTION INTRAMUSCULAR; INTRAVENOUS ONCE AS NEEDED
Status: DISCONTINUED | OUTPATIENT
Start: 2023-09-26 | End: 2023-09-26 | Stop reason: HOSPADM

## 2023-09-26 RX ORDER — OXYCODONE HYDROCHLORIDE 10 MG/1
10 TABLET ORAL EVERY 4 HOURS PRN
Status: DISCONTINUED | OUTPATIENT
Start: 2023-09-26 | End: 2023-09-27 | Stop reason: HOSPADM

## 2023-09-26 RX ORDER — OXYCODONE HYDROCHLORIDE AND ACETAMINOPHEN 5; 325 MG/1; MG/1
1 TABLET ORAL EVERY 4 HOURS PRN
Qty: 15 TABLET | Refills: 0 | Status: SHIPPED | OUTPATIENT
Start: 2023-09-26

## 2023-09-26 RX ORDER — BUPIVACAINE HYDROCHLORIDE 2.5 MG/ML
INJECTION, SOLUTION EPIDURAL; INFILTRATION; INTRACAUDAL AS NEEDED
Status: DISCONTINUED | OUTPATIENT
Start: 2023-09-26 | End: 2023-09-26 | Stop reason: HOSPADM

## 2023-09-26 RX ORDER — SODIUM CHLORIDE, SODIUM LACTATE, POTASSIUM CHLORIDE, CALCIUM CHLORIDE 600; 310; 30; 20 MG/100ML; MG/100ML; MG/100ML; MG/100ML
INJECTION, SOLUTION INTRAVENOUS CONTINUOUS PRN
Status: DISCONTINUED | OUTPATIENT
Start: 2023-09-26 | End: 2023-09-26

## 2023-09-26 RX ADMIN — PROPOFOL 30 MG: 10 INJECTION, EMULSION INTRAVENOUS at 17:23

## 2023-09-26 RX ADMIN — VECURONIUM BROMIDE 2 MG: 1 INJECTION, POWDER, LYOPHILIZED, FOR SOLUTION INTRAVENOUS at 17:09

## 2023-09-26 RX ADMIN — LIDOCAINE HYDROCHLORIDE 100 MG: 20 INJECTION INTRAVENOUS at 14:59

## 2023-09-26 RX ADMIN — FENTANYL CITRATE 50 MCG: 50 INJECTION INTRAMUSCULAR; INTRAVENOUS at 15:31

## 2023-09-26 RX ADMIN — PROPOFOL 130 MCG/KG/MIN: 10 INJECTION, EMULSION INTRAVENOUS at 14:59

## 2023-09-26 RX ADMIN — ALBUMIN (HUMAN): 12.5 INJECTION, SOLUTION INTRAVENOUS at 17:47

## 2023-09-26 RX ADMIN — ONDANSETRON 4 MG: 2 INJECTION INTRAMUSCULAR; INTRAVENOUS at 18:10

## 2023-09-26 RX ADMIN — MIDAZOLAM 2 MG: 1 INJECTION INTRAMUSCULAR; INTRAVENOUS at 14:54

## 2023-09-26 RX ADMIN — VECURONIUM BROMIDE 1 MG: 1 INJECTION, POWDER, LYOPHILIZED, FOR SOLUTION INTRAVENOUS at 16:48

## 2023-09-26 RX ADMIN — VECURONIUM BROMIDE 1 MG: 1 INJECTION, POWDER, LYOPHILIZED, FOR SOLUTION INTRAVENOUS at 15:30

## 2023-09-26 RX ADMIN — OXYCODONE HYDROCHLORIDE 10 MG: 10 TABLET ORAL at 23:48

## 2023-09-26 RX ADMIN — SODIUM CHLORIDE 125 ML/HR: 0.9 INJECTION, SOLUTION INTRAVENOUS at 23:47

## 2023-09-26 RX ADMIN — PROPOFOL 200 MG: 10 INJECTION, EMULSION INTRAVENOUS at 14:59

## 2023-09-26 RX ADMIN — FENTANYL CITRATE 50 MCG: 50 INJECTION INTRAMUSCULAR; INTRAVENOUS at 18:01

## 2023-09-26 RX ADMIN — SODIUM CHLORIDE: 0.9 INJECTION, SOLUTION INTRAVENOUS at 15:03

## 2023-09-26 RX ADMIN — SODIUM CHLORIDE 125 ML/HR: 0.9 INJECTION, SOLUTION INTRAVENOUS at 10:42

## 2023-09-26 RX ADMIN — VECURONIUM BROMIDE 7 MG: 1 INJECTION, POWDER, LYOPHILIZED, FOR SOLUTION INTRAVENOUS at 14:59

## 2023-09-26 RX ADMIN — DEXAMETHASONE SODIUM PHOSPHATE 10 MG: 10 INJECTION INTRAMUSCULAR; INTRAVENOUS at 14:59

## 2023-09-26 RX ADMIN — IBUPROFEN 600 MG: 600 TABLET ORAL at 21:13

## 2023-09-26 RX ADMIN — VECURONIUM BROMIDE 2 MG: 1 INJECTION, POWDER, LYOPHILIZED, FOR SOLUTION INTRAVENOUS at 16:03

## 2023-09-26 RX ADMIN — HYDROMORPHONE HYDROCHLORIDE 0.5 MG: 1 INJECTION, SOLUTION INTRAMUSCULAR; INTRAVENOUS; SUBCUTANEOUS at 16:48

## 2023-09-26 RX ADMIN — FENTANYL CITRATE 50 MCG: 0.05 INJECTION, SOLUTION INTRAMUSCULAR; INTRAVENOUS at 19:49

## 2023-09-26 RX ADMIN — SUGAMMADEX 200 MG: 100 INJECTION, SOLUTION INTRAVENOUS at 18:19

## 2023-09-26 RX ADMIN — ACETAMINOPHEN 325MG 650 MG: 325 TABLET ORAL at 21:13

## 2023-09-26 RX ADMIN — SODIUM CHLORIDE, SODIUM LACTATE, POTASSIUM CHLORIDE, AND CALCIUM CHLORIDE: .6; .31; .03; .02 INJECTION, SOLUTION INTRAVENOUS at 16:48

## 2023-09-26 RX ADMIN — HYDROMORPHONE HYDROCHLORIDE 0.5 MG: 1 INJECTION, SOLUTION INTRAMUSCULAR; INTRAVENOUS; SUBCUTANEOUS at 17:25

## 2023-09-26 RX ADMIN — INDIGOTINDISULFONATE SODIUM 24 MG: 8 INJECTION INTRAVENOUS at 18:09

## 2023-09-26 RX ADMIN — CEFAZOLIN SODIUM 1000 MG: 1 SOLUTION INTRAVENOUS at 14:56

## 2023-09-26 RX ADMIN — FENTANYL CITRATE 100 MCG: 50 INJECTION INTRAMUSCULAR; INTRAVENOUS at 14:59

## 2023-09-26 NOTE — ASSESSMENT & PLAN NOTE
• S/p LSH, RSO, lysis of adhesions, fulgeration of endometriosis, cysto with Dr. Maddison Zelaya  • Pain: Tylenol, Motrin radha, Charity PRN   • Diet: Regular  • Borrego removed at the end of the procedure, void trial**  • DVT Ppx: SCDs (BMI 26)  • Encouraged OOB/IS  • Dispo: **

## 2023-09-26 NOTE — OP NOTE
OPERATIVE REPORT  PATIENT NAME: Sabrina Hernández    :  1980  MRN: [id number]  Pt Location: [facility] OR ROOM 07    SURGERY DATE: 2023    Surgeon(s) and Role:     Job So DO - Primary     * Alma Liang MD - Assisting     * Orquidea Harman MD - Assisting    Preop Diagnosis:  Adenomyosis [N80.03]    Post-Op Diagnosis Codes:     * Adenomyosis [N80.03]    Procedure(s):  LSH. LYSIS OF ADHESIONS  Right - LAP OOPHORECTOMY    Specimen(s):  ID Type Source Tests Collected by Time Destination   1 : Uterus and Right ovary Tissue Uterus TISSUE Jacqueline Moore  2023 1807        Estimated Blood Loss:   250 mL    Drains:  [REMOVED] Urethral Catheter Non-latex 16 Fr. (Removed)   Number of days: 0       Anesthesia Type:   General    Operative Indications:  Adenomyosis [N80.03]    Operative Findings:  Dense omental and small bowel adhesions to the anterior abdominal wall as well as to the anterior and fundal walls of the body of the uterus which was approximately 12wks in size. Omental adhesions to the right ovary which was also densely adherent to the pelvic side wall. Surgically absent bilateral fallopian tubes and left ovary. Bladder wall adhesions to the anterior lower uterine segment. Multiple gun-powder endometriotic lesions in the posterior cul-de-sac. Cystoscopy performed at the conclusion of the [facility] showed normal bladder wall mucosa with efflux visualized from bilateral ureteral orifices. Complications:   None    Procedure and Technique:  The patient was properly identified in the holding area by the operating room staff and attending physician. She was taken to operating room where general anesthesia was instituted without complication. She was placed in the dorsal lithotomy position with her legs in [address] with care taken to avoid excessive flexion or extension of her lower extremities.  Once the patient was properly positioned, the patient was prepped and draped in normal sterile fashion. A time-out was performed. The patient was again properly identified by the operating room staff and attending physician. At this time, the patient was receiving antibiotics for prophylaxis. A Borrego catheter was aseptically inserted. The uterine manipulator was placed without complications. Only the tip of the RORO was used (size 10) after we sounded the uterus to 11cm with endometrial balloon inflated. Gloves were changed, and attention was directed to the abdomen. Two towel clamps were used to elis the umbilicus. A 10-mm incision was created at the level of the umbilicus in a vertical fashion superiorly. A Veress needle was utilized to get into the peritoneal axis. Once we were intraperitoneal with low opening presure, we allowed CO2 gas to insufflate her abdomen until we reached a pressure of 15 mmHg. Once we reached that pressure, a 10-mm optical trocar was inserted under direct visualization at this site. Two additional 10 mm trocars were placed on bilateral lower quadrants approximately 2 fingerbreadths above the anterior superior iliac spine and 2 cm medial to that spot under direct visualization. The inferior epigastric vessels were visualized prior to trocar insertion in trying to avoid injury to this vessel. First, a survey of the cavity was performed, and we confirmed the above-mentioned findings. Utilizing the LigaSure, we started to lyse the omental adhesions to the anterior abdominal wall which spanned a significant portion of the anterior middle and lower abdominal wall. Upon advancing to the lower portion of the anterior abdominal wall during the lysis of the omental adhesions we found a segment of small bowel that was densely adherent to the abdominal wall. At this point, Dr. Aleta Baez with General Surgery was consulted intraoperatively given these findings.  While waiting for Dr. Memo Maurer, attention was turned to the right adnexa, where additional omental adhesions were lysed to the right ovary. The ovary was also found to be densely adherent to the pelvic side wall and decision was made to perform a right oophorectomy which was done with the use of a LigaSure. After this was performed, the right side port was extended to accommodate a 15mm port. The left port was extended to a 10mm to accommodate Avery power morcellator. A single tooth tenaculum was inserted through the morcellator and the ovary was grasped and placed into an endocatch bag. The ovary was then morcellated and removed from the abdominal cavity. The right round ligament was then sealed, coagulated and cut using the LigaSure. Using the same device the broad ligament was dissected into the anterior and posterior leafs which were then taken down to the uterine vessels. The  vesicouterine space was then partially developed on the right side without complications. At this time, Dr. Glynn Spencer scrubbed in and assisted with the rest of the omental and small bowel adhesions to the anterior abdominal wall as well as the uterus as described above. Once this was performed, the round ligament on the left side was identified and noted to be scarred. It was then sealed, coagulated and cut with the LigaSure. The broad ligament was then dissected into the anterior and posterior leafs and taken down to the level of the uterine vessels. The rest of the vesicouterine space was then fully developed. We began cautery of the easily visible uterine arteries at the level of the internal cervical os on both sides. These uterine arteries were double burned without complications. We noticed blanching of the uterus, and we introduced the Olympus laparoscopic bipolar loop. Once we were able to get the loop at the level of the endocervical canal, the uterus was grasped using a Tenaculum.  The manipulator was replaced with a sponge stick and the loop was activated and we transected the corpus of the uterus without complications under direct visualization using the tenaculum for stabilization. A laparoscopic J hook bovie tip were used to cauterize the cervical os and areas over the cervical stump that were oozing. The pelvis was hemostatic after irrigation. The monopolar J hook was utilized to fulgurate the gun powder endometriotic lesions on posterior cul-de-suc. At this point, we introduced the 15 endocatch bag thorugh the right lower quadrant. The left lower quadrant trocar was replaced with the Avery power morcellator. The blade was activated after a laparoscopic tenaculum was brought through the device and the specimen was placed into the endocatch bag. Once in the bag, the specimen was grasped with the tenaculum and the morcellator was activated to begin specimen morcellation. The entire morcellation process occurred under direct visualization and remained contained in the specimen bag. Once completed, the morcellator was placed on safe mode and removed from the abdomen. The endocatch bag was also removed out of the abdomen through the 15 mm trocar with small tissue pieces and residual small volume of blood remaining in the intact bag. At this point, we did a survey of the pelvic cavity and we confirmed good hemostasis. The left and right lower quadrant fascial defects were closed using the M close laparoscopic fascial closure device with 0-vicryl suture under visualization. Local was also infiltrated into these ports prior to closure. The camera port was also removed once CO2 gas was evacuated from the abdomen and after several Valsalva breaths were given. The skin incisions were closed with interrupted 3-0 plaingut suture and bandaids. A cystoscopy was then performed with above findings. The Borrego catheter was removed at the end of the procedure. All instrument, sponge and sharps counts were correct x 2.  The patient tolerated the procedure well and went to the recovery room in stable condition, and she is stable at the moment of dictation. Dr. Luther Collazo was present for the entirety of the procedure. I was present for the entirety of the procedure.      Patient Disposition:  PACU        SIGNATURE: oRwdy Gastelum MD  DATE: September 26, 2023  TIME: 8:12 PM

## 2023-09-26 NOTE — ANESTHESIA POSTPROCEDURE EVALUATION
Post-Op Assessment Note    CV Status:  Stable  Pain Score: 1    Pain management: adequate     Mental Status:  Alert and awake   Hydration Status:  Euvolemic   PONV Controlled:  Controlled   Airway Patency:  Patent      Post Op Vitals Reviewed: Yes      Staff: Anesthesiologist         No notable events documented.     BP      Temp      Pulse     Resp      SpO2      /81   Pulse 76   Temp 97.9 °F (36.6 °C)   Resp 14   Ht 5' 9" (1.753 m)   Wt 81.3 kg (179 lb 3.7 oz)   LMP 09/14/2023 (Approximate)   SpO2 100%   BMI 26.47 kg/m²

## 2023-09-26 NOTE — ANESTHESIA PREPROCEDURE EVALUATION
Procedure:  LSH WITH B/L SALPINGECTOMY (Abdomen)  LAP OOPHORECTOMY (Right: Pelvis)    Relevant Problems   CARDIO   (+) Chronic migraine without aura without status migrainosus, not intractable   (+) Migraines   (+) Precordial pain      GI/HEPATIC   (+) Gastroesophageal reflux disease without esophagitis   (+) Pancreatic abnormality      HEMATOLOGY   (+) Acute on chronic anemia   (+) Iron deficiency anemia      NEURO/PSYCH   (+) Chronic migraine without aura without status migrainosus, not intractable   (+) Migraines   (+) Seizure (HCC)   (+) Seizure disorder (HCC)   (+) Transient diplopia      Nervous and Auditory   (+) 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 (HCC)   (+) Cerebral atrophy (HCC)      Musculoskeletal and Integument   (+) Dermatographism      Other   (+) Gait instability   (+) Nystagmus        Physical Exam    Airway    Mallampati score: II  TM Distance: >3 FB  Neck ROM: full     Dental   No notable dental hx     Cardiovascular  Rhythm: regular, Rate: normal, Cardiovascular exam normal    Pulmonary  Pulmonary exam normal Breath sounds clear to auscultation,     Other Findings        Anesthesia Plan  ASA Score- 3     Anesthesia Type- general with ASA Monitors. Additional Monitors:   Airway Plan: ETT. Comment: Last sz 9/3/23 per Dariana CHA). Neurology Clearance with physical char. .       Plan Factors-Exercise tolerance (METS): >4 METS. Chart reviewed. Existing labs reviewed. Patient summary reviewed. Patient is not a current smoker. Patient not instructed to abstain from smoking on day of procedure. Patient did not smoke on day of surgery. Induction- intravenous. Postoperative Plan-     Informed Consent- Anesthetic plan and risks discussed with patient Karma Padilla.

## 2023-09-27 VITALS
DIASTOLIC BLOOD PRESSURE: 63 MMHG | TEMPERATURE: 98.1 F | OXYGEN SATURATION: 99 % | HEIGHT: 69 IN | BODY MASS INDEX: 26.55 KG/M2 | HEART RATE: 68 BPM | SYSTOLIC BLOOD PRESSURE: 113 MMHG | RESPIRATION RATE: 19 BRPM | WEIGHT: 179.23 LBS

## 2023-09-27 PROCEDURE — 99024 POSTOP FOLLOW-UP VISIT: CPT | Performed by: OBSTETRICS & GYNECOLOGY

## 2023-09-27 RX ADMIN — ACETAMINOPHEN 325MG 650 MG: 325 TABLET ORAL at 06:47

## 2023-09-27 RX ADMIN — IBUPROFEN 600 MG: 600 TABLET ORAL at 08:28

## 2023-09-27 RX ADMIN — LEVETIRACETAM 1500 MG: 750 TABLET, FILM COATED ORAL at 08:28

## 2023-09-27 RX ADMIN — IBUPROFEN 600 MG: 600 TABLET ORAL at 03:09

## 2023-09-27 RX ADMIN — OXYCODONE HYDROCHLORIDE 10 MG: 10 TABLET ORAL at 10:08

## 2023-09-27 RX ADMIN — SODIUM CHLORIDE 125 ML/HR: 0.9 INJECTION, SOLUTION INTRAVENOUS at 06:47

## 2023-09-27 NOTE — PLAN OF CARE
Problem: PAIN - ADULT  Goal: Verbalizes/displays adequate comfort level or baseline comfort level  Description: Interventions:  - Encourage patient to monitor pain and request assistance  - Assess pain using appropriate pain scale  - Administer analgesics based on type and severity of pain and evaluate response  - Implement non-pharmacological measures as appropriate and evaluate response  - Consider cultural and social influences on pain and pain management  - Notify physician/advanced practitioner if interventions unsuccessful or patient reports new pain  Outcome: Progressing     Problem: INFECTION - ADULT  Goal: Absence or prevention of progression during hospitalization  Description: INTERVENTIONS:  - Assess and monitor for signs and symptoms of infection  - Monitor lab/diagnostic results  - Monitor all insertion sites, i.e. indwelling lines, tubes, and drains  - Monitor endotracheal if appropriate and nasal secretions for changes in amount and color  - Athena appropriate cooling/warming therapies per order  - Administer medications as ordered  - Instruct and encourage patient and family to use good hand hygiene technique  - Identify and instruct in appropriate isolation precautions for identified infection/condition  Outcome: Progressing  Goal: Absence of fever/infection during neutropenic period  Description: INTERVENTIONS:  - Monitor WBC    Outcome: Progressing     Problem: SAFETY ADULT  Goal: Patient will remain free of falls  Description: INTERVENTIONS:  - Educate patient/family on patient safety including physical limitations  - Instruct patient to call for assistance with activity   - Consult OT/PT to assist with strengthening/mobility   - Keep Call bell within reach  - Keep bed low and locked with side rails adjusted as appropriate  - Keep care items and personal belongings within reach  - Initiate and maintain comfort rounds  - Make Fall Risk Sign visible to staff  - Offer Toileting every  Hours, in advance of need  - Initiate/Maintain alarm  - Obtain necessary fall risk management equipment:   - Apply yellow socks and bracelet for high fall risk patients  - Consider moving patient to room near nurses station  Outcome: Progressing  Goal: Maintain or return to baseline ADL function  Description: INTERVENTIONS:  -  Assess patient's ability to carry out ADLs; assess patient's baseline for ADL function and identify physical deficits which impact ability to perform ADLs (bathing, care of mouth/teeth, toileting, grooming, dressing, etc.)  - Assess/evaluate cause of self-care deficits   - Assess range of motion  - Assess patient's mobility; develop plan if impaired  - Assess patient's need for assistive devices and provide as appropriate  - Encourage maximum independence but intervene and supervise when necessary  - Involve family in performance of ADLs  - Assess for home care needs following discharge   - Consider OT consult to assist with ADL evaluation and planning for discharge  - Provide patient education as appropriate  Outcome: Progressing  Goal: Maintains/Returns to pre admission functional level  Description: INTERVENTIONS:  - Perform BMAT or MOVE assessment daily.   - Set and communicate daily mobility goal to care team and patient/family/caregiver. - Collaborate with rehabilitation services on mobility goals if consulted  - Perform Range of Motion  times a day. - Reposition patient every  hours.   - Dangle patient  times a day  - Stand patient  times a day  - Ambulate patient  times a day  - Out of bed to chair  times a day   - Out of bed for meals  times a day  - Out of bed for toileting  - Record patient progress and toleration of activity level   Outcome: Progressing     Problem: DISCHARGE PLANNING  Goal: Discharge to home or other facility with appropriate resources  Description: INTERVENTIONS:  - Identify barriers to discharge w/patient and caregiver  - Arrange for needed discharge resources and transportation as appropriate  - Identify discharge learning needs (meds, wound care, etc.)  - Arrange for interpretive services to assist at discharge as needed  - Refer to Case Management Department for coordinating discharge planning if the patient needs post-hospital services based on physician/advanced practitioner order or complex needs related to functional status, cognitive ability, or social support system  Outcome: Progressing     Problem: Knowledge Deficit  Goal: Patient/family/caregiver demonstrates understanding of disease process, treatment plan, medications, and discharge instructions  Description: Complete learning assessment and assess knowledge base.   Interventions:  - Provide teaching at level of understanding  - Provide teaching via preferred learning methods  Outcome: Progressing     Problem: RESPIRATORY - ADULT  Goal: Achieves optimal ventilation and oxygenation  Description: INTERVENTIONS:  - Assess for changes in respiratory status  - Assess for changes in mentation and behavior  - Position to facilitate oxygenation and minimize respiratory effort  - Oxygen administered by appropriate delivery if ordered  - Initiate smoking cessation education as indicated  - Encourage broncho-pulmonary hygiene including cough, deep breathe, Incentive Spirometry  - Assess the need for suctioning and aspirate as needed  - Assess and instruct to report SOB or any respiratory difficulty  - Respiratory Therapy support as indicated  Outcome: Progressing     Problem: SKIN/TISSUE INTEGRITY - ADULT  Goal: Skin Integrity remains intact(Skin Breakdown Prevention)  Description: Assess:  -Perform Leo assessment every   -Clean and moisturize skin every   -Inspect skin when repositioning, toileting, and assisting with ADLS  -Assess under medical devices such as  every   -Assess extremities for adequate circulation and sensation     Bed Management:  -Have minimal linens on bed & keep smooth, unwrinkled  -Change linens as needed when moist or perspiring  -Avoid sitting or lying in one position for more than  hours while in bed  -Keep HOB at degrees     Toileting:  -Offer bedside commode  -Assess for incontinence every   -Use incontinent care products after each incontinent episode such as    Activity:  -Mobilize patient  times a day  -Encourage activity and walks on unit  -Encourage or provide ROM exercises   -Turn and reposition patient every  Hours  -Use appropriate equipment to lift or move patient in bed  -Instruct/ Assist with weight shifting every  when out of bed in chair  -Consider limitation of chair time  hour intervals    Skin Care:  -Avoid use of baby powder, tape, friction and shearing, hot water or constrictive clothing  -Relieve pressure over bony prominences using   -Do not massage red bony areas    Next Steps:  -Teach patient strategies to minimize risks such as    -Consider consults to  interdisciplinary teams such as   Outcome: Progressing

## 2023-09-27 NOTE — PLAN OF CARE
Problem: PAIN - ADULT  Goal: Verbalizes/displays adequate comfort level or baseline comfort level  Description: Interventions:  - Encourage patient to monitor pain and request assistance  - Assess pain using appropriate pain scale  - Administer analgesics based on type and severity of pain and evaluate response  - Implement non-pharmacological measures as appropriate and evaluate response  - Consider cultural and social influences on pain and pain management  - Notify physician/advanced practitioner if interventions unsuccessful or patient reports new pain  Outcome: Adequate for Discharge     Problem: INFECTION - ADULT  Goal: Absence or prevention of progression during hospitalization  Description: INTERVENTIONS:  - Assess and monitor for signs and symptoms of infection  - Monitor lab/diagnostic results  - Monitor all insertion sites, i.e. indwelling lines, tubes, and drains  - Monitor endotracheal if appropriate and nasal secretions for changes in amount and color  - [place] appropriate cooling/warming therapies per order  - Administer medications as ordered  - Instruct and encourage patient and family to use good hand hygiene technique  - Identify and instruct in appropriate isolation precautions for identified infection/condition  Outcome: Adequate for Discharge  Goal: Absence of fever/infection during neutropenic period  Description: INTERVENTIONS:  - Monitor WBC    Outcome: Adequate for Discharge     Problem: SAFETY ADULT  Goal: Patient will remain free of falls  Description: INTERVENTIONS:  - Educate patient/family on patient safety including physical limitations  - Instruct patient to call for assistance with activity   - Consult OT/PT to assist with strengthening/mobility   - Keep Call bell within reach  - Keep bed low and locked with side rails adjusted as appropriate  - Keep care items and personal belongings within reach  - Initiate and maintain comfort rounds  - Make Fall Risk Sign visible to staff  - Apply yellow socks and bracelet for high fall risk patients  - Consider moving patient to room near nurses station  Outcome: Adequate for Discharge  Goal: Maintain or return to baseline ADL function  Description: INTERVENTIONS:  -  Assess patient's ability to carry out ADLs; assess patient's baseline for ADL function and identify physical deficits which impact ability to perform ADLs (bathing, care of mouth/teeth, toileting, grooming, dressing, etc.)  - Assess/evaluate cause of self-care deficits   - Assess range of motion  - Assess patient's mobility; develop plan if impaired  - Assess patient's need for assistive devices and provide as appropriate  - Encourage maximum independence but intervene and supervise when necessary  - Involve family in performance of ADLs  - Assess for home care needs following discharge   - Consider OT consult to assist with ADL evaluation and planning for discharge  - Provide patient education as appropriate  Outcome: Adequate for Discharge  Goal: Maintains/Returns to pre admission functional level  Description: INTERVENTIONS:  - Perform BMAT or MOVE assessment daily.   - Set and communicate daily mobility goal to care team and patient/family/caregiver.    - Collaborate with rehabilitation services on mobility goals if consulted  - Out of bed for toileting  - Record patient progress and toleration of activity level   Outcome: Adequate for Discharge     Problem: DISCHARGE PLANNING  Goal: Discharge to home or other facility with appropriate resources  Description: INTERVENTIONS:  - Identify barriers to discharge w/patient and caregiver  - Arrange for needed discharge resources and transportation as appropriate  - Identify discharge learning needs (meds, wound care, etc.)  - Arrange for interpretive services to assist at discharge as needed  - Refer to Case Management Department for coordinating discharge planning if the patient needs post-hospital services based on physician/advanced practitioner order or complex needs related to functional status, cognitive ability, or social support system  Outcome: Adequate for Discharge     Problem: Knowledge Deficit  Goal: Patient/family/caregiver demonstrates understanding of disease process, treatment plan, medications, and discharge instructions  Description: Complete learning assessment and assess knowledge base.   Interventions:  - Provide teaching at level of understanding  - Provide teaching via preferred learning methods  Outcome: Adequate for Discharge     Problem: RESPIRATORY - ADULT  Goal: Achieves optimal ventilation and oxygenation  Description: INTERVENTIONS:  - Assess for changes in respiratory status  - Assess for changes in mentation and behavior  - Position to facilitate oxygenation and minimize respiratory effort  - Oxygen administered by appropriate delivery if ordered  - Initiate smoking cessation education as indicated  - Encourage broncho-pulmonary hygiene including cough, deep breathe, Incentive Spirometry  - Assess the need for suctioning and aspirate as needed  - Assess and instruct to report SOB or any respiratory difficulty  - Respiratory Therapy support as indicated  Outcome: Adequate for Discharge     Problem: SKIN/TISSUE INTEGRITY - ADULT  Goal: Skin Integrity remains intact(Skin Breakdown Prevention)  Description: Assess:  -Assess extremities for adequate circulation and sensation     Bed Management:  -Have minimal linens on bed & keep smooth, unwrinkled  -Change linens as needed when moist or perspiring    Toileting:  -Offer bedside commode    Skin Care:  -Avoid use of baby powder, tape, friction and shearing, hot water or constrictive clothing    Outcome: Adequate for Discharge     Problem: MOBILITY - ADULT  Goal: Maintain or return to baseline ADL function  Description: INTERVENTIONS:  -  Assess patient's ability to carry out ADLs; assess patient's baseline for ADL function and identify physical deficits which impact ability to perform ADLs (bathing, care of mouth/teeth, toileting, grooming, dressing, etc.)  - Assess/evaluate cause of self-care deficits   - Assess range of motion  - Assess patient's mobility; develop plan if impaired  - Assess patient's need for assistive devices and provide as appropriate  - Encourage maximum independence but intervene and supervise when necessary  - Involve family in performance of ADLs  - Assess for home care needs following discharge   - Consider OT consult to assist with ADL evaluation and planning for discharge  - Provide patient education as appropriate  Outcome: Adequate for Discharge  Goal: Maintains/Returns to pre admission functional level  Description: INTERVENTIONS:  - Perform BMAT or MOVE assessment daily.   - Set and communicate daily mobility goal to care team and patient/family/caregiver.    - Collaborate with rehabilitation services on mobility goals if consulted  - Out of bed for toileting  - Record patient progress and toleration of activity level   Outcome: Adequate for Discharge

## 2023-09-27 NOTE — PROGRESS NOTES
Progress Note - OB/GYN   Susanne Vizcarra 41 y.o. female MRN: [id number]  Unit/Bed#: E5 -01 Encounter: [id number]    Assessment:  POD#1 s/p LSH, RO, extensive DEBBIE and fulguration of endometriosis, cysto. Doing well this morning. Plan:  [facility])  Assessment & Plan  Home Keppra ordered    * S/P laparoscopic supracervical hysterectomy  Assessment & Plan  • S/p LSH, RO, lysis of adhesions, fulgeration of endometriosis, cysto with Dr. Mark Mcclure  • Pain: Tylenol, Motrin radha, Charity PRN   • Diet: Regular  • Borrego removed at the end of the procedure, voiding freely  • DVT Ppx: SCDs (BMI 26)  • Encouraged OOB/IS  • Dispo: Plan for home after breakfast this AM          Subjective/Objective     Subjective: POD#1 s/p LSH, RO, extensive DEBBIE and fulguration of endometriosis, cysto. Doing well this morning. Pain was well controlled overnight with current pain medication regiment. Did not ambulate overnight. Did not eat but tolerating liquids. Denies nausea or vomiting. Denies passing flatus or BM. Denies vaginal bleeding. Voiding freely. Denies any fevers, chills, sweats, chest pain, SOB. Objective:     Vitals: Blood pressure 113/63, pulse 68, temperature 98.1 °F (36.7 °C), temperature source Oral, resp. rate 19, height 5' 9" (1.753 m), weight 81.3 kg (179 lb 3.7 oz), last menstrual period 09/14/2023, SpO2 99 %. ,Body mass index is 26.47 kg/m². Intake/Output Summary (Last 24 hours) at 9/27/2023 0723  Last data filed at 9/26/2023 1930  Gross per 24 hour   Intake 2850 ml   Output 1275 ml   Net 1575 ml       Invasive Devices     Peripheral Intravenous Line  Duration           Peripheral IV 09/26/23 Distal;Dorsal (posterior); Left Forearm <1 day                Physical Exam: Abdomen soft, non-distended, appropriately tender. Without calf tenderness bilaterally. Lab, Imaging and other studies: I have personally reviewed pertinent reports.

## 2023-10-03 PROCEDURE — 88307 TISSUE EXAM BY PATHOLOGIST: CPT | Performed by: PATHOLOGY

## 2023-10-04 ENCOUNTER — OFFICE VISIT (OUTPATIENT)
Dept: GYNECOLOGY | Facility: CLINIC | Age: 43
End: 2023-10-04

## 2023-10-04 VITALS — WEIGHT: 179 LBS | DIASTOLIC BLOOD PRESSURE: 74 MMHG | BODY MASS INDEX: 26.43 KG/M2 | SYSTOLIC BLOOD PRESSURE: 112 MMHG

## 2023-10-04 DIAGNOSIS — G89.18 POSTOPERATIVE PAIN: ICD-10-CM

## 2023-10-04 DIAGNOSIS — Z48.89 POSTOPERATIVE VISIT: Primary | ICD-10-CM

## 2023-10-04 PROCEDURE — 99024 POSTOP FOLLOW-UP VISIT: CPT | Performed by: OBSTETRICS & GYNECOLOGY

## 2023-10-04 RX ORDER — NAPROXEN SODIUM 550 MG/1
550 TABLET ORAL 2 TIMES DAILY WITH MEALS
Qty: 30 TABLET | Refills: 0 | Status: SHIPPED | OUTPATIENT
Start: 2023-10-04 | End: 2023-10-19

## 2023-10-04 NOTE — PROGRESS NOTES
Patient presents for postoperative check. She is status post LSA lysis of extensive abdominal adhesions and right oophorectomy. She had had a prior left salpingo-oophorectomy. She is doing fairly well since the surgery other than discomfort especially in the left lower port site. Denies any drainage. No urinary or GI complaints. Physical exam: Port sites are healing well with no erythema exudate or induration. Sutures removed. Path report revealed adenomyosis leiomyomata uteri 231 g along with the right ovarian abscess    Impression: Postoperative check    Plan: Anaprox DS 1 tablet every 10-12 hours as needed.   Return to office as needed/annual

## 2023-10-24 ENCOUNTER — TELEPHONE (OUTPATIENT)
Dept: GYNECOLOGY | Facility: CLINIC | Age: 43
End: 2023-10-24

## 2023-10-24 DIAGNOSIS — N39.0 URINARY TRACT INFECTION WITHOUT HEMATURIA, SITE UNSPECIFIED: Primary | ICD-10-CM

## 2023-10-24 RX ORDER — NITROFURANTOIN 25; 75 MG/1; MG/1
100 CAPSULE ORAL 2 TIMES DAILY
Qty: 14 CAPSULE | Refills: 0 | Status: SHIPPED | OUTPATIENT
Start: 2023-10-24 | End: 2023-10-31

## 2023-10-24 NOTE — TELEPHONE ENCOUNTER
Patients  called and states Iron Amaro has UTI. Can meds be sent to [facility] in [facility]. Please advise.

## 2023-12-06 ENCOUNTER — OFFICE VISIT (OUTPATIENT)
Dept: FAMILY MEDICINE CLINIC | Facility: CLINIC | Age: 43
End: 2023-12-06
Payer: COMMERCIAL

## 2023-12-06 VITALS
OXYGEN SATURATION: 98 % | BODY MASS INDEX: 28.03 KG/M2 | DIASTOLIC BLOOD PRESSURE: 76 MMHG | HEART RATE: 86 BPM | WEIGHT: 174.4 LBS | SYSTOLIC BLOOD PRESSURE: 127 MMHG | TEMPERATURE: 97.3 F | RESPIRATION RATE: 16 BRPM | HEIGHT: 66 IN

## 2023-12-06 DIAGNOSIS — R27.0 ATAXIA: ICD-10-CM

## 2023-12-06 DIAGNOSIS — K21.00 HIATAL HERNIA WITH GERD AND ESOPHAGITIS: Primary | ICD-10-CM

## 2023-12-06 DIAGNOSIS — K21.00 GASTROESOPHAGEAL REFLUX DISEASE WITH ESOPHAGITIS WITHOUT HEMORRHAGE: ICD-10-CM

## 2023-12-06 DIAGNOSIS — K44.9 HIATAL HERNIA WITH GERD AND ESOPHAGITIS: Primary | ICD-10-CM

## 2023-12-06 DIAGNOSIS — E66.3 OVERWEIGHT (BMI 25.0-29.9): ICD-10-CM

## 2023-12-06 DIAGNOSIS — Z12.31 ENCOUNTER FOR SCREENING MAMMOGRAM FOR MALIGNANT NEOPLASM OF BREAST: ICD-10-CM

## 2023-12-06 PROCEDURE — 99214 OFFICE O/P EST MOD 30 MIN: CPT | Performed by: FAMILY MEDICINE

## 2023-12-06 RX ORDER — FAMOTIDINE 20 MG/1
20 TABLET, FILM COATED ORAL
Qty: 90 TABLET | Refills: 1 | Status: SHIPPED | OUTPATIENT
Start: 2023-12-06 | End: 2024-11-30

## 2023-12-06 NOTE — PROGRESS NOTES
Name: Figueroa Reardon      : 1980      MRN: [id number]  Encounter Provider: Lu Oakley MD  Encounter Date: 2023   Encounter department: [address] [place] [address]     1. Hiatal hernia with GERD and esophagitis  -     famotidine (PEPCID) 20 mg tablet; Take 1 tablet (20 mg total) by mouth daily at bedtime  -     Ambulatory Referral to General Surgery; Future    2. Gastroesophageal reflux disease with esophagitis without hemorrhage  -     famotidine (PEPCID) 20 mg tablet; Take 1 tablet (20 mg total) by mouth daily at bedtime  -     Ambulatory Referral to General Surgery; Future    3. Overweight (BMI 25.0-29.9)    4. Ataxia    5. Encounter for screening mammogram for malignant neoplasm of breast  -     Mammo diagnostic bilateral w cad; Future; Expected date: 2023      Patient's recent colonoscopy was negative and she was asked to repeat it in 10 years. However patient's upper endoscopy showed reflux with esophagitis and a 6 cm hiatal hernia. Patient was referred to me by her GI for this. I discussed this with patient and daughter. Patient is through continue her omeprazole in the morning and I added famotidine to it at night. Patient is also advised on appropriate diet, no anti-inflammatories and also to elevate the head of her bed from the floor to help her with the reflux. Patient is also referred to a surgeon for evaluation of her hiatal hernia and possible surgery. Patient is to follow-up with her GI as scheduled as well. Regarding her overweight I did advise patient to do a better diet specially that she is limited in her exercise. Regarding her ataxia patient is to follow-up with the neurologist.  She is also advised to use a cane at least.  Patient also sent for her mammogram.  RTO 2 months for her annual physical.    BMI Counseling: Body mass index is 28.15 kg/m².  The BMI is above normal. Nutrition recommendations include decreasing portion sizes, encouraging healthy choices of fruits and vegetables, consuming healthier snacks, limiting drinks that contain sugar and moderation in carbohydrate intake. Exercise recommendations include exercising 3-5 times per week. No pharmacotherapy was ordered. Patient referred to PCP. Rationale for BMI follow-up plan is due to patient being overweight or obese. Depression Screening and Follow-up Plan: Patient was screened for depression during today's encounter. They screened negative with a PHQ-2 score of 0. Subjective      45-year-old female here with her daughter for follow-up from her GI. Patient recently had an upper endoscopy and colonoscopy and was referred to me by her GI for encountering hiatal hernia in her upper endoscopy. Patient was also found to have esophagitis on her upper endoscopy. Patient does have GERD and is on omeprazole 20 mg in the morning. Patient is status post hysterectomy. Patient received a flu vaccine already. And patient is requesting a mammogram.  Patient also sees neurology for her history of ataxia etc.  Patient does not use a cane or walker. Review of Systems   Constitutional:  Negative for activity change, appetite change, chills, fatigue, fever and unexpected weight change. HENT:  Negative for congestion and sore throat. Eyes:  Negative for visual disturbance. Respiratory:  Negative for cough and shortness of breath. Cardiovascular:  Negative for chest pain and palpitations. Gastrointestinal:  Negative for abdominal pain, blood in stool, constipation, diarrhea, nausea and vomiting. Genitourinary:  Negative for dysuria. Neurological:  Negative for dizziness and headaches. Psychiatric/Behavioral:  Negative for dysphoric mood. The patient is not nervous/anxious.         Current Outpatient Medications on File Prior to Visit   Medication Sig   • cyanocobalamin (VITAMIN B-12) 1000 MCG tablet Take 1 tablet (1,000 mcg total) by mouth daily   • ferrous sulfate 324 (65 Fe) mg Take 1 tablet (324 mg total) by mouth daily   • lamoTRIgine (LaMICtal) 100 mg tablet Take 100 mg by mouth 2 (two) times a day   • levETIRAcetam (KEPPRA) 750 mg tablet Take 2 tablets (1,500 mg total) by mouth 2 (two) times a day   • omeprazole (PriLOSEC) 20 mg delayed release capsule Take 1 capsule (20 mg total) by mouth daily   • ondansetron (ZOFRAN-ODT) 4 mg disintegrating tablet Take 1 tablet (4 mg total) by mouth every 6 (six) hours as needed for nausea   • oxyCODONE-acetaminophen (Percocet) 5-325 mg per tablet Take 1 tablet by mouth every 4 (four) hours as needed for severe pain for up to 15 doses Max Daily Amount: 6 tablets   • Tranexamic Acid 650 MG TABS    • acetaminophen (TYLENOL) 325 mg tablet Take 3 tablets (975 mg total) by mouth every 6 (six) hours as needed for mild pain   • Acetaminophen Extra Strength 500 MG tablet  (Patient not taking: Reported on 7/5/2023)   • Erenumab-aooe (Aimovig) 70 MG/ML SOAJ Inject 70 mg under the skin every 28 days (Patient not taking: Reported on 12/6/2023)   • [DISCONTINUED] naproxen sodium (ANAPROX) 550 mg tablet Take 1 tablet (550 mg total) by mouth 2 (two) times a day with meals for 30 doses       Objective     /76 (BP Location: Left arm, Patient Position: Sitting, Cuff Size: Adult)   Pulse 86   Temp (!) 97.3 °F (36.3 °C) (Temporal)   Resp 16   Ht 5' 6" (1.676 m)   Wt 79.1 kg (174 lb 6.4 oz)   LMP 09/14/2023 (Approximate)   SpO2 98%   BMI 28.15 kg/m²     Physical Exam  Vitals reviewed. Constitutional:       General: She is not in acute distress. Appearance: Normal appearance. She is not ill-appearing. HENT:      Head: Normocephalic and atraumatic. Mouth/Throat:      Mouth: Mucous membranes are moist.      Pharynx: Oropharynx is clear. Eyes:      Extraocular Movements: Extraocular movements intact. Conjunctiva/sclera: Conjunctivae normal.   Neck:      Vascular: No carotid bruit.    Cardiovascular:      Rate and Rhythm: Normal rate and regular rhythm. Pulmonary:      Effort: Pulmonary effort is normal.      Breath sounds: Normal breath sounds. Abdominal:      General: Bowel sounds are normal.      Palpations: Abdomen is soft. Tenderness: There is no abdominal tenderness. Lymphadenopathy:      Cervical: No cervical adenopathy. Neurological:      General: No focal deficit present. Mental Status: She is alert and oriented to person, place, and time. Mental status is at baseline.    Psychiatric:         Mood and Affect: Mood normal.         Behavior: Behavior normal.       Noemy Escobedo MD

## 2024-01-02 ENCOUNTER — APPOINTMENT (EMERGENCY)
Dept: RADIOLOGY | Facility: HOSPITAL | Age: 44
End: 2024-01-02
Payer: COMMERCIAL

## 2024-01-02 ENCOUNTER — HOSPITAL ENCOUNTER (EMERGENCY)
Facility: HOSPITAL | Age: 44
Discharge: HOME | End: 2024-01-02
Attending: EMERGENCY MEDICINE
Payer: COMMERCIAL

## 2024-01-02 ENCOUNTER — APPOINTMENT (EMERGENCY)
Dept: CT IMAGING | Facility: HOSPITAL | Age: 44
End: 2024-01-02
Payer: COMMERCIAL

## 2024-01-02 VITALS
RESPIRATION RATE: 18 BRPM | OXYGEN SATURATION: 96 % | HEART RATE: 87 BPM | HEIGHT: 69 IN | WEIGHT: 172 LBS | TEMPERATURE: 96.2 F | SYSTOLIC BLOOD PRESSURE: 114 MMHG | BODY MASS INDEX: 25.48 KG/M2 | DIASTOLIC BLOOD PRESSURE: 69 MMHG

## 2024-01-02 DIAGNOSIS — R51.9 HEADACHE: ICD-10-CM

## 2024-01-02 DIAGNOSIS — S80.01XA CONTUSION OF RIGHT KNEE, INITIAL ENCOUNTER: ICD-10-CM

## 2024-01-02 DIAGNOSIS — S60.031A CONTUSION OF RIGHT MIDDLE FINGER: ICD-10-CM
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
## 2024-01-02 PROCEDURE — 99285 EMERGENCY DEPT VISIT HI MDM: CPT

## 2024-01-02 PROCEDURE — 96372 THER/PROPH/DIAG INJ SC/IM: CPT

## 2024-01-02 PROCEDURE — 73140 X-RAY EXAM OF FINGER(S): CPT

## 2024-01-02 PROCEDURE — G1004 CDSM NDSC: HCPCS

## 2024-01-02 PROCEDURE — 99284 EMERGENCY DEPT VISIT MOD MDM: CPT | Performed by: EMERGENCY MEDICINE

## 2024-01-02 PROCEDURE — 70450 CT HEAD/BRAIN W/O DYE: CPT

## 2024-01-02 PROCEDURE — 73564 X-RAY EXAM KNEE 4 OR MORE: CPT

## 2024-01-02 RX ORDER — KETOROLAC TROMETHAMINE 30 MG/ML
15 INJECTION, SOLUTION INTRAMUSCULAR; INTRAVENOUS ONCE
Status: COMPLETED | OUTPATIENT
Start: 2024-01-02 | End: 2024-01-02

## 2024-01-02 RX ORDER — ACETAMINOPHEN 325 MG/1
975 TABLET ORAL ONCE
Status: COMPLETED | OUTPATIENT
Start: 2024-01-02 | End: 2024-01-02

## 2024-01-02 RX ORDER — LEVETIRACETAM 500 MG/1
1500 TABLET ORAL ONCE
Status: COMPLETED | OUTPATIENT
Start: 2024-01-02 | End: 2024-01-02

## 2024-01-02 RX ADMIN — LEVETIRACETAM 1500 MG: 500 TABLET, FILM COATED ORAL at 15:57

## 2024-01-02 RX ADMIN — ACETAMINOPHEN 975 MG: 325 TABLET ORAL at 15:57

## 2024-01-02 RX ADMIN — KETOROLAC TROMETHAMINE 15 MG: 30 INJECTION, SOLUTION INTRAMUSCULAR; INTRAVENOUS at 17:31

## 2024-01-02 NOTE — ED PROVIDER NOTES
History  Chief Complaint   Patient presents with    Fall     Pt.was going to brush teeth and lost balance per daughter.  Pt. Does not speak English.  Denies LOC.  C/o right 3rd finger pain, head pain and right knee pain since fall.  Denies any blood thinners     Patient is a 43-year-old female who presents for evaluation of a headache, right middle finger pain and right knee pain status post mechanical fall.  Patient is mainly Czech-speaking.  She is here with her son who is translating.  She says that she went to the bathroom earlier this morning, went to reach for something and lost her balance.  She hit the front side of her head, right finger and right knee.  There was no loss of consciousness.  She is not on any blood thinners.  She admits to a headache as well as right middle finger and right knee pain.  She denies any lightheadedness, knees, nausea, vomiting, chest pain, shortness of breath, abdominal pain, neck pain, back pain.        Prior to Admission Medications   Prescriptions Last Dose Informant Patient Reported? Taking?   Acetaminophen Extra Strength 500 MG tablet  Self Yes No   Patient not taking: Reported on 7/5/2023   Erenumab-aooe (Aimovig) 70 MG/ML SOAJ  Spouse/Significant Other, Self Yes No   Sig: Inject 70 mg under the skin every 28 days   Patient not taking: Reported on 12/6/2023   Tranexamic Acid 650 MG TABS  Self Yes No   acetaminophen (TYLENOL) 325 mg tablet  Self No No   Sig: Take 3 tablets (975 mg total) by mouth every 6 (six) hours as needed for mild pain   cyanocobalamin (VITAMIN B-12) 1000 MCG tablet  Self No No   Sig: Take 1 tablet (1,000 mcg total) by mouth daily   famotidine (PEPCID) 20 mg tablet   No No   Sig: Take 1 tablet (20 mg total) by mouth daily at bedtime   ferrous sulfate 324 (65 Fe) mg  Self No No   Sig: Take 1 tablet (324 mg total) by mouth daily   lamoTRIgine (LaMICtal) 100 mg tablet  Self Yes No   Sig: Take 100 mg by mouth 2 (two) times a day   levETIRAcetam (KEPPRA)  750 mg tablet  Self No No   Sig: Take 2 tablets (1,500 mg total) by mouth 2 (two) times a day   omeprazole (PriLOSEC) 20 mg delayed release capsule  Self No No   Sig: Take 1 capsule (20 mg total) by mouth daily   ondansetron (ZOFRAN-ODT) 4 mg disintegrating tablet  Self No No   Sig: Take 1 tablet (4 mg total) by mouth every 6 (six) hours as needed for nausea   oxyCODONE-acetaminophen (Percocet) 5-325 mg per tablet   No No   Sig: Take 1 tablet by mouth every 4 (four) hours as needed for severe pain for up to 15 doses Max Daily Amount: 6 tablets      Facility-Administered Medications: None       Past Medical History:   Diagnosis Date    Anemia     B12 deficiency     Chronic migraine without aura     Dermatographism     Endometriosis     GERD (gastroesophageal reflux disease)     History of blood transfusion     Iron deficiency anemia     Lyme neuropathy     Seizure disorder (HCC)     Seizures (HCC)     last seizure 23    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HCC)     Visual disturbance     Diplopia, nystagmus, decreased visual acuity    Vitamin D deficiency        Past Surgical History:   Procedure Laterality Date     SECTION      ESOPHAGOGASTRODUODENOSCOPY N/A 3/8/2019    Procedure: ESOPHAGOGASTRODUODENOSCOPY (EGD);  Surgeon: Prashanth Mendoza MD;  Location: [facility];  Service: Gastroenterology    IR DRAINAGE TUBE CHECK/CHANGE/REPOSITION/REINSERTION/UPSIZE  2023    IR DRAINAGE TUBE PLACEMENT  2023    OOPHORECTOMY      unilateral --  last assessed 17    UT LAPAROSCOPY W/RMVL ADNEXAL STRUCTURES Right 2023    Procedure: LAP OOPHORECTOMY;  Surgeon: Dc Pantoja DO;  Location: [facility];  Service: Gynecology    UT LAPS SUPRACRV HYSTERECT 250 GM/< RMVL TUBE/OVAR N/A 2023    Procedure: LSH, LYSIS OF ADHESIONS;  Surgeon: Dc Pantoja DO;  Location: [facility];  Service: Gynecology    TUBAL LIGATION      last assessed 17       Family History   Problem Relation Age of Onset     Other Mother         AIDS, Brain Tumor balance disorder    Seizures Mother     Alcohol abuse Father     Other Sister         Balance disorder    HIV Maternal Grandmother      I have reviewed and agree with the history as documented.    E-Cigarette/Vaping    E-Cigarette Use Never User      E-Cigarette/Vaping Substances    Nicotine No     THC No     CBD No     Flavoring No     Other No     Unknown No      Social History     Tobacco Use    Smoking status: Never    Smokeless tobacco: Never   Vaping Use    Vaping status: Never Used   Substance Use Topics    Alcohol use: Never    Drug use: No       Review of Systems   Constitutional:  Negative for fever and unexpected weight change.   HENT:  Negative for congestion, ear pain, sore throat and trouble swallowing.    Eyes:  Negative for pain and redness.   Respiratory:  Negative for cough, chest tightness and shortness of breath.    Cardiovascular:  Negative for chest pain and leg swelling.   Gastrointestinal:  Negative for abdominal distention, abdominal pain, diarrhea and vomiting.   Endocrine: Negative for polyuria.   Genitourinary:  Negative for dysuria, hematuria, pelvic pain and vaginal bleeding.   Musculoskeletal:  Positive for arthralgias (R middle finger, R knee). Negative for back pain and myalgias.   Skin:  Negative for rash.   Neurological:  Positive for headaches. Negative for dizziness, syncope, weakness and light-headedness.       Physical Exam  Physical Exam  Vitals and nursing note reviewed.   Constitutional:       General: She is not in acute distress.     Appearance: She is well-developed.   HENT:      Head: Normocephalic and atraumatic.      Right Ear: External ear normal.      Left Ear: External ear normal.      Nose: Nose normal.      Mouth/Throat:      Mouth: Mucous membranes are moist.      Pharynx: No oropharyngeal exudate.   Eyes:      Conjunctiva/sclera: Conjunctivae normal.      Pupils: Pupils are equal, round, and reactive to light.   Neck:       Comments: No C spine tenderness  Cardiovascular:      Rate and Rhythm: Normal rate and regular rhythm.      Heart sounds: Normal heart sounds. No murmur heard.     No friction rub. No gallop.   Pulmonary:      Effort: Pulmonary effort is normal. No respiratory distress.      Breath sounds: Normal breath sounds. No wheezing or rales.   Abdominal:      General: There is no distension.      Palpations: Abdomen is soft.      Tenderness: There is no abdominal tenderness. There is no guarding.   Musculoskeletal:         General: Tenderness (R knee, R middle finger) present. No swelling or deformity. Normal range of motion.      Cervical back: Normal range of motion and neck supple.      Comments: No T or L spine tenderness    Lymphadenopathy:      Cervical: No cervical adenopathy.   Skin:     General: Skin is warm and dry.   Neurological:      General: No focal deficit present.      Mental Status: She is alert and oriented to person, place, and time. Mental status is at baseline.      Cranial Nerves: No cranial nerve deficit.      Sensory: No sensory deficit.      Motor: No weakness or abnormal muscle tone.      Coordination: Coordination normal.         Vital Signs  ED Triage Vitals [01/02/24 1435]   Temperature Pulse Respirations Blood Pressure SpO2   (!) 96.2 °F (35.7 °C) 87 18 114/69 96 %      Temp Source Heart Rate Source Patient Position - Orthostatic VS BP Location FiO2 (%)   Tympanic -- Sitting Left arm --      Pain Score       10 - Worst Possible Pain           Vitals:    01/02/24 1435   BP: 114/69   Pulse: 87   Patient Position - Orthostatic VS: Sitting         Visual Acuity      ED Medications  Medications   ketorolac (TORADOL) injection 15 mg (has no administration in time range)   acetaminophen (TYLENOL) tablet 975 mg (975 mg Oral Given 1/2/24 1557)   levETIRAcetam (KEPPRA) tablet 1,500 mg (1,500 mg Oral Given 1/2/24 1557)       Diagnostic Studies  Results Reviewed       None                   CT head without  contrast   Final Result by Terrance Charles MD (01/02 0045)      No acute intracranial abnormality.                  Workstation performed: [id number]         XR finger third digit-middle RIGHT   ED Interpretation by Jevon Diaz DO (01/02 1713)   No acute osseous abnormality      XR knee 4+ views Right injury   ED Interpretation by Jevon Diaz DO (01/02 1713)   No acute osseous abnormality                 Procedures  Procedures         ED Course  ED Course as of 01/02/24 1728   Tue Jan 02, 2024   1547 Patient's  is requesting her morning Keppra dose that she was not able to take it.  They said that she takes 1500 mg.  Will order   1723 Updated patient and  on imaging.  Patient to be discharged home                               SBIRT 20yo+      Flowsheet Row Most Recent Value   Initial Alcohol Screen: US AUDIT-C     1. How often do you have a drink containing alcohol? 0 Filed at: 01/02/2024 1438   Audit-C Score 0 Filed at: 01/02/2024 1438   VALENTIN: How many times in the past year have you...    Used an illegal drug or used a prescription medication for non-medical reasons? Never Filed at: 01/02/2024 1438                      Medical Decision Making  43-year-old female presenting for evaluation mechanical fall.  Lost her balance when reaching for something, hit head, right middle finger/right knee.  Has had difficulty ambulating on knee secondary to pain.  No obvious deformity present.  No other signs of trauma.  No C, T or L spine tenderness, no chest tenderness, no abdominal tenderness.  Vitals within normal limits  Will obtain CT head given head strike and persistent headache.  Will obtain x-ray of right middle finger, will obtain x-ray of right knee.  Will give Tylenol for pain.  X-ray of right knee and right middle finger show no acute osseous abnormality.  CT head within normal limits.  Patient discharged home.    Problems Addressed:  Contusion of right knee, initial encounter: acute illness  or injury  Contusion of right middle finger: acute illness or injury  Fall, initial encounter: acute illness or injury  Headache: acute illness or injury    Amount and/or Complexity of Data Reviewed  Radiology: ordered and independent interpretation performed.    Risk  OTC drugs.  Prescription drug management.             Disposition  Final diagnoses:   Fall, initial encounter   Contusion of right knee, initial encounter   Contusion of right middle finger   Headache     Time reflects when diagnosis was documented in both MDM as applicable and the Disposition within this note       Time User Action Codes Description Comment    1/2/2024  5:23 PM Jevon Diaz [W19.XXXA] Fall, initial encounter     1/2/2024  5:23 PM Jevon Diaz [S80.01XA] Contusion of right knee, initial encounter     1/2/2024  5:24 PM Jevon Diaz [S60.031A] Contusion of right middle finger     1/2/2024  5:24 PM Jevon Diaz [R51.9] Headache           ED Disposition       ED Disposition   Discharge    Condition   Stable    Date/Time   Tue Jan 2, 2024 1723    Comment   Reny Espinoza discharge to home/self care.                   Follow-up Information       Follow up With Specialties Details Why Contact Info    Malachi Guaman MD Family Medicine Schedule an appointment as soon as possible for a visit  For follow up of symptoms [address] [place] [address] [id number] [phone]              Patient's Medications   Discharge Prescriptions    No medications on file       No discharge procedures on file.    PDMP Review       None            ED Provider  Electronically Signed by             Jevon Diaz DO  01/02/24 1000

## 2024-01-10 ENCOUNTER — CONSULT (OUTPATIENT)
Dept: SURGERY | Facility: CLINIC | Age: 44
End: 2024-01-10
Payer: COMMERCIAL

## 2024-01-10 VITALS
WEIGHT: 173 LBS | BODY MASS INDEX: 25.62 KG/M2 | DIASTOLIC BLOOD PRESSURE: 68 MMHG | OXYGEN SATURATION: 93 % | TEMPERATURE: 97.6 F | SYSTOLIC BLOOD PRESSURE: 102 MMHG | HEART RATE: 92 BPM | HEIGHT: 69 IN

## 2024-01-10 DIAGNOSIS — K21.00 HIATAL HERNIA WITH GERD AND ESOPHAGITIS: ICD-10-CM

## 2024-01-10 DIAGNOSIS — K44.9 HIATAL HERNIA WITH GERD AND ESOPHAGITIS: ICD-10-CM

## 2024-01-10 DIAGNOSIS — K21.00 GASTROESOPHAGEAL REFLUX DISEASE WITH ESOPHAGITIS WITHOUT HEMORRHAGE: ICD-10-CM

## 2024-01-10 PROCEDURE — 99243 OFF/OP CNSLTJ NEW/EST LOW 30: CPT | Performed by: SURGERY

## 2024-01-10 NOTE — ASSESSMENT & PLAN NOTE
I reviewed her endoscopy report and the images.  I reviewed her CT scan from April as well to look at her anatomy.  There is concern for had a hernia which may be very small on the CT scan and possibly worse on endoscopy.  At this time we will plan for esophageal manometry and 24-hour pH monitoring.  A lot of her symptoms do not appear to be acid or reflux related or related to hiatal hernia and would like to help determine which symptoms are acid related.  I explained the anatomy of hiatal hernia and discussed symptoms in that context.  I explained that hiatal hernia repair would help with acid symptoms and would like to clarify which her symptoms are related.  An additional get an upper GI to look at the anatomy on more of a dynamic study.  Once these testing are complete she can return for reevaluation.  In the meantime I do recommend maximizing her medical therapy and I recommend she takes omeprazole twice a day consistently to see if he has any improvement.

## 2024-01-10 NOTE — PROGRESS NOTES
Assessment/Plan:    Gastroesophageal reflux disease with esophagitis without hemorrhage  I reviewed her endoscopy report and the images.  I reviewed her CT scan from April as well to look at her anatomy.  There is concern for had a hernia which may be very small on the CT scan and possibly worse on endoscopy.  At this time we will plan for esophageal manometry and 24-hour pH monitoring.  A lot of her symptoms do not appear to be acid or reflux related or related to hiatal hernia and would like to help determine which symptoms are acid related.  I explained the anatomy of hiatal hernia and discussed symptoms in that context.  I explained that hiatal hernia repair would help with acid symptoms and would like to clarify which her symptoms are related.  An additional get an upper GI to look at the anatomy on more of a dynamic study.  Once these testing are complete she can return for reevaluation.  In the meantime I do recommend maximizing her medical therapy and I recommend she takes omeprazole twice a day consistently to see if he has any improvement.       Diagnoses and all orders for this visit:    Gastroesophageal reflux disease with esophagitis without hemorrhage  -     Ambulatory Referral to General Surgery  -     [facility] UGI; Future  -     Espoh manometry/24hr ph; Future    Hiatal hernia with GERD and esophagitis  -     Ambulatory Referral to General Surgery  -     [facility] U[facility]; Future  -     Espoh manometry/24hr ph; Future          Subjective:      Patient ID: Reny Espinoza is a 43 y.o. female.    Ms Espinoza is a 43-year-old female here for evaluation of a hiatal hernia.  Patient was accompanied by a friend who provided translation.  The official  was offered but she refused.  Patient states she has been having epigastric abdominal pain since August.  She saw gastroenterology at that time and was scheduled for endoscopy.  She underwent endoscopy which showed mild esophagitis and a hiatal  "hernia.  She was referred here for evaluation.  She states she has daily epigastric pain she states she has pain with eating all types of food.  She has nausea constantly.  She has pain with movement in her epigastric area.  She does wake up with some saliva may be reflux in the morning.  She was prescribed famotidine but states that it made her pain worse.  She does take Omeprazole but not consistently in the morning.        The following portions of the patient's history were reviewed and updated as appropriate: allergies, current medications, past family history, past medical history, past social history, past surgical history, and problem list.    Review of Systems   Constitutional:  Negative for chills and fever.   HENT:  Negative for ear pain and sore throat.    Eyes:  Negative for pain and visual disturbance.   Respiratory:  Negative for cough and shortness of breath.    Cardiovascular:  Negative for chest pain and palpitations.   Gastrointestinal:  Positive for abdominal pain and nausea. Negative for vomiting.   Musculoskeletal:  Positive for gait problem. Negative for arthralgias and back pain.   Skin:  Negative for color change and rash.   Neurological:  Positive for weakness and headaches. Negative for syncope.   Psychiatric/Behavioral:  Negative for agitation and behavioral problems.    All other systems reviewed and are negative.        Objective:      /68 (BP Location: Left arm, Patient Position: Sitting, Cuff Size: Large)   Pulse 92   Temp 97.6 °F (36.4 °C) (Tympanic)   Ht 5' 9\" (1.753 m)   Wt 78.5 kg (173 lb)   LMP 09/14/2023 (Approximate)   SpO2 93%   BMI 25.55 kg/m²          Physical Exam  Vitals and nursing note reviewed. Exam conducted with a chaperone present.   Constitutional:       Appearance: Normal appearance.   Cardiovascular:      Rate and Rhythm: Normal rate and regular rhythm.   Pulmonary:      Effort: Pulmonary effort is normal.      Breath sounds: Normal breath sounds. "   Abdominal:      General: There is no distension.      Palpations: Abdomen is soft. There is no mass.      Tenderness: There is no abdominal tenderness.      Comments: Tender epigastric area with voluntary guarding   Skin:     General: Skin is warm and dry.   Neurological:      Mental Status: She is alert.      Motor: Weakness present.      Gait: Gait abnormal.   Psychiatric:         Mood and Affect: Mood normal.         Behavior: Behavior normal.

## 2024-01-19 ENCOUNTER — TELEPHONE (OUTPATIENT)
Dept: GASTROENTEROLOGY | Facility: HOSPITAL | Age: 44
End: 2024-01-19

## 2024-01-22 ENCOUNTER — HOSPITAL ENCOUNTER (EMERGENCY)
Facility: HOSPITAL | Age: 44
Discharge: HOME | End: 2024-01-22
Attending: EMERGENCY MEDICINE
Payer: COMMERCIAL

## 2024-01-22 VITALS
TEMPERATURE: 98.7 F | OXYGEN SATURATION: 100 % | RESPIRATION RATE: 17 BRPM | SYSTOLIC BLOOD PRESSURE: 109 MMHG | HEART RATE: 81 BPM | DIASTOLIC BLOOD PRESSURE: 63 MMHG

## 2024-01-22 DIAGNOSIS — G40.919 BREAKTHROUGH SEIZURE (HCC): Primary | ICD-10-CM

## 2024-01-22 LAB
ALBUMIN SERPL BCP-MCNC: 4.2 G/DL (ref 3.5–5)
ALP SERPL-CCNC: 63 U/L (ref 34–104)
ALT SERPL W P-5'-P-CCNC: 9 U/L (ref 7–52)
AMORPH URATE CRY URNS QL MICRO: ABNORMAL
AMPHETAMINES SERPL QL SCN: NEGATIVE
ANION GAP SERPL CALCULATED.3IONS-SCNC: 7 MMOL/L
AST SERPL W P-5'-P-CCNC: 16 U/L (ref 13–39)
BACTERIA UR QL AUTO: ABNORMAL /HPF
BARBITURATES UR QL: NEGATIVE
BASOPHILS # BLD AUTO: 0.02 THOUSANDS/ÂΜL (ref 0–0.1)
BASOPHILS NFR BLD AUTO: 0 % (ref 0–1)
BENZODIAZ UR QL: NEGATIVE
BILIRUB SERPL-MCNC: 0.64 MG/DL (ref 0.2–1)
BILIRUB UR QL STRIP: NEGATIVE
BUN SERPL-MCNC: 15 MG/DL (ref 5–25)
CALCIUM SERPL-MCNC: 9.6 MG/DL (ref 8.4–10.2)
CHLORIDE SERPL-SCNC: 108 MMOL/L (ref 96–108)
CK SERPL-CCNC: 251 U/L (ref 26–192)
CLARITY UR: CLEAR
CO2 SERPL-SCNC: 25 MMOL/L (ref 21–32)
COCAINE UR QL: NEGATIVE
COLOR UR: YELLOW
CREAT SERPL-MCNC: 0.56 MG/DL (ref 0.6–1.3)
EOSINOPHIL # BLD AUTO: 0.02 THOUSAND/ÂΜL (ref 0–0.61)
EOSINOPHIL NFR BLD AUTO: 0 % (ref 0–6)
ERYTHROCYTE [DISTWIDTH] IN BLOOD BY AUTOMATED COUNT: 12.5 % (ref 11.6–15.1)
EXT PREGNANCY TEST URINE: NEGATIVE
EXT. CONTROL: NORMAL
FLUAV RNA RESP QL NAA+PROBE: NEGATIVE
FLUBV RNA RESP QL NAA+PROBE: NEGATIVE
GFR SERPL CREATININE-BSD FRML MDRD: 116 ML/MIN/1.73SQ M
GLUCOSE SERPL-MCNC: 91 MG/DL (ref 65–140)
GLUCOSE UR STRIP-MCNC: NEGATIVE MG/DL
HCT VFR BLD AUTO: 39.5 % (ref 34.8–46.1)
HGB BLD-MCNC: 13.6 G/DL (ref 11.5–15.4)
HGB UR QL STRIP.AUTO: ABNORMAL
HYALINE CASTS #/AREA URNS LPF: ABNORMAL /LPF
IMM GRANULOCYTES # BLD AUTO: 0.01 THOUSAND/UL (ref 0–0.2)
IMM GRANULOCYTES NFR BLD AUTO: 0 % (ref 0–2)
KETONES UR STRIP-MCNC: NEGATIVE MG/DL
LEUKOCYTE ESTERASE UR QL STRIP: NEGATIVE
LYMPHOCYTES # BLD AUTO: 1.53 THOUSANDS/ÂΜL (ref 0.6–4.47)
LYMPHOCYTES NFR BLD AUTO: 34 % (ref 14–44)
MCH RBC QN AUTO: 29.6 PG (ref 26.8–34.3)
MCHC RBC AUTO-ENTMCNC: 34.4 G/DL (ref 31.4–37.4)
MCV RBC AUTO: 86 FL (ref 82–98)
METHADONE UR QL: NEGATIVE
MONOCYTES # BLD AUTO: 0.5 THOUSAND/ÂΜL (ref 0.17–1.22)
MONOCYTES NFR BLD AUTO: 11 % (ref 4–12)
MUCOUS THREADS UR QL AUTO: ABNORMAL
NEUTROPHILS # BLD AUTO: 2.45 THOUSANDS/ÂΜL (ref 1.85–7.62)
NEUTS SEG NFR BLD AUTO: 55 % (ref 43–75)
NITRITE UR QL STRIP: NEGATIVE
NON-SQ EPI CELLS URNS QL MICRO: ABNORMAL /HPF
NRBC BLD AUTO-RTO: 0 /100 WBCS
OPIATES UR QL SCN: NEGATIVE
OXYCODONE+OXYMORPHONE UR QL SCN: NEGATIVE
PCP UR QL: NEGATIVE
PH UR STRIP.AUTO: 6 [PH] (ref 4.5–8)
PLATELET # BLD AUTO: 182 THOUSANDS/UL (ref 149–390)
PMV BLD AUTO: 12.6 FL (ref 8.9–12.7)
POTASSIUM SERPL-SCNC: 3.9 MMOL/L (ref 3.5–5.3)
PROT SERPL-MCNC: 7.1 G/DL (ref 6.4–8.4)
PROT UR STRIP-MCNC: ABNORMAL MG/DL
RBC # BLD AUTO: 4.59 MILLION/UL (ref 3.81–5.12)
RBC #/AREA URNS AUTO: ABNORMAL /HPF
RSV RNA RESP QL NAA+PROBE: NEGATIVE
SARS-COV-2 RNA RESP QL NAA+PROBE: NEGATIVE
SODIUM SERPL-SCNC: 140 MMOL/L (ref 135–147)
SP GR UR STRIP.AUTO: >=1.03 (ref 1–1.03)
THC UR QL: NEGATIVE
UROBILINOGEN UR QL STRIP.AUTO: 0.2 E.U./DL
WBC # BLD AUTO: 4.53 THOUSAND/UL (ref 4.31–10.16)
WBC #/AREA URNS AUTO: ABNORMAL /HPF

## 2024-01-22 PROCEDURE — 96374 THER/PROPH/DIAG INJ IV PUSH: CPT

## 2024-01-22 PROCEDURE — 96375 TX/PRO/DX INJ NEW DRUG ADDON: CPT

## 2024-01-22 PROCEDURE — 80177 DRUG SCRN QUAN LEVETIRACETAM: CPT | Performed by: EMERGENCY MEDICINE

## 2024-01-22 PROCEDURE — 99285 EMERGENCY DEPT VISIT HI MDM: CPT

## 2024-01-22 PROCEDURE — 36415 COLL VENOUS BLD VENIPUNCTURE: CPT | Performed by: EMERGENCY MEDICINE

## 2024-01-22 PROCEDURE — 99285 EMERGENCY DEPT VISIT HI MDM: CPT | Performed by: EMERGENCY MEDICINE

## 2024-01-22 PROCEDURE — 81001 URINALYSIS AUTO W/SCOPE: CPT

## 2024-01-22 PROCEDURE — 96361 HYDRATE IV INFUSION ADD-ON: CPT

## 2024-01-22 PROCEDURE — 0241U HB NFCT DS VIR RESP RNA 4 TRGT: CPT | Performed by: EMERGENCY MEDICINE

## 2024-01-22 PROCEDURE — 80307 DRUG TEST PRSMV CHEM ANLYZR: CPT | Performed by: EMERGENCY MEDICINE

## 2024-01-22 PROCEDURE — 81025 URINE PREGNANCY TEST: CPT | Performed by: EMERGENCY MEDICINE

## 2024-01-22 PROCEDURE — 82550 ASSAY OF CK (CPK): CPT | Performed by: EMERGENCY MEDICINE

## 2024-01-22 PROCEDURE — 85025 COMPLETE CBC W/AUTO DIFF WBC: CPT | Performed by: EMERGENCY MEDICINE

## 2024-01-22 PROCEDURE — 80053 COMPREHEN METABOLIC PANEL: CPT | Performed by: EMERGENCY MEDICINE

## 2024-01-22 RX ORDER — ACETAMINOPHEN 325 MG/1
650 TABLET ORAL ONCE
Status: COMPLETED | OUTPATIENT
Start: 2024-01-22 | End: 2024-01-22

## 2024-01-22 RX ORDER — ACETAMINOPHEN 325 MG/1
325 TABLET ORAL ONCE
Status: COMPLETED | OUTPATIENT
Start: 2024-01-22 | End: 2024-01-22

## 2024-01-22 RX ORDER — KETOROLAC TROMETHAMINE 30 MG/ML
30 INJECTION, SOLUTION INTRAMUSCULAR; INTRAVENOUS ONCE
Status: COMPLETED | OUTPATIENT
Start: 2024-01-22 | End: 2024-01-22

## 2024-01-22 RX ORDER — LEVETIRACETAM 500 MG/5ML
1500 INJECTION, SOLUTION, CONCENTRATE INTRAVENOUS ONCE
Status: COMPLETED | OUTPATIENT
Start: 2024-01-22 | End: 2024-01-22

## 2024-01-22 RX ORDER — LIDOCAINE 50 MG/G
1 PATCH TOPICAL ONCE
Status: DISCONTINUED | OUTPATIENT
Start: 2024-01-22 | End: 2024-01-22 | Stop reason: HOSPADM

## 2024-01-22 RX ORDER — LACOSAMIDE 50 MG/1
100 TABLET ORAL ONCE
Status: COMPLETED | OUTPATIENT
Start: 2024-01-22 | End: 2024-01-22

## 2024-01-22 RX ORDER — LACOSAMIDE 100 MG/1
100 TABLET ORAL EVERY 12 HOURS SCHEDULED
Qty: 60 TABLET | Refills: 0 | Status: SHIPPED | OUTPATIENT
Start: 2024-01-22 | End: 2024-02-21

## 2024-01-22 RX ADMIN — LEVETIRACETAM 1500 MG: 100 INJECTION, SOLUTION INTRAVENOUS at 14:57

## 2024-01-22 RX ADMIN — LACOSAMIDE 100 MG: 50 TABLET, FILM COATED ORAL at 16:39

## 2024-01-22 RX ADMIN — LIDOCAINE 5% 1 PATCH: 700 PATCH TOPICAL at 14:56

## 2024-01-22 RX ADMIN — ACETAMINOPHEN 325MG 325 MG: 325 TABLET ORAL at 16:42

## 2024-01-22 RX ADMIN — KETOROLAC TROMETHAMINE 30 MG: 30 INJECTION, SOLUTION INTRAMUSCULAR; INTRAVENOUS at 14:55

## 2024-01-22 RX ADMIN — ACETAMINOPHEN 325MG 650 MG: 325 TABLET ORAL at 14:54

## 2024-01-22 RX ADMIN — SODIUM CHLORIDE 1000 ML: 0.9 INJECTION, SOLUTION INTRAVENOUS at 14:52

## 2024-01-22 NOTE — ED PROVIDER NOTES
History  Chief Complaint   Patient presents with    Seizure - Prior Hx Of     Pt reports having seizures but unsure when last one was. Pt c/o neck pain but denies falling. Reports seizure happened in bed. +loc     Patient is a 43-year-old female, history of seizure disorder, on Keppra 1500 twice daily, compliant with medications, comes in with 2 seizure episodes last night, first seizure occurred at 10:30 pm last night lasted 4 minutes, patient was in bed, second seizure occurred around 1:30 am last night 3 minutes, bit tongue, urinary incontinence, was confused for 20 minutes after each seizure, similar to prior seizure; no recent change in seizure med dose, last seizure before today was October '23; [facility]; no recent illness, no fever, cough, chest pain, abd pain, vomiting or diarrhea.      History provided by:  Patient and relative (Son and Daughter)  Seizure - Prior Hx Of  Seizure activity on arrival: no    Seizure type:  Grand mal  Preceding symptoms comment:  Unbale to specify  Initial focality:  None  Episode characteristics: generalized shaking, incontinence and tongue biting    Postictal symptoms: no confusion    Return to baseline: no    Severity:  Moderate  Duration:  4 minutes  Timing:  Clustered  Number of seizures this episode:  2  Progression:  Resolved  Context: not fever    Context comment:  Compliant with keppra 1500 mg BID, likely breakthrough seizure  Recent head injury:  No recent head injuries  PTA treatment:  None  History of seizures: yes        Prior to Admission Medications   Prescriptions Last Dose Informant Patient Reported? Taking?   Acetaminophen Extra Strength 500 MG tablet  Self Yes No   Patient not taking: Reported on 7/5/2023   Erenumab-aooe (Aimovig) 70 MG/ML SOAJ  Self Yes No   Sig: Inject 70 mg under the skin every 28 days   Patient not taking: Reported on 12/6/2023   Tranexamic Acid 650 MG TABS  Self Yes No   Patient not taking: Reported on  1/10/2024   acetaminophen (TYLENOL) 325 mg tablet  Self No No   Sig: Take 3 tablets (975 mg total) by mouth every 6 (six) hours as needed for mild pain   Patient not taking: Reported on 1/10/2024   cyanocobalamin (VITAMIN B-12) 1000 MCG tablet  Self No No   Sig: Take 1 tablet (1,000 mcg total) by mouth daily   famotidine (PEPCID) 20 mg tablet  Self No No   Sig: Take 1 tablet (20 mg total) by mouth daily at bedtime   ferrous sulfate 324 (65 Fe) mg  Self No No   Sig: Take 1 tablet (324 mg total) by mouth daily   lamoTRIgine (LaMICtal) 100 mg tablet  Self Yes No   Sig: Take 100 mg by mouth 2 (two) times a day   Patient not taking: Reported on 1/10/2024   levETIRAcetam (KEPPRA) 750 mg tablet  Self No No   Sig: Take 2 tablets (1,500 mg total) by mouth 2 (two) times a day   omeprazole (PriLOSEC) 20 mg delayed release capsule  Self No No   Sig: Take 1 capsule (20 mg total) by mouth daily   ondansetron (ZOFRAN-ODT) 4 mg disintegrating tablet  Self No No   Sig: Take 1 tablet (4 mg total) by mouth every 6 (six) hours as needed for nausea   Patient not taking: Reported on 1/10/2024   oxyCODONE-acetaminophen (Percocet) 5-325 mg per tablet  Self No No   Sig: Take 1 tablet by mouth every 4 (four) hours as needed for severe pain for up to 15 doses Max Daily Amount: 6 tablets   Patient not taking: Reported on 1/10/2024      Facility-Administered Medications: None       Past Medical History:   Diagnosis Date    Anemia     B12 deficiency     Chronic migraine without aura     Dermatographism     Endometriosis     GERD (gastroesophageal reflux disease)     History of blood transfusion     Iron deficiency anemia     Lyme neuropathy     Seizure disorder (HCC)     Seizures (HCC)     last seizure 23    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HCC)     Visual disturbance     Diplopia, nystagmus, decreased visual acuity    Vitamin D deficiency        Past Surgical History:   Procedure Laterality Date     SECTION       ESOPHAGOGASTRODUODENOSCOPY N/A 3/8/2019    Procedure: ESOPHAGOGASTRODUODENOSCOPY (EGD);  Surgeon: Prashanth Mendoza MD;  Location: [facility];  Service: Gastroenterology    IR DRAINAGE TUBE CHECK/CHANGE/REPOSITION/REINSERTION/UPSIZE  5/8/2023    IR DRAINAGE TUBE PLACEMENT  4/23/2023    OOPHORECTOMY      unilateral --  last assessed 03/20/17    MI LAPAROSCOPY W/RMVL ADNEXAL STRUCTURES Right 9/26/2023    Procedure: LAP OOPHORECTOMY;  Surgeon: Dc Pantoja DO;  Location: [facility];  Service: Gynecology    MI LAPS SUPRACRV HYSTERECT 250 GM/< RMVL TUBE/OVAR N/A 9/26/2023    Procedure: LSH, LYSIS OF ADHESIONS;  Surgeon: Dc Pantoja DO;  Location: [facility];  Service: Gynecology    TUBAL LIGATION      last assessed 03/20/17       Family History   Problem Relation Age of Onset    Other Mother         AIDS, Brain Tumor balance disorder    Seizures Mother     Alcohol abuse Father     Other Sister         Balance disorder    HIV Maternal Grandmother      I have reviewed and agree with the history as documented.    E-Cigarette/Vaping    E-Cigarette Use Never User      E-Cigarette/Vaping Substances    Nicotine No     THC No     CBD No     Flavoring No     Other No     Unknown No      Social History     Tobacco Use    Smoking status: Never    Smokeless tobacco: Never   Vaping Use    Vaping status: Never Used   Substance Use Topics    Alcohol use: Never    Drug use: No       Review of Systems   Constitutional:  Negative for chills and fever.   HENT:  Negative for facial swelling, sore throat and trouble swallowing.    Eyes:  Negative for pain and visual disturbance.   Respiratory:  Negative for cough, chest tightness and shortness of breath.    Cardiovascular:  Negative for chest pain and leg swelling.   Gastrointestinal:  Negative for abdominal pain, diarrhea, nausea and vomiting.   Genitourinary:  Negative for dysuria and flank pain.   Musculoskeletal:  Negative for back pain, neck pain and neck stiffness.   Skin:   Negative for pallor and rash.   Allergic/Immunologic: Negative for environmental allergies and immunocompromised state.   Neurological:  Negative for dizziness and headaches.   Hematological:  Negative for adenopathy. Does not bruise/bleed easily.   Psychiatric/Behavioral:  Negative for agitation and behavioral problems.    All other systems reviewed and are negative.      Physical Exam  Physical Exam  Vitals and nursing note reviewed.   Constitutional:       General: She is not in acute distress.     Appearance: She is well-developed.   HENT:      Head: Normocephalic and atraumatic.   Eyes:      Extraocular Movements: Extraocular movements intact.      Pupils: Pupils are equal, round, and reactive to light.   Cardiovascular:      Rate and Rhythm: Normal rate and regular rhythm.   Pulmonary:      Effort: Pulmonary effort is normal. No respiratory distress.   Abdominal:      Palpations: Abdomen is soft.      Tenderness: There is no abdominal tenderness. There is no guarding or rebound.   Musculoskeletal:         General: Normal range of motion.      Cervical back: Normal range of motion and neck supple.   Skin:     General: Skin is warm and dry.   Neurological:      General: No focal deficit present.      Mental Status: She is alert and oriented to person, place, and time.      Cranial Nerves: No cranial nerve deficit.      Motor: No weakness.      Coordination: Coordination normal.   Psychiatric:         Mood and Affect: Mood normal.         Behavior: Behavior normal.         Vital Signs  ED Triage Vitals   Temperature Pulse Respirations Blood Pressure SpO2   01/22/24 1405 01/22/24 1339 01/22/24 1339 01/22/24 1339 01/22/24 1339   98.7 °F (37.1 °C) 99 17 110/70 98 %      Temp Source Heart Rate Source Patient Position - Orthostatic VS BP Location FiO2 (%)   01/22/24 1405 01/22/24 1339 01/22/24 1339 01/22/24 1339 --   Oral Monitor Sitting Right arm       Pain Score       01/22/24 1454       10 - Worst Possible Pain            Vitals:    01/22/24 1339 01/22/24 1444 01/22/24 1600   BP: 110/70 101/67 109/63   Pulse: 99 90 81   Patient Position - Orthostatic VS: Sitting Lying Lying         Visual Acuity      ED Medications  Medications   lidocaine (LIDODERM) 5 % patch 1 patch (1 patch Topical Medication Applied 1/22/24 1456)   sodium chloride 0.9 % bolus 1,000 mL (0 mL Intravenous Stopped 1/22/24 1601)   ketorolac (TORADOL) injection 30 mg (30 mg Intravenous Given 1/22/24 1455)   acetaminophen (TYLENOL) tablet 650 mg (650 mg Oral Given 1/22/24 1454)   levETIRAcetam (KEPPRA) injection 1,500 mg (1,500 mg Intravenous Given 1/22/24 1457)   lacosamide (VIMPAT) tablet 100 mg (100 mg Oral Given 1/22/24 1639)   acetaminophen (TYLENOL) tablet 325 mg (325 mg Oral Given 1/22/24 1642)       Diagnostic Studies  Results Reviewed       Procedure Component Value Units Date/Time    Rapid drug screen, urine [[id number]]  (Normal) Collected: 01/22/24 1450    Lab Status: Final result Specimen: Urine, Clean Catch Updated: 01/22/24 1551     Amph/Meth UR Negative     Barbiturate Ur Negative     Benzodiazepine Urine Negative     Cocaine Urine Negative     Methadone Urine Negative     Opiate Urine Negative     PCP Ur Negative     THC Urine Negative     Oxycodone Urine Negative    Narrative:      FOR MEDICAL PURPOSES ONLY.   IF CONFIRMATION NEEDED PLEASE CONTACT THE LAB WITHIN 5 DAYS.    Drug Screen Cutoff Levels:  AMPHETAMINE/METHAMPHETAMINES  1000 ng/mL  BARBITURATES     200 ng/mL  BENZODIAZEPINES     200 ng/mL  COCAINE      300 ng/mL  METHADONE      300 ng/mL  OPIATES      300 ng/mL  PHENCYCLIDINE     25 ng/mL  THC       50 ng/mL  OXYCODONE      100 ng/mL    FLU/RSV/COVID - if FLU/RSV clinically relevant [[id number]]  (Normal) Collected: 01/22/24 1450    Lab Status: Final result Specimen: Nares from Nose Updated: 01/22/24 1547     SARS-CoV-2 Negative     INFLUENZA A PCR Negative     INFLUENZA B PCR Negative     RSV PCR Negative    Narrative:      FOR  PEDIATRIC PATIENTS - copy/paste COVID Guidelines URL to browser: [email or web address]    SARS-CoV-2 assay is a Nucleic Acid Amplification assay intended for the  qualitative detection of nucleic acid from SARS-CoV-2 in nasopharyngeal  swabs. Results are for the presumptive identification of SARS-CoV-2 RNA.    Positive results are indicative of infection with SARS-CoV-2, the virus  causing COVID-19, but do not rule out bacterial infection or co-infection  with other viruses. Laboratories within the [place] and its  territories are required to report all positive results to the appropriate  public health authorities. Negative results do not preclude SARS-CoV-2  infection and should not be used as the sole basis for treatment or other  patient management decisions. Negative results must be combined with  clinical observations, patient history, and epidemiological information.  This test has not been FDA cleared or approved.    This test has been authorized by FDA under an Emergency Use Authorization  (EUA). This test is only authorized for the duration of time the  declaration that circumstances exist justifying the authorization of the  emergency use of an in vitro diagnostic tests for detection of SARS-CoV-2  virus and/or diagnosis of COVID-19 infection under section 564(b)(1) of  the Act, 21 U.S.C. 360bbb-3(b)(1), unless the authorization is terminated  or revoked sooner. The test has been validated but independent review by FDA  and CLIA is pending.    Test performed using [employer] GeneXpert: This RT-PCR assay targets N2,  a region unique to SARS-CoV-2. A conserved region in the E-gene was chosen  for pan-Sarbecovirus detection which includes SARS-CoV-2.    According to CMS-2020-01-R, this platform meets the definition of high-throughput technology.    Comprehensive metabolic panel [[id number]]  (Abnormal) Collected: 01/22/24 1450    Lab Status: Final result  Specimen: Blood from Arm, Left Updated: 01/22/24 1533     Sodium 140 mmol/L      Potassium 3.9 mmol/L      Chloride 108 mmol/L      CO2 25 mmol/L      ANION GAP 7 mmol/L      BUN 15 mg/dL      Creatinine 0.56 mg/dL      Glucose 91 mg/dL      Calcium 9.6 mg/dL      AST 16 U/L      ALT 9 U/L      Alkaline Phosphatase 63 U/L      Total Protein 7.1 g/dL      Albumin 4.2 g/dL      Total Bilirubin 0.64 mg/dL      eGFR 116 ml/min/1.73sq m     Narrative:      National Kidney Disease Foundation guidelines for Chronic Kidney Disease (CKD):     Stage 1 with normal or high GFR (GFR > 90 mL/min/1.73 square meters)    Stage 2 Mild CKD (GFR = 60-89 mL/min/1.73 square meters)    Stage 3A Moderate CKD (GFR = 45-59 mL/min/1.73 square meters)    Stage 3B Moderate CKD (GFR = 30-44 mL/min/1.73 square meters)    Stage 4 Severe CKD (GFR = 15-29 mL/min/1.73 square meters)    Stage 5 End Stage CKD (GFR <15 mL/min/1.73 square meters)  Note: GFR calculation is accurate only with a steady state creatinine    CK [[id number]]  (Abnormal) Collected: 01/22/24 1450    Lab Status: Final result Specimen: Blood from Arm, Left Updated: 01/22/24 1533     Total  U/L     CBC and differential [[id number]] Collected: 01/22/24 1450    Lab Status: Final result Specimen: Blood from Arm, Left Updated: 01/22/24 1501     WBC 4.53 Thousand/uL      RBC 4.59 Million/uL      Hemoglobin 13.6 g/dL      Hematocrit 39.5 %      MCV 86 fL      MCH 29.6 pg      MCHC 34.4 g/dL      RDW 12.5 %      MPV 12.6 fL      Platelets 182 Thousands/uL      nRBC 0 /100 WBCs      Neutrophils Relative 55 %      Immat GRANS % 0 %      Lymphocytes Relative 34 %      Monocytes Relative 11 %      Eosinophils Relative 0 %      Basophils Relative 0 %      Neutrophils Absolute 2.45 Thousands/µL      Immature Grans Absolute 0.01 Thousand/uL      Lymphocytes Absolute 1.53 Thousands/µL      Monocytes Absolute 0.50 Thousand/µL      Eosinophils Absolute 0.02 Thousand/µL      Basophils Absolute  0.02 Thousands/µL     Levetiracetam level [[id number]] Collected: 01/22/24 1450    Lab Status: In process Specimen: Blood from Arm, Left Updated: 01/22/24 1457    Urine Microscopic [[id number]]  (Abnormal) Collected: 01/22/24 1401    Lab Status: Final result Specimen: Urine, Clean Catch Updated: 01/22/24 1457     RBC, UA 2-4 /hpf      WBC, UA 2-4 /hpf      Epithelial Cells Occasional /hpf      Bacteria, UA None Seen /hpf      MUCUS THREADS Innumerable     Hyaline Casts, UA 3-5 /lpf      Amorphous Crystals, UA Moderate    POCT pregnancy, urine [[id number]]  (Normal) Resulted: 01/22/24 1405    Lab Status: Final result Updated: 01/22/24 1405     EXT Preg Test, Ur Negative     Control Valid    Urine Macroscopic, POC [[id number]]  (Abnormal) Collected: 01/22/24 1401    Lab Status: Final result Specimen: Urine Updated: 01/22/24 1403     Color, UA Yellow     Clarity, UA Clear     pH, UA 6.0     Leukocytes, UA Negative     Nitrite, UA Negative     Protein, UA Trace mg/dl      Glucose, UA Negative mg/dl      Ketones, UA Negative mg/dl      Urobilinogen, UA 0.2 E.U./dl      Bilirubin, UA Negative     Occult Blood, UA Small     Specific Gravity, UA >=1.030    Narrative:      CLINITEK RESULT                   No orders to display              Procedures  Procedures         ED Course  ED Course as of 01/22/24 1832 Mon Jan 22, 2024   1608 WBC: 4.53   1608 Hemoglobin: 13.6   1608 Platelet Count: 182   1608 Sodium: 140   1608 Potassium: 3.9   1608 BUN: 15   1608 Creatinine(!): 0.56   1608 Glucose, Random: 91   1608 Total CK(!): 251  Labs reviewed.   1609 Case discussed with Dr. Arciniega, neurology on-call, advised can start Vimpat at twice daily, close follow-up with her neurologist.   1643 Patient and family present informed about the workup results, neurology advised to start Vimpat, will send the prescription to patient's pharmacy, advised close follow-up with patient's neurologist.                               SBIRT 20yo+       Flowsheet Row Most Recent Value   Initial Alcohol Screen: US AUDIT-C     1. How often do you have a drink containing alcohol? 0 Filed at: 01/22/2024 1343   2. How many drinks containing alcohol do you have on a typical day you are drinking?  0 Filed at: 01/22/2024 1343   3b. FEMALE Any Age, or MALE 65+: How often do you have 4 or more drinks on one occassion? 0 Filed at: 01/22/2024 1343   Audit-C Score 0 Filed at: 01/22/2024 1343   VALENTIN: How many times in the past year have you...    Used an illegal drug or used a prescription medication for non-medical reasons? Never Filed at: 01/22/2024 1343                      Medical Decision Making  Patient is a 43-year-old female, history of seizure disorder, compliant on Keppra 1500 twice daily, comes in with 2 seizures overnight as documented above, patient was postictal, confused, complaining of headache which is usual for her, no recent illness, no fever, no cough. On exam, patient is conscious, alert, vital signs are stable, no acute distress, lungs clear, heart sounds regular, nonfocal neuroexam.  Differential diagnosis: Acute seizure, rule out electrolyte imbalance, dehydration, will check labs, give Keppra IV as patient did not take morning dose today, discussed case with neurology.    Problems Addressed:  Breakthrough seizure (HCC): acute illness or injury    Amount and/or Complexity of Data Reviewed  Labs: ordered. Decision-making details documented in ED Course.  Discussion of management or test interpretation with external provider(s): Case dicussed with neurology on call    Risk  OTC drugs.  Prescription drug management.             Disposition  Final diagnoses:   Breakthrough seizure (HCC)     Time reflects when diagnosis was documented in both MDM as applicable and the Disposition within this note       Time User Action Codes Description Comment    1/22/2024  4:30 PM Antony Alarcon Add [G40.919] Breakthrough seizure (HCC)           ED Disposition       ED  Disposition   Discharge    Condition   Stable    Date/Time   Mon Jan 22, 2024 1629    Comment   Reny Espinoza discharge to home/self care.                   Follow-up Information       Follow up With Specialties Details Why Contact Info    Malachi Guaman MD Family Medicine Schedule an appointment as soon as possible for a visit   [address] [facility] [facility]   [address]  [place] [address] [phone]      Yakelin Becerril MD Neurology Schedule an appointment as soon as possible for a visit   [address] [facility] [address]  [address]  [place] [address]  [phone]              Discharge Medication List as of 1/22/2024  4:43 PM        START taking these medications    Details   lacosamide (Vimpat) 100 mg tablet Take 1 tablet (100 mg total) by mouth every 12 (twelve) hours, Starting Mon 1/22/2024, Until Wed 2/21/2024, Normal           CONTINUE these medications which have NOT CHANGED    Details   !! acetaminophen (TYLENOL) 325 mg tablet Take 3 tablets (975 mg total) by mouth every 6 (six) hours as needed for mild pain, Starting Sat 4/22/2023, No Print      !! Acetaminophen Extra Strength 500 MG tablet Starting Thu 4/20/2023, Historical Med      cyanocobalamin (VITAMIN B-12) 1000 MCG tablet Take 1 tablet (1,000 mcg total) by mouth daily, Starting Tue 10/4/2022, Normal      Erenumab-aooe (Aimovig) 70 MG/ML SOAJ Inject 70 mg under the skin every 28 days, Starting Mon 10/18/2021, Historical Med      famotidine (PEPCID) 20 mg tablet Take 1 tablet (20 mg total) by mouth daily at bedtime, Starting Wed 12/6/2023, Until Sat 11/30/2024, Normal      ferrous sulfate 324 (65 Fe) mg Take 1 tablet (324 mg total) by mouth daily, Starting Tue 10/4/2022, Normal      lamoTRIgine (LaMICtal) 100 mg tablet Take 100 mg by mouth 2 (two) times a day, Starting Mon 2/20/2023, Until Tue 2/20/2024, Historical Med      levETIRAcetam (KEPPRA) 750 mg tablet Take 2 tablets (1,500 mg total) by mouth 2 (two) times a day, Starting Tue 1/24/2023, No  Print      omeprazole (PriLOSEC) 20 mg delayed release capsule Take 1 capsule (20 mg total) by mouth daily, Starting Wed 7/5/2023, Normal      ondansetron (ZOFRAN-ODT) 4 mg disintegrating tablet Take 1 tablet (4 mg total) by mouth every 6 (six) hours as needed for nausea, Starting Sun 1/22/2023, Normal      oxyCODONE-acetaminophen (Percocet) 5-325 mg per tablet Take 1 tablet by mouth every 4 (four) hours as needed for severe pain for up to 15 doses Max Daily Amount: 6 tablets, Starting Tue 9/26/2023, Normal      Tranexamic Acid 650 MG TABS Historical Med       !! - Potential duplicate medications found. Please discuss with provider.          No discharge procedures on file.    PDMP Review       None            ED Provider  Electronically Signed by             Antony Alarcon MD  01/22/24 2134

## 2024-01-24 LAB — LEVETIRACETAM SERPL-MCNC: 13.3 UG/ML (ref 10–40)

## 2024-01-26 ENCOUNTER — HOSPITAL ENCOUNTER (OUTPATIENT)
Dept: RADIOLOGY | Facility: HOSPITAL | Age: 44
Discharge: HOME | End: 2024-01-26
Attending: SURGERY
Payer: COMMERCIAL

## 2024-01-26 DIAGNOSIS — K21.00 GASTROESOPHAGEAL REFLUX DISEASE WITH ESOPHAGITIS WITHOUT HEMORRHAGE: ICD-10-CM

## 2024-01-26 DIAGNOSIS — K21.00 HIATAL HERNIA WITH GERD AND ESOPHAGITIS: ICD-10-CM

## 2024-01-26 DIAGNOSIS — K44.9 HIATAL HERNIA WITH GERD AND ESOPHAGITIS: ICD-10-CM

## 2024-01-26 PROCEDURE — 74240 X-RAY XM UPR GI TRC 1CNTRST: CPT

## 2024-01-30 ENCOUNTER — HOSPITAL ENCOUNTER (OUTPATIENT)
Dept: GASTROENTEROLOGY | Facility: HOSPITAL | Age: 44
Discharge: HOME | End: 2024-01-30
Attending: SURGERY
Payer: COMMERCIAL

## 2024-01-30 VITALS
HEART RATE: 90 BPM | DIASTOLIC BLOOD PRESSURE: 70 MMHG | TEMPERATURE: 98 F | SYSTOLIC BLOOD PRESSURE: 118 MMHG | RESPIRATION RATE: 16 BRPM | OXYGEN SATURATION: 98 %

## 2024-01-30 DIAGNOSIS — K21.00 HIATAL HERNIA WITH GERD AND ESOPHAGITIS: ICD-10-CM

## 2024-01-30 DIAGNOSIS — K44.9 HIATAL HERNIA WITH GERD AND ESOPHAGITIS: ICD-10-CM

## 2024-01-30 DIAGNOSIS — K21.00 GASTROESOPHAGEAL REFLUX DISEASE WITH ESOPHAGITIS WITHOUT HEMORRHAGE: ICD-10-CM

## 2024-01-30 PROCEDURE — 91010 ESOPHAGUS MOTILITY STUDY: CPT

## 2024-01-30 PROCEDURE — 91038 ESOPH IMPED FUNCT TEST > 1HR: CPT

## 2024-01-30 NOTE — PERIOPERATIVE NURSING NOTE
Patient brought in the room and explained the esophageal manometry procedure. After the confirmation of allergies, lidocaine 2 % viscous given via nostrils and  a transnasal insertion of the High Resolution esophageal manometry catheter was inserted via right nostril. Patient given water to drink during the insertion and once the catheter inserted pressure bands of both Upper esophageal sphincter  (UES) and Lower esophageal sphincter ( LES) were observed on the color contour. Patient instructed to take a deep breath to verify placement of the catheter, diaphragmatic pinch noted on inspiration. Catheter was secured to right/left cheek. Patient was assisted to supine position .Patient was instructed to relax  while acclimating the catheter for about 5 minutes. A 30 second baseline resting pressure was obtained to identify the UES and LES followed by a series of 10 liquid swallows using 5 cc room temperature normal saline to assess esophageal motility and bolus transit. Patient administered 6 viscous swallows using 5 cc viscous solution. After the 6 viscous patient needing a break and started gagging and vomited some viscous solution. Patient sitting more upright and upset wanting to end the procedure. Patient encouraged to continue and attempted the 1 multiple rapid drink swallow using 2 cc room temperature normal saline given a total of 5 drinks. But patient spitting up the liquid and sitting forward. Patient reports she does not want to continue because she feels her stomach hurst. The imaging on the computer confirms the catheter remains in place but patient continuing to gag. Catheter removed intact and no blood present . Discussed with patient the option to do the LPR ph study and she feels she can not tolerate it today. I offered the patient to return next week to have it done and she agreed. Reinforce she will need to continue to be off reflux medication.   Patient given discharge instructions and patient left  in stable condition with daughter via wheelchair and no more vomiting or active pain..

## 2024-02-07 ENCOUNTER — HOSPITAL ENCOUNTER (OUTPATIENT)
Dept: GASTROENTEROLOGY | Facility: HOSPITAL | Age: 44
Discharge: HOME | End: 2024-02-07
Attending: SURGERY
Payer: COMMERCIAL

## 2024-02-07 VITALS
DIASTOLIC BLOOD PRESSURE: 70 MMHG | RESPIRATION RATE: 16 BRPM | HEART RATE: 94 BPM | SYSTOLIC BLOOD PRESSURE: 117 MMHG | OXYGEN SATURATION: 100 % | TEMPERATURE: 98 F

## 2024-02-07 DIAGNOSIS — K21.9 HIATAL HERNIA WITH GERD: ICD-10-CM

## 2024-02-07 DIAGNOSIS — K21.9 GASTROESOPHAGEAL REFLUX DISEASE, UNSPECIFIED WHETHER ESOPHAGITIS PRESENT: ICD-10-CM

## 2024-02-07 DIAGNOSIS — K44.9 HIATAL HERNIA WITH GERD: ICD-10-CM

## 2024-02-07 PROCEDURE — 91038 ESOPH IMPED FUNCT TEST > 1HR: CPT

## 2024-02-07 NOTE — PERIOPERATIVE NURSING NOTE
Patient brought in room and educated on procedure. Lidocaine 2% topical solution inserted via left/right nostril. Measurement for ph placement obtained from recent Manometry procedure.  PH 56 LPR probe inserted via  right nostril and secured. Zephr monitor teach back done with patient and daughter and patient verbalized understanding. Discharg instructions given to patient and instructed to return the next day to have the probe remove. Patient left in stable condition.

## 2024-02-20 ENCOUNTER — TELEPHONE (OUTPATIENT)
Age: 44
End: 2024-02-20

## 2024-02-23 ENCOUNTER — NURSE TRIAGE (OUTPATIENT)
Age: 44
End: 2024-02-23

## 2024-02-23 NOTE — TELEPHONE ENCOUNTER
"Reason for Disposition   Information only question and nurse able to answer    Answer Assessment - Initial Assessment Questions  1. REASON FOR CALL or QUESTION: \"What is your reason for calling today?\" or \"How can I best help you?\" or \"What question do you have that I can help answer?\"          Pt calling for 24 pH Testing, advised that results will need to be reviewed and once reviewed we would call her to discuss, pt. Verbalized understanding    Protocols used: Information Only Call - No Triage-ADULT-[place]    "

## 2024-02-23 NOTE — TELEPHONE ENCOUNTER
Called pt using ([phone]) no answer first time and second time pt disconnected call. Sent my chart.

## 2024-02-23 NOTE — TELEPHONE ENCOUNTER
Pt transferred to nurses line, reviewed results with pt via language line . Pt understood and follow up OV was scheduled.

## 2024-03-12 ENCOUNTER — OFFICE VISIT (OUTPATIENT)
Age: 44
End: 2024-03-12
Payer: COMMERCIAL

## 2024-03-12 VITALS
RESPIRATION RATE: 18 BRPM | BODY MASS INDEX: 25.33 KG/M2 | DIASTOLIC BLOOD PRESSURE: 62 MMHG | HEIGHT: 69 IN | OXYGEN SATURATION: 98 % | SYSTOLIC BLOOD PRESSURE: 100 MMHG | WEIGHT: 171 LBS | HEART RATE: 73 BPM

## 2024-03-12 DIAGNOSIS — K21.9 GASTROESOPHAGEAL REFLUX DISEASE WITHOUT ESOPHAGITIS: ICD-10-CM

## 2024-03-12 DIAGNOSIS — R68.81 EARLY SATIETY: ICD-10-CM

## 2024-03-12 DIAGNOSIS — R11.0 NAUSEA: Primary | ICD-10-CM

## 2024-03-12 PROCEDURE — 99214 OFFICE O/P EST MOD 30 MIN: CPT | Performed by: PHYSICIAN ASSISTANT

## 2024-03-12 RX ORDER — TACROLIMUS 1 MG/G
OINTMENT TOPICAL
COMMUNITY
Start: 2024-02-12

## 2024-03-12 RX ORDER — TRIAMCINOLONE ACETONIDE 1 MG/G
CREAM TOPICAL
COMMUNITY
Start: 2024-01-11

## 2024-03-12 RX ORDER — OMEPRAZOLE 20 MG/1
20 CAPSULE, DELAYED RELEASE ORAL 2 TIMES DAILY
Qty: 60 CAPSULE | Refills: 1 | Status: SHIPPED | OUTPATIENT
Start: 2024-03-12 | End: 2024-05-11

## 2024-03-12 NOTE — PROGRESS NOTES
[facility] - Outpatient Follow-up Note  Reny Espinoza 43 y.o. female MRN: [id number]  Encounter: [id number]          ASSESSMENT AND PLAN:      1. Nausea  2. Early satiety  3. Gastroesophageal reflux disease  4. Hiatal hernia  5. Pancreatic cyst    Patient presents for follow up.  She reports issues with ongoing nausea, reflux, and early satiety.  EGD in September of 2023 showed grade A esophagitis and a a large HH, gastric polyp; biopsies negative for h pylori.  She has a history of a 9mm pancreatic cyst on CT Scan a year ago which requires follow up surveillance.  Colonoscopy in September of 2023 was normal.  UGI series 1/26/24 showed a small type 1 sliding HH and moderate GERD.  Esophageal manometry 1/30/2024 was normal.  24 hr pH 2/7/24 showed evidence of GERD but it was not in the pathological range.  She is on Omeprazole 20mg po daily.    Will increase Omeprazole to 20mg po BID x 6 weeks.  Will check a gastric emptying study to evaluate for gastroparesis.  Will check an MRI of the abdomen with contrast and MRCP to evaluate the pancreatic cyst for surveillance as well as evaluate the gallbladder/biliary tree.  ______________________________________________________________________    SUBJECTIVE:  Patient is a pleasant 43 year old female who presents to the office for follow up.  She reports issues with ongoing nausea, reflux, and early satiety.  EGD in September of 2023 showed grade A esophagitis and a a large HH, gastric polyp; biopsies negative for h pylori. She has a history of a 9mm pancreatic cyst on CT Scan a year ago which requires follow up surveillance. Colonoscopy in September of 2023 was normal. UGI series 1/26/24 showed a small type 1 sliding HH and moderate GERD. Esophageal manometry 1/30/2024 was normal. 24 hr pH 2/7/24 showed evidence of GERD but it was not in the pathological range. She is on Omeprazole 20mg po daily. She has not tried a BID course yet.  No family  history of pancreatic cancer.  She has also seen surgery regarding her hiatal hernia.      REVIEW OF SYSTEMS IS OTHERWISE NEGATIVE.      Historical Information   Past Medical History:   Diagnosis Date    Anemia     B12 deficiency     Chronic migraine without aura     Dermatographism     Endometriosis     GERD (gastroesophageal reflux disease)     History of blood transfusion     Iron deficiency anemia     Lyme neuropathy     Seizure disorder (HCC)     Seizures (HCC)     last seizure 23    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HCC)     Visual disturbance     Diplopia, nystagmus, decreased visual acuity    Vitamin D deficiency      Past Surgical History:   Procedure Laterality Date     SECTION      ESOPHAGOGASTRODUODENOSCOPY N/A 3/8/2019    Procedure: ESOPHAGOGASTRODUODENOSCOPY (EGD);  Surgeon: Prashanth Mendoza MD;  Location: [facility];  Service: Gastroenterology    IR DRAINAGE TUBE CHECK/CHANGE/REPOSITION/REINSERTION/UPSIZE  2023    IR DRAINAGE TUBE PLACEMENT  2023    OOPHORECTOMY      unilateral --  last assessed 17    WY LAPAROSCOPY W/RMVL ADNEXAL STRUCTURES Right 2023    Procedure: LAP OOPHORECTOMY;  Surgeon: Dc Pantoja DO;  Location: [facility];  Service: Gynecology    WY LAPS SUPRACRV HYSTERECT 250 GM/< RMVL TUBE/OVAR N/A 2023    Procedure: LSH, LYSIS OF ADHESIONS;  Surgeon: Dc Pantoja DO;  Location: [facility];  Service: Gynecology    TUBAL LIGATION      last assessed 17     Social History   Social History     Substance and Sexual Activity   Alcohol Use Never     Social History     Substance and Sexual Activity   Drug Use No     Social History     Tobacco Use   Smoking Status Never   Smokeless Tobacco Never     Family History   Problem Relation Age of Onset    Other Mother         AIDS, Brain Tumor balance disorder    Seizures Mother     Alcohol abuse Father     Other Sister         Balance disorder    HIV Maternal Grandmother        Meds/Allergies  "      Current Outpatient Medications:     cyanocobalamin (VITAMIN B-12) 1000 MCG tablet    famotidine (PEPCID) 20 mg tablet    ferrous sulfate 324 (65 Fe) mg    levETIRAcetam (KEPPRA) 750 mg tablet    omeprazole (PriLOSEC) 20 mg delayed release capsule    tacrolimus (PROTOPIC) 0.1 % ointment    triamcinolone (KENALOG) 0.1 % cream    acetaminophen (TYLENOL) 325 mg tablet    Acetaminophen Extra Strength 500 MG tablet    Erenumab-aooe (Aimovig) 70 MG/ML SOAJ    lacosamide (Vimpat) 100 mg tablet    ondansetron (ZOFRAN-ODT) 4 mg disintegrating tablet    oxyCODONE-acetaminophen (Percocet) 5-325 mg per tablet    Tranexamic Acid 650 MG TABS    Allergies   Allergen Reactions    Morphine Anaphylaxis and Itching           Objective     Blood pressure 100/62, pulse 73, resp. rate 18, height 5' 9.02\" (1.753 m), weight 77.6 kg (171 lb), last menstrual period 09/14/2023, SpO2 98%. Body mass index is 25.24 kg/m².      PHYSICAL EXAM:      General Appearance:   Alert, cooperative, no distress   HEENT:   Normocephalic, atraumatic, anicteric     Neck:  Supple, symmetrical, trachea midline   Lungs:   Clear to auscultation bilaterally; no rales, rhonchi or wheezing; respirations unlabored    Heart::   Regular rate and rhythm; no murmur, rub, or gallop.   Abdomen:   Soft, non-tender, non-distended; normal bowel sounds; no masses, no organomegaly    Genitalia:   Deferred    Rectal:   Deferred    Extremities:  No cyanosis, clubbing or edema    Pulses:  2+ and symmetric    Skin:  No jaundice, rashes, or lesions    Lymph nodes:  No palpable cervical lymphadenopathy        Lab Results:   No visits with results within 1 Day(s) from this visit.   Latest known visit with results is:   Admission on 01/22/2024, Discharged on 01/22/2024   Component Date Value    EXT Preg Test, Ur 01/22/2024 Negative     Control 01/22/2024 Valid     Color, UA 01/22/2024 Yellow     Clarity, UA 01/22/2024 Clear     pH, UA 01/22/2024 6.0     Leukocytes, UA 01/22/2024 " Negative     Nitrite, UA 01/22/2024 Negative     Protein, UA 01/22/2024 Trace (A)     Glucose, UA 01/22/2024 Negative     Ketones, UA 01/22/2024 Negative     Urobilinogen, UA 01/22/2024 0.2     Bilirubin, UA 01/22/2024 Negative     Occult Blood, UA 01/22/2024 Small (A)     Specific Gravity, UA 01/22/2024 >=1.030     RBC, UA 01/22/2024 2-4 (A)     WBC, UA 01/22/2024 2-4 (A)     Epithelial Cells 01/22/2024 Occasional     Bacteria, UA 01/22/2024 None Seen     MUCUS THREADS 01/22/2024 Innumerable (A)     Hyaline Casts, UA 01/22/2024 3-5 (A)     Amorphous Crystals, UA 01/22/2024 Moderate     WBC 01/22/2024 4.53     RBC 01/22/2024 4.59     Hemoglobin 01/22/2024 13.6     Hematocrit 01/22/2024 39.5     MCV 01/22/2024 86     MCH 01/22/2024 29.6     MCHC 01/22/2024 34.4     RDW 01/22/2024 12.5     MPV 01/22/2024 12.6     Platelets 01/22/2024 182     nRBC 01/22/2024 0     Neutrophils Relative 01/22/2024 55     Immature Grans % 01/22/2024 0     Lymphocytes Relative 01/22/2024 34     Monocytes Relative 01/22/2024 11     Eosinophils Relative 01/22/2024 0     Basophils Relative 01/22/2024 0     Neutrophils Absolute 01/22/2024 2.45     Absolute Immature Grans 01/22/2024 0.01     Absolute Lymphocytes 01/22/2024 1.53     Absolute Monocytes 01/22/2024 0.50     Eosinophils Absolute 01/22/2024 0.02     Basophils Absolute 01/22/2024 0.02     Sodium 01/22/2024 140     Potassium 01/22/2024 3.9     Chloride 01/22/2024 108     CO2 01/22/2024 25     ANION GAP 01/22/2024 7     BUN 01/22/2024 15     Creatinine 01/22/2024 0.56 (L)     Glucose 01/22/2024 91     Calcium 01/22/2024 9.6     AST 01/22/2024 16     ALT 01/22/2024 9     Alkaline Phosphatase 01/22/2024 63     Total Protein 01/22/2024 7.1     Albumin 01/22/2024 4.2     Total Bilirubin 01/22/2024 0.64     eGFR 01/22/2024 116     Total CK 01/22/2024 251 (H)     Amph/Meth UR 01/22/2024 Negative     Barbiturate Ur 01/22/2024 Negative     Benzodiazepine Urine 01/22/2024 Negative     Cocaine  Urine 01/22/2024 Negative     Methadone Urine 01/22/2024 Negative     Opiate Urine 01/22/2024 Negative     PCP Ur 01/22/2024 Negative     THC Urine 01/22/2024 Negative     Oxycodone Urine 01/22/2024 Negative     SARS-CoV-2 01/22/2024 Negative     INFLUENZA A PCR 01/22/2024 Negative     INFLUENZA B PCR 01/22/2024 Negative     RSV PCR 01/22/2024 Negative     Levetiracetam Lvl 01/22/2024 13.3          Radiology Results:   No results found.

## 2024-03-14 ENCOUNTER — APPOINTMENT (EMERGENCY)
Dept: CT IMAGING | Facility: HOSPITAL | Age: 44
End: 2024-03-14
Payer: COMMERCIAL

## 2024-03-14 ENCOUNTER — APPOINTMENT (EMERGENCY)
Dept: RADIOLOGY | Facility: HOSPITAL | Age: 44
End: 2024-03-14
Payer: COMMERCIAL

## 2024-03-14 ENCOUNTER — HOSPITAL ENCOUNTER (EMERGENCY)
Facility: HOSPITAL | Age: 44
Discharge: HOME | End: 2024-03-14
Attending: EMERGENCY MEDICINE
Payer: COMMERCIAL

## 2024-03-14 VITALS
HEART RATE: 77 BPM | RESPIRATION RATE: 20 BRPM | OXYGEN SATURATION: 99 % | DIASTOLIC BLOOD PRESSURE: 71 MMHG | TEMPERATURE: 97.8 F | SYSTOLIC BLOOD PRESSURE: 142 MMHG
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
## 2024-03-14 DIAGNOSIS — S80.819A ABRASION OF LEG: ICD-10-CM

## 2024-03-14 PROCEDURE — 90715 TDAP VACCINE 7 YRS/> IM: CPT

## 2024-03-14 PROCEDURE — 96374 THER/PROPH/DIAG INJ IV PUSH: CPT

## 2024-03-14 PROCEDURE — 70450 CT HEAD/BRAIN W/O DYE: CPT

## 2024-03-14 PROCEDURE — 99285 EMERGENCY DEPT VISIT HI MDM: CPT

## 2024-03-14 PROCEDURE — 72125 CT NECK SPINE W/O DYE: CPT

## 2024-03-14 PROCEDURE — 99284 EMERGENCY DEPT VISIT MOD MDM: CPT

## 2024-03-14 PROCEDURE — 73590 X-RAY EXAM OF LOWER LEG: CPT

## 2024-03-14 PROCEDURE — 73564 X-RAY EXAM KNEE 4 OR MORE: CPT

## 2024-03-14 PROCEDURE — 90471 IMMUNIZATION ADMIN: CPT

## 2024-03-14 PROCEDURE — 73502 X-RAY EXAM HIP UNI 2-3 VIEWS: CPT

## 2024-03-14 RX ORDER — KETOROLAC TROMETHAMINE 30 MG/ML
15 INJECTION, SOLUTION INTRAMUSCULAR; INTRAVENOUS ONCE
Status: COMPLETED | OUTPATIENT
Start: 2024-03-14 | End: 2024-03-14

## 2024-03-14 RX ORDER — ACETAMINOPHEN 325 MG/1
650 TABLET ORAL ONCE
Status: COMPLETED | OUTPATIENT
Start: 2024-03-14 | End: 2024-03-14

## 2024-03-14 RX ADMIN — KETOROLAC TROMETHAMINE 15 MG: 30 INJECTION, SOLUTION INTRAMUSCULAR; INTRAVENOUS at 17:39

## 2024-03-14 RX ADMIN — ACETAMINOPHEN 650 MG: 325 TABLET ORAL at 19:16

## 2024-03-14 RX ADMIN — TETANUS TOXOID, REDUCED DIPHTHERIA TOXOID AND ACELLULAR PERTUSSIS VACCINE, ADSORBED 0.5 ML: 5; 2.5; 8; 8; 2.5 SUSPENSION INTRAMUSCULAR at 18:12

## 2024-03-14 NOTE — DISCHARGE INSTRUCTIONS
Your workup here was not concerning for anything dangerous. Therefore there is no need for you to stay at the hospital for further testing. We feel safe to send you home.    You can use Tylenol for management of your symptoms.    You should follow up with your PCP to assess for resolution of your symptoms and to determine if there is any further evaluation that needs to be performed.    Return to the emergency department if you have any worsening symptoms.    Thank you for choosing [facility] for your care!

## 2024-03-14 NOTE — ED PROVIDER NOTES
Chief Complaint   Patient presents with    Fall     Patient became dizzy and fell striking her head , complaining of rt sided head, right leg, and right glute pain.      History of Present Illness and Review of Systems   This is a 43 y.o. female with PMH significant for spinal cerebellar degeneration coming in today with complaint of fall.  The patient's  assists with providing history.  He states that she was walking with a walker and suffered a fall, tripping on the carpet and fell onto her right side.  She is currently stating she has pain in her head, neck, right lower leg and left hip.  Denies any LOC or seizure-like episodes.  She states she is feeling dizzy, with symptoms of vertiginous symptoms however the  states this is ongoing for her and is related to her spinocerebellar degeneration.  No chest pain, shortness of breath, abdominal pain.  No fevers or infectious symptoms.  Denies any nausea vomiting diaphoresis or difficulties speaking or swallowing.  She normally ambulates with a walker at baseline.  No anticoagulant or antiplatelet use.  No other symptoms currently.    Chart review notable for following with neurology at for her cerebellar ataxia.  States that she has ambulatory dysfunction at baseline, refuses to walk with a walker and is at high risk for frequent falls.    - No language barrier.     No other complaints for this encounter.    Remainder of ROS Reviewed and Non-Pertinent    Past Medical, Past Surgical History:    has a past medical history of Anemia, B12 deficiency, Chronic migraine without aura, Dermatographism, Endometriosis, GERD (gastroesophageal reflux disease), History of blood transfusion, Iron deficiency anemia, Lyme neuropathy, Seizure disorder (HCC), Seizures (HCC), 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HCC), Visual disturbance, and Vitamin D deficiency.   has a past surgical history that includes  section; Oophorectomy; Tubal ligation; Esophagogastroduodenoscopy  (N/A, 3/8/2019); IR drainage tube placement (4/23/2023); IR drainage tube check/change/reposition/reinsertion/upsize (5/8/2023); pr laps supracrv hysterect 250 gm/< rmvl tube/ovar (N/A, 9/26/2023); and pr laparoscopy w/rmvl adnexal structures (Right, 9/26/2023).     Allergies:     Allergies   Allergen Reactions    Morphine Anaphylaxis and Itching       Social and Family History:     Social History     Substance and Sexual Activity   Alcohol Use Never     Social History     Tobacco Use   Smoking Status Never   Smokeless Tobacco Never     Social History     Substance and Sexual Activity   Drug Use No       Physical Examination     Vitals:    03/14/24 1727   BP: 142/71   BP Location: Left arm   Pulse: 77   Resp: 20   Temp: 97.8 °F (36.6 °C)   TempSrc: Temporal   SpO2: 99%       Physical Exam  Vitals and nursing note reviewed.   Constitutional:       General: She is not in acute distress.     Appearance: She is well-developed.   HENT:      Head: Normocephalic and atraumatic.   Eyes:      Conjunctiva/sclera: Conjunctivae normal.   Cardiovascular:      Rate and Rhythm: Normal rate and regular rhythm.      Heart sounds: No murmur heard.  Pulmonary:      Effort: Pulmonary effort is normal. No respiratory distress.      Breath sounds: Normal breath sounds.   Abdominal:      Palpations: Abdomen is soft.      Tenderness: There is no abdominal tenderness.   Musculoskeletal:         General: Tenderness present. No swelling.      Cervical back: Neck supple.      Comments: R anterior leg TTP with no evidence of deformity or bony protrusion, abrasion noted as well, bleeding controlled, compartments soft, TTP of the L hip as well, no deformity, remainder of secondary survey negative   Skin:     General: Skin is warm and dry.      Capillary Refill: Capillary refill takes less than 2 seconds.   Neurological:      Mental Status: She is alert.   Psychiatric:         Mood and Affect: Mood normal.           Procedures    Procedures      MDM:   Medical Decision Making  Reny Espinoza is a 43 y.o. who presents with complaints of fall    Vital signs are stable, physical exam shows the patient shows that the head is atraumatic, C-spine is negative however mild tenderness palpation along the right trapezius muscle border, tenderness to palpation of the right anterior lower leg with notable abrasion, as well as of the left hip, there is no evidence of deformity, active bleeding, heart sounds are regular lungs clear to auscultation, no abdominal tenderness, and appears at her baseline neurostatus    Ddx: We will evaluate for traumatic injuries including skull fracture versus intracranial hemorrhage versus C-spine injury as well as orthopedic injuries to her right lower leg as well as her left hip.  She has an abrasion which will not require repair other than basic wound care.  Will update her tetanus as well.  Her dizziness sounds chronic in nature related to her spinocerebellar degeneration when discussing with her .  Additionally her fall sounds mechanical in nature.  No indication for further workup for this at this time.    Plan: Workup will include CT head, C-spine, plain films, pain control, tetanus. Will monitor closely and reassess.    Reassessment/Disposition: Workup ultimately unremarkable for any acute traumatic abnormality.  Patient appeared at her baseline health status, no indication for further workup or imaging at this time.  Ultimately discharged without complication.        Amount and/or Complexity of Data Reviewed  Radiology: ordered and independent interpretation performed. Decision-making details documented in ED Course.    Risk  OTC drugs.  Prescription drug management.        - Reviewed relevant past office visits/hospitalizations/procedures  -Obtained pertinent history that influenced decision making from the family    ED Course as of 03/14/24 2237   Thu Mar 14, 2024   1925 CT head without contrast   1943  Imaging negative. Pt appears at baseline. Will send home with f/u precautions      Final Dispo   Final Diagnosis:  1. Fall, initial encounter    2. Abrasion of leg      Time reflects when diagnosis was documented in both MDM as applicable and the Disposition within this note       Time User Action Codes Description Comment    3/14/2024  7:43 PM Gorge Yeager Add [W19.XXXA] Fall, initial encounter     3/14/2024  7:43 PM Gorge Yeager Add [S80.819A] Abrasion of leg           ED Disposition       ED Disposition   Discharge    Condition   Stable    Date/Time   Thu Mar 14, 2024  7:43 PM    Comment   Reny Espinoza discharge to home/self care.                   Follow-up Information       Follow up With Specialties Details Why Contact Info Additional Information    Malachi Guaman MD Family Medicine Call   [address]   [address]  [place] [address] [phone]       [facility] Emergency Medicine  If symptoms worsen [address] [address] [phone] [facility], [address], [place] [address]          Medications   ketorolac (TORADOL) injection 15 mg (15 mg Intravenous Given 3/14/24 1739)   tetanus-diphtheria-acellular pertussis (BOOSTRIX) IM injection 0.5 mL (0.5 mL Intramuscular Given 3/14/24 1812)   acetaminophen (TYLENOL) tablet 650 mg (650 mg Oral Given 3/14/24 1916)       Risk Stratification Tools                Orders Placed This Encounter   Procedures    CT head without contrast    CT cervical spine without contrast    XR knee 4+ views Right injury    XR tibia fibula 2 views RIGHT    XR hip/pelv 2-3 vws left       Labs:   Labs Reviewed - No data to display    Imaging:     XR knee 4+ views Right injury   ED Interpretation by Gorge Yeager MD (03/14 1818)   No acute osseous abnormality      XR tibia fibula 2 views RIGHT   ED Interpretation by Gorge Perez  "MD Toy (03/14 1818)   No acute osseous abnormality      XR hip/pelv 2-3 vws left   ED Interpretation by Gorge Yeager MD (03/14 1818)   No acute osseous abnormality      CT head without contrast   Final Result by Arnaldo Lehman DO (03/14 1924)   No acute intracranial abnormality.                  Workstation performed: [id number]         CT cervical spine without contrast   Final Result by Arnaldo Lehman DO (03/14 1933)   No cervical spine fracture or traumatic malalignment.                  Workstation performed: [id number]            All details of the evaluation and treatment plan were made clear and additionally all questions and concerns were addressed while under my care.    Portions of the record may have been created with voice recognition software. Occasional wrong word or \"sound a like\" substitutions may have occurred due to the inherent limitations of voice recognition software. Read the chart carefully and recognize, using context, where substitutions have occurred.    The attending physician physically available and evaluated the above patient alongside myself.      Gorge Yeager MD  03/14/24 2237    "

## 2024-03-15 NOTE — ED ATTENDING ATTESTATION
3/14/2024  I, Rodriguez Bah DO, saw and evaluated the patient. I have discussed the patient with the resident/non-physician practitioner and agree with the resident's/non-physician practitioner's findings, Plan of Care, and MDM as documented in the resident's/non-physician practitioner's note, except where noted. All available labs and Radiology studies were reviewed.  I was present for key portions of any procedure(s) performed by the resident/non-physician practitioner and I was immediately available to provide assistance.       At this point I agree with the current assessment done in the Emergency Department.  I have conducted an independent evaluation of this patient a history and physical is as follows:    Patient with history of seizures, falls presents after a fall.  Right-sided head injury, extremity injury, paraspinal tenderness.  CT scans viewed and interpreted independent by me, no acute disease appreciated.  Patient stable, neurologically intact after discharge.    ED Course         Critical Care Time  Procedures

## 2024-04-01 ENCOUNTER — HOSPITAL ENCOUNTER (OUTPATIENT)
Dept: RADIOLOGY | Facility: HOSPITAL | Age: 44
Discharge: HOME | End: 2024-04-01
Payer: COMMERCIAL

## 2024-04-01 DIAGNOSIS — R68.81 EARLY SATIETY: ICD-10-CM

## 2024-04-01 DIAGNOSIS — R11.0 NAUSEA: ICD-10-CM

## 2024-04-01 PROCEDURE — 74183 MRI ABD W/O CNTR FLWD CNTR: CPT

## 2024-04-01 PROCEDURE — A9585 GADOBUTROL INJECTION: HCPCS | Performed by: PHYSICIAN ASSISTANT

## 2024-04-01 RX ORDER — GADOBUTROL 604.72 MG/ML
7 INJECTION INTRAVENOUS
Status: COMPLETED | OUTPATIENT
Start: 2024-04-01 | End: 2024-04-01

## 2024-04-01 RX ADMIN — GADOBUTROL 7 ML: 604.72 INJECTION INTRAVENOUS at 05:36

## 2024-04-08 ENCOUNTER — TELEPHONE (OUTPATIENT)
Age: 44
End: 2024-04-08

## 2024-04-08 DIAGNOSIS — K86.2 PANCREATIC CYST: Primary | ICD-10-CM

## 2024-04-08 NOTE — TELEPHONE ENCOUNTER
Patients GI provider:  Dr. Arana    Number to return call: [phone]    Reason for call: Zaynab from radiology calling with significant findings of MRI ABDOMEN W WO CONTRAST AND MRCP. Ready to read in Epic.     Scheduled procedure/appointment date if applicable: N/A

## 2024-04-09 ENCOUNTER — TELEPHONE (OUTPATIENT)
Age: 44
End: 2024-04-09

## 2024-04-09 NOTE — TELEPHONE ENCOUNTER
----- Message from Edie Aceves PA-C sent at 4/8/2024 12:16 PM EDT -----  Please inform patient (she is Bulgarian speaking) that the pancreatic body cyst measures 1.7 x 1.3 x 1.0 cm, it has been slowly increasing in size in retrospect measuring approximately 1.0 x 0.8 x 0.6 cm when compared as far back as November 2017. Repeat MRI recommended in 6 months.  I would also like her to have an endoscopic ultrasound in [place] for further evaluation of the pancreatic cyst given its size and slow growth.  I will send a message to the advanced endoscopy team. Thank you!

## 2024-04-11 ENCOUNTER — HOSPITAL ENCOUNTER (OUTPATIENT)
Dept: NON INVASIVE DIAGNOSTICS | Facility: CLINIC | Age: 44
Discharge: HOME | End: 2024-04-11
Payer: COMMERCIAL

## 2024-04-11 ENCOUNTER — TELEPHONE (OUTPATIENT)
Age: 44
End: 2024-04-11

## 2024-04-11 DIAGNOSIS — R11.0 NAUSEA: ICD-10-CM

## 2024-04-11 DIAGNOSIS — R68.81 EARLY SATIETY: ICD-10-CM

## 2024-04-11 PROCEDURE — A9541 TC99M SULFUR COLLOID: HCPCS

## 2024-04-11 PROCEDURE — 78264 GASTRIC EMPTYING IMG STUDY: CPT

## 2024-04-11 NOTE — TELEPHONE ENCOUNTER
----- Message from Edie Aceves PA-C sent at 4/11/2024  2:25 PM EDT -----  Please inform patient that the gastric emptying study came back abnormal showing delayed gastric emptying (gastroparesis).  Recommend small, frequent low fat meals. Note: she is Hebrew speaking.  Please mail her the gastroparesis diet. We could also use a medication called Reglan if needed in the future (she should have a follow up after the EUS for her enlarging pancreatic cyst noted in another message). Thank you.

## 2024-04-24 ENCOUNTER — PREP FOR PROCEDURE (OUTPATIENT)
Dept: GASTROENTEROLOGY | Facility: CLINIC | Age: 44
End: 2024-04-24

## 2024-04-24 ENCOUNTER — TELEPHONE (OUTPATIENT)
Age: 44
End: 2024-04-24

## 2024-04-24 ENCOUNTER — OFFICE VISIT (OUTPATIENT)
Age: 44
End: 2024-04-24
Payer: COMMERCIAL

## 2024-04-24 VITALS
HEIGHT: 69 IN | OXYGEN SATURATION: 98 % | HEART RATE: 87 BPM | SYSTOLIC BLOOD PRESSURE: 118 MMHG | WEIGHT: 171 LBS | DIASTOLIC BLOOD PRESSURE: 80 MMHG | BODY MASS INDEX: 25.33 KG/M2

## 2024-04-24 DIAGNOSIS — K44.9 HIATAL HERNIA: ICD-10-CM

## 2024-04-24 DIAGNOSIS — K21.9 GASTROESOPHAGEAL REFLUX DISEASE WITHOUT ESOPHAGITIS: ICD-10-CM

## 2024-04-24 DIAGNOSIS — K86.2 PANCREATIC CYST: ICD-10-CM

## 2024-04-24 DIAGNOSIS — K86.2 PANCREATIC CYST: Primary | ICD-10-CM

## 2024-04-24 DIAGNOSIS — K31.84 GASTROPARESIS: Primary | ICD-10-CM

## 2024-04-24 PROCEDURE — 99214 OFFICE O/P EST MOD 30 MIN: CPT | Performed by: PHYSICIAN ASSISTANT

## 2024-04-24 RX ORDER — ALCOHOL ANTISEPTIC PADS
PADS, MEDICATED (EA) TOPICAL
COMMUNITY
Start: 2024-03-30

## 2024-04-24 RX ORDER — CONTAINER,EMPTY
EACH MISCELLANEOUS
COMMUNITY
Start: 2024-03-30

## 2024-04-24 RX ORDER — DUPILUMAB 300 MG/2ML
INJECTION, SOLUTION SUBCUTANEOUS
COMMUNITY
Start: 2024-04-22

## 2024-04-24 NOTE — PROGRESS NOTES
[facility] - Outpatient Follow-up Note  Reny Espinoza 43 y.o. female MRN: [id number]  Encounter: [id number]          ASSESSMENT AND PLAN:      1. Pancreatic cyst  2. Gastroesophageal reflux disease  3. Hiatal hernia  4. Gastroparesis    Patient presents for follow up.  She reports issues with ongoing nausea, reflux, and early satiety.  EGD in September of 2023 showed grade A esophagitis and a a large HH, gastric polyp; biopsies negative for h pylori.  She has a history of a 9mm pancreatic cyst on CT Scan a year ago which requires follow up surveillance.  Colonoscopy in September of 2023 was normal.  UGI series 1/26/24 showed a small type 1 sliding HH and moderate GERD.  Esophageal manometry 1/30/2024 was normal.  24 hr pH 2/7/24 showed evidence of GERD but it was not in the pathological range.  Gastric emptying study 4/11/24 showed delayed gastric emptying with a T-1/2 of 167 minutes.  MRI abdomen with and without contrast 4/1/24 showed a pancreatic body cyst measuring 1.7x1.3x1cm which was been slowly growing in retrospect measuring 1x0.8x0.6cm in 11/2017.     Will refer for EUS for further evaluation of the enlarging pancreatic cystic lesion.  Continue the Omeprazole 20mg po BID course.  Small, frequent low fat meals for her gastroparesis.  Note: We cannot use Reglan given her seizure disorder.  ______________________________________________________________________    SUBJECTIVE:  Patient is a pleasant 43 year old female who presents to the office for follow up.  She has had issues with reflux, nausea, and early satiety. No family history of pancreatic cancer.  She previously saw surgery regarding her hiatal hernia.      REVIEW OF SYSTEMS IS OTHERWISE NEGATIVE.      Historical Information   Past Medical History:   Diagnosis Date    Anemia     B12 deficiency     Chronic migraine without aura     Dermatographism     Endometriosis     GERD (gastroesophageal reflux disease)     History of  blood transfusion     Iron deficiency anemia     Lyme neuropathy     Seizure disorder (HCC)     Seizures (HCC)     last seizure 23    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HCC)     Visual disturbance     Diplopia, nystagmus, decreased visual acuity    Vitamin D deficiency      Past Surgical History:   Procedure Laterality Date     SECTION      ESOPHAGOGASTRODUODENOSCOPY N/A 3/8/2019    Procedure: ESOPHAGOGASTRODUODENOSCOPY (EGD);  Surgeon: Prashanth Mendoza MD;  Location: [facility] GI LAB;  Service: Gastroenterology    IR DRAINAGE TUBE CHECK/CHANGE/REPOSITION/REINSERTION/UPSIZE  2023    IR DRAINAGE TUBE PLACEMENT  2023    OOPHORECTOMY      unilateral --  last assessed 17    ND LAPAROSCOPY W/RMVL ADNEXAL STRUCTURES Right 2023    Procedure: LAP OOPHORECTOMY;  Surgeon: Dc Pantoja DO;  [facility]: [facility];  Service: Gynecology    ND LAPS SUPRACRV HYSTERECT 250 GM/< RMVL TUBE/OVAR N/A 2023    Procedure: LSH, LYSIS OF ADHESIONS;  Surgeon: Dc Pantoja DO;  Location: [facility];  Service: Gynecology    TUBAL LIGATION      last assessed 17     Social History   Social History     Substance and Sexual Activity   Alcohol Use Never     Social History     Substance and Sexual Activity   Drug Use No     Social History     Tobacco Use   Smoking Status Never   Smokeless Tobacco Never     Family History   Problem Relation Age of Onset    Other Mother         AIDS, Brain Tumor balance disorder    Seizures Mother     Alcohol abuse Father     Other Sister         Balance disorder    HIV Maternal Grandmother        Meds/Allergies       Current Outpatient Medications:     cyanocobalamin (VITAMIN B-12) 1000 MCG tablet    Dupixent 300 MG/2ML SOPN    famotidine (PEPCID) 20 mg tablet    ferrous sulfate 324 (65 Fe) mg    levETIRAcetam (KEPPRA) 750 mg tablet    omeprazole (PriLOSEC) 20 mg delayed release capsule    Sharps Container (BD Phlebotomy Sharps ) MISC    tacrolimus (PROTOPIC) 0.1 %  "ointment    triamcinolone (KENALOG) 0.1 % cream    UltiCare Alcohol Swabs 70 % PADS    acetaminophen (TYLENOL) 325 mg tablet    Acetaminophen Extra Strength 500 MG tablet    Erenumab-aooe (Aimovig) 70 MG/ML SOAJ    lacosamide (Vimpat) 100 mg tablet    ondansetron (ZOFRAN-ODT) 4 mg disintegrating tablet    oxyCODONE-acetaminophen (Percocet) 5-325 mg per tablet    Tranexamic Acid 650 MG TABS    Allergies   Allergen Reactions    Morphine Anaphylaxis and Itching           Objective     Blood pressure 118/80, pulse 87, height 5' 9\" (1.753 m), weight 77.6 kg (171 lb), last menstrual period 09/14/2023, SpO2 98%. Body mass index is 25.25 kg/m².      PHYSICAL EXAM:      General Appearance:   Alert, cooperative, no distress   HEENT:   Normocephalic, atraumatic, anicteric.     Neck:  Supple, symmetrical, trachea midline   Lungs:   Clear to auscultation bilaterally; no rales, rhonchi or wheezing; respirations unlabored    Heart::   Regular rate and rhythm; no murmur, rub, or gallop.   Abdomen:   Soft, non-tender, non-distended; normal bowel sounds; no masses, no organomegaly    Genitalia:   Deferred    Rectal:   Deferred    Extremities:  No cyanosis, clubbing or edema    Pulses:  2+ and symmetric    Skin:  No jaundice, rashes, or lesions    Lymph nodes:  No palpable cervical lymphadenopathy        Lab Results:   No visits with results within 1 Day(s) from this visit.   Latest known visit with results is:   Admission on 01/22/2024, Discharged on 01/22/2024   Component Date Value    EXT Preg Test, Ur 01/22/2024 Negative     Control 01/22/2024 Valid     Color, UA 01/22/2024 Yellow     Clarity, UA 01/22/2024 Clear     pH, UA 01/22/2024 6.0     Leukocytes, UA 01/22/2024 Negative     Nitrite, UA 01/22/2024 Negative     Protein, UA 01/22/2024 Trace (A)     Glucose, UA 01/22/2024 Negative     Ketones, UA 01/22/2024 Negative     Urobilinogen, UA 01/22/2024 0.2     Bilirubin, UA 01/22/2024 Negative     Occult Blood, UA 01/22/2024 Small " (A)     Specific Gravity, UA 01/22/2024 >=1.030     RBC, UA 01/22/2024 2-4 (A)     WBC, UA 01/22/2024 2-4 (A)     Epithelial Cells 01/22/2024 Occasional     Bacteria, UA 01/22/2024 None Seen     MUCUS THREADS 01/22/2024 Innumerable (A)     Hyaline Casts, UA 01/22/2024 3-5 (A)     Amorphous Crystals, UA 01/22/2024 Moderate     WBC 01/22/2024 4.53     RBC 01/22/2024 4.59     Hemoglobin 01/22/2024 13.6     Hematocrit 01/22/2024 39.5     MCV 01/22/2024 86     MCH 01/22/2024 29.6     MCHC 01/22/2024 34.4     RDW 01/22/2024 12.5     MPV 01/22/2024 12.6     Platelets 01/22/2024 182     nRBC 01/22/2024 0     Segmented % 01/22/2024 55     Immature Grans % 01/22/2024 0     Lymphocytes % 01/22/2024 34     Monocytes % 01/22/2024 11     Eosinophils Relative 01/22/2024 0     Basophils Relative 01/22/2024 0     Absolute Neutrophils 01/22/2024 2.45     Absolute Immature Grans 01/22/2024 0.01     Absolute Lymphocytes 01/22/2024 1.53     Absolute Monocytes 01/22/2024 0.50     Eosinophils Absolute 01/22/2024 0.02     Basophils Absolute 01/22/2024 0.02     Sodium 01/22/2024 140     Potassium 01/22/2024 3.9     Chloride 01/22/2024 108     CO2 01/22/2024 25     ANION GAP 01/22/2024 7     BUN 01/22/2024 15     Creatinine 01/22/2024 0.56 (L)     Glucose 01/22/2024 91     Calcium 01/22/2024 9.6     AST 01/22/2024 16     ALT 01/22/2024 9     Alkaline Phosphatase 01/22/2024 63     Total Protein 01/22/2024 7.1     Albumin 01/22/2024 4.2     Total Bilirubin 01/22/2024 0.64     eGFR 01/22/2024 116     Total CK 01/22/2024 251 (H)     Amph/Meth UR 01/22/2024 Negative     Barbiturate Ur 01/22/2024 Negative     Benzodiazepine Urine 01/22/2024 Negative     Cocaine Urine 01/22/2024 Negative     Methadone Urine 01/22/2024 Negative     Opiate Urine 01/22/2024 Negative     PCP Ur 01/22/2024 Negative     THC Urine 01/22/2024 Negative     Oxycodone Urine 01/22/2024 Negative     SARS-CoV-2 01/22/2024 Negative     INFLUENZA A PCR 01/22/2024 Negative      INFLUENZA B PCR 01/22/2024 Negative     RSV PCR 01/22/2024 Negative     Levetiracetam Lvl 01/22/2024 13.3          Radiology Results:   NM gastric emptying    Result Date: 4/11/2024  Narrative: GASTRIC EMPTYING STUDY INDICATION: R11.0: Nausea R68.81: Early satiety COMPARISON:  None available TECHNIQUE:   The study was performed following the oral administration of 1.1 mCi Tc-99m sulfur colloid combined with scrambled eggs, as part of a standard meal. (Patient did cough up to 1 bite of a prior to first image). Following the meal, one minute anterior and posterior images were obtained immediately and at 0.25 hours, 0.5 hour, 1 hour, 1.5 hour, 2 hour, 3 hour and 4 hour intervals from the time of ingestion.  Geometric mean analyses were then performed. FINDINGS: Gastric emptying at 0.5 hours = 9% (N < 30%) Gastric emptying at 1 hour = 18% (N = 10 - 70%) Gastric emptying at 2 hours = 36% (N = > 40%) Gastric emptying at 3 hours = 54% (N = > 70%) Gastric emptying at 4 hours = 72% (N = >90%) Linear T-1/2 = 167 minutes     Impression: Delayed gastric emptying Workstation performed: [id number]     MRI abdomen w wo contrast and mrcp    Result Date: 4/8/2024  Narrative: MRI OF THE ABDOMEN WITH AND WITHOUT CONTRAST WITH MRCP INDICATION: 43 years / Female. R11.0: Nausea R68.81: Early satiety. Pancreatic cyst COMPARISON: Multiple prior examinations including most recent CT of abdomen and pelvis performed April 21, 2023 TECHNIQUE: Multiplanar/multisequence MRI of the abdomen with 3D MRCP was performed before and after administration of contrast. Some image quality degradation related to unavoidable respiratory motion artifact especially on postcontrast phases. IV Contrast: 7 mL of Gadobutrol injection (SINGLE-DOSE) FINDINGS: LOWER CHEST: Unremarkable. LIVER: Normal in size and configuration. No suspicious mass. Patent hepatic and portal veins. BILE DUCTS: No intrahepatic or extrahepatic bile duct dilation. Common bile duct is  "normal in caliber. No choledocholithiasis, biliary stricture or suspicious mass. GALLBLADDER: Normal PANCREAS: Pancreatic body cyst, 1.7 x 1.3 x 1.0 cm on image 77 of series 7 and image 23 of series 6. When compared across modalities to CT this is similar having measured 1.6 x 1.1 x 0.9 cm when measured using similar technique on April 21, 2023, but the  lesion has been slowly increasing in size in retrospect measuring approximately 1.0 x 0.8 x 0.6 cm when compared as far back as November 2017. Within the limitations of this examination there is no associated pathologic postcontrast enhancement. No main  branch pancreatic ductal enlargement seen. ADRENAL GLANDS: Unremarkable. SPLEEN: Normal. KIDNEYS/PROXIMAL URETERS: No hydroureteronephrosis. No suspicious renal mass. BOWEL: No dilated loops of bowel. PERITONEUM/RETROPERITONEUM: No ascites. LYMPH NODES: No abdominal lymphadenopathy. VESSELS: No aneurysm. ABDOMINAL WALL: Unremarkable BONES: No suspicious osseous lesion.     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 Based on institutional consensus and radiology literature in this 43-year-old patient recommendation is for next MRI/MRCP with and without intravenous contrast in 6 months from this examination. This examination was marked \"significant notification\" in Epic in order to begin the standard process by which the radiology reading room liaison alerts the referring practitioner. Workstation performed: [id number]   "

## 2024-06-10 NOTE — ASSESSMENT & PLAN NOTE
Continue emgality injection q30 days, no s/e and decreased migraines by >50% 
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
LMP/First Trimester Sonogram

## 2024-06-13 ENCOUNTER — TELEPHONE (OUTPATIENT)
Age: 44
End: 2024-06-13

## 2024-06-13 DIAGNOSIS — D50.9 IRON DEFICIENCY ANEMIA, UNSPECIFIED IRON DEFICIENCY ANEMIA TYPE: ICD-10-CM

## 2024-06-13 DIAGNOSIS — G31.9 CEREBELLAR ATROPHY (HCC): ICD-10-CM

## 2024-06-13 RX ORDER — FERROUS SULFATE 324(65)MG
324 TABLET, DELAYED RELEASE (ENTERIC COATED) ORAL DAILY
Qty: 90 TABLET | Refills: 0 | Status: SHIPPED | OUTPATIENT
Start: 2024-06-13

## 2024-06-13 NOTE — TELEPHONE ENCOUNTER
PT call about her Iron refill and does not know the name. PT left the empty bottle in [place] and for got what the name is. I do not know that name can we or the PT the Iron  medication thank you.

## 2024-06-13 NOTE — TELEPHONE ENCOUNTER
Medication: cyanocobalamin (VITAMIN B-12)     Dose/Frequency: 1000 MCG    Quantity: 90 Tablet     [id number] [phone]     Office:   [x] PCP/Provider -   [] Speciality/Provider -     Does the patient have enough for 3 days?   [] Yes   [x] No - Send as HP to POD

## 2024-06-26 ENCOUNTER — ANESTHESIA EVENT (OUTPATIENT)
Dept: GASTROENTEROLOGY | Facility: HOSPITAL | Age: 44
End: 2024-06-26

## 2024-06-26 ENCOUNTER — ANESTHESIA (OUTPATIENT)
Dept: GASTROENTEROLOGY | Facility: HOSPITAL | Age: 44
End: 2024-06-26

## 2024-06-26 ENCOUNTER — HOSPITAL ENCOUNTER (OUTPATIENT)
Dept: GASTROENTEROLOGY | Facility: HOSPITAL | Age: 44
Setting detail: OUTPATIENT SURGERY
Discharge: HOME | End: 2024-06-26
Attending: INTERNAL MEDICINE
Payer: COMMERCIAL

## 2024-06-26 VITALS
RESPIRATION RATE: 18 BRPM | DIASTOLIC BLOOD PRESSURE: 65 MMHG | HEART RATE: 69 BPM | TEMPERATURE: 97.2 F | SYSTOLIC BLOOD PRESSURE: 115 MMHG | OXYGEN SATURATION: 100 %

## 2024-06-26 DIAGNOSIS — K86.2 PANCREATIC CYST: ICD-10-CM

## 2024-06-26 LAB
ANION GAP SERPL CALCULATED.3IONS-SCNC: 5 MMOL/L (ref 4–13)
BUN SERPL-MCNC: 8 MG/DL (ref 5–25)
CALCIUM SERPL-MCNC: 9.6 MG/DL (ref 8.4–10.2)
CHLORIDE SERPL-SCNC: 106 MMOL/L (ref 96–108)
CO2 SERPL-SCNC: 28 MMOL/L (ref 21–32)
CREAT SERPL-MCNC: 0.57 MG/DL (ref 0.6–1.3)
GFR SERPL CREATININE-BSD FRML MDRD: 115 ML/MIN/1.73SQ M
GLUCOSE P FAST SERPL-MCNC: 92 MG/DL (ref 65–99)
GLUCOSE SERPL-MCNC: 92 MG/DL (ref 65–140)
POTASSIUM SERPL-SCNC: 4 MMOL/L (ref 3.5–5.3)
SODIUM SERPL-SCNC: 139 MMOL/L (ref 135–147)

## 2024-06-26 PROCEDURE — 43259 EGD US EXAM DUODENUM/JEJUNUM: CPT | Performed by: INTERNAL MEDICINE

## 2024-06-26 PROCEDURE — 80048 BASIC METABOLIC PNL TOTAL CA: CPT | Performed by: STUDENT IN AN ORGANIZED HEALTH CARE EDUCATION/TRAINING PROGRAM

## 2024-06-26 RX ORDER — PROPOFOL 10 MG/ML
INJECTION, EMULSION INTRAVENOUS AS NEEDED
Status: DISCONTINUED | OUTPATIENT
Start: 2024-06-26 | End: 2024-06-26

## 2024-06-26 RX ORDER — POTASSIUM CITRATE AND CITRIC ACID MONOHYDRATE 1100; 334 MG/5ML; MG/5ML
SOLUTION ORAL
COMMUNITY

## 2024-06-26 RX ORDER — PROPOFOL 10 MG/ML
INJECTION, EMULSION INTRAVENOUS CONTINUOUS PRN
Status: DISCONTINUED | OUTPATIENT
Start: 2024-06-26 | End: 2024-06-26

## 2024-06-26 RX ORDER — LIDOCAINE HYDROCHLORIDE 10 MG/ML
INJECTION, SOLUTION EPIDURAL; INFILTRATION; INTRACAUDAL; PERINEURAL AS NEEDED
Status: DISCONTINUED | OUTPATIENT
Start: 2024-06-26 | End: 2024-06-26

## 2024-06-26 RX ORDER — SODIUM CHLORIDE 9 MG/ML
INJECTION, SOLUTION INTRAVENOUS CONTINUOUS PRN
Status: DISCONTINUED | OUTPATIENT
Start: 2024-06-26 | End: 2024-06-26

## 2024-06-26 RX ADMIN — LIDOCAINE HYDROCHLORIDE 90 MG: 10 INJECTION, SOLUTION EPIDURAL; INFILTRATION; INTRACAUDAL; PERINEURAL at 15:04

## 2024-06-26 RX ADMIN — PROPOFOL 50 MG: 10 INJECTION, EMULSION INTRAVENOUS at 15:09

## 2024-06-26 RX ADMIN — SODIUM CHLORIDE: 9 INJECTION, SOLUTION INTRAVENOUS at 15:00

## 2024-06-26 RX ADMIN — PROPOFOL 150 MCG/KG/MIN: 10 INJECTION, EMULSION INTRAVENOUS at 15:05

## 2024-06-26 RX ADMIN — PROPOFOL 130 MG: 10 INJECTION, EMULSION INTRAVENOUS at 15:04

## 2024-06-26 NOTE — ANESTHESIA POSTPROCEDURE EVALUATION
Post-Op Assessment Note    CV Status:  Stable  Pain Score: 0    Pain management: adequate       Mental Status:  Sleepy   Hydration Status:  Stable   PONV Controlled:  None   Airway Patency:  Patent     Post Op Vitals Reviewed: Yes    No anethesia notable event occurred.    Staff: ROB             BP   99/58 (73)   Temp      Pulse  83   Resp   18   SpO2   97% on RA

## 2024-06-26 NOTE — ANESTHESIA PREPROCEDURE EVALUATION
Procedure:  ENDOSCOPIC ULTRASOUND (UPPER)    - Started taking OTC potassium supplement twice daily, 75 mg BID. Feels like this has helped with seizure prevention  - Took AM Keppra and Vimpat. Last seizure was ~3 months ago per . Generalized tonic-clonic    Relevant Problems   ANESTHESIA (within normal limits)      CARDIO   (+) Chronic migraine without aura without status migrainosus, not intractable   (+) Migraines   (+) Precordial pain      GI/HEPATIC   (+) Gastroesophageal reflux disease with esophagitis without hemorrhage   (+) Pancreatic abnormality      HEMATOLOGY   (+) Acute on chronic anemia   (+) Iron deficiency anemia      NEURO/PSYCH   (+) Chronic migraine without aura without status migrainosus, not intractable   (+) Migraines   (+) Seizure (HCC)   (+) Seizure disorder (HCC)   (+) Transient diplopia        Physical Exam    Airway    Mallampati score: II  TM Distance: >3 FB  Neck ROM: full     Dental   No notable dental hx     Cardiovascular  Cardiovascular exam normal    Pulmonary  Pulmonary exam normal     Other Findings  post-pubertal.      Anesthesia Plan  ASA Score- 2     Anesthesia Type- IV sedation with anesthesia with ASA Monitors.         Additional Monitors:     Airway Plan:            Plan Factors-Exercise tolerance (METS): >4 METS.    Chart reviewed. EKG reviewed. Imaging results reviewed. Existing labs reviewed. Patient summary reviewed.    Patient is not a current smoker.      Obstructive sleep apnea risk education given perioperatively.        Induction- intravenous.    Postoperative Plan-         Informed Consent- Anesthetic plan and risks discussed with patient.  I personally reviewed this patient with the CRNA. Discussed and agreed on the Anesthesia Plan with the CRNA..

## 2024-06-26 NOTE — H&P
History and Physical -  Gastroenterology Specialists  Reny Espinoza 43 y.o. female MRN: [id number]    HPI: Reny Espinoza is a 43 y.o. year old female who presents with pancreatic cyst      Review of Systems    Historical Information   Past Medical History:   Diagnosis Date    Anemia     B12 deficiency     Chronic migraine without aura     Dermatographism     Endometriosis     GERD (gastroesophageal reflux disease)     History of blood transfusion     Iron deficiency anemia     Lyme neuropathy     Seizure disorder (HCC)     Seizures (HCC)     last seizure 23    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HCC)     Visual disturbance     Diplopia, nystagmus, decreased visual acuity    Vitamin D deficiency      Past Surgical History:   Procedure Laterality Date     SECTION      ESOPHAGOGASTRODUODENOSCOPY N/A 3/8/2019    Procedure: ESOPHAGOGASTRODUODENOSCOPY (EGD);  Surgeon: Prashanth Mendoza MD;  Location: [facility] GI LAB;  Service: Gastroenterology    IR DRAINAGE TUBE CHECK/CHANGE/REPOSITION/REINSERTION/UPSIZE  2023    IR DRAINAGE TUBE PLACEMENT  2023    OOPHORECTOMY      unilateral --  last assessed 17    ND LAPAROSCOPY W/RMVL ADNEXAL STRUCTURES Right 2023    Procedure: LAP OOPHORECTOMY;  Surgeon: Dc Pantoja DO;  Location: [facility] OR;  Service: Gynecology    ND LAPS SUPRACRV HYSTERECT 250 GM/< RMVL TUBE/OVAR N/A 2023    Procedure: LSH, LYSIS OF ADHESIONS;  Surgeon: Dc Pantoja DO;  Location[facility];  Service: Gynecology    TUBAL LIGATION      last assessed 17     Social History   Social History     Substance and Sexual Activity   Alcohol Use Never     Social History     Substance and Sexual Activity   Drug Use No     Social History     Tobacco Use   Smoking Status Never   Smokeless Tobacco Never     Family History   Problem Relation Age of Onset    Other Mother         AIDS, Brain Tumor balance disorder    Seizures Mother     Alcohol abuse Father     Other Sister          Balance disorder    HIV Maternal Grandmother        Meds/Allergies     Not in a hospital admission.    Allergies   Allergen Reactions    Morphine Anaphylaxis and Itching       Objective     /69   Pulse 74   Temp (!) 96.1 °F (35.6 °C)   Resp 18   LMP 09/14/2023 (Approximate)   SpO2 99%       PHYSICAL EXAM    Gen: NAD  CV: RRR  CHEST: Clear  ABD: soft, NT/ND  EXT: no edema  Neuro: AAO      ASSESSMENT/PLAN:  This is a 43 y.o. year old female here for eus for pancreatic cyst.     PLAN:   Procedure: eus

## 2024-07-10 ENCOUNTER — OFFICE VISIT (OUTPATIENT)
Dept: FAMILY MEDICINE CLINIC | Facility: CLINIC | Age: 44
End: 2024-07-10
Payer: COMMERCIAL

## 2024-07-10 VITALS
BODY MASS INDEX: 24.02 KG/M2 | HEIGHT: 69 IN | WEIGHT: 162.2 LBS | TEMPERATURE: 97.2 F | HEART RATE: 74 BPM | OXYGEN SATURATION: 98 % | DIASTOLIC BLOOD PRESSURE: 57 MMHG | RESPIRATION RATE: 16 BRPM | SYSTOLIC BLOOD PRESSURE: 113 MMHG

## 2024-07-10 DIAGNOSIS — R27.0 ATAXIA: ICD-10-CM

## 2024-07-10 DIAGNOSIS — Z13.1 SCREENING FOR DIABETES MELLITUS: ICD-10-CM

## 2024-07-10 DIAGNOSIS — Z11.59 NEED FOR HEPATITIS C SCREENING TEST: ICD-10-CM

## 2024-07-10 DIAGNOSIS — G31.9 CEREBELLAR ATROPHY (HCC): ICD-10-CM

## 2024-07-10 DIAGNOSIS — Z11.4 SCREENING FOR HIV (HUMAN IMMUNODEFICIENCY VIRUS): ICD-10-CM

## 2024-07-10 DIAGNOSIS — E04.1 NODULAR THYROID DISEASE: ICD-10-CM

## 2024-07-10 DIAGNOSIS — E55.9 VITAMIN D DEFICIENCY: ICD-10-CM

## 2024-07-10 DIAGNOSIS — Z12.4 SCREENING FOR CERVICAL CANCER: ICD-10-CM

## 2024-07-10 DIAGNOSIS — Z00.00 ANNUAL PHYSICAL EXAM: Primary | ICD-10-CM

## 2024-07-10 DIAGNOSIS — J30.1 NON-SEASONAL ALLERGIC RHINITIS DUE TO POLLEN: ICD-10-CM

## 2024-07-10 DIAGNOSIS — Z12.31 ENCOUNTER FOR SCREENING MAMMOGRAM FOR BREAST CANCER: ICD-10-CM

## 2024-07-10 DIAGNOSIS — H54.7 POOR VISION: ICD-10-CM

## 2024-07-10 PROCEDURE — 99396 PREV VISIT EST AGE 40-64: CPT | Performed by: FAMILY MEDICINE

## 2024-07-10 RX ORDER — LEVOCETIRIZINE DIHYDROCHLORIDE 5 MG/1
5 TABLET, FILM COATED ORAL EVERY EVENING
Qty: 30 TABLET | Refills: 2 | Status: SHIPPED | OUTPATIENT
Start: 2024-07-10

## 2024-07-10 NOTE — PATIENT INSTRUCTIONS
"Patient Education     Routine physical for adults   The Basics   Written by the doctors and editors at [facility]   What is a physical? -- A physical is a routine visit, or \"check-up,\" with your doctor. You might also hear it called a \"wellness visit\" or \"preventive visit.\"  During each visit, the doctor will:   Ask about your physical and mental health   Ask about your habits, behaviors, and lifestyle   Do an exam   Give you vaccines if needed   Talk to you about any medicines you take   Give advice about your health   Answer your questions  Getting regular check-ups is an important part of taking care of your health. It can help your doctor find and treat any problems you have. But it's also important for preventing health problems.  A routine physical is different from a \"sick visit.\" A sick visit is when you see a doctor because of a health concern or problem. Since physicals are scheduled ahead of time, you can think about what you want to ask the doctor.  How often should I get a physical? -- It depends on your age and health. In general, for people age 21 years and older:   If you are younger than 50 years, you might be able to get a physical every 3 years.   If you are 50 years or older, your doctor might recommend a physical every year.  If you have an ongoing health condition, like diabetes or high blood pressure, your doctor will probably want to see you more often.  What happens during a physical? -- In general, each visit will include:   Physical exam - The doctor or nurse will check your height, weight, heart rate, and blood pressure. They will also look at your eyes and ears. They will ask about how you are feeling and whether you have any symptoms that bother you.   Medicines - It's a good idea to bring a list of all the medicines you take to each doctor visit. Your doctor will talk to you about your medicines and answer any questions. Tell them if you are having any side effects that bother you. You " "should also tell them if you are having trouble paying for any of your medicines.   Habits and behaviors - This includes:   Your diet   Your exercise habits   Whether you smoke, drink alcohol, or use drugs   Whether you are sexually active   Whether you feel safe at home  Your doctor will talk to you about things you can do to improve your health and lower your risk of health problems. They will also offer help and support. For example, if you want to quit smoking, they can give you advice and might prescribe medicines. If you want to improve your diet or get more physical activity, they can help you with this, too.   Lab tests, if needed - The tests you get will depend on your age and situation. For example, your doctor might want to check your:   Cholesterol   Blood sugar   Iron level   Vaccines - The recommended vaccines will depend on your age, health, and what vaccines you already had. Vaccines are very important because they can prevent certain serious or deadly infections.   Discussion of screening - \"Screening\" means checking for diseases or other health problems before they cause symptoms. Your doctor can recommend screening based on your age, risk, and preferences. This might include tests to check for:   Cancer, such as breast, prostate, cervical, ovarian, colorectal, prostate, lung, or skin cancer   Sexually transmitted infections, such as chlamydia and gonorrhea   Mental health conditions like depression and anxiety  Your doctor will talk to you about the different types of screening tests. They can help you decide which screenings to have. They can also explain what the results might mean.   Answering questions - The physical is a good time to ask the doctor or nurse questions about your health. If needed, they can refer you to other doctors or specialists, too.  Adults older than 65 years often need other care, too. As you get older, your doctor will talk to you about:   How to prevent falling at " home   Hearing or vision tests   Memory testing   How to take your medicines safely   Making sure that you have the help and support you need at home  All topics are updated as new evidence becomes available and our peer review process is complete.  This topic retrieved from [employer] on: May 02, 2024.  Topic 634787 Version 1.0  Release: 32.4.3 - C32.122  © 2024 UpToDate, Inc. and/or its affiliates. All rights reserved.  Consumer Information Use and Disclaimer   Disclaimer: This generalized information is a limited summary of diagnosis, treatment, and/or medication information. It is not meant to be comprehensive and should be used as a tool to help the user understand and/or assess potential diagnostic and treatment options. It does NOT include all information about conditions, treatments, medications, side effects, or risks that may apply to a specific patient. It is not intended to be medical advice or a substitute for the medical advice, diagnosis, or treatment of a health care provider based on the health care provider's examination and assessment of a patient's specific and unique circumstances. Patients must speak with a health care provider for complete information about their health, medical questions, and treatment options, including any risks or benefits regarding use of medications. This information does not endorse any treatments or medications as safe, effective, or approved for treating a specific patient. UpToDate, Inc. and its affiliates disclaim any warranty or liability relating to this information or the use thereof.The use of this information is governed by the Terms of Use, available at https://www.[employer].com/en/know/clinical-effectiveness-terms. 2024© UpToDate, Inc. and its affiliates and/or licensors. All rights reserved.  Copyright   © 2024 UpToDate, Inc. and/or its affiliates. All rights reserved.

## 2024-07-10 NOTE — PROGRESS NOTES
Adult Annual Physical  Name: Reny Espinoza      : 1980      MRN: [id number]  Encounter Provider: Malachi Guaman MD  Encounter Date: 7/10/2024   Encounter department: [facility]    Assessment & Plan   1. Annual physical exam  -     Lipid panel; Future; Expected date: 07/10/2024  -     CBC and differential; Future; Expected date: 07/10/2024  -     Comprehensive metabolic panel; Future; Expected date: 07/10/2024  -     UA w Reflex to Microscopic w Reflex to Culture; Future  2. Non-seasonal allergic rhinitis due to pollen  -     Allergy Evaluation , [place]; Future  -     levocetirizine (XYZAL) 5 MG tablet; Take 1 tablet (5 mg total) by mouth every evening  3. Nodular thyroid disease  -     US thyroid; Future; Expected date: 07/10/2024  4. Poor vision  -     Ambulatory Referral to Ophthalmology; Future  5. Vitamin D deficiency  -     Vitamin D 25 hydroxy; Future; Expected date: 07/10/2024  6. Ataxia  -     TSH, 3rd generation with Free T4 reflex; Future; Expected date: 07/10/2024  7. 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 (HCC)  -     cyanocobalamin (VITAMIN B-12) 1000 MCG tablet; Take 1 tablet (1,000 mcg total) by mouth daily  8. Encounter for screening mammogram for breast cancer  -     Mammo screening bilateral w 3d & cad; Future  9. Screening for cervical cancer  -     Ambulatory referral to Obstetrics / Gynecology; Future  10. Screening for diabetes mellitus  -     Hemoglobin A1C; Future; Expected date: 07/10/2024  11. Need for hepatitis C screening test  -     Hepatitis C antibody; Future; Expected date: 07/10/2024  12. Screening for HIV (human immunodeficiency virus)  -     HIV 1/2 AB/AG w Reflex [facility] for 2 yr old and above; Future; Expected date: 07/10/2024      Regarding her annual physical patient is given age and diagnosis appropriate evaluation and care.  Patient is ordered the above blood work and urine which will also include screening for diabetes, hepatitis C and HIV as discussed  with patient and with her permission.  Patient is sent for mammogram.  Patient sent to her GYN for her Pap smear/annual.  Patient advised to continue her supplements and vitamins.  Regarding her allergies, discussed with patient.  Patient education.  Patient describes Xyzal above.  Patient is also ordered the allergy panel with the next blood work.  Regarding her nodular thyroid patient will get a thyroid sonogram and we will TFTs with her next blood work.  Regarding her poor vision, discussed with patient and .  Patient will get the above blood work and urine.  Patient patient is referred to ophthalmology for this as well.  And will follow-up with her neurologist 2.  Regarding her vitamin D deficiency patient advised to supplement.  Will check her blood work as well.  Regarding her ataxia/cerebellar atrophy and seizure history, patient sees neurology and will follow-up.  Will check the above blood work as well.  RTO 1 year for annual physical and do the blood work and urine before.  Patient can see me prior to that for anything else in between.          Immunizations and preventive care screenings were discussed with patient today. Appropriate education was printed on patient's after visit summary.    Counseling:  Alcohol/drug use: discussed moderation in alcohol intake, the recommendations for healthy alcohol use, and avoidance of illicit drug use.  Dental Health: discussed importance of regular tooth brushing, flossing, and dental visits.  Injury prevention: discussed safety/seat belts, safety helmets, smoke detectors, carbon dioxide detectors, and smoking near bedding or upholstery.  Sexual health: discussed sexually transmitted diseases, partner selection, use of condoms, avoidance of unintended pregnancy, and contraceptive alternatives.  Exercise: the importance of regular exercise/physical activity was discussed. Recommend exercise 3-5 times per week for at least 30 minutes.       Depression Screening  and Follow-up Plan: Patient was screened for depression during today's encounter. They screened negative with a PHQ-2 score of 0.        History of Present Illness     Adult Annual Physical:  Patient presents for annual physical. 43-year-old female here for her annual physical.  Patient also would like to be evaluated for allergies.  Patient sees GI for her GERD as well as a pancreatic cyst.  Patient had an MRI of the abdomen back in April of this year which showed to be slightly enlarged and she has a follow-up MRI in 6 months after that already ordered by them.  Patient also had a gastric emptying study which showed delayed gastric emptying.  Both of these results were shared/discussed by me with the patient and her  as per their request.  Is also followed up and treated by her neurologist for her ataxia/cerebral atrophy/seizure history.  Patient denies tobacco alcohol or drugs.  Patient denies pregnancy.  Patient does have poor vision for distance..     Diet and Physical Activity:  - Diet/Nutrition: well balanced diet.  - Exercise: no formal exercise and walking.    Depression Screening:  - PHQ-2 Score: 0    General Health:  - Sleep: sleeps well.  - Hearing: normal hearing bilateral ears.  - Vision: vision problems.  - Dental: regular dental visits.    /GYN Health:  - Follows with GYN: yes.   - Menopause: premenopausal.   - History of STDs: no    Advanced Care Planning:  - Has an advanced directive?: no    - Has a durable medical POA?: no    - ACP document given to patient?: no      Review of Systems   Constitutional:  Negative for activity change, appetite change, chills, fatigue, fever and unexpected weight change.   HENT:  Positive for postnasal drip. Negative for congestion, hearing loss and sore throat.    Eyes:  Negative for visual disturbance.   Respiratory:  Negative for cough and shortness of breath.    Cardiovascular:  Negative for chest pain and palpitations.   Gastrointestinal:  Negative for  "abdominal pain, blood in stool, constipation, diarrhea, nausea and vomiting.   Genitourinary:  Negative for dysuria and hematuria.   Musculoskeletal:  Positive for gait problem.   Skin:  Negative for rash.   Neurological:  Positive for seizures. Negative for dizziness and headaches.   Psychiatric/Behavioral:  Negative for dysphoric mood. The patient is not nervous/anxious.          Objective     /57 (BP Location: Left arm, Patient Position: Sitting, Cuff Size: Adult)   Pulse 74   Temp (!) 97.2 °F (36.2 °C) (Temporal)   Resp 16   Ht 5' 9\" (1.753 m)   Wt 73.6 kg (162 lb 3.2 oz)   LMP 09/14/2023 (Approximate)   SpO2 98%   BMI 23.95 kg/m²     Physical Exam  Vitals reviewed.   Constitutional:       General: She is not in acute distress.     Appearance: Normal appearance. She is normal weight. She is not ill-appearing.   HENT:      Head: Normocephalic and atraumatic.      Right Ear: Tympanic membrane, ear canal and external ear normal.      Left Ear: Tympanic membrane, ear canal and external ear normal.      Mouth/Throat:      Mouth: Mucous membranes are moist.      Pharynx: Oropharynx is clear.   Eyes:      Extraocular Movements: Extraocular movements intact.      Conjunctiva/sclera: Conjunctivae normal.   Neck:      Vascular: No carotid bruit.      Comments: Thyroid slightly nodular on exam  Cardiovascular:      Rate and Rhythm: Normal rate and regular rhythm.   Pulmonary:      Effort: Pulmonary effort is normal.      Breath sounds: Normal breath sounds.   Abdominal:      General: Bowel sounds are normal.      Palpations: Abdomen is soft.      Tenderness: There is no abdominal tenderness. There is no right CVA tenderness or left CVA tenderness.   Musculoskeletal:      Right lower leg: No edema.      Left lower leg: No edema.      Comments: No calf tenderness bilateral.   Lymphadenopathy:      Cervical: No cervical adenopathy.   Skin:     General: Skin is warm.   Neurological:      Mental Status: She is " alert and oriented to person, place, and time. Mental status is at baseline.   Psychiatric:         Mood and Affect: Mood normal.         Behavior: Behavior normal.         Thought Content: Thought content normal.

## 2024-07-15 ENCOUNTER — OFFICE VISIT (OUTPATIENT)
Dept: OBGYN CLINIC | Facility: CLINIC | Age: 44
End: 2024-07-15
Payer: COMMERCIAL

## 2024-07-15 VITALS
BODY MASS INDEX: 23.99 KG/M2 | WEIGHT: 162 LBS | HEIGHT: 69 IN | OXYGEN SATURATION: 98 % | HEART RATE: 94 BPM | DIASTOLIC BLOOD PRESSURE: 70 MMHG | SYSTOLIC BLOOD PRESSURE: 124 MMHG

## 2024-07-15 DIAGNOSIS — Z12.31 ENCOUNTER FOR SCREENING MAMMOGRAM FOR BREAST CANCER: ICD-10-CM

## 2024-07-15 DIAGNOSIS — Z12.4 CERVICAL CANCER SCREENING: ICD-10-CM

## 2024-07-15 DIAGNOSIS — Z01.419 ENCNTR FOR GYN EXAM (GENERAL) (ROUTINE) W/O ABN FINDINGS: Primary | ICD-10-CM

## 2024-07-15 DIAGNOSIS — N89.8 VAGINAL ODOR: ICD-10-CM

## 2024-07-15 DIAGNOSIS — Z12.4 SCREENING FOR CERVICAL CANCER: ICD-10-CM

## 2024-07-15 DIAGNOSIS — Z90.711 S/P LAPAROSCOPIC SUPRACERVICAL HYSTERECTOMY: ICD-10-CM

## 2024-07-15 PROCEDURE — 87660 TRICHOMONAS VAGIN DIR PROBE: CPT | Performed by: OBSTETRICS & GYNECOLOGY

## 2024-07-15 PROCEDURE — 99396 PREV VISIT EST AGE 40-64: CPT | Performed by: OBSTETRICS & GYNECOLOGY

## 2024-07-15 PROCEDURE — G0476 HPV COMBO ASSAY CA SCREEN: HCPCS | Performed by: OBSTETRICS & GYNECOLOGY

## 2024-07-15 PROCEDURE — G0145 SCR C/V CYTO,THINLAYER,RESCR: HCPCS | Performed by: OBSTETRICS & GYNECOLOGY

## 2024-07-15 PROCEDURE — 87510 GARDNER VAG DNA DIR PROBE: CPT | Performed by: OBSTETRICS & GYNECOLOGY

## 2024-07-15 PROCEDURE — 87480 CANDIDA DNA DIR PROBE: CPT | Performed by: OBSTETRICS & GYNECOLOGY

## 2024-07-15 NOTE — PROGRESS NOTES
Assessment        Diagnoses and all orders for this visit:    Encntr for gyn exam (general) (routine) w/o abn findings    Cervical cancer screening  -     Liquid-based pap, screening    Encounter for screening mammogram for breast cancer  -     Mammo screening bilateral w 3d & cad; Future    Screening for cervical cancer  -     Ambulatory referral to Obstetrics / Gynecology    S/P laparoscopic supracervical hysterectomy    Vaginal odor             Plan      All questions answered.  Await pap smear results.  Contraception: status post hysterectomy.  Educational material distributed.  Follow up in 1 year.  Follow up as needed.  Mammogram.  Pap smear.       Subjective      Reny Espinoza is a 43 y.o. female who presents for annual exam.      Chief Complaint   Patient presents with    Gynecologic Exam     Occasional vaginal odor    Laparoscopic Hysterectomy  Right oophorectomy  Lysis of adhesion  Dr. Pantoja 9/26/23    H/o LSO in [place]    Tissue Exam: [id number]  Order: [id number]   Collected 9/26/2023  6:07 PM       Status: Final result       Visible to patient: Yes (not seen)       Dx: Adenomyosis    0 Result Notes      Component    Case Report   Surgical Pathology Report                         Case: [id number]                                   Authorizing Provider:  Dc Pantoja DO      Collected:           09/26/2023 1807               Ordering Location:     [facility]        Received:            09/26/2023 [address] [place] [address] Operating Room                                                     Pathologist:           Ade Aguilar MD                                                       Specimen:    Uterus, Uterus and Right ovary                                                            Final Diagnosis   A. Uterus, right ovary, hysterectomy, right oophorectomy:     - Irregular fragments of uterine and right ovarian tissue (consistent with morcellation),  "representing:               - Secretory phase endometrium; negative for hyperplasia and malignancy               - Benign leiomyomata               - Adenomyosis               - Ovary with endosalpingiosis containing small abscess, and benign follicular and corpus luteum cysts      Electronically signed by Ade Aguilar MD on 10/3/2023 at  9:44 AM   Additional Information    All reported additional testing was performed with appropriately reactive controls.  These tests were developed and their performance characteristics determined by [facility] or appropriate performing facility, though some tests may be performed on tissues which have not been validated for performance characteristics (such as staining performed on alcohol exposed cell blocks and decalcified tissues).  Results should be interpreted with caution and in the context of the patients’ clinical condition. These tests may not be cleared or approved by the U.S. Food and Drug Administration, though the FDA has determined that such clearance or approval is not necessary. These tests are used for clinical purposes and they should not be regarded as investigational or for research. This laboratory has been approved by [facility], designated as a high-complexity laboratory and is qualified to perform these tests.     Interpretation performed at [place] [address]   .   Gross Description    A. The specimen is received in formalin, labeled with the patient's name and hospital number, and is designated \" uterus and right ovary.\"  The specimen consists of a 231 g fragmented uterus and right ovary.  The uterine fragments measure 17.4 x 14.2 x 3.8 cm in aggregate.  The brown shaggy endometrium is up to 0.4 cm thick.  There are intramural, white, whorled nodules that range in size from 0.1 to 0.6 cm.  The ovary is disrupted and upon reconstruction measures 3.3 x 3.2 x 2.4 cm.  There are hemorrhagic areas " measuring up to 0.6 cm.  There is a corpus luteum up to 0.7 cm.  The surface has attached purple-brown tissue, possible fallopian tube, measuring 3.0 x 1.2 x 0.6 cm.  Representative sections are submitted in 14 cassettes.  1-9: Sections of uterus with endometrium  10-11: Sections with intramural nodules  12-13: Sections of ovary  14: Attached purple tissue totally submitted  Note: The estimated total formalin fixation time based upon information provided by the submitting clinician and the standard processing schedule is under 72 hours.  T[facility]vens   Clinical Information    Uterus and right ovary    Patient was admitted for tubo-ovarian abscess given her dilated fallopian tube with multiloculation and ring enhancement.  Patient was started on IV antibiotics and had a drain placed by IR.  He met criteria for sepsis.  She started to clinically improve and her IV antibiotics were shifted to oral once based on cultures from the drain.  She was discharged on p.o. antibiotics and will have an outpatient hysterectomy with bilateral salpingectomy and possible oophorectomy done after her body recovers.    Reny is a 43yo  who presents for persistent RLQ pain. Hx significant for endometriosis, chronic pelvic pain, suspected adenomyosis. Vitals WNL, stable. Non-surgical abdomen. Concern for TOA, receiving IV antibiotic treatment.    Operative Findings:  Dense omental and small bowel adhesions to the anterior abdominal wall as well as to the anterior and fundal walls of the body of the uterus which was approximately 12wks in size. Omental adhesions to the right ovary which was also densely adherent to the pelvic side wall. Surgically absent bilateral fallopian tubes and left ovary. Bladder wall adhesions to the anterior lower uterine segment. Multiple gun-powder endometriotic lesions in the posterior cul-de-sac.      Resulting Agency [facility]              Specimen Collected: 23  6:07 PM Last Resulted: 10/03/23  9:44 AM        Order Details        View Encounter        Lab and Collection Details        Routing        Result History     View All Conversations on this Encounter              Last Pap:   Last mammogram: never    Current contraception: status post hysterectomy  History of abnormal Pap smear: no  History of abnormal mammogram: no  Family history of uterine or ovarian cancer: no  Family history of breast cancer: no  Family history of colon cancer: no    OB History    Para Term  AB Living   3 3 0 0 0 3   SAB IAB Ectopic Multiple Live Births   0 0 0 0 0      # Outcome Date GA Lbr Karthikeyan/2nd Weight Sex Type Anes PTL Lv   3 Para            2 Para            1 Para                Menstrual History:  OB History          3    Para   3    Term                AB        Living   3         SAB        IAB        Ectopic        Multiple        Live Births                    Menarche age: 14  Patient's last menstrual period was 2023 (approximate).             Past Medical History:   Diagnosis Date    Anemia     B12 deficiency     Chronic migraine without aura     Dermatographism     Endometriosis     GERD (gastroesophageal reflux disease)     History of blood transfusion     Iron deficiency anemia     Lyme neuropathy     Seizure disorder (HCC)     Seizures (HCC)     last seizure 23    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HCC)     Visual disturbance     Diplopia, nystagmus, decreased visual acuity    Vitamin D deficiency      Past Surgical History:   Procedure Laterality Date     SECTION      ESOPHAGOGASTRODUODENOSCOPY N/A 3/8/2019    Procedure: ESOPHAGOGASTRODUODENOSCOPY (EGD);  Surgeon: Prashanth Mendoza MD;  Location: [facility];  Service: Gastroenterology    IR DRAINAGE TUBE CHECK/CHANGE/REPOSITION/REINSERTION/UPSIZE  2023    IR DRAINAGE TUBE PLACEMENT  2023    OOPHORECTOMY      unilateral --  last assessed 17    WA LAPAROSCOPY W/RMVL ADNEXAL STRUCTURES Right 2023     Procedure: LAP OOPHORECTOMY;  Surgeon: Dc Pantoja DO;  Location: [facility];  Service: Gynecology    DC LAPS SUPRACRV HYSTERECT 250 GM/< RMVL TUBE/OVAR N/A 9/26/2023    Procedure: LSH, LYSIS OF ADHESIONS;  Surgeon: Dc Pantoja DO;  Location: [facility];  Service: Gynecology    TUBAL LIGATION      last assessed 03/20/17     Family History   Problem Relation Age of Onset    Other Mother         AIDS, Brain Tumor balance disorder    Seizures Mother     Alcohol abuse Father     Other Sister         Balance disorder    HIV Maternal Grandmother        Social History     Tobacco Use    Smoking status: Never    Smokeless tobacco: Never   Vaping Use    Vaping status: Never Used   Substance Use Topics    Alcohol use: Never    Drug use: No          Current Outpatient Medications:     citric acid-potassium citrate (POLYCITRA K) 1,100-334 mg/5 mL solution, Take by mouth 3 (three) times a day with meals (Patient not taking: Reported on 7/10/2024), Disp: , Rfl:     cyanocobalamin (VITAMIN B-12) 1000 MCG tablet, Take 1 tablet (1,000 mcg total) by mouth daily, Disp: 30 tablet, Rfl: 5    ferrous sulfate 324 (65 Fe) mg, Take 1 tablet (324 mg total) by mouth daily, Disp: 90 tablet, Rfl: 0    levETIRAcetam (KEPPRA) 750 mg tablet, Take 2 tablets (1,500 mg total) by mouth 2 (two) times a day, Disp: , Rfl:     levocetirizine (XYZAL) 5 MG tablet, Take 1 tablet (5 mg total) by mouth every evening, Disp: 30 tablet, Rfl: 2    omeprazole (PriLOSEC) 20 mg delayed release capsule, Take 1 capsule (20 mg total) by mouth 2 (two) times a day, Disp: 60 capsule, Rfl: 1    Sharps Container (BD Phlebotomy Sharps ) MISC, USE TO DISPOSE OF NEEDLES, Disp: , Rfl:     tacrolimus (PROTOPIC) 0.1 % ointment, , Disp: , Rfl:     triamcinolone (KENALOG) 0.1 % cream, , Disp: , Rfl:     UltiCare Alcohol Swabs 70 % PADS, USE TO CLEAN INJECTION SITE, Disp: , Rfl:     Allergies   Allergen Reactions    Morphine Anaphylaxis and Itching  "          Review of Systems   Constitutional: Negative.    HENT: Negative.     Eyes: Negative.    Respiratory: Negative.     Cardiovascular: Negative.    Gastrointestinal: Negative.    Endocrine: Negative.    Genitourinary:         As noted in HPI   Musculoskeletal: Negative.    Skin: Negative.    Allergic/Immunologic: Negative.    Neurological: Negative.    Hematological: Negative.    Psychiatric/Behavioral: Negative.         /70   Pulse 94   Ht 5' 9\" (1.753 m)   Wt 73.5 kg (162 lb)   LMP 09/14/2023 (Approximate)   SpO2 98%   BMI 23.92 kg/m²         Physical Exam  Constitutional:       General: She is not in acute distress.     Appearance: She is well-developed.   Genitourinary:      Uterus normal.      No vaginal discharge or bleeding.        Right Adnexa: not tender and no mass present.     Left Adnexa: not tender and no mass present.     No cervical motion tenderness.      Uterus is absent.   Neck:      Thyroid: No thyromegaly.   Cardiovascular:      Rate and Rhythm: Normal rate and regular rhythm.      Pulses: Normal pulses.      Heart sounds: Normal heart sounds.   Pulmonary:      Effort: Pulmonary effort is normal.      Breath sounds: Normal breath sounds.   Abdominal:      General: There is no distension.      Palpations: Abdomen is soft.      Tenderness: There is no abdominal tenderness.   Musculoskeletal:         General: No tenderness or edema.   Neurological:      Mental Status: She is alert and oriented to person, place, and time.   Skin:     General: Skin is warm and dry.   Psychiatric:         Attention and Perception: Attention normal.         Mood and Affect: Mood normal.         Speech: Speech normal.         Behavior: Behavior normal. Behavior is cooperative.   Vitals and nursing note reviewed. Exam conducted with a chaperone present.             Future Appointments   Date Time Provider Department Center   7/11/2025  3:00 PM Malachi Guaman MD [facility]            "

## 2024-07-16 LAB
CANDIDA RRNA VAG QL PROBE: NOT DETECTED
G VAGINALIS RRNA GENITAL QL PROBE: NOT DETECTED
HPV HR 12 DNA CVX QL NAA+PROBE: NEGATIVE
HPV16 DNA CVX QL NAA+PROBE: NEGATIVE
HPV18 DNA CVX QL NAA+PROBE: NEGATIVE
T VAGINALIS RRNA GENITAL QL PROBE: NOT DETECTED

## 2024-07-18 LAB
LAB AP GYN PRIMARY INTERPRETATION: NORMAL
Lab: NORMAL

## 2024-07-25 ENCOUNTER — TELEPHONE (OUTPATIENT)
Age: 44
End: 2024-07-25

## 2024-07-25 NOTE — TELEPHONE ENCOUNTER
Pt is moving to [place] and wants some kind of copy of her medical records. I spoke to Briseyda in the Med Rec Dept. She will reach out to patient with .    I used  Emilia ID [id number]

## 2024-10-15 DIAGNOSIS — G31.9 CEREBELLAR ATROPHY (HCC): ICD-10-CM

## 2024-10-15 DIAGNOSIS — R68.81 EARLY SATIETY: ICD-10-CM

## 2024-10-15 DIAGNOSIS — R11.0 NAUSEA: ICD-10-CM

## 2024-10-15 NOTE — TELEPHONE ENCOUNTER
Reason for call:  Chirag ID [id number] assisted     [x] Refill   [] Prior Auth  [] Other: Patient also requested vitamin B12. Patient made aware that there are remaining refills at the pharmacy. Last ordered 07.10.24 w 5 refills and to contact the pharmacy. Patient verbalized understanding    Office:   [] PCP/Provider -   [x] Specialty/Provider - Dr Guaman    Medication: Omeprazole     Dose/Frequency: 20 mg BID    Quantity: 90D     [facility]: [facility]     Does the patient have enough for 3 days?   [] Yes   [x] No - Send as HP to POD

## 2024-11-19 NOTE — PROGRESS NOTES
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        Start Time: 845  Stop Time: 926  Total time in clinic (min): 41 minutes    Assessment  Assessment details: Patient is a 38 y o  female who reports to outpatient physical therapy with spinocerebellar ataxia  Probable movement impairment diagnosis of gait/balance dysfunction secondary to spinocerebellar ataxia resulting in pathoanatomical symptoms of decreased independence with most activity, poor gait/balance, decreased LE strength/ROM, core/abdominal weakness and limiting ability to stand/transition/walk, perform normal daily activity  Due to patient's need for assistance and poor balance, patient is at an increased risk of falling  Patient would benefit from being evaluated for and fit for proper assistive device and for physical therapy to increase LE strength, improve balance and gait, and increase functional activity and independence  Skilled physical therapy is warranted for the application of the interventions found below in the planned intervention section  Etiologic factors include chronic weakness/balance dysfunction  Prognosis is good given HEP compliance and attendance to physical therapy 2x a week for 8 weeks  Positive prognostic indicators include good motivation  Negative prognostic indicators include chronic weakness  Please contact me if you have any questions or recommendations  Thank you for the referral and the opportunity to share in [address]  Patient verbalized understanding of POC, HEP, and return demonstrated HEP      Impairments: abnormal gait, abnormal or restricted ROM, abnormal movement, activity intolerance, impaired balance, impaired physical strength, lacks appropriate home exercise program, pain with function and weight-bearing intolerance    Plan  Plan details: Re-Evaluation in 4 weeks, follow up with neurological physical therapy in [place]  Planned therapy interventions: manual therapy, neuromuscular re-education, patient education, postural training, therapeutic activities, therapeutic exercise, home exercise program, gait training and balance  Frequency: 2x week  Duration in weeks: 12  Plan of Care beginning date: 9/20/2022  Plan of Care expiration date: 12/20/2022  Treatment plan discussed with: patient        Subjective Evaluation    History of Present Illness  Mechanism of injury: Ryan Rogers presents to outpatient physical therapy with a referral for Spinocerebellar ataxia and gait training  Ryan Rogers reports walking has been difficult for about 4 years; does not think it is gradually decreasing  Has not previously been to physical therapy       HPI/Primary Complaint: difficulty walking, trouble with balance; legs feel weak; legs feel weird but no change in sensation; denies any pain; balance is the same all day, nothing changes it or makes it worse/better; has frequent seizures, about every 3 months, forgets what happens when they occur; will have some pain in legs with walking  AD: has never used assistive device; needs assistance to walk; thinks she may be able to walk with use of cane of other AD  Relevant PMH: 4 yr history; history of seizures  Exercise: no regular exercise  Aggravating factors/limitations: getting out of car, getting up out of chair;    Patient Goals: improve walking/balance;     Social Support  Stairs in house: yes (BL hand rail)   Lives with: adult children and parents          Objective     Strength/Myotome Testing     Left Wrist/Hand      (2nd hand position)     Trial 1: 60    Right Wrist/Hand      (2nd hand position)     Trial 1: 55    Left Hip   Planes of Motion   Flexion: 3-  Extension: 3-  Abduction: 3-    Right Hip   Planes of Motion   Flexion: 3-  Extension: 3-  Abduction: 3-    Left Knee Flexion: 3  Extension: 3    Right Knee   Flexion: 3  Extension: 3    Left Ankle/Foot   Dorsiflexion: 3+  Plantar flexion: 3+    Right Ankle/Foot   Dorsiflexion: 3+  Plantar flexion: 3+    Ambulation   Weight-Bearing Status   Assistive device used: none  Neuro Exam:     Sensation   Light touch LE: left WNL    Coordination   Heel to shin: left WNL and right WNL    Transfers Sit to stand: minimum assist Sit to supine: minimum assist Supine to sit: minimum assist     Functional outcomes   5x sit to stand: 42 (seconds)  TU (seconds)  Functional outcome comment: FT EO: LOB, shakiness, <5"  Feet wide EO: <15"    *fatigued after 5x STS- SOB and LE fatigue    Amputee   Gait comment: Needs assistance for walking; step to gait pattern with ataxia; needs UE support for balance; some freezing episodes; slow gait speed             Precautions: high risk of falls- use gait belt; history of seizures; spinocerebellar ataxia  *needs use of - primarily Nepali speaking   HEP: n/a ()  RE: (10/20)  POC: ()  FOTO: 23 ()    Manuals                                                                 Neuro Re-Ed             Static Balance             Seated balance/trunk control                                                                              Ther Ex             Nustep                                                                                                        Ther Activity             Fwd walking  Bwd walking                                                                 Gait Training             Assistive device training                          Modalities yes

## 2024-12-06 NOTE — ASSESSMENT & PLAN NOTE
12/06/24                            Rand Salmon  [address] [facility] [address]    To Whom It May Concern:    This is to certify Rand Salmon was evaluated with Steven Penny PA-C on 12/06/24 and is excused from work on 12/6/24.                         Steven Penny PA-C  [facility]  [address] [facility] [address]  Dept Phone: [phone]       · Patient was scheduled for hysterectomy yesterday  Will need to be rescheduled

## 2025-07-10 ENCOUNTER — RA CDI HCC (OUTPATIENT)
Dept: OTHER | Facility: HOSPITAL | Age: 45
End: 2025-07-10

## 2025-07-10 NOTE — PROGRESS NOTES
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

## (undated) DEVICE — TROCAR: Brand: KII SLEEVE

## (undated) DEVICE — BLUE HEAT SCOPE WARMER

## (undated) DEVICE — SYRINGE 50ML LL

## (undated) DEVICE — DRAPE EQUIPMENT RF WAND

## (undated) DEVICE — TRAY FOLEY 16FR URIMETER SILICONE SURESTEP

## (undated) DEVICE — SUT PLAIN 3-0 PS-1 27 IN 1640H

## (undated) DEVICE — 3000CC GUARDIAN II: Brand: GUARDIAN

## (undated) DEVICE — IV EXTENSION TUBING 33 IN

## (undated) DEVICE — Device

## (undated) DEVICE — TROCAR: Brand: KII FIOS FIRST ENTRY

## (undated) DEVICE — PENCIL ELECTROSURG E-Z CLEAN -0035H

## (undated) DEVICE — PREMIUM DRY TRAY LF: Brand: MEDLINE INDUSTRIES, INC.

## (undated) DEVICE — TROCARS: Brand: KII® OPTICAL ACCESS SYSTEM

## (undated) DEVICE — PLASTIC ADHESIVE BANDAGE: Brand: CURITY

## (undated) DEVICE — INTENDED FOR TISSUE SEPARATION, AND OTHER PROCEDURES THAT REQUIRE A SHARP SURGICAL BLADE TO PUNCTURE OR CUT.: Brand: BARD-PARKER SAFETY BLADES SIZE 11, STERILE

## (undated) DEVICE — MORCELLATOR LAP LINA XCISE 15 MM OBTURATOR CRDLS

## (undated) DEVICE — BETHLEHEM UNIVERSAL GYN LAP PK: Brand: CARDINAL HEALTH

## (undated) DEVICE — [HIGH FLOW INSUFFLATOR,  DO NOT USE IF PACKAGE IS DAMAGED,  KEEP DRY,  KEEP AWAY FROM SUNLIGHT,  PROTECT FROM HEAT AND RADIOACTIVE SOURCES.]: Brand: PNEUMOSURE

## (undated) DEVICE — UTERINE MANIPULATOR RUMI 6.7 X 10 CM

## (undated) DEVICE — INSUFFLATION NEEDLE TO ESTABLISH PNEUMOPERITONEUM.: Brand: INSUFFLATION NEEDLE

## (undated) DEVICE — STRL COTTON TIP APPLCTR 6IN PK: Brand: CARDINAL HEALTH

## (undated) DEVICE — SCD SEQUENTIAL COMPRESSION COMFORT SLEEVE MEDIUM KNEE LENGTH: Brand: KENDALL SCD

## (undated) DEVICE — ELECTRODE LAP J HOOK E-Z CLEAN 33CM-0021

## (undated) DEVICE — IRRIG ENDO FLO TUBING

## (undated) DEVICE — MAYO STAND COVER: Brand: CONVERTORS

## (undated) DEVICE — Device: Brand: TISSUE RETRIEVAL SYSTEM

## (undated) DEVICE — 40595 XL TRENDELENBURG POSITIONING KIT: Brand: 40595 XL TRENDELENBURG POSITIONING KIT

## (undated) DEVICE — M-CLOSE KIT

## (undated) DEVICE — GLOVE PI ULTRA TOUCH SZ.7.5

## (undated) DEVICE — Device: Brand: OLYMPUS

## (undated) DEVICE — LIGASURE LAP SLR/DIV MARYLAND 5MM

## (undated) DEVICE — CHLORAPREP HI-LITE 26ML ORANGE

## (undated) DEVICE — LAPAROSCOPIC SMOKE EVAC TUBING

## (undated) DEVICE — METZENBAUM ADTEC SINGLE USE DISSECTING SCISSORS, SHAFT ONLY, MONOPOLAR, CURVED TO LEFT, WORKING LENGTH: 12 1/4", (310 MM), DIAM. 5 MM, INSULATED, DOUBLE ACTION, STERILE, DISPOSABLE, PACKAGE OF 10 PIECES: Brand: AESCULAP